# Patient Record
Sex: MALE | Race: WHITE | NOT HISPANIC OR LATINO
[De-identification: names, ages, dates, MRNs, and addresses within clinical notes are randomized per-mention and may not be internally consistent; named-entity substitution may affect disease eponyms.]

---

## 2017-02-01 ENCOUNTER — APPOINTMENT (OUTPATIENT)
Dept: ELECTROPHYSIOLOGY | Facility: CLINIC | Age: 82
End: 2017-02-01

## 2017-05-04 ENCOUNTER — APPOINTMENT (OUTPATIENT)
Dept: ELECTROPHYSIOLOGY | Facility: CLINIC | Age: 82
End: 2017-05-04

## 2017-05-04 DIAGNOSIS — I50.9 HEART FAILURE, UNSPECIFIED: ICD-10-CM

## 2017-11-21 ENCOUNTER — APPOINTMENT (OUTPATIENT)
Dept: ELECTROPHYSIOLOGY | Facility: CLINIC | Age: 82
End: 2017-11-21
Payer: MEDICARE

## 2017-11-21 PROCEDURE — 93295 DEV INTERROG REMOTE 1/2/MLT: CPT

## 2017-11-21 PROCEDURE — 93296 REM INTERROG EVL PM/IDS: CPT

## 2018-03-08 ENCOUNTER — APPOINTMENT (OUTPATIENT)
Dept: ELECTROPHYSIOLOGY | Facility: CLINIC | Age: 83
End: 2018-03-08

## 2018-06-24 ENCOUNTER — INPATIENT (INPATIENT)
Facility: HOSPITAL | Age: 83
LOS: 11 days | Discharge: NURSING, REHAB OR LONG-TERM CARE | End: 2018-07-06
Attending: INTERNAL MEDICINE | Admitting: INTERNAL MEDICINE
Payer: MEDICARE

## 2018-06-24 VITALS
DIASTOLIC BLOOD PRESSURE: 60 MMHG | SYSTOLIC BLOOD PRESSURE: 123 MMHG | HEART RATE: 73 BPM | RESPIRATION RATE: 18 BRPM | OXYGEN SATURATION: 97 % | TEMPERATURE: 98 F

## 2018-06-24 DIAGNOSIS — I95.9 HYPOTENSION, UNSPECIFIED: ICD-10-CM

## 2018-06-24 LAB
ALBUMIN SERPL ELPH-MCNC: 3.8 G/DL — SIGNIFICANT CHANGE UP (ref 3.3–5)
ALP SERPL-CCNC: 72 U/L — SIGNIFICANT CHANGE UP (ref 40–120)
ALT FLD-CCNC: 16 U/L — SIGNIFICANT CHANGE UP (ref 4–41)
APPEARANCE UR: CLEAR — SIGNIFICANT CHANGE UP
AST SERPL-CCNC: 22 U/L — SIGNIFICANT CHANGE UP (ref 4–40)
BASE EXCESS BLDV CALC-SCNC: 4 MMOL/L — SIGNIFICANT CHANGE UP
BASOPHILS # BLD AUTO: 0.3 K/UL — HIGH (ref 0–0.2)
BASOPHILS NFR BLD AUTO: 1.2 % — SIGNIFICANT CHANGE UP (ref 0–2)
BASOPHILS NFR SPEC: 0 % — SIGNIFICANT CHANGE UP (ref 0–2)
BILIRUB SERPL-MCNC: 0.6 MG/DL — SIGNIFICANT CHANGE UP (ref 0.2–1.2)
BILIRUB UR-MCNC: NEGATIVE — SIGNIFICANT CHANGE UP
BLOOD GAS VENOUS - CREATININE: 1.27 MG/DL — SIGNIFICANT CHANGE UP (ref 0.5–1.3)
BLOOD UR QL VISUAL: NEGATIVE — SIGNIFICANT CHANGE UP
BUN SERPL-MCNC: 25 MG/DL — HIGH (ref 7–23)
CALCIUM SERPL-MCNC: 8.7 MG/DL — SIGNIFICANT CHANGE UP (ref 8.4–10.5)
CHLORIDE BLDV-SCNC: 97 MMOL/L — SIGNIFICANT CHANGE UP (ref 96–108)
CHLORIDE SERPL-SCNC: 94 MMOL/L — LOW (ref 98–107)
CK MB BLD-MCNC: 2.48 NG/ML — SIGNIFICANT CHANGE UP (ref 1–6.6)
CK MB BLD-MCNC: SIGNIFICANT CHANGE UP (ref 0–2.5)
CK SERPL-CCNC: 27 U/L — LOW (ref 30–200)
CO2 SERPL-SCNC: 26 MMOL/L — SIGNIFICANT CHANGE UP (ref 22–31)
COLOR SPEC: YELLOW — SIGNIFICANT CHANGE UP
CREAT SERPL-MCNC: 1.28 MG/DL — SIGNIFICANT CHANGE UP (ref 0.5–1.3)
ELLIPTOCYTES BLD QL SMEAR: SIGNIFICANT CHANGE UP
EOSINOPHIL # BLD AUTO: 0.12 K/UL — SIGNIFICANT CHANGE UP (ref 0–0.5)
EOSINOPHIL NFR BLD AUTO: 0.5 % — SIGNIFICANT CHANGE UP (ref 0–6)
EOSINOPHIL NFR FLD: 0.9 % — SIGNIFICANT CHANGE UP (ref 0–6)
FERRITIN SERPL-MCNC: 327.1 NG/ML — SIGNIFICANT CHANGE UP (ref 30–400)
GAS PNL BLDV: 138 MMOL/L — SIGNIFICANT CHANGE UP (ref 136–146)
GIANT PLATELETS BLD QL SMEAR: PRESENT — SIGNIFICANT CHANGE UP
GLUCOSE BLDV-MCNC: 196 — HIGH (ref 70–99)
GLUCOSE SERPL-MCNC: 197 MG/DL — HIGH (ref 70–99)
GLUCOSE UR-MCNC: NEGATIVE — SIGNIFICANT CHANGE UP
HCO3 BLDV-SCNC: 26 MMOL/L — SIGNIFICANT CHANGE UP (ref 20–27)
HCT VFR BLD CALC: 33.7 % — LOW (ref 39–50)
HCT VFR BLDV CALC: 35.8 % — LOW (ref 39–51)
HGB BLD-MCNC: 10.9 G/DL — LOW (ref 13–17)
HGB BLDV-MCNC: 11.6 G/DL — LOW (ref 13–17)
HYALINE CASTS # UR AUTO: SIGNIFICANT CHANGE UP (ref 0–?)
IMM GRANULOCYTES # BLD AUTO: 0.05 # — SIGNIFICANT CHANGE UP
IMM GRANULOCYTES NFR BLD AUTO: 0.2 % — SIGNIFICANT CHANGE UP (ref 0–1.5)
IRON SATN MFR SERPL: 295 UG/DL — SIGNIFICANT CHANGE UP (ref 155–535)
IRON SATN MFR SERPL: 74 UG/DL — SIGNIFICANT CHANGE UP (ref 45–165)
KETONES UR-MCNC: NEGATIVE — SIGNIFICANT CHANGE UP
LACTATE BLDV-MCNC: 4.9 MMOL/L — CRITICAL HIGH (ref 0.5–2)
LEUKOCYTE ESTERASE UR-ACNC: NEGATIVE — SIGNIFICANT CHANGE UP
LYMPHOCYTES # BLD AUTO: 16.65 K/UL — HIGH (ref 1–3.3)
LYMPHOCYTES # BLD AUTO: 66.1 % — HIGH (ref 13–44)
LYMPHOCYTES NFR SPEC AUTO: 46.9 % — HIGH (ref 13–44)
MCHC RBC-ENTMCNC: 27.5 PG — SIGNIFICANT CHANGE UP (ref 27–34)
MCHC RBC-ENTMCNC: 32.3 % — SIGNIFICANT CHANGE UP (ref 32–36)
MCV RBC AUTO: 85.1 FL — SIGNIFICANT CHANGE UP (ref 80–100)
MONOCYTES # BLD AUTO: 4.36 K/UL — HIGH (ref 0–0.9)
MONOCYTES NFR BLD AUTO: 17.3 % — HIGH (ref 2–14)
MONOCYTES NFR BLD: 8 % — SIGNIFICANT CHANGE UP (ref 2–9)
MUCOUS THREADS # UR AUTO: SIGNIFICANT CHANGE UP
NEUTROPHIL AB SER-ACNC: 11.5 % — LOW (ref 43–77)
NEUTROPHILS # BLD AUTO: 3.71 K/UL — SIGNIFICANT CHANGE UP (ref 1.8–7.4)
NEUTROPHILS NFR BLD AUTO: 14.7 % — LOW (ref 43–77)
NITRITE UR-MCNC: NEGATIVE — SIGNIFICANT CHANGE UP
NRBC # FLD: 0 — SIGNIFICANT CHANGE UP
PCO2 BLDV: 54 MMHG — HIGH (ref 41–51)
PH BLDV: 7.35 PH — SIGNIFICANT CHANGE UP (ref 7.32–7.43)
PH UR: 6 — SIGNIFICANT CHANGE UP (ref 4.6–8)
PLATELET # BLD AUTO: 168 K/UL — SIGNIFICANT CHANGE UP (ref 150–400)
PLATELET COUNT - ESTIMATE: NORMAL — SIGNIFICANT CHANGE UP
PMV BLD: 10.1 FL — SIGNIFICANT CHANGE UP (ref 7–13)
PO2 BLDV: < 24 MMHG — LOW (ref 35–40)
POIKILOCYTOSIS BLD QL AUTO: SLIGHT — SIGNIFICANT CHANGE UP
POLYCHROMASIA BLD QL SMEAR: SIGNIFICANT CHANGE UP
POTASSIUM BLDV-SCNC: 3.7 MMOL/L — SIGNIFICANT CHANGE UP (ref 3.4–4.5)
POTASSIUM SERPL-MCNC: 4 MMOL/L — SIGNIFICANT CHANGE UP (ref 3.5–5.3)
POTASSIUM SERPL-SCNC: 4 MMOL/L — SIGNIFICANT CHANGE UP (ref 3.5–5.3)
PROT SERPL-MCNC: 7 G/DL — SIGNIFICANT CHANGE UP (ref 6–8.3)
PROT UR-MCNC: NEGATIVE MG/DL — SIGNIFICANT CHANGE UP
RBC # BLD: 3.96 M/UL — LOW (ref 4.2–5.8)
RBC # FLD: 15.3 % — HIGH (ref 10.3–14.5)
SAO2 % BLDV: 19.1 % — LOW (ref 60–85)
SCHISTOCYTES BLD QL AUTO: SLIGHT — SIGNIFICANT CHANGE UP
SMUDGE CELLS # BLD: PRESENT — SIGNIFICANT CHANGE UP
SODIUM SERPL-SCNC: 139 MMOL/L — SIGNIFICANT CHANGE UP (ref 135–145)
SP GR SPEC: 1.01 — SIGNIFICANT CHANGE UP (ref 1–1.04)
TROPONIN T, HIGH SENSITIVITY: 126 NG/L — CRITICAL HIGH (ref ?–14)
TROPONIN T, HIGH SENSITIVITY: 132 NG/L — CRITICAL HIGH (ref ?–14)
UIBC SERPL-MCNC: 221.1 UG/DL — SIGNIFICANT CHANGE UP (ref 110–370)
UROBILINOGEN FLD QL: NORMAL MG/DL — SIGNIFICANT CHANGE UP
VARIANT LYMPHS # BLD: 32.7 % — SIGNIFICANT CHANGE UP
WBC # BLD: 25.19 K/UL — HIGH (ref 3.8–10.5)
WBC # FLD AUTO: 25.19 K/UL — HIGH (ref 3.8–10.5)
WBC UR QL: SIGNIFICANT CHANGE UP (ref 0–?)

## 2018-06-24 PROCEDURE — 71046 X-RAY EXAM CHEST 2 VIEWS: CPT | Mod: 26

## 2018-06-24 PROCEDURE — 99223 1ST HOSP IP/OBS HIGH 75: CPT | Mod: GC

## 2018-06-24 RX ORDER — SODIUM CHLORIDE 9 MG/ML
500 INJECTION INTRAMUSCULAR; INTRAVENOUS; SUBCUTANEOUS ONCE
Qty: 0 | Refills: 0 | Status: COMPLETED | OUTPATIENT
Start: 2018-06-24 | End: 2018-06-24

## 2018-06-24 RX ORDER — FOLIC ACID 0.8 MG
1 TABLET ORAL DAILY
Qty: 0 | Refills: 0 | Status: DISCONTINUED | OUTPATIENT
Start: 2018-06-24 | End: 2018-07-06

## 2018-06-24 RX ORDER — CARVEDILOL PHOSPHATE 80 MG/1
6.25 CAPSULE, EXTENDED RELEASE ORAL EVERY 12 HOURS
Qty: 0 | Refills: 0 | Status: DISCONTINUED | OUTPATIENT
Start: 2018-06-24 | End: 2018-06-27

## 2018-06-24 RX ORDER — OMEGA-3 ACID ETHYL ESTERS 1 G
2 CAPSULE ORAL
Qty: 0 | Refills: 0 | Status: DISCONTINUED | OUTPATIENT
Start: 2018-06-24 | End: 2018-07-06

## 2018-06-24 RX ORDER — SODIUM CHLORIDE 9 MG/ML
1000 INJECTION INTRAMUSCULAR; INTRAVENOUS; SUBCUTANEOUS ONCE
Qty: 0 | Refills: 0 | Status: COMPLETED | OUTPATIENT
Start: 2018-06-24 | End: 2018-06-24

## 2018-06-24 RX ADMIN — SODIUM CHLORIDE 1000 MILLILITER(S): 9 INJECTION INTRAMUSCULAR; INTRAVENOUS; SUBCUTANEOUS at 18:18

## 2018-06-24 RX ADMIN — SODIUM CHLORIDE 500 MILLILITER(S): 9 INJECTION INTRAMUSCULAR; INTRAVENOUS; SUBCUTANEOUS at 16:55

## 2018-06-24 NOTE — H&P ADULT - PMH
Adult Onset Diabetes Mellitus,    ALMI (Anterolateral Wall Myoca    Asymptomatic Chronic Venous Hy    Heart failure    Personal History of Hypertensi    Personal History of Myocardial    S/P Implantation of Automatic

## 2018-06-24 NOTE — H&P ADULT - PROBLEM SELECTOR PLAN 8
Pt on Eliquis for UNKNOWN REASONS - family does not know.   -- on EKG, pt appears to be atrial sensed, ventricular paced with irregular rhythm - likely Afib. Will c/w eliquis. Check A1C   Insulin SS and premeal for now.  Lactic acidosis likely secondary to Metformin use, would recommend discontinuing prior to d/c home

## 2018-06-24 NOTE — H&P ADULT - HISTORY OF PRESENT ILLNESS
89 yo M with PMHx of MI (1980s, no stents), HFrEF (EF unknown) s/p AICD (BiV, [place] [facility] 2003), CVA with minimal residual LLE weakness, HTN, T2DM presenting with complaint of generalized weakness of 3-4 weeks and increasing fatigue. Today, per family, the patient appeared lethargic and not quite himself. Home bp measurement revealed his BP to be in 70s/40s. Per family, his bp has been in 70-80s systolic at home in the morning but improves during the day. Patient was seen by both PMD and cardiologist last week and advised to decrease the dose of some of his medications, however, patient has not done so. Additionally, patient was found to have leukocytosis without any fevers/signs of infection for the past 2-3 months. He was referred to hematologist last week and has appointment tomorrow. Patient denies any recent fevers, chills, nausea, vomiting, abdominal pain, headache, shortness of breath, chest pain. No recent travel. He has been ambulatory with a walker which he needs because of bad knees. Denies lower extremity edema.     In EMS, BP was 102/70. In ED, initial vitals were T97.9F, HR73, /60, RR 18 97% on supplement O2   Patient received 1.5L NS. 89 yo Male with Hx of MI (1980s, no stents), HFrEF (unknown current EF, EF from 2009 30% per the H&P from 6/17/2009) s/p AICD (BiV, [place] [facility] 2003), CVA with minimal residual LLE weakness, HTN, Type 2 DM presenting with complaint of generalized weakness of 3-4 weeks and increasing fatigue. Today, per family, the patient appeared lethargic and not quite himself. Home bp measurement revealed his BP to be in 70s/40s. Per family, his bp has been in 70-80s systolic at home in the morning but improves during the day. Patient was seen by both PMD and cardiologist last week and advised to decrease the dose of some of his medications, however, patient has not done so. Additionally, patient was found to have leukocytosis without any fevers/signs of infection for the past 2-3 months. He was referred to hematologist last week and has appointment tomorrow. Patient denies any recent fevers, chills, nausea, vomiting, abdominal pain, headache, shortness of breath, chest pain. No recent travel. He has been ambulatory with a walker which he needs because of bad knees. Denies lower extremity edema.     In EMS, BP was 102/70. In ED, initial vitals were T97.9F, HR73, /60, RR 18 97% on supplement O2   Patient received 1.5L NS.

## 2018-06-24 NOTE — CONSULT NOTE ADULT - ATTENDING COMMENTS
Resting comfortably. No signs of CHF or acute cardiac decompensation. Suspect type II MI in setting of hypotension. No plans for ischemic evaluation.

## 2018-06-24 NOTE — H&P ADULT - MUSCULOSKELETAL
detailed exam no joint warmth/no calf tenderness/diminished strength/no joint erythema/ROM intact/no joint swelling

## 2018-06-24 NOTE — ED PROVIDER NOTE - CHPI ED SYMPTOMS NEG
no chills/no decreased eating/drinking/no nausea/no dizziness/no numbness/no tingling/no vomiting/no pain/no fever

## 2018-06-24 NOTE — H&P ADULT - ASSESSMENT
88 yo M w pmhx of PMHx of MI (1980s, no stents), HFrEF (EF unknown) s/p AICD (BiV, [place] [facility] 2003), CVA, HTN, T2DM presenting w 4 weeks of weakness found to have persistent leukocytosis in addition to normocytic anemia admitted for hypotension responsive to fluids 89 yo Male with Hx of MI (1980s, no stents), HFrEF (unknown current EF, EF from 2009 30% per the H&P from 6/17/2009) s/p AICD (BiV, [place] [facility] 2003), CVA with minimal residual LLE weakness, HTN, Type 2 DM p/w 4 weeks of generalized weakness, found to have persistent leukocytosis in addition to normocytic anemia admitted for hypotension responsive to fluids in the context of heavy BP control regimen; 89 yo Male with Hx of MI (1980s, no stents), HFrEF (unknown current EF, EF from 2009 30% per the H&P from 6/17/2009) s/p AICD (BiV, [place] [facility] 2003), CVA with minimal residual LLE weakness, HTN, Type 2 DM a/w multifactorial generalized weakness in the context of persistent lymphocytosis, normocytic anemia, hypotension likely 2/2 heavy BP control regimen and possible side-effect of high dose Coreg; 88 yo Male with Hx of MI (1980s, no stents), HFrEF (unknown current EF, EF from 2009 30% per the H&P from 6/17/2009) s/p AICD (BiV, [place] [facility] 2003), CVA with minimal residual LLE weakness, HTN, Type 2 DM a/w multifactorial generalized weakness in the context of persistent lymphocytosis, normocytic anemia, hypotension likely 2/2 heavy BP control regimen and possible side-effect of high dose Coreg; Lymphocytic predominance concerning for CLL given smudge cells vs  lymphoma;

## 2018-06-24 NOTE — ED PROVIDER NOTE - PMH
Abnormal Metabolic State in Di    Abnormal Metabolic State in Di    Abnormal Metabolic State in Di    Acute on Chronic Systolic Hear    Adult Onset Diabetes Mellitus,    ALMI (Anterolateral Wall Myoca    Asymptomatic Chronic Venous Hy    Personal History of Hypertensi    Personal History of Myocardial    S/P Implantation of Automatic

## 2018-06-24 NOTE — H&P ADULT - PROBLEM SELECTOR PLAN 4
Pt not currently in HF exacerbation - lungs clear, no JVP elevation, pt tolerated 1.5L fluids without issues.   Cards consult appreciated, will start coreg at 6.25 bid. Add on ARB when tolerated. Pt not currently in HF exacerbation - lungs clear, no JVP elevation, pt tolerated 1.5L fluids without issues.   Cards consult appreciated, will start coreg at 6.25 bid. Add on ARB when tolerated.  TTE in am   ICD interrogation

## 2018-06-24 NOTE — H&P ADULT - PROBLEM SELECTOR PLAN 3
Unclear etiology, has a lymphocytic predominance concerning for lymphoma. LN not palpable on exam.   Fu blood and urine cultures  Heme consult in am for possible bm biopsy Unclear etiology, has a lymphocytic predominance concerning for lymphoma vs CLL . LN not palpable on exam.   Fu blood and urine cultures  Heme consult in am for possible bm biopsy Unclear etiology, has a lymphocytic predominance concerning for lymphoma vs CLL . LN not palpable on exam.   Fu blood and urine cultures  Heme consult in am for possible BM biopsy Unclear etiology, has a lymphocytic predominance concerning for CLL given smudge cells vs  lymphoma; LN not palpable on exam.   f/u blood and urine cultures  Heme consult in am for flow cytometry

## 2018-06-24 NOTE — ED ADULT NURSE NOTE - OBJECTIVE STATEMENT
pt rec;d in 22, A&Ox3, c/o generalized weakness to both legs at baseline, progressively getting worse. Pt's family states his blood pressure was low earlier today and he nearly passed out, denies LOC, denies fevers/chills, denies chest pain/SOB. Labs sent, NS infusing, EKG in progress

## 2018-06-24 NOTE — H&P ADULT - NSHPPHYSICALEXAM_GEN_ALL_CORE
Vital Signs Last 24 Hrs  T(C): 36.8 (06-24-18 @ 22:46), Max: 37 (06-24-18 @ 17:47)  T(F): 98.2 (06-24-18 @ 22:46), Max: 98.6 (06-24-18 @ 17:47)  HR: 80 (06-24-18 @ 22:46) (70 - 80)  BP: 159/72 (06-24-18 @ 22:46) (123/60 - 159/72)  BP(mean): --  RR: 16 (06-24-18 @ 22:46) (16 - 18)  SpO2: 100% (06-24-18 @ 22:46) (97% - 100%)    GENERAL: NAD  HEAD: Head atraumatic, normocephalic  ENT: EOMI, L eye hazy opacity, partial blindness, conjunctiva and sclera clear, neck supple, no JVD, no erythema or exudates in the oropharynx   CHEST/LUNG: Clear to auscultation bilaterally, no crackles, rhonchi or wheezing   HEART: Regular rate and rhythm, no murmurs, rubs, or gallops  ABDOMEN: Soft, nontender, nondistended, normal bowel sounds   EXTREMITIES:  2+ Peripheral pulses, no clubbing, cyanosis, or edema  PSYCH: AAOx3  NEUROLOGY: No focal deficits -- LLE does not feel weak compared to RLE   SKIN: 2 lesions, 1cm each on L side of the forehead that are hyperpigmented macules appearing stuck on Vital Signs Last 24 Hrs  T(C): 36.8 (06-24-18 @ 22:46), Max: 37 (06-24-18 @ 17:47)  T(F): 98.2 (06-24-18 @ 22:46), Max: 98.6 (06-24-18 @ 17:47)  HR: 80 (06-24-18 @ 22:46) (70 - 80)  BP: 159/72 (06-24-18 @ 22:46) (123/60 - 159/72)  BP(mean): --  RR: 16 (06-24-18 @ 22:46) (16 - 18)  SpO2: 100% (06-24-18 @ 22:46) (97% - 100%)    GENERAL: NAD  ENT: EOMI, L eye hazy opacity, partial blindness, conjunctiva and sclera clear, neck supple, no JVD, no erythema or exudates in the oropharynx   CHEST/LUNG: Clear to auscultation bilaterally, no crackles, rhonchi or wheezing   HEART: Regular rate and rhythm, no murmurs, rubs, or gallops  ABDOMEN: Soft, nontender, nondistended, normal bowel sounds   EXTREMITIES:  2+ Peripheral pulses, no clubbing, cyanosis, or edema  NEUROLOGY: No focal deficits -- LLE does not feel weak compared to RLE   SKIN: 2 lesions, 1cm each on L side of the forehead that are hyperpigmented macules appearing stuck on

## 2018-06-24 NOTE — H&P ADULT - PROBLEM SELECTOR PLAN 1
Patient on multiple heart failure medications that are blood pressure lowering including both Ace+ ARB, high dose coreg and hydralazine.   Hypotension likely due to intolerance of high dose meds that patient takes all at once in the am, it was responsive to fluids, now bp stable.   Monitor vitals q4h  Hold antihypertensives, restart slowly as tolerated. Will discuss w pt's cardiologist re: both Ace/ARB- only start one in hospital. Patient on multiple heart failure medications that are blood pressure lowering including both Ace+ ARB, high dose Coreg and Hydralazine.   Hypotension likely due to intolerance of high dose meds that patient takes all at once in the am, it was responsive to fluids, now bp stable.   Monitor vitals q4h  Hold antihypertensives, restart slowly as tolerated. Will discuss w pt's cardiologist re: both Ace/ARB- only start one in hospital.

## 2018-06-24 NOTE — CONSULT NOTE ADULT - ASSESSMENT
89M with PMH of acute on chronic systolic HF, AICD ([place] [facility]), AWSTEMI, DM,HTN presents with generalized weakness 89M with PMH of acute on chronic systolic HF, AICD ([place] [facility]), AWSTEMI, DM,HTN presents with generalized weakness. In ER, troponin T 134, indicative of Type II MI. Plan: Patient with evidence of Type II MI and would benefit from a low dose aspirin. Would not treat for ACS at this time, there is no active chest pain. EKG shows ventricular pacing.  Admit to tele and repeat troponin T and trend cardiac enzymes. It is unlikely that patient is in acute heart failure, he tolerated 1.5 liters of fluid in the ER and has no SOB or  MCC[place]OUGH.  CXR is clear. BP is currently 130/60, pacing, HR is in the  60s.  On physical exam, there is no edema, lungs are clear. Patient skin is cool and dry on exam, possibly low output state, given the lactic acid of 4.9. ECHO in the am and AICD interrogation ([place] [facility]). Patient is currently taking large doses of cardiac medications and diuretic therapy. At home, family reports BP was soft, systolic . Would initiate low dose coreg of 6.25. Hold ARB and hold lasix for today.  Hold hydralazine for now. Continue lipitor 40mg po qhs. This plan has been discussed with . Cardiology will continue to follow.

## 2018-06-24 NOTE — H&P ADULT - NSHPREVIEWOFSYSTEMS_GEN_ALL_CORE
General: No fevers, chills +weakness   HEENT: No headaches, dysphagia, changes in vision, hoarseness, nasal congestion  CVS: No chest pain, palpitations   Resp: No shortness of breath, wheezing   GI: No abdominal pain, nausea, vomiting, changes in bowel habits  Renal: No dysuria, hematuria   Ext: No edema, no claudication   Skin: No rashes or itching   Neuro: No confusion, +LLE weakness  Endocrine: No excessive heat or cold symptoms General: No fevers, chills, +weakness   HEENT: No headaches, dysphagia, changes in vision, hoarseness, nasal congestion  CVS: No chest pain, palpitations   Resp: No shortness of breath, wheezing   GI: No abdominal pain, nausea, vomiting, changes in bowel habits  Renal: No dysuria, hematuria   Ext: No edema, no claudication   Skin: No rashes or itching   Neuro: No confusion, +LLE weakness  Endocrine: No excessive heat or cold symptoms

## 2018-06-24 NOTE — H&P ADULT - PROBLEM SELECTOR PLAN 7
Pt on Eliquis for UNKNOWN REASONS - family does not know.   Hold Eliquis  Start lovenox in am for DVT ppx Check A1C   Insulin SS and premeal for now.  Lactic acidosis likely secondary to Metformin use, would recommend discontinuing at home Patient's medication list provided by the daughter, entered by Pharmacy   However, both Ace and ARB are listed as current meds, pt also on Eliquis for unclear reason, on multiple high dose anti hypertensives  Medication list needs to be clarified with PMD by the day team Patient's medication list provided by the daughter, entered by Pharmacy   However, both ACEI and ARB are listed as current meds, pt also on Eliquis for unclear reason, on multiple high dose anti hypertensives  -Medication list needs to be clarified with PMD by the day team

## 2018-06-24 NOTE — ED PROVIDER NOTE - MEDICAL DECISION MAKING DETAILS
89 y/o male w/ generalized weakness. will evaluate for electorlyte vs infectous etiology. plan for xray chest, labs, ua, IVF. BP wnl. will have patient dispo to home if labs wnl, patient has appt w/hematologist tomorrow

## 2018-06-24 NOTE — ED PROVIDER NOTE - OBJECTIVE STATEMENT
DM, CVA w/residual LLE weakness, CAD s/p AICD p/w generalized weakness. Pt alert and oriented at bedside, daughter and caretaker also at bedside to provide history. Over the past 1day patient has been noted to have lower than usual blood pressure and more tired appearing today. Patient usually have lower BP's in the AM (90's SBP). Patient denies chest pain, shortness of breath, no diarrhea fever or  symptoms. Follows up with PMD and was reported to have a WBC 26, patient to follow up with hematologist tomorrow. Family also reports recent changes in diabetes medication. ON ROS, patient has no other complaints.

## 2018-06-24 NOTE — H&P ADULT - PROBLEM SELECTOR PLAN 9
Pt on Eliquis for UNKNOWN REASONS - family does not know.   -- on EKG, pt appears to be atrial sensed, ventricular paced with irregular rhythm - likely Afib. Will c/w eliquis.

## 2018-06-24 NOTE — H&P ADULT - NSHPLABSRESULTS_GEN_ALL_CORE
LABS:                        10.9   . )-----------( 168      ( 2018 17:00 )             33.7     -    139  |  94<L>  |  25<H>  ----------------------------<  197<H>  4.0   |  26  |  1.28    Ca    8.7      2018 16:53    TPro  7.0  /  Alb  3.8  /  TBili  0.6  /  DBili  x   /  AST  22  /  ALT  16  /  AlkPhos  72          CARDIAC MARKERS ( 2018 18:17 )  x     / x     / 27 u/L / 2.48 ng/mL / x          Urinalysis Basic - ( 2018 19:33 )    Color: YELLOW / Appearance: CLEAR / S.009 / pH: 6.0  Gluc: NEGATIVE / Ketone: NEGATIVE  / Bili: NEGATIVE / Urobili: NORMAL mg/dL   Blood: NEGATIVE / Protein: NEGATIVE mg/dL / Nitrite: NEGATIVE   Leuk Esterase: NEGATIVE / RBC: x / WBC 0-2   Sq Epi: x / Non Sq Epi: x / Bacteria: x      RADIOLOGY & ADDITIONAL TESTS:    Imaging Personally Reviewed:  < from: Xray Chest 2 Views PA/Lat (18 @ 18:00) >        INTERPRETATION:  no emergent findings    Consultant(s) Notes Reviewed:  Cardiology    EKG: V paced LABS:                        10.9   . )-----------( 168      ( 2018 17:00 )             33.7     -    139  |  94<L>  |  25<H>  ----------------------------<  197<H>  4.0   |  26  |  1.28    Ca    8.7      2018 16:53    TPro  7.0  /  Alb  3.8  /  TBili  0.6  /  DBili  x   /  AST  22  /  ALT  16  /  AlkPhos  72          CARDIAC MARKERS ( 2018 18:17 )  x     / x     / 27 u/L / 2.48 ng/mL / x          Urinalysis Basic - ( 2018 19:33 )    Color: YELLOW / Appearance: CLEAR / S.009 / pH: 6.0  Gluc: NEGATIVE / Ketone: NEGATIVE  / Bili: NEGATIVE / Urobili: NORMAL mg/dL   Blood: NEGATIVE / Protein: NEGATIVE mg/dL / Nitrite: NEGATIVE   Leuk Esterase: NEGATIVE / RBC: x / WBC 0-2   Sq Epi: x / Non Sq Epi: x / Bacteria: x      RADIOLOGY & ADDITIONAL TESTS:    Imaging Personally Reviewed:  < from: Xray Chest 2 Views PA/Lat (18 @ 18:00) >        INTERPRETATION:  no emergent findings    Consultant(s) Notes Reviewed:  Cardiology    EKG: V paced -- appears to be atrial sensed, v paced as irregular. CXR - clear lungs, no pleural effusions, AICD, left sided - my reading     EKG: V paced -- appears to be atrial sensed, v paced as irregular.    TPro  7.0  /  Alb  3.8  /  TBili  0.6  /  DBili  x   /  AST  22  /  ALT  16  /  AlkPhos  72  06-24        CARDIAC MARKERS ( 2018 18:17 )  x     / x     / 27 u/L / 2.48 ng/mL / x          Urinalysis Basic - ( 2018 19:33 )    Color: YELLOW / Appearance: CLEAR / S.009 / pH: 6.0  Gluc: NEGATIVE / Ketone: NEGATIVE  / Bili: NEGATIVE / Urobili: NORMAL mg/dL   Blood: NEGATIVE / Protein: NEGATIVE mg/dL / Nitrite: NEGATIVE   Leuk Esterase: NEGATIVE / RBC: x / WBC 0-2   Sq Epi: x / Non Sq Epi: x / Bacteria: x CXR - clear lungs, no pleural effusions, AICD, left sided - my reading     EKG: V paced, appears to be atrial sensed, v paced as irregular - my reading    TPro  7.0  /  Alb  3.8  /  TBili  0.6  /  DBili  x   /  AST  22  /  ALT  16  /  AlkPhos  72  06-24        CARDIAC MARKERS ( 2018 18:17 )  x     / x     / 27 u/L / 2.48 ng/mL / x          Urinalysis Basic - ( 2018 19:33 )    Color: YELLOW / Appearance: CLEAR / S.009 / pH: 6.0  Gluc: NEGATIVE / Ketone: NEGATIVE  / Bili: NEGATIVE / Urobili: NORMAL mg/dL   Blood: NEGATIVE / Protein: NEGATIVE mg/dL / Nitrite: NEGATIVE   Leuk Esterase: NEGATIVE / RBC: x / WBC 0-2   Sq Epi: x / Non Sq Epi: x / Bacteria: x

## 2018-06-24 NOTE — H&P ADULT - PROBLEM SELECTOR PLAN 2
hsTrp elevated, trending down.   Cards consult appreciated, appears to be Type II MI in the setting of hypotension and poor perfusion   Monitor on tele hsTrp elevated, trending down. Doubt acute ACS given low CKMB and no clinical finding c/w ACS (no CP, palpitations, SOB);  Possible Type II MI in the setting of hypotension  Cards consult in ED, recs reviewed   Monitor on tele  Trend CE  Repeat screening EKG unchanged

## 2018-06-24 NOTE — H&P ADULT - PROBLEM SELECTOR PLAN 5
Normocytic anemia, potentially contributing to weakness  F/u iron studies, B12, folate, TSH Normocytic anemia, potentially contributing to weakness  F/u iron studies, B12, folate, TSH  Check LDH, Haptoglobin

## 2018-06-24 NOTE — H&P ADULT - PROBLEM SELECTOR PLAN 6
Check A1C   Insulin SS and premeal for now. Patient's medication list provided by the daughter, entered by Pharmacy   However, both Ace and ARB are listed as current meds, pt also on Eliquis for unclear reason, on multiple high dose anti hypertensives  Medication list needs to be clarified with PMD by the day team Lactic acidosis likely secondary to Metformin use Lactic acidosis likely multifactorial secondary to Metformin use and possible episodes of hypotension due to BP medication non-intentional overdose;   -Hold Metformin   -Recheck Lactate in am

## 2018-06-24 NOTE — ED PROVIDER NOTE - ATTENDING CONTRIBUTION TO CARE
I performed a face to face history and physical exam of the patient and discussed their management with the resident. I reviewed the resident's note and agree with the documented findings and plan of care. 88 y/o male BIB daughter and HHA for near syncopal episode today, as per patient he complains of b/l knee pain, however they noted he looked like he was going to pass out, no CP, no SOB, +weakness generalized, has had low BP for several weeks, today had BP of 69/40, pt had Lasix from 80 to 60 mg for one week, 1)EKG 2)tele 3)CBC, CMP, VBG, CE, 4)CXR 5)IVF 6)Cardiology

## 2018-06-25 DIAGNOSIS — R74.8 ABNORMAL LEVELS OF OTHER SERUM ENZYMES: ICD-10-CM

## 2018-06-25 DIAGNOSIS — D64.9 ANEMIA, UNSPECIFIED: ICD-10-CM

## 2018-06-25 DIAGNOSIS — I95.9 HYPOTENSION, UNSPECIFIED: ICD-10-CM

## 2018-06-25 DIAGNOSIS — Z29.9 ENCOUNTER FOR PROPHYLACTIC MEASURES, UNSPECIFIED: ICD-10-CM

## 2018-06-25 DIAGNOSIS — Z79.899 OTHER LONG TERM (CURRENT) DRUG THERAPY: ICD-10-CM

## 2018-06-25 DIAGNOSIS — I50.9 HEART FAILURE, UNSPECIFIED: ICD-10-CM

## 2018-06-25 DIAGNOSIS — E11.9 TYPE 2 DIABETES MELLITUS WITHOUT COMPLICATIONS: ICD-10-CM

## 2018-06-25 DIAGNOSIS — D72.829 ELEVATED WHITE BLOOD CELL COUNT, UNSPECIFIED: ICD-10-CM

## 2018-06-25 DIAGNOSIS — R79.89 OTHER SPECIFIED ABNORMAL FINDINGS OF BLOOD CHEMISTRY: ICD-10-CM

## 2018-06-25 LAB
BASOPHILS # BLD AUTO: 0.28 K/UL — HIGH (ref 0–0.2)
BASOPHILS NFR BLD AUTO: 1.1 % — SIGNIFICANT CHANGE UP (ref 0–2)
CORTIS SERPL-MCNC: 12.9 UG/DL — SIGNIFICANT CHANGE UP (ref 2.7–18.4)
EOSINOPHIL # BLD AUTO: 0.18 K/UL — SIGNIFICANT CHANGE UP (ref 0–0.5)
EOSINOPHIL NFR BLD AUTO: 0.7 % — SIGNIFICANT CHANGE UP (ref 0–6)
FOLATE SERPL-MCNC: 12.2 NG/ML — SIGNIFICANT CHANGE UP (ref 4.7–20)
GLUCOSE BLDC GLUCOMTR-MCNC: 101 MG/DL — HIGH (ref 70–99)
GLUCOSE BLDC GLUCOMTR-MCNC: 204 MG/DL — HIGH (ref 70–99)
GLUCOSE BLDC GLUCOMTR-MCNC: 205 MG/DL — HIGH (ref 70–99)
GLUCOSE BLDC GLUCOMTR-MCNC: 240 MG/DL — HIGH (ref 70–99)
HBA1C BLD-MCNC: 5.6 % — SIGNIFICANT CHANGE UP (ref 4–5.6)
HCT VFR BLD CALC: 29.6 % — LOW (ref 39–50)
HGB BLD-MCNC: 9.4 G/DL — LOW (ref 13–17)
IMM GRANULOCYTES # BLD AUTO: 0.03 # — SIGNIFICANT CHANGE UP
IMM GRANULOCYTES NFR BLD AUTO: 0.1 % — SIGNIFICANT CHANGE UP (ref 0–1.5)
LACTATE SERPL-SCNC: 0.9 MMOL/L — SIGNIFICANT CHANGE UP (ref 0.5–2)
LYMPHOCYTES # BLD AUTO: 18.69 K/UL — HIGH (ref 1–3.3)
LYMPHOCYTES # BLD AUTO: 71.8 % — HIGH (ref 13–44)
MCHC RBC-ENTMCNC: 28.1 PG — SIGNIFICANT CHANGE UP (ref 27–34)
MCHC RBC-ENTMCNC: 31.8 % — LOW (ref 32–36)
MCV RBC AUTO: 88.6 FL — SIGNIFICANT CHANGE UP (ref 80–100)
MONOCYTES # BLD AUTO: 3.98 K/UL — HIGH (ref 0–0.9)
MONOCYTES NFR BLD AUTO: 15.3 % — HIGH (ref 2–14)
NEUTROPHILS # BLD AUTO: 2.87 K/UL — SIGNIFICANT CHANGE UP (ref 1.8–7.4)
NEUTROPHILS NFR BLD AUTO: 11 % — LOW (ref 43–77)
NRBC # FLD: 0 — SIGNIFICANT CHANGE UP
NT-PROBNP SERPL-SCNC: 5187 PG/ML — SIGNIFICANT CHANGE UP
PLATELET # BLD AUTO: 151 K/UL — SIGNIFICANT CHANGE UP (ref 150–400)
PMV BLD: 10.3 FL — SIGNIFICANT CHANGE UP (ref 7–13)
RBC # BLD: 3.34 M/UL — LOW (ref 4.2–5.8)
RBC # FLD: 15.1 % — HIGH (ref 10.3–14.5)
TSH SERPL-MCNC: 0.86 UIU/ML — SIGNIFICANT CHANGE UP (ref 0.27–4.2)
VIT B12 SERPL-MCNC: 1514 PG/ML — HIGH (ref 200–900)
WBC # BLD: 26.03 K/UL — HIGH (ref 3.8–10.5)
WBC # FLD AUTO: 26.03 K/UL — HIGH (ref 3.8–10.5)

## 2018-06-25 PROCEDURE — 93284 PRGRMG EVAL IMPLANTABLE DFB: CPT | Mod: 26

## 2018-06-25 PROCEDURE — 99233 SBSQ HOSP IP/OBS HIGH 50: CPT

## 2018-06-25 PROCEDURE — 99222 1ST HOSP IP/OBS MODERATE 55: CPT

## 2018-06-25 PROCEDURE — 73030 X-RAY EXAM OF SHOULDER: CPT | Mod: 26,RT

## 2018-06-25 RX ORDER — INSULIN LISPRO 100/ML
VIAL (ML) SUBCUTANEOUS AT BEDTIME
Qty: 0 | Refills: 0 | Status: DISCONTINUED | OUTPATIENT
Start: 2018-06-25 | End: 2018-07-06

## 2018-06-25 RX ORDER — DEXTROSE 50 % IN WATER 50 %
12.5 SYRINGE (ML) INTRAVENOUS ONCE
Qty: 0 | Refills: 0 | Status: DISCONTINUED | OUTPATIENT
Start: 2018-06-25 | End: 2018-07-06

## 2018-06-25 RX ORDER — ACETAMINOPHEN 500 MG
650 TABLET ORAL EVERY 6 HOURS
Qty: 0 | Refills: 0 | Status: DISCONTINUED | OUTPATIENT
Start: 2018-06-25 | End: 2018-07-06

## 2018-06-25 RX ORDER — INSULIN LISPRO 100/ML
VIAL (ML) SUBCUTANEOUS
Qty: 0 | Refills: 0 | Status: DISCONTINUED | OUTPATIENT
Start: 2018-06-25 | End: 2018-07-06

## 2018-06-25 RX ORDER — APIXABAN 2.5 MG/1
2.5 TABLET, FILM COATED ORAL EVERY 12 HOURS
Qty: 0 | Refills: 0 | Status: DISCONTINUED | OUTPATIENT
Start: 2018-06-25 | End: 2018-07-06

## 2018-06-25 RX ADMIN — CARVEDILOL PHOSPHATE 6.25 MILLIGRAM(S): 80 CAPSULE, EXTENDED RELEASE ORAL at 13:02

## 2018-06-25 RX ADMIN — CARVEDILOL PHOSPHATE 6.25 MILLIGRAM(S): 80 CAPSULE, EXTENDED RELEASE ORAL at 01:49

## 2018-06-25 RX ADMIN — APIXABAN 2.5 MILLIGRAM(S): 2.5 TABLET, FILM COATED ORAL at 07:27

## 2018-06-25 RX ADMIN — Medication 2: at 17:51

## 2018-06-25 RX ADMIN — Medication 2 GRAM(S): at 07:27

## 2018-06-25 RX ADMIN — Medication 2 GRAM(S): at 21:09

## 2018-06-25 RX ADMIN — Medication 1 MILLIGRAM(S): at 13:01

## 2018-06-25 RX ADMIN — Medication 2: at 13:01

## 2018-06-25 RX ADMIN — APIXABAN 2.5 MILLIGRAM(S): 2.5 TABLET, FILM COATED ORAL at 21:09

## 2018-06-25 NOTE — PROGRESS NOTE ADULT - ASSESSMENT
88 yo M HFrEF s/p AICD, prior CVA with minimal residual LLE weakness, HTN, DM2 p/w generalized weakness, possibly secondary to hypotension from heavy BP control regimen, noted to have anemia and lymphocytosis, concerning for possible CLL.     # Hypotension likely 2/2 medications  - Patient on multiple BP lowering medications including possibly both ace-inhibitor and ARB, high dose coreg and hydralazine as well as diuretic  - BP improving  - c/w coreg, slowly reintroduce other BP meds as tolerated, will not prescribe both ace-i and arb at same time  - fall precautions, PT consult    # Elevated hs-troponin  - appreciate cardiology input  - doubt ACS, patient denies CP/SOB, likely type 2 MI in setting of hypotension  - no need for ischemic evaluation as per cardiology    # Leukocytosis, concern for CLL  - smudge cells noted, concern for CLL, d/w heme fellow  - patient was supposed to followup with Dr. Abad's office, contacted office at [phone], d/w staff there who reports Dr. Abad has not seen the patient yet (patient no-showed three appointments to establish care), left message for him to see if there is any workup he wishes patient to initiate here in the hospital   - if he is unable to come to hospital, or declines as he has not seen patient yet, will defer to house hematology team for workup    # Chronic HFrEF  - appears euvolemic  - c/w coreg, add on ARB when tolerated  - EP interrogation appreciated: no high ventricular rate events, few episodes of very high transient atrial rate events, normal sensing and pacing, excellent threshold capture, no reprogramming  - echo pending    # Anemia (normocytic)  - monitor hgb. transfuse prn goal hgb >7    # lactic acidosis  - possibly secondary to hypotension and metformin use  - hold metformin  - s/p IV fluids  - lactate improved    # DM2  - hold metformin, and other oral agents  - c/w HISS, monitor FS, goal <180    # Medication management  - medication list provided by daughter, entered by pharmacy  - f/u PMD re: medication list to clarify indications for meds, avoid concurrent ace-i/ARB administration    # DVT ppx: on eliquis ?for afib    Dispo: pending medical optimization, workup of possible CLL (vs otpt f/u), PT recs rehab 88 yo M HFrEF s/p AICD, prior CVA with minimal residual LLE weakness, HTN, DM2 p/w generalized weakness, possibly secondary to hypotension from heavy BP control regimen, noted to have anemia and lymphocytosis, concerning for possible CLL.     # Hypotension likely 2/2 medications  - Patient on multiple BP lowering medications including possibly both ace-inhibitor and ARB, high dose coreg and hydralazine as well as diuretic  - BP improving  - c/w coreg, slowly reintroduce other BP meds as tolerated, will not prescribe both ace-i and arb at same time  - fall precautions, PT consult    # Right shoulder disclocation  - superior right shoulder dislocation noted on Xray  - appreciate ortho assistance...     # Elevated hs-troponin  - appreciate cardiology input  - doubt ACS, patient denies CP/SOB, likely type 2 MI in setting of hypotension  - no need for ischemic evaluation as per cardiology    # Leukocytosis, concern for CLL  - smudge cells noted, concern for CLL, d/w heme fellow  - patient was supposed to followup with Dr. Abad's office, contacted office at [phone], d/w staff there who reports Dr. Abad has not seen the patient yet (patient no-showed three appointments to establish care), left message for him to see if there is any workup he wishes patient to initiate here in the hospital   - if he is unable to come to hospital, or declines as he has not seen patient yet, will defer to house hematology team for workup    # Chronic HFrEF  - appears euvolemic  - c/w coreg, add on ARB when tolerated  - EP interrogation appreciated: no high ventricular rate events, few episodes of very high transient atrial rate events, normal sensing and pacing, excellent threshold capture, no reprogramming  - echo pending    # Anemia (normocytic)  - monitor hgb. transfuse prn goal hgb >7    # lactic acidosis  - possibly secondary to hypotension and metformin use  - hold metformin  - s/p IV fluids  - lactate improved    # DM2  - hold metformin, and other oral agents  - c/w HISS, monitor FS, goal <180    # Medication management  - medication list provided by daughter, entered by pharmacy  - f/u PMD re: medication list to clarify indications for meds, avoid concurrent ace-i/ARB administration    # DVT ppx: on eliquis ?for afib    Dispo: pending medical optimization, workup of possible CLL (vs otpt f/u), PT recs rehab

## 2018-06-25 NOTE — CHART NOTE - NSCHARTNOTEFT_GEN_A_CORE
ELECTROPHYSIOLOGY  Device Interrogation Performed                                  Date/Time: 6/25/2018  16:00  :       [employer] CRT-D                    Model:          Cognis                          Mode:      DDD                       Rate:     60    Atrial Lead:  P wave amplitude:                          mv          Impedence:                   Ohms      Threshold:                V@             ms      Ventricular Lead(s):  RV Lead: R wave amplitude:                          mv          Impedence:                   Ohms      Threshold:                V@             ms   LV Lead:  R wave amplitude:                          mv          Impedence:                   Ohms      Threshold:                V@             ms     Battery Status:                     Good             Underlying Rhythm:    Sinus rhythm 60    Events/Observation: No high ventricular rate events;  few episodes of  very transient high atrial rate events  one 10 beats PAT with RVR     Impression/Plan:  p/w hypotension; Advised to follow up in device clinic  ([id number]) after discharge for routine device check up (last follow up in 2016)   Normal sensing and pacing via iterative testing.  Excellent threshold capture.  No reprogramming.

## 2018-06-25 NOTE — PROGRESS NOTE ADULT - SUBJECTIVE AND OBJECTIVE BOX
Patient is a 89y old  Male who presents with a chief complaint of Weakness (24 Jun 2018 23:07)    INTERVAL HPI/OVERNIGHT EVENTS:  Denies pain/chills. States feeling much better, denies lightheadedness or weakness. Reports he lives w/ his daughter and son in law. Would like to go home. Discussed with him importance of medication adjustment/avoiding hypotension, need to ensure safety of ambulation (PT eval), as well as followup/investigation of leukocytosis (concern for CLL). 	    MEDICATIONS  (STANDING):  apixaban 2.5 milliGRAM(s) Oral every 12 hours  carvedilol 6.25 milliGRAM(s) Oral every 12 hours  dextrose 50% Injectable 12.5 Gram(s) IV Push once  folic acid 1 milliGRAM(s) Oral daily  insulin lispro (HumaLOG) corrective regimen sliding scale   SubCutaneous three times a day before meals  insulin lispro (HumaLOG) corrective regimen sliding scale   SubCutaneous at bedtime  omega-3-Acid Ethyl Esters 2 Gram(s) Oral two times a day    MEDICATIONS  (PRN):    Allergies    Cipro (Other)  fluoroquinolone antibiotics (Unknown)  latex (Unknown)    Intolerances    REVIEW OF SYSTEMS:  Please see interval HPI:    Vital Signs Last 24 Hrs  T(C): 36.3 (25 Jun 2018 10:41), Max: 37 (24 Jun 2018 17:47)  T(F): 97.4 (25 Jun 2018 10:41), Max: 98.6 (24 Jun 2018 17:47)  HR: 82 (25 Jun 2018 14:10) (70 - 90)  BP: 104/62 (25 Jun 2018 14:10) (104/62 - 178/73)  BP(mean): --  RR: 18 (25 Jun 2018 14:10) (16 - 18)  SpO2: 99% (25 Jun 2018 14:10) (98% - 100%)  I&O's Detail    24 Jun 2018 07:01  -  25 Jun 2018 07:00  --------------------------------------------------------  IN:  Total IN: 0 mL    OUT:    Voided: 200 mL  Total OUT: 200 mL    Total NET: -200 mL    PHYSICAL EXAM:  GENERAL: NAD, lying in bed  HEAD:  NC/AT  EYES: EOMI, clear sclera/conjunctiva  ENMT: MMM  NECK: supple  NERVOUS SYSTEM: non-focal   CHEST/LUNG: CTAB  HEART: S1S2 RRR  ABDOMEN: soft, non-tender  EXTREMITIES:  no c/c/e      LABS:                        9.4    26.03 )-----------( 151      ( 25 Jun 2018 09:08 )             29.6     24 Jun 2018 16:53    139    |  94     |  25     ----------------------------<  197    4.0     |  26     |  1.28     Ca    8.7        24 Jun 2018 16:53    TPro  7.0    /  Alb  3.8    /  TBili  0.6    /  DBili  x      /  AST  22     /  ALT  16     /  AlkPhos  72     24 Jun 2018 16:53      CAPILLARY BLOOD GLUCOSE  POCT Blood Glucose.: 240 mg/dL (25 Jun 2018 12:28)  POCT Blood Glucose.: 101 mg/dL (25 Jun 2018 09:02)    BLOOD CULTURE    RADIOLOGY & ADDITIONAL TESTS:    Imaging Personally Reviewed:  [x ] YES   CXR: clear lungs  Consultant(s) Notes Reviewed:  Cards    Care Discussed with Consultants/Other Providers: Carlitso fellow, Office of Dr. Abad Patient is a 89y old  Male who presents with a chief complaint of Weakness (24 Jun 2018 23:07)    INTERVAL HPI/OVERNIGHT EVENTS:  Denies pain/chills. States feeling much better, denies lightheadedness or weakness. Reports he lives w/ his daughter and son in law. Would like to go home. Discussed with him importance of medication adjustment/avoiding hypotension, need to ensure safety of ambulation (PT eval), as well as followup/investigation of leukocytosis (concern for CLL). 	    MEDICATIONS  (STANDING):  apixaban 2.5 milliGRAM(s) Oral every 12 hours  carvedilol 6.25 milliGRAM(s) Oral every 12 hours  dextrose 50% Injectable 12.5 Gram(s) IV Push once  folic acid 1 milliGRAM(s) Oral daily  insulin lispro (HumaLOG) corrective regimen sliding scale   SubCutaneous three times a day before meals  insulin lispro (HumaLOG) corrective regimen sliding scale   SubCutaneous at bedtime  omega-3-Acid Ethyl Esters 2 Gram(s) Oral two times a day    MEDICATIONS  (PRN):    Allergies    Cipro (Other)  fluoroquinolone antibiotics (Unknown)  latex (Unknown)    Intolerances    REVIEW OF SYSTEMS:  Please see interval HPI:    Vital Signs Last 24 Hrs  T(C): 36.3 (25 Jun 2018 10:41), Max: 37 (24 Jun 2018 17:47)  T(F): 97.4 (25 Jun 2018 10:41), Max: 98.6 (24 Jun 2018 17:47)  HR: 82 (25 Jun 2018 14:10) (70 - 90)  BP: 104/62 (25 Jun 2018 14:10) (104/62 - 178/73)  BP(mean): --  RR: 18 (25 Jun 2018 14:10) (16 - 18)  SpO2: 99% (25 Jun 2018 14:10) (98% - 100%)  I&O's Detail    24 Jun 2018 07:01  -  25 Jun 2018 07:00  --------------------------------------------------------  IN:  Total IN: 0 mL    OUT:    Voided: 200 mL  Total OUT: 200 mL    Total NET: -200 mL    PHYSICAL EXAM:  GENERAL: NAD, lying in bed  HEAD:  NC/AT  EYES: EOMI, clear sclera/conjunctiva  ENMT: MMM  NECK: supple  NERVOUS SYSTEM: non-focal   CHEST/LUNG: CTAB  HEART: S1S2 RRR  ABDOMEN: soft, non-tender  EXTREMITIES:  no c/c/e      LABS:                        9.4    26.03 )-----------( 151      ( 25 Jun 2018 09:08 )             29.6     24 Jun 2018 16:53    139    |  94     |  25     ----------------------------<  197    4.0     |  26     |  1.28     Ca    8.7        24 Jun 2018 16:53    TPro  7.0    /  Alb  3.8    /  TBili  0.6    /  DBili  x      /  AST  22     /  ALT  16     /  AlkPhos  72     24 Jun 2018 16:53      CAPILLARY BLOOD GLUCOSE  POCT Blood Glucose.: 240 mg/dL (25 Jun 2018 12:28)  POCT Blood Glucose.: 101 mg/dL (25 Jun 2018 09:02)    BLOOD CULTURE    RADIOLOGY & ADDITIONAL TESTS:    Imaging Personally Reviewed:  [x ] YES   CXR: clear lungs, superior right shoulder dislocation     Consultant(s) Notes Reviewed:  Cards    Care Discussed with Consultants/Other Providers: Carlitos fellow, Office of Alberta Britton

## 2018-06-26 LAB
ALBUMIN SERPL ELPH-MCNC: 3.5 G/DL — SIGNIFICANT CHANGE UP (ref 3.3–5)
ALP SERPL-CCNC: 74 U/L — SIGNIFICANT CHANGE UP (ref 40–120)
ALT FLD-CCNC: 12 U/L — SIGNIFICANT CHANGE UP (ref 4–41)
AST SERPL-CCNC: 18 U/L — SIGNIFICANT CHANGE UP (ref 4–40)
BASOPHILS # BLD AUTO: 0.09 K/UL — SIGNIFICANT CHANGE UP (ref 0–0.2)
BASOPHILS NFR BLD AUTO: 0.2 % — SIGNIFICANT CHANGE UP (ref 0–2)
BILIRUB SERPL-MCNC: 0.7 MG/DL — SIGNIFICANT CHANGE UP (ref 0.2–1.2)
BUN SERPL-MCNC: 17 MG/DL — SIGNIFICANT CHANGE UP (ref 7–23)
BUN SERPL-MCNC: 19 MG/DL — SIGNIFICANT CHANGE UP (ref 7–23)
CALCIUM SERPL-MCNC: 8.7 MG/DL — SIGNIFICANT CHANGE UP (ref 8.4–10.5)
CALCIUM SERPL-MCNC: 8.9 MG/DL — SIGNIFICANT CHANGE UP (ref 8.4–10.5)
CHLORIDE SERPL-SCNC: 100 MMOL/L — SIGNIFICANT CHANGE UP (ref 98–107)
CHLORIDE SERPL-SCNC: 100 MMOL/L — SIGNIFICANT CHANGE UP (ref 98–107)
CO2 SERPL-SCNC: 27 MMOL/L — SIGNIFICANT CHANGE UP (ref 22–31)
CO2 SERPL-SCNC: 27 MMOL/L — SIGNIFICANT CHANGE UP (ref 22–31)
CREAT SERPL-MCNC: 0.86 MG/DL — SIGNIFICANT CHANGE UP (ref 0.5–1.3)
CREAT SERPL-MCNC: 0.93 MG/DL — SIGNIFICANT CHANGE UP (ref 0.5–1.3)
EOSINOPHIL # BLD AUTO: 0.13 K/UL — SIGNIFICANT CHANGE UP (ref 0–0.5)
EOSINOPHIL NFR BLD AUTO: 0.4 % — SIGNIFICANT CHANGE UP (ref 0–6)
GLUCOSE BLDC GLUCOMTR-MCNC: 189 MG/DL — HIGH (ref 70–99)
GLUCOSE BLDC GLUCOMTR-MCNC: 198 MG/DL — HIGH (ref 70–99)
GLUCOSE BLDC GLUCOMTR-MCNC: 270 MG/DL — HIGH (ref 70–99)
GLUCOSE BLDC GLUCOMTR-MCNC: 298 MG/DL — HIGH (ref 70–99)
GLUCOSE SERPL-MCNC: 184 MG/DL — HIGH (ref 70–99)
GLUCOSE SERPL-MCNC: 204 MG/DL — HIGH (ref 70–99)
HCT VFR BLD CALC: 33.3 % — LOW (ref 39–50)
HCT VFR BLD CALC: 34.2 % — LOW (ref 39–50)
HGB BLD-MCNC: 10.7 G/DL — LOW (ref 13–17)
HGB BLD-MCNC: 11.1 G/DL — LOW (ref 13–17)
IMM GRANULOCYTES # BLD AUTO: 0.07 # — SIGNIFICANT CHANGE UP
IMM GRANULOCYTES NFR BLD AUTO: 0.2 % — SIGNIFICANT CHANGE UP (ref 0–1.5)
LYMPHOCYTES # BLD AUTO: 27.04 K/UL — HIGH (ref 1–3.3)
LYMPHOCYTES # BLD AUTO: 73.6 % — HIGH (ref 13–44)
MAGNESIUM SERPL-MCNC: 1.4 MG/DL — LOW (ref 1.6–2.6)
MCHC RBC-ENTMCNC: 27.2 PG — SIGNIFICANT CHANGE UP (ref 27–34)
MCHC RBC-ENTMCNC: 27.4 PG — SIGNIFICANT CHANGE UP (ref 27–34)
MCHC RBC-ENTMCNC: 32.1 % — SIGNIFICANT CHANGE UP (ref 32–36)
MCHC RBC-ENTMCNC: 32.5 % — SIGNIFICANT CHANGE UP (ref 32–36)
MCV RBC AUTO: 84.4 FL — SIGNIFICANT CHANGE UP (ref 80–100)
MCV RBC AUTO: 84.7 FL — SIGNIFICANT CHANGE UP (ref 80–100)
METHOD TYPE: SIGNIFICANT CHANGE UP
MONOCYTES # BLD AUTO: 4.45 K/UL — HIGH (ref 0–0.9)
MONOCYTES NFR BLD AUTO: 12.1 % — SIGNIFICANT CHANGE UP (ref 2–14)
NEUTROPHILS # BLD AUTO: 4.97 K/UL — SIGNIFICANT CHANGE UP (ref 1.8–7.4)
NEUTROPHILS NFR BLD AUTO: 13.5 % — LOW (ref 43–77)
NRBC # FLD: 0 — SIGNIFICANT CHANGE UP
NRBC # FLD: 0 — SIGNIFICANT CHANGE UP
ORGANISM # SPEC MICROSCOPIC CNT: SIGNIFICANT CHANGE UP
ORGANISM # SPEC MICROSCOPIC CNT: SIGNIFICANT CHANGE UP
PHOSPHATE SERPL-MCNC: 2.2 MG/DL — LOW (ref 2.5–4.5)
PLATELET # BLD AUTO: 170 K/UL — SIGNIFICANT CHANGE UP (ref 150–400)
PLATELET # BLD AUTO: 171 K/UL — SIGNIFICANT CHANGE UP (ref 150–400)
PMV BLD: 10.4 FL — SIGNIFICANT CHANGE UP (ref 7–13)
PMV BLD: 9.9 FL — SIGNIFICANT CHANGE UP (ref 7–13)
POTASSIUM SERPL-MCNC: 3.6 MMOL/L — SIGNIFICANT CHANGE UP (ref 3.5–5.3)
POTASSIUM SERPL-MCNC: 3.7 MMOL/L — SIGNIFICANT CHANGE UP (ref 3.5–5.3)
POTASSIUM SERPL-SCNC: 3.6 MMOL/L — SIGNIFICANT CHANGE UP (ref 3.5–5.3)
POTASSIUM SERPL-SCNC: 3.7 MMOL/L — SIGNIFICANT CHANGE UP (ref 3.5–5.3)
PROT SERPL-MCNC: 6.6 G/DL — SIGNIFICANT CHANGE UP (ref 6–8.3)
RBC # BLD: 3.93 M/UL — LOW (ref 4.2–5.8)
RBC # BLD: 4.05 M/UL — LOW (ref 4.2–5.8)
RBC # FLD: 15.3 % — HIGH (ref 10.3–14.5)
RBC # FLD: 15.4 % — HIGH (ref 10.3–14.5)
SODIUM SERPL-SCNC: 140 MMOL/L — SIGNIFICANT CHANGE UP (ref 135–145)
SODIUM SERPL-SCNC: 141 MMOL/L — SIGNIFICANT CHANGE UP (ref 135–145)
SPECIMEN SOURCE: SIGNIFICANT CHANGE UP
SPECIMEN SOURCE: SIGNIFICANT CHANGE UP
WBC # BLD: 31.99 K/UL — HIGH (ref 3.8–10.5)
WBC # BLD: 36.75 K/UL — HIGH (ref 3.8–10.5)
WBC # FLD AUTO: 31.99 K/UL — HIGH (ref 3.8–10.5)
WBC # FLD AUTO: 36.75 K/UL — HIGH (ref 3.8–10.5)

## 2018-06-26 PROCEDURE — 73020 X-RAY EXAM OF SHOULDER: CPT | Mod: 26,RT

## 2018-06-26 PROCEDURE — 99232 SBSQ HOSP IP/OBS MODERATE 35: CPT

## 2018-06-26 PROCEDURE — 99233 SBSQ HOSP IP/OBS HIGH 50: CPT

## 2018-06-26 RX ORDER — LOSARTAN POTASSIUM 100 MG/1
25 TABLET, FILM COATED ORAL DAILY
Qty: 0 | Refills: 0 | Status: DISCONTINUED | OUTPATIENT
Start: 2018-06-26 | End: 2018-07-06

## 2018-06-26 RX ORDER — MAGNESIUM SULFATE 500 MG/ML
2 VIAL (ML) INJECTION ONCE
Qty: 0 | Refills: 0 | Status: COMPLETED | OUTPATIENT
Start: 2018-06-26 | End: 2018-06-26

## 2018-06-26 RX ORDER — SENNA PLUS 8.6 MG/1
2 TABLET ORAL AT BEDTIME
Qty: 0 | Refills: 0 | Status: DISCONTINUED | OUTPATIENT
Start: 2018-06-26 | End: 2018-07-06

## 2018-06-26 RX ORDER — DOCUSATE SODIUM 100 MG
100 CAPSULE ORAL THREE TIMES A DAY
Qty: 0 | Refills: 0 | Status: DISCONTINUED | OUTPATIENT
Start: 2018-06-26 | End: 2018-07-06

## 2018-06-26 RX ORDER — POLYETHYLENE GLYCOL 3350 17 G/17G
17 POWDER, FOR SOLUTION ORAL ONCE
Qty: 0 | Refills: 0 | Status: COMPLETED | OUTPATIENT
Start: 2018-06-26 | End: 2018-06-26

## 2018-06-26 RX ADMIN — CARVEDILOL PHOSPHATE 6.25 MILLIGRAM(S): 80 CAPSULE, EXTENDED RELEASE ORAL at 06:02

## 2018-06-26 RX ADMIN — Medication 100 MILLIGRAM(S): at 22:10

## 2018-06-26 RX ADMIN — CARVEDILOL PHOSPHATE 6.25 MILLIGRAM(S): 80 CAPSULE, EXTENDED RELEASE ORAL at 17:34

## 2018-06-26 RX ADMIN — SENNA PLUS 2 TABLET(S): 8.6 TABLET ORAL at 22:10

## 2018-06-26 RX ADMIN — POLYETHYLENE GLYCOL 3350 17 GRAM(S): 17 POWDER, FOR SOLUTION ORAL at 18:30

## 2018-06-26 RX ADMIN — APIXABAN 2.5 MILLIGRAM(S): 2.5 TABLET, FILM COATED ORAL at 17:35

## 2018-06-26 RX ADMIN — Medication 3: at 12:11

## 2018-06-26 RX ADMIN — APIXABAN 2.5 MILLIGRAM(S): 2.5 TABLET, FILM COATED ORAL at 06:02

## 2018-06-26 RX ADMIN — Medication 100 MILLIGRAM(S): at 14:38

## 2018-06-26 RX ADMIN — Medication 3: at 17:34

## 2018-06-26 RX ADMIN — Medication 2 GRAM(S): at 12:12

## 2018-06-26 RX ADMIN — Medication 1 MILLIGRAM(S): at 14:38

## 2018-06-26 RX ADMIN — Medication 100 MILLIGRAM(S): at 06:02

## 2018-06-26 RX ADMIN — Medication 1: at 08:43

## 2018-06-26 RX ADMIN — Medication 50 GRAM(S): at 02:36

## 2018-06-26 RX ADMIN — LOSARTAN POTASSIUM 25 MILLIGRAM(S): 100 TABLET, FILM COATED ORAL at 14:38

## 2018-06-26 NOTE — PROGRESS NOTE ADULT - ASSESSMENT
89M with PMH of acute on chronic systolic HF, AICD ([employer]), AWSTEMI, DM,HTN presents with generalized weakness, leukocytosis and hypotension. His troponin elevation was a type 2 event likely in the setting of hypotension. He has had no cardiac symptoms since, and his BP has stabilized, more so even into hypertensive range.   -plan will be for medical management, no plan for ischemic evaluation   -would continue carvedilol 6.25 mg BID  -would reintroduce losartan 25 mg daily (takes 50 mg at home)  -would hold diuretic (takes 60 mg furosemide at home) as he appears euvolemic to dry on my exam and potentially over diuresis contributed to his admission, this can be reaccessed with his cardiologist in outpatient setting  -primary team to investigate his indication for NOAC/reach out to patient's cardiologist--> no afib on device interrogation. Benefits and risks must be evaluated in a frail elderly man and if indication is not there anymore or low benefit, he would likely benefit from aspirin alone over NOAC from an ischemic heart standpoint.   -no further cardiac interventions  -please call service with any further questions     Please call on [place] cardiology [place] at [phone] with any further questions. 89M with PMH of acute on chronic systolic HF, AICD ([employer]), AWSTEMI, DM,HTN presents with generalized weakness, leukocytosis and hypotension. His troponin elevation was a type 2 event likely in the setting of hypotension. He has had no cardiac symptoms since, and his BP has stabilized, more so even into hypertensive range.   -plan will be for medical management, no plan for ischemic evaluation   -would continue carvedilol 6.25 mg BID  -would reintroduce losartan 25 mg daily (takes 50 mg at home)  -would hold diuretic (takes 60 mg furosemide at home) as he appears euvolemic to dry on my exam and potentially over diuresis contributed to his admission, this can be reaccessed with his cardiologist in outpatient setting  -primary team to investigate his indication for NOAC/reach out to patient's cardiologist--> no afib on device interrogation. Benefits and risks must be evaluated in a frail elderly man and if indication is not there anymore or low benefit, he would likely benefit from aspirin alone over NOAC from an ischemic heart standpoint.   -no further cardiac interventions, primary team to investigate causes of his lymphocytosis   -please call service with any further questions     Please call on call cardiology team at [phone] with any further questions.

## 2018-06-26 NOTE — PROGRESS NOTE ADULT - SUBJECTIVE AND OBJECTIVE BOX
24H hour events: No acute events overnight. Patient feels well.     MEDICATIONS:  apixaban 2.5 milliGRAM(s) Oral every 12 hours  carvedilol 6.25 milliGRAM(s) Oral every 12 hours  acetaminophen   Tablet. 650 milliGRAM(s) Oral every 6 hours PRN  docusate sodium 100 milliGRAM(s) Oral three times a day  senna 2 Tablet(s) Oral at bedtime  dextrose 50% Injectable 12.5 Gram(s) IV Push once  insulin lispro (HumaLOG) corrective regimen sliding scale   SubCutaneous three times a day before meals  insulin lispro (HumaLOG) corrective regimen sliding scale   SubCutaneous at bedtime  folic acid 1 milliGRAM(s) Oral daily    REVIEW OF SYSTEMS:  Complete 10point ROS negative except as documented above.    PHYSICAL EXAM:  T(C): 36.7 (06-26-18 @ 08:36), Max: 37.5 (06-26-18 @ 01:35)  HR: 81 (06-26-18 @ 08:36) (80 - 100)  BP: 145/77 (06-26-18 @ 08:36) (75/75 - 175/88)  RR: 16 (06-26-18 @ 08:36) (16 - 18)  SpO2: 100% (06-26-18 @ 08:36) (97% - 100%)  Wt(kg): --  I&O's Summary    Appearance: Normal	  HEENT:   Normal oral mucosa, PERRL, EOMI	  Lymphatic: No lymphadenopathy  Cardiovascular: Normal S1 S2, No JVD, No murmurs, No edema  Respiratory: Lungs clear to auscultation	  Psychiatry: A & O x 3, Mood & affect appropriate  Gastrointestinal:  Soft, Non-tender, + BS	  Skin: No rashes, No ecchymoses, No cyanosis	  Neurologic: Non-focal  Extremities: Normal range of motion, No clubbing, cyanosis or edema  Vascular: Peripheral pulses palpable 2+ bilaterally    LABS:	 	    CBC Full  -  ( 26 Jun 2018 05:10 )  WBC Count : 31.99 K/uL  Hemoglobin : 10.7 g/dL  Hematocrit : 33.3 %  Platelet Count - Automated : 171 K/uL  Mean Cell Volume : 84.7 fL  Mean Cell Hemoglobin : 27.2 pg  Mean Cell Hemoglobin Concentration : 32.1 %  Auto Neutrophil # : x  Auto Lymphocyte # : x  Auto Monocyte # : x  Auto Eosinophil # : x  Auto Basophil # : x  Auto Neutrophil % : x  Auto Lymphocyte % : x  Auto Monocyte % : x  Auto Eosinophil % : x  Auto Basophil % : x    06-26    141  |  100  |  17  ----------------------------<  184<H>  3.6   |  27  |  0.86  06-26    140  |  100  |  19  ----------------------------<  204<H>  3.7   |  27  |  0.93    Ca    8.9      26 Jun 2018 05:10  Ca    8.7      26 Jun 2018 00:30  Phos  2.2     06-26  Mg     1.4     06-26    TPro  6.6  /  Alb  3.5  /  TBili  0.7  /  DBili  x   /  AST  18  /  ALT  12  /  AlkPhos  74  06-26  TPro  7.0  /  Alb  3.8  /  TBili  0.6  /  DBili  x   /  AST  22  /  ALT  16  /  AlkPhos  72  06-24      proBNP: Serum Pro-Brain Natriuretic Peptide: 5187 pg/mL (06-25 @ 06:30)    TELEMETRY: V paced at 70 	    	  ASSESSMENT/PLAN:

## 2018-06-26 NOTE — CONSULT NOTE ADULT - SUBJECTIVE AND OBJECTIVE BOX
Orthopaedic Surgery Consult Note    Patient is a 89y old  Male who presents with a chief complaint of Weakness (2018 23:07)    HPI: 89 yo Male with Hx of MI (1980s, no stents), CHF, AICD ([facility], [place] [facility] ), CVA with minimal residual LLE weakness, HTN, Type 2 DM who was admitted on 18 due to a month of generalized weakness, lethargy, and fatigue. Also found to have leukocytosis and fevers. A chest XR was obtained during this hospitalization, which showed a glimpse of a R shoulder that was superiorly dislocated. We were consulted to evaluate given the abnormal finding and instructed the primary team to obtain dedicated R shoulder XRs. The patient denies experiencing any recent shoulder trauma. He denies shoulder pain but endorses inability to raise arm due to weakness that has been ongoing for many years. The family independently confirmed this. He denies numbness, tingling in RUE. He denies ever having had surgery on R shoulder or any diagnoses including rotator cuff disease or prior dislocations to shoulder.     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  S/P Inguinal Hernia Repair    [] No significant past history as reviewed with the patient and family    FAMILY HISTORY:  No pertinent family history in first degree relatives    [] Family history not pertinent as reviewed with the patient and family    SOCIAL HISTORY:    MEDICATIONS  (STANDING):  apixaban 2.5 milliGRAM(s) Oral every 12 hours  carvedilol 6.25 milliGRAM(s) Oral every 12 hours  dextrose 50% Injectable 12.5 Gram(s) IV Push once  folic acid 1 milliGRAM(s) Oral daily  insulin lispro (HumaLOG) corrective regimen sliding scale   SubCutaneous three times a day before meals  insulin lispro (HumaLOG) corrective regimen sliding scale   SubCutaneous at bedtime  omega-3-Acid Ethyl Esters 2 Gram(s) Oral two times a day    MEDICATIONS  (PRN):  acetaminophen   Tablet. 650 milliGRAM(s) Oral every 6 hours PRN mild and moderate pain    Allergies    Cipro (Other)  fluoroquinolone antibiotics (Unknown)  latex (Unknown)    Intolerances        REVIEW OF SYSTEMS  [x] All review of systems negative except for those marked.  Systemic:	[] Fever		[] Chills		[] Night sweats		[] Fatigue	[] Other  [] Cardiovascular:  [] Pulmonary:  [] Renal/Urologic:  [] Gastrointestinal:  [] Metabolic:  [] Neurologic:  [] Hematologic:  [] ENT:  [] Ophthalmologic:  [] Musculoskeletal: see HPI    Vital Signs Last 24 Hrs  T(C): 36.4 (2018 02:49), Max: 37.5 (2018 01:35)  T(F): 97.6 (2018 02:49), Max: 99.5 (2018 01:35)  HR: 100 (2018 02:49) (80 - 100)  BP: 175/88 (2018 02:49) (75/75 - 175/88)  BP(mean): --  RR: 18 (2018 02:49) (16 - 18)  SpO2: 97% (:49) (97% - 100%)     @ 07:01  -   @ 07:00  --------------------------------------------------------  IN: 0 mL / OUT: 200 mL / NET: -200 mL        PHYSICAL EXAM:  RUE:  skin intact  PROM in shoulder FF, IR, ER, Abduction and adduction all full and painless  Patient minimally cooperative with most of active shoulder examination but unable to forward flex shoulder past 40-50 deg  motor AIN/PIN/U intact  SILT M/U/R  wwp                              11.1   36.75 )-----------( 170      ( 2018 00:30 )             34.2     26    140  |  100  |  19  ----------------------------<  204<H>  3.7   |  27  |  0.93    Ca    8.7      2018 00:30  Phos  2.2       Mg     1.4         TPro  6.6  /  Alb  3.5  /  TBili  0.7  /  DBili  x   /  AST  18  /  ALT  12  /  AlkPhos  74  26      Urinalysis Basic - ( 2018 19:33 )    Color: YELLOW / Appearance: CLEAR / S.009 / pH: 6.0  Gluc: NEGATIVE / Ketone: NEGATIVE  / Bili: NEGATIVE / Urobili: NORMAL mg/dL   Blood: NEGATIVE / Protein: NEGATIVE mg/dL / Nitrite: NEGATIVE   Leuk Esterase: NEGATIVE / RBC: x / WBC 0-2   Sq Epi: x / Non Sq Epi: x / Bacteria: x        IMAGING STUDIES:  XR R shoulder  -Superior dislocated R shoulder. Changes in humeral head and remodeling of osseus structures such as glenoid and acromion indicate this dislocation is chronic. No evidence of fractures or any other acute processes.     89y Male with PMH as above admitted w/ fatigue, lethargy in setting of fever and leukocytosis incidentally found to have chronic R shoulder dislocation seen on CXR and better visualized on subsequent R shoulder films. Patient has no pain in his R shoulder and has had limited ROM for years according to family.     Pain  -pain control if needed  -WBAT RUE  -No acute orthopaedic intervention at this time  -May follow up with Dr. Bryce Barrera as an out pt if necesarry
Date of Admission:  6/24/18  CHIEF COMPLAINT:  Generalized weakness  HISTORY OF PRESENT ILLNESS:  Mr. Quintanilla is an 89M with PMH of CVA with residual LLE weakness, CAD, acute on chronic systolic HF, AICD ([facility]), presents with generalized weakness for several weeks and increased fatigue and decreased exercise tolerance. Cardiology called to evaluate for elevated troponin T of 134 in setting of elevated lactic acid level of 4.9 and elevated WBC count of 25    Allergies  Cipro (Other)  ciprofloxacin (Unknown)  fluoroquinolone antibiotics (Unknown)  latex (Unknown)    Intolerances  MEDICATIONS:    coreg 25mg po bid  eliquis 2.5mg po bid  enalapril 20mg po bid  lasix 80mg po qd  folic acid 1mg po qd  glipizide 10mg in am 5mg in pm  hydralazine 25mg po qd  losartan 50mg po qd  metformin 1000mg po bid  pravastatin 40mg po qd  zetia 10mg po qd  VIT D 1000units qd    PAST MEDICAL & SURGICAL HISTORY:  Acute on Chronic Systolic Hear  S/P Implantation of Automatic difibrillator [facility] [facility]  ALMI (Anterolateral Wall Myoca  Asymptomatic Chronic Venous Hy  Adult Onset Diabetes Mellitus,  Abnormal Metabolic State in Di  Abnormal Metabolic State in Di  Abnormal Metabolic State in Di  Personal History of Myocardial  Personal History of Hypertensi  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  S/P Inguinal Hernia Repair      FAMILY HISTORY:      SOCIAL HISTORY:    [ ] Non-smoker  [ ] Smoker  [ ] Alcohol      REVIEW OF SYSTEMS:  Constituitional: +weakness, +fatigue, negative for fever, chills  Lungs: negative for SOB  Cardiac: negative for CP or palpitations  GI: negative for N/V/D  Ext:negative for edema      T(C): 37 (06-24-18 @ 17:47), Max: 37 (06-24-18 @ 17:47)  HR: 70 (06-24-18 @ 17:47) (70 - 73)  BP: 138/54 (06-24-18 @ 17:47) (123/60 - 138/54)  RR: 18 (06-24-18 @ 17:47) (18 - 18)  SpO2: 100% (06-24-18 @ 17:47) (97% - 100%)  Wt(kg): --  I&O's Summary      Physical Exam:  General: NAD  Cardiovascular: click heard, Normal S1 S2, No JVD, No murmurs, No edema  Respiratory: Lungs clear to auscultation	  Gastrointestinal:  Soft, Non-tender, + BS  : large hernia in scrotum, +peristalsis heard, no tenderness, enlarged scrotum  Skin: warm and dry, No rashes, No ecchymoses, No cyanosis	  Extremities:  No clubbing, cyanosis or edema  Vascular: Peripheral pulses palpable 2+ bilaterally    LABS:	   	    CBC Full  -  ( 24 Jun 2018 17:00 )  WBC Count : 25.19 K/uL  Hemoglobin : 10.9 g/dL  Hematocrit : 33.7 %  Platelet Count - Automated : 168 K/uL  Mean Cell Volume : 85.1 fL  Mean Cell Hemoglobin : 27.5 pg  Mean Cell Hemoglobin Concentration : 32.3 %  Auto Neutrophil # : 3.71 K/uL  Auto Lymphocyte # : 16.65 K/uL  Auto Monocyte # : 4.36 K/uL  Auto Eosinophil # : 0.12 K/uL  Auto Basophil # : 0.30 K/uL  Auto Neutrophil % : 14.7 %  Auto Lymphocyte % : 66.1 %  Auto Monocyte % : 17.3 %  Auto Eosinophil % : 0.5 %  Auto Basophil % : 1.2 %    06-24    139  |  94<L>  |  25<H>  ----------------------------<  197<H>  4.0   |  26  |  1.28    Ca    8.7      24 Jun 2018 16:53    TPro  7.0  /  Alb  3.8  /  TBili  0.6  /  DBili  x   /  AST  22  /  ALT  16  /  AlkPhos  72  06-24    TELEMETRY: 	  ventricular paced  ECG:  	ventricular paced

## 2018-06-26 NOTE — PROGRESS NOTE ADULT - SUBJECTIVE AND OBJECTIVE BOX
Patient is a 89y old  Male who presents with a chief complaint of Weakness (24 Jun 2018 23:07)    INTERVAL HPI/OVERNIGHT EVENTS:  Feels OK, denies chest pain/SOB/lightheadedness. Discussed history of fall and chronic right shoulder dislocation. Also discussed PT eval and recommendation for rehab. Patient currently does not want to go to rehab, notes that he has a 6 hr x 7 day HHA to take care of him while his daughter and son in law are at work. To discuss further with family.     MEDICATIONS  (STANDING):  apixaban 2.5 milliGRAM(s) Oral every 12 hours  carvedilol 6.25 milliGRAM(s) Oral every 12 hours  dextrose 50% Injectable 12.5 Gram(s) IV Push once  docusate sodium 100 milliGRAM(s) Oral three times a day  folic acid 1 milliGRAM(s) Oral daily  insulin lispro (HumaLOG) corrective regimen sliding scale   SubCutaneous three times a day before meals  insulin lispro (HumaLOG) corrective regimen sliding scale   SubCutaneous at bedtime  losartan 25 milliGRAM(s) Oral daily  omega-3-Acid Ethyl Esters 2 Gram(s) Oral two times a day  senna 2 Tablet(s) Oral at bedtime    MEDICATIONS  (PRN):  acetaminophen   Tablet. 650 milliGRAM(s) Oral every 6 hours PRN mild and moderate pain    Allergies  Cipro (Other)  fluoroquinolone antibiotics (Unknown)  latex (Unknown)    Intolerances    REVIEW OF SYSTEMS:  Please see interval HPI:    Vital Signs Last 24 Hrs  T(C): 36.7 (26 Jun 2018 08:36), Max: 37.5 (26 Jun 2018 01:35)  T(F): 98 (26 Jun 2018 08:36), Max: 99.5 (26 Jun 2018 01:35)  HR: 81 (26 Jun 2018 08:36) (80 - 100)  BP: 145/77 (26 Jun 2018 08:36) (75/75 - 175/88)  BP(mean): --  RR: 16 (26 Jun 2018 08:36) (16 - 18)  SpO2: 100% (26 Jun 2018 08:36) (97% - 100%)  I&O's Detail    PHYSICAL EXAM:  GENERAL: NAD, lying in bed  HEAD:  NC/AT  EYES: EOMI, clear sclera/conjunctiva  ENMT: MMM  NECK: supple  NERVOUS SYSTEM: non-focal   CHEST/LUNG: CTAB  HEART: S1S2 RRR  ABDOMEN: soft, non-tender  EXTREMITIES:  no c/c/e      LABS:                        10.7   31.99 )-----------( 171      ( 26 Jun 2018 05:10 )             33.3     26 Jun 2018 05:10    141    |  100    |  17     ----------------------------<  184    3.6     |  27     |  0.86     Ca    8.9        26 Jun 2018 05:10  Phos  2.2       26 Jun 2018 00:30  Mg     1.4       26 Jun 2018 00:30    TPro  6.6    /  Alb  3.5    /  TBili  0.7    /  DBili  x      /  AST  18     /  ALT  12     /  AlkPhos  74     26 Jun 2018 00:30    CAPILLARY BLOOD GLUCOSE  POCT Blood Glucose.: 270 mg/dL (26 Jun 2018 12:03)  POCT Blood Glucose.: 189 mg/dL (26 Jun 2018 08:39)  POCT Blood Glucose.: 204 mg/dL (25 Jun 2018 21:47)  POCT Blood Glucose.: 205 mg/dL (25 Jun 2018 17:02)    BLOOD CULTURE  06-25 @ 07:19 --    NO ORGANISMS ISOLATED  NO ORGANISMS ISOLATED AT 24 HOURS  BLOOD CULTURE PCR    RADIOLOGY & ADDITIONAL TESTS:    Imaging Personally Reviewed:  [ ] YES     Consultant(s) Notes Reviewed:  Cards    Care Discussed with Consultants/Other Providers: Carlitos fellow, Dr. Abad's office

## 2018-06-26 NOTE — PROGRESS NOTE ADULT - ASSESSMENT
88 yo M HFrEF s/p AICD, prior CVA with minimal residual LLE weakness, HTN, DM2 p/w generalized weakness, possibly secondary to hypotension from heavy BP control regimen, noted to have anemia and lymphocytosis, concerning for possible CLL.     # Hypotension likely 2/2 medications  - Patient on multiple BP lowering medications including possibly both ace-inhibitor and ARB, high dose coreg and hydralazine as well as diuretic  - BP improving  - c/w coreg, appreciate cardiology input, will resume losartan 25 mg at this time, hold diuretics as does not appear overloaded  - otpt cardiology followup  - fall precautions, PT recs rehab, will d/w patient and family rehab vs home w/ services    # Right shoulder disclocation  - superior right shoulder dislocation noted on Xray  - appreciate ortho assistance, they note chronic in nature, no acute orthopedic intervention at this time    # Elevated hs-troponin  - appreciate cardiology input  - doubt ACS, patient denies CP/SOB, likely type 2 MI in setting of hypotension  - no need for ischemic evaluation as per cardiology    # Leukocytosis, concern for CLL  - smudge cells noted, concern for CLL  - d/w heme fellow, who states would be preferable for patient to followup with/be evaluated by the hematologist he would follow up with as outpatient...  - patient was supposed to followup with Dr. Abad, again contacted Dr. Abad's office, they report that patient has multiple no-shows and Dr. Abad has not seen this patient yet. Since he missed the appointment earlier this week, he had another appointment set for July 30th, but office reports that it was also recently cancelled and he has no upcoming appointments as of this time. They will reach out to Dr. Abad to see if he would wants to still see this patient or if he should be referred to another hematologist, number left for callback  - if Dr. Abad is unable to come to hospital, or declines to see the patient given multiple no-shows, will defer to house hematology team for workup    # Chronic HFrEF  - appears euvolemic  - c/w coreg, adding on losartan today  - EP interrogation appreciated: no high ventricular rate events, few episodes of very high transient atrial rate events, normal sensing and pacing, excellent threshold capture, no reprogramming  - echo pending    # Anemia (normocytic)  - monitor hgb. transfuse prn goal hgb >7    # lactic acidosis  - possibly secondary to hypotension and metformin use  - hold metformin  - s/p IV fluids  - lactate improved    # DM2  - hold metformin, and other oral agents  - c/w HISS, monitor FS, goal <180  - c/w diabetic diet    # Medication management  - medication list provided by daughter, entered by pharmacy  - f/u PMD re: medication list to clarify indications for meds, including indication for eliquis, no afib noted on device interrogation...    # DVT ppx: on eliquis ?for afib, to followup with PMD re: indication for eliquis    Dispo: pending medical optimization, workup of possible CLL (vs otpt f/u), PT recs rehab, to f/u family re: rehab vs home w/ services... 89 yo M HFrEF s/p AICD, prior CVA with minimal residual LLE weakness, HTN, DM2 p/w generalized weakness, possibly secondary to hypotension from heavy BP control regimen, noted to have anemia and lymphocytosis, concerning for possible CLL.     # Hypotension likely 2/2 medications  - Patient on multiple BP lowering medications including possibly both ace-inhibitor and ARB, high dose coreg and hydralazine as well as diuretic  - BP improving  - c/w coreg, appreciate cardiology input, will resume losartan 25 mg at this time, hold diuretics as does not appear overloaded  - otpt cardiology followup  - fall precautions, PT recs rehab, will d/w patient and family rehab vs home w/ services    # Right shoulder disclocation  - superior right shoulder dislocation noted on Xray  - appreciate ortho assistance, they note chronic in nature, no acute orthopedic intervention at this time    # Elevated hs-troponin  - appreciate cardiology input  - doubt ACS, patient denies CP/SOB, likely type 2 MI in setting of hypotension  - no need for ischemic evaluation as per cardiology    # Leukocytosis, concern for CLL  - smudge cells noted, concern for CLL  - d/w heme fellow, who states would be preferable for patient to followup with/be evaluated by the hematologist he would follow up with as outpatient...  - patient was supposed to followup with Dr. Abad, again contacted Dr. Abad's office, they report that patient has multiple no-shows and Dr. Abad has not seen this patient yet. Since he missed the appointment earlier this week, he had another appointment set for July 30th, but office reports that it was also recently cancelled and he has no upcoming appointments as of this time. They will reach out to Dr. Abad to see if he would wants to still see this patient or if he should be referred to another hematologist, number left for callback  - if Dr. Abad is unable to come to hospital, or declines to see the patient given multiple no-shows, will defer to house hematology team for workup    # Chronic HFrEF  - appears euvolemic  - c/w coreg, adding on losartan today  - EP interrogation appreciated: no high ventricular rate events, few episodes of very high transient atrial rate events, normal sensing and pacing, excellent threshold capture, no reprogramming  - echo pending    # Anemia (normocytic)  - monitor hgb. transfuse prn goal hgb >7    # lactic acidosis  - possibly secondary to hypotension and metformin use  - hold metformin  - s/p IV fluids  - lactate improved    # DM2  - hold metformin, and other oral agents  - c/w HISS, monitor FS, goal <180  - c/w diabetic diet    # Hypomagnesemia  - repleting Mg    # Medication management  - medication list provided by daughter, entered by pharmacy  - f/u PMD re: medication list to clarify indications for meds, including indication for eliquis, no afib noted on device interrogation...    # DVT ppx: on eliquis ?for afib, to followup with PMD re: indication for eliquis    Dispo: pending medical optimization, workup of possible CLL (vs otpt f/u), PT recs rehab, to f/u family re: rehab vs home w/ services...

## 2018-06-27 LAB
-  COAGULASE NEGATIVE STAPHYLOCOCCUS: SIGNIFICANT CHANGE UP
4/8 RATIO: 1.93 CELLS/UL — SIGNIFICANT CHANGE UP (ref 1.69–2.84)
ABS CD8: 300 CELLS/UL — SIGNIFICANT CHANGE UP (ref 291–576)
BACTERIA BLD CULT: SIGNIFICANT CHANGE UP
BASOPHILS # BLD AUTO: 0.04 K/UL — SIGNIFICANT CHANGE UP (ref 0–0.2)
BASOPHILS NFR BLD AUTO: 0.1 % — SIGNIFICANT CHANGE UP (ref 0–2)
BASOPHILS NFR SPEC: 0 % — SIGNIFICANT CHANGE UP (ref 0–2)
BUN SERPL-MCNC: 21 MG/DL — SIGNIFICANT CHANGE UP (ref 7–23)
BUN SERPL-MCNC: 23 MG/DL — SIGNIFICANT CHANGE UP (ref 7–23)
CALCIUM SERPL-MCNC: 8.7 MG/DL — SIGNIFICANT CHANGE UP (ref 8.4–10.5)
CALCIUM SERPL-MCNC: 8.8 MG/DL — SIGNIFICANT CHANGE UP (ref 8.4–10.5)
CD16+CD56+ CELLS NFR BLD: 3 % — LOW (ref 6–22)
CD16+CD56+ CELLS NFR SPEC: 314 CELL/UL — SIGNIFICANT CHANGE UP (ref 59–453)
CD19 BLASTS SPEC-ACNC: 88 % — HIGH (ref 8–22)
CD19 BLASTS SPEC-ACNC: 9708 CELL/UL — HIGH (ref 105–430)
CD3 BLASTS SPEC-ACNC: 8 % — LOW (ref 62–83)
CD3 BLASTS SPEC-ACNC: 919 CELLS/UL — SIGNIFICANT CHANGE UP (ref 678–2144)
CD4 %: 5 % — LOW (ref 43–52)
CD8 %: 3 % — LOW (ref 17–28)
CHLORIDE SERPL-SCNC: 101 MMOL/L — SIGNIFICANT CHANGE UP (ref 98–107)
CHLORIDE SERPL-SCNC: 101 MMOL/L — SIGNIFICANT CHANGE UP (ref 98–107)
CO2 SERPL-SCNC: 26 MMOL/L — SIGNIFICANT CHANGE UP (ref 22–31)
CO2 SERPL-SCNC: 28 MMOL/L — SIGNIFICANT CHANGE UP (ref 22–31)
CREAT SERPL-MCNC: 0.89 MG/DL — SIGNIFICANT CHANGE UP (ref 0.5–1.3)
CREAT SERPL-MCNC: 0.99 MG/DL — SIGNIFICANT CHANGE UP (ref 0.5–1.3)
EOSINOPHIL # BLD AUTO: 0.05 K/UL — SIGNIFICANT CHANGE UP (ref 0–0.5)
EOSINOPHIL NFR BLD AUTO: 0.1 % — SIGNIFICANT CHANGE UP (ref 0–6)
EOSINOPHIL NFR FLD: 1 % — SIGNIFICANT CHANGE UP (ref 0–6)
GLUCOSE BLDC GLUCOMTR-MCNC: 144 MG/DL — HIGH (ref 70–99)
GLUCOSE BLDC GLUCOMTR-MCNC: 181 MG/DL — HIGH (ref 70–99)
GLUCOSE BLDC GLUCOMTR-MCNC: 219 MG/DL — HIGH (ref 70–99)
GLUCOSE BLDC GLUCOMTR-MCNC: 249 MG/DL — HIGH (ref 70–99)
GLUCOSE BLDC GLUCOMTR-MCNC: 281 MG/DL — HIGH (ref 70–99)
GLUCOSE SERPL-MCNC: 178 MG/DL — HIGH (ref 70–99)
GLUCOSE SERPL-MCNC: 204 MG/DL — HIGH (ref 70–99)
HCT VFR BLD CALC: 35 % — LOW (ref 39–50)
HGB BLD-MCNC: 10.8 G/DL — LOW (ref 13–17)
IMM GRANULOCYTES # BLD AUTO: 0.1 # — SIGNIFICANT CHANGE UP
IMM GRANULOCYTES NFR BLD AUTO: 0.3 % — SIGNIFICANT CHANGE UP (ref 0–1.5)
LG PLATELETS BLD QL AUTO: SLIGHT — SIGNIFICANT CHANGE UP
LYMPHOCYTES # BLD AUTO: 23.97 K/UL — HIGH (ref 1–3.3)
LYMPHOCYTES # BLD AUTO: 70.3 % — HIGH (ref 13–44)
LYMPHOCYTES NFR SPEC AUTO: 59 % — HIGH (ref 13–44)
MAGNESIUM SERPL-MCNC: 1.8 MG/DL — SIGNIFICANT CHANGE UP (ref 1.6–2.6)
MAGNESIUM SERPL-MCNC: 1.9 MG/DL — SIGNIFICANT CHANGE UP (ref 1.6–2.6)
MANUAL SMEAR VERIFICATION: SIGNIFICANT CHANGE UP
MCHC RBC-ENTMCNC: 27.6 PG — SIGNIFICANT CHANGE UP (ref 27–34)
MCHC RBC-ENTMCNC: 30.9 % — LOW (ref 32–36)
MCV RBC AUTO: 89.3 FL — SIGNIFICANT CHANGE UP (ref 80–100)
MONOCYTES # BLD AUTO: 3.52 K/UL — HIGH (ref 0–0.9)
MONOCYTES NFR BLD AUTO: 10.3 % — SIGNIFICANT CHANGE UP (ref 2–14)
MONOCYTES NFR BLD: 6 % — SIGNIFICANT CHANGE UP (ref 2–9)
NEUTROPHIL AB SER-ACNC: 23 % — LOW (ref 43–77)
NEUTROPHILS # BLD AUTO: 6.42 K/UL — SIGNIFICANT CHANGE UP (ref 1.8–7.4)
NEUTROPHILS NFR BLD AUTO: 18.9 % — LOW (ref 43–77)
NRBC # BLD: 0 /100WBC — SIGNIFICANT CHANGE UP
NRBC # FLD: 0 — SIGNIFICANT CHANGE UP
ORGANISM # SPEC MICROSCOPIC CNT: SIGNIFICANT CHANGE UP
OVALOCYTES BLD QL SMEAR: SLIGHT — SIGNIFICANT CHANGE UP
PLATELET # BLD AUTO: 157 K/UL — SIGNIFICANT CHANGE UP (ref 150–400)
PLATELET COUNT - ESTIMATE: NORMAL — SIGNIFICANT CHANGE UP
PMV BLD: 10 FL — SIGNIFICANT CHANGE UP (ref 7–13)
POTASSIUM SERPL-MCNC: 3.4 MMOL/L — LOW (ref 3.5–5.3)
POTASSIUM SERPL-MCNC: 4 MMOL/L — SIGNIFICANT CHANGE UP (ref 3.5–5.3)
POTASSIUM SERPL-SCNC: 3.4 MMOL/L — LOW (ref 3.5–5.3)
POTASSIUM SERPL-SCNC: 4 MMOL/L — SIGNIFICANT CHANGE UP (ref 3.5–5.3)
RBC # BLD: 3.92 M/UL — LOW (ref 4.2–5.8)
RBC # FLD: 15.8 % — HIGH (ref 10.3–14.5)
SMUDGE CELLS # BLD: PRESENT — SIGNIFICANT CHANGE UP
SODIUM SERPL-SCNC: 140 MMOL/L — SIGNIFICANT CHANGE UP (ref 135–145)
SODIUM SERPL-SCNC: 142 MMOL/L — SIGNIFICANT CHANGE UP (ref 135–145)
T-CELL CD4 SUBSET PNL BLD: 581 CELL/UL — SIGNIFICANT CHANGE UP (ref 431–1434)
VARIANT LYMPHS # BLD: 11 % — SIGNIFICANT CHANGE UP
WBC # BLD: 34.1 K/UL — HIGH (ref 3.8–10.5)
WBC # FLD AUTO: 34.1 K/UL — HIGH (ref 3.8–10.5)

## 2018-06-27 PROCEDURE — 88189 FLOWCYTOMETRY/READ 16 & >: CPT

## 2018-06-27 PROCEDURE — 93010 ELECTROCARDIOGRAM REPORT: CPT

## 2018-06-27 PROCEDURE — 85060 BLOOD SMEAR INTERPRETATION: CPT

## 2018-06-27 PROCEDURE — 99233 SBSQ HOSP IP/OBS HIGH 50: CPT

## 2018-06-27 RX ORDER — MAGNESIUM SULFATE 500 MG/ML
1 VIAL (ML) INJECTION ONCE
Qty: 0 | Refills: 0 | Status: COMPLETED | OUTPATIENT
Start: 2018-06-27 | End: 2018-06-27

## 2018-06-27 RX ORDER — CARVEDILOL PHOSPHATE 80 MG/1
12.5 CAPSULE, EXTENDED RELEASE ORAL EVERY 12 HOURS
Qty: 0 | Refills: 0 | Status: DISCONTINUED | OUTPATIENT
Start: 2018-06-27 | End: 2018-07-06

## 2018-06-27 RX ORDER — POTASSIUM CHLORIDE 20 MEQ
40 PACKET (EA) ORAL EVERY 4 HOURS
Qty: 0 | Refills: 0 | Status: COMPLETED | OUTPATIENT
Start: 2018-06-27 | End: 2018-06-27

## 2018-06-27 RX ORDER — CARVEDILOL PHOSPHATE 80 MG/1
6.25 CAPSULE, EXTENDED RELEASE ORAL ONCE
Qty: 0 | Refills: 0 | Status: COMPLETED | OUTPATIENT
Start: 2018-06-27 | End: 2018-06-27

## 2018-06-27 RX ORDER — MAGNESIUM OXIDE 400 MG ORAL TABLET 241.3 MG
400 TABLET ORAL
Qty: 0 | Refills: 0 | Status: DISCONTINUED | OUTPATIENT
Start: 2018-06-27 | End: 2018-07-06

## 2018-06-27 RX ADMIN — LOSARTAN POTASSIUM 25 MILLIGRAM(S): 100 TABLET, FILM COATED ORAL at 06:51

## 2018-06-27 RX ADMIN — Medication 1: at 09:35

## 2018-06-27 RX ADMIN — MAGNESIUM OXIDE 400 MG ORAL TABLET 400 MILLIGRAM(S): 241.3 TABLET ORAL at 02:05

## 2018-06-27 RX ADMIN — Medication 2 GRAM(S): at 12:54

## 2018-06-27 RX ADMIN — Medication 100 MILLIGRAM(S): at 06:47

## 2018-06-27 RX ADMIN — Medication 100 GRAM(S): at 17:03

## 2018-06-27 RX ADMIN — Medication 2: at 17:35

## 2018-06-27 RX ADMIN — APIXABAN 2.5 MILLIGRAM(S): 2.5 TABLET, FILM COATED ORAL at 06:47

## 2018-06-27 RX ADMIN — CARVEDILOL PHOSPHATE 12.5 MILLIGRAM(S): 80 CAPSULE, EXTENDED RELEASE ORAL at 17:03

## 2018-06-27 RX ADMIN — Medication 40 MILLIEQUIVALENT(S): at 12:53

## 2018-06-27 RX ADMIN — Medication 100 MILLIGRAM(S): at 12:56

## 2018-06-27 RX ADMIN — Medication 2 GRAM(S): at 17:02

## 2018-06-27 RX ADMIN — Medication 100 MILLIGRAM(S): at 22:43

## 2018-06-27 RX ADMIN — Medication 1 MILLIGRAM(S): at 12:55

## 2018-06-27 RX ADMIN — MAGNESIUM OXIDE 400 MG ORAL TABLET 400 MILLIGRAM(S): 241.3 TABLET ORAL at 09:35

## 2018-06-27 RX ADMIN — MAGNESIUM OXIDE 400 MG ORAL TABLET 400 MILLIGRAM(S): 241.3 TABLET ORAL at 17:02

## 2018-06-27 RX ADMIN — CARVEDILOL PHOSPHATE 6.25 MILLIGRAM(S): 80 CAPSULE, EXTENDED RELEASE ORAL at 12:55

## 2018-06-27 RX ADMIN — APIXABAN 2.5 MILLIGRAM(S): 2.5 TABLET, FILM COATED ORAL at 17:02

## 2018-06-27 RX ADMIN — SENNA PLUS 2 TABLET(S): 8.6 TABLET ORAL at 22:43

## 2018-06-27 RX ADMIN — CARVEDILOL PHOSPHATE 6.25 MILLIGRAM(S): 80 CAPSULE, EXTENDED RELEASE ORAL at 06:47

## 2018-06-27 RX ADMIN — Medication 40 MILLIEQUIVALENT(S): at 09:35

## 2018-06-27 NOTE — PROGRESS NOTE ADULT - SUBJECTIVE AND OBJECTIVE BOX
Patient is a 89y old  Male who presents with a chief complaint of Weakness (24 Jun 2018 23:07)      INTERVAL HPI/OVERNIGHT EVENTS:  Feels well, without complaints. Deferring to family regarding rehab vs home w/ services. Discussed plan to obtain flow cytometry here to investigate possibility of CLL, and to followup with Dr. Abad as outpatient for further management.     Able to reach family (granddaughter at [phone]), updated them on hospital course, need to reschedule heme appt with Dr. Abad and PT recommendations of rehab. Family would like patient to have increased HHA hours, but are interested in learning about rehab, will f/u CM/SW re: choices.     MEDICATIONS  (STANDING):  apixaban 2.5 milliGRAM(s) Oral every 12 hours  carvedilol 12.5 milliGRAM(s) Oral every 12 hours  dextrose 50% Injectable 12.5 Gram(s) IV Push once  docusate sodium 100 milliGRAM(s) Oral three times a day  folic acid 1 milliGRAM(s) Oral daily  insulin lispro (HumaLOG) corrective regimen sliding scale   SubCutaneous three times a day before meals  insulin lispro (HumaLOG) corrective regimen sliding scale   SubCutaneous at bedtime  losartan 25 milliGRAM(s) Oral daily  magnesium oxide 400 milliGRAM(s) Oral two times a day with meals  magnesium sulfate  IVPB 1 Gram(s) IV Intermittent once  omega-3-Acid Ethyl Esters 2 Gram(s) Oral two times a day  senna 2 Tablet(s) Oral at bedtime    MEDICATIONS  (PRN):  acetaminophen   Tablet. 650 milliGRAM(s) Oral every 6 hours PRN mild and moderate pain    Allergies    Cipro (Other)  fluoroquinolone antibiotics (Unknown)  latex (Unknown)    Intolerances    REVIEW OF SYSTEMS:  Please see interval HPI:    Vital Signs Last 24 Hrs  T(C): 36.2 (27 Jun 2018 12:46), Max: 36.8 (26 Jun 2018 22:08)  T(F): 97.1 (27 Jun 2018 12:46), Max: 98.2 (26 Jun 2018 22:08)  HR: 70 (27 Jun 2018 12:46) (70 - 102)  BP: 118/60 (27 Jun 2018 12:46) (118/60 - 175/85)  BP(mean): --  RR: 18 (27 Jun 2018 12:46) (16 - 18)  SpO2: 95% (27 Jun 2018 12:46) (95% - 99%)  I&O's Detail    27 Jun 2018 07:01  -  27 Jun 2018 13:50  --------------------------------------------------------  IN:  Total IN: 0 mL    OUT:    Intermittent Catheterization - Urethral: 320 mL  Total OUT: 320 mL    Total NET: -320 mL    PHYSICAL EXAM:  GENERAL: NAD, lying in bed  HEAD:  NC/AT  EYES: EOMI, clear sclera/conjunctiva  ENMT: MMM  NECK: supple  NERVOUS SYSTEM:  awake, non-focal  CHEST/LUNG: Comfortable on RA  ABDOMEN: soft, non-tender  EXTREMITIES:  no c/c/e    LABS:             10.8   34.10 )-----------( 157      ( 27 Jun 2018 05:45 )             35.0     27 Jun 2018 05:45    142    |  101    |  21     ----------------------------<  178    3.4     |  28     |  0.89     Ca    8.7        27 Jun 2018 05:45  Mg     1.9       27 Jun 2018 05:45    CAPILLARY BLOOD GLUCOSE      POCT Blood Glucose.: 144 mg/dL (27 Jun 2018 12:07)  POCT Blood Glucose.: 181 mg/dL (27 Jun 2018 09:18)  POCT Blood Glucose.: 281 mg/dL (27 Jun 2018 07:34)  POCT Blood Glucose.: 198 mg/dL (26 Jun 2018 21:20)  POCT Blood Glucose.: 298 mg/dL (26 Jun 2018 17:07)    BLOOD CULTURE  06-25 @ 07:19 --    NO ORGANISMS ISOLATED  NO ORGANISMS ISOLATED AT 48 HRS.  BLOOD CULTURE PCR  ***Blood Panel PCR results on this specimen are available  approximately 3 hours after the Gram stain result***  Gram stain, PCR, and/or culture results may not always  correspond due to difference in methodologies  ------------------------------------------------------------  This PCR assay was performed using [employer].  The  following targets are tested for:  Enterococcus, vancomycin  resistant enterococci, Listeria monocytogenes,  coagulase  negative staphylococci, S. aureus, methicillin resistant S.  aureus, Streptococcus agalactiae (Group B), S. pneumoniae,  S. pyogenes (Group A), Acinetobacter baumannii, Enterobacter  cloacae, E. coli, Klebsiella oxytoca, K. pneumoniae, Proteus  sp., Serratia marcescens, Haemophilus influenzae, Neisseria  meningitidis, Pseudomonas aeruginosa, Candida albicans, C.  glabrata, C. krusei, C. parapsilosis, C. tropicalis and the  KPC resistance gene.  **NOTE: Due to technical problems, Proteus sp. will NOT be  reported as part of the BCID panel until further notice.    RADIOLOGY & ADDITIONAL TESTS:    Imaging Personally Reviewed:  [ ] YES     Telemetry: episodes of WCT    Consultant(s) Notes Reviewed:      Care Discussed with Consultants/Other Providers: Dr. Abad ([facility]), [place]

## 2018-06-27 NOTE — CHART NOTE - NSCHARTNOTEFT_GEN_A_CORE
6/26/18 - Tele - 14 beats of Wide complex Tachycardia  - Irregularly irregular w intermittent regular WCT -EKG paced rhythm pt asymp -   pt has h/o Atrial Fib & AICD - evaluated this admission - 6/25 ICD interrogation: No high ventricular rate events. Few episodes of very transient high atrial rate events. Normal sensing and pacing via iterative testing. Excellent threshold capture. No reprogramming.  Patient on Coreg  BMP sent stat - magnesium 1.8 - supplemented    Patient incontinent - urinary dribbling noted by RN at bedside, pt has no urinary complaints of dysuria, frequency, urgency  bladder scan performed showed > 800 ccs of urine - straight cath protocol initiated   pt stable will continue to monitor       Vital Signs Last 24 Hrs  T(C): 36.7 (27 Jun 2018 02:23), Max: 36.8 (26 Jun 2018 12:30)  T(F): 98.1 (27 Jun 2018 02:23), Max: 98.2 (26 Jun 2018 12:30)  HR: 102 (27 Jun 2018 02:23) (81 - 102)  BP: 135/86 (27 Jun 2018 02:23) (127/74 - 175/85)  RR: 17 (27 Jun 2018 02:23) (16 - 18)  SpO2: 98% (27 Jun 2018 02:23) (98% - 100%)    Allergies    Cipro (Other)  fluoroquinolone antibiotics (Unknown)  latex (Unknown)    Intolerances      MEDICATIONS  (STANDING):  apixaban 2.5 milliGRAM(s) Oral every 12 hours  carvedilol 6.25 milliGRAM(s) Oral every 12 hours  dextrose 50% Injectable 12.5 Gram(s) IV Push once  docusate sodium 100 milliGRAM(s) Oral three times a day  folic acid 1 milliGRAM(s) Oral daily  insulin lispro (HumaLOG) corrective regimen sliding scale   SubCutaneous three times a day before meals  insulin lispro (HumaLOG) corrective regimen sliding scale   SubCutaneous at bedtime  losartan 25 milliGRAM(s) Oral daily  magnesium oxide 400 milliGRAM(s) Oral two times a day with meals  magnesium sulfate  IVPB 1 Gram(s) IV Intermittent once  omega-3-Acid Ethyl Esters 2 Gram(s) Oral two times a day  senna 2 Tablet(s) Oral at bedtime    MEDICATIONS  (PRN):  acetaminophen   Tablet. 650 milliGRAM(s) Oral every 6 hours PRN mild and moderate pain    06-27    140  |  101  |  23  ----------------------------<  204<H>  4.0   |  26  |  0.99    Ca    8.8      27 Jun 2018 00:40  Phos  2.2     06-26  Mg     1.8     06-27    TPro  6.6  /  Alb  3.5  /  TBili  0.7  /  DBili  x   /  AST  18  /  ALT  12  /  AlkPhos  74  06-26

## 2018-06-27 NOTE — PROGRESS NOTE ADULT - ASSESSMENT
89 yo M HFrEF s/p AICD, prior CVA with minimal residual LLE weakness, HTN, DM2 p/w generalized weakness, possibly secondary to hypotension from heavy BP control regimen, noted to have anemia and lymphocytosis, concerning for possible CLL.     # Hypotension likely 2/2 medications  - Patient on multiple BP lowering medications including possibly both ace-inhibitor and ARB, high dose coreg and hydralazine as well as diuretic  - BP improving  - appreciate cardiology input, restarted losartan 25 mg,, holding diuretics as does not appear overloaded  - given events on tele, dose coreg increased to 12.5 mg bid, appreciate cardiology assistance  - otpt cardiology followup  - fall precautions, PT recs rehab, discussed with family, who may be leaning towards rehab    # WCT  - dose coreg increased to 12.5 mg bid as above  - hypokalemia: replete K and mg    # Right shoulder disclocation  - superior right shoulder dislocation noted on Xray  - appreciate ortho assistance, they note chronic in nature, no acute orthopedic intervention at this time    # Elevated hs-troponin  - appreciate cardiology input  - doubt ACS, patient denies CP/SOB, likely type 2 MI in setting of hypotension  - no need for ischemic evaluation as per cardiology    # Leukocytosis (lymphocytosis), concern for CLL  - smudge cells noted, concern for CLL  - d/w Dr. Abad (otpt heme) today, will obtain flow cytometry lymphocytosis panel here, patient to followup with him in office for results/further management, d/w family to ensure that they reschedule his appointment and that he attends (had no showed multiple times previously)  - flow cytometry requisition form given to nursing    # Chronic HFrEF  - appears euvolemic  - c/w coreg, losartan today  - EP interrogation appreciated: no high ventricular rate events, few episodes of very high transient atrial rate events, normal sensing and pacing, excellent threshold capture, no reprogramming  - echo pending    # Anemia (normocytic)  - monitor hgb. transfuse prn goal hgb >7    # lactic acidosis  - possibly secondary to hypotension and metformin use  - hold metformin  - s/p IV fluids  - lactate improved    # DM2  - hold metformin, and other oral agents  - c/w HISS, monitor FS, goal <180  - c/w diabetic diet    # Medication management  - medication list provided by daughter, entered by pharmacy  - f/u PMD re: medication list to clarify indications for meds, including indication for eliquis, no afib noted on device interrogation...    # DVT ppx: on eliquis ?for afib, to followup with PMD re: indication for eliquis    Dispo:  PT recs rehab, to f/u family re: rehab vs home w/ services... family may be leaning toward rehab, appreciate CM/SW assistance

## 2018-06-28 LAB
BUN SERPL-MCNC: 19 MG/DL — SIGNIFICANT CHANGE UP (ref 7–23)
CALCIUM SERPL-MCNC: 8.8 MG/DL — SIGNIFICANT CHANGE UP (ref 8.4–10.5)
CHLORIDE SERPL-SCNC: 104 MMOL/L — SIGNIFICANT CHANGE UP (ref 98–107)
CO2 SERPL-SCNC: 28 MMOL/L — SIGNIFICANT CHANGE UP (ref 22–31)
CREAT SERPL-MCNC: 0.84 MG/DL — SIGNIFICANT CHANGE UP (ref 0.5–1.3)
GLUCOSE BLDC GLUCOMTR-MCNC: 164 MG/DL — HIGH (ref 70–99)
GLUCOSE BLDC GLUCOMTR-MCNC: 228 MG/DL — HIGH (ref 70–99)
GLUCOSE BLDC GLUCOMTR-MCNC: 255 MG/DL — HIGH (ref 70–99)
GLUCOSE BLDC GLUCOMTR-MCNC: 283 MG/DL — HIGH (ref 70–99)
GLUCOSE BLDC GLUCOMTR-MCNC: 301 MG/DL — HIGH (ref 70–99)
GLUCOSE SERPL-MCNC: 153 MG/DL — HIGH (ref 70–99)
HCT VFR BLD CALC: 30.9 % — LOW (ref 39–50)
HGB BLD-MCNC: 9.6 G/DL — LOW (ref 13–17)
MAGNESIUM SERPL-MCNC: 2.2 MG/DL — SIGNIFICANT CHANGE UP (ref 1.6–2.6)
MCHC RBC-ENTMCNC: 28.2 PG — SIGNIFICANT CHANGE UP (ref 27–34)
MCHC RBC-ENTMCNC: 31.1 % — LOW (ref 32–36)
MCV RBC AUTO: 90.6 FL — SIGNIFICANT CHANGE UP (ref 80–100)
NRBC # FLD: 0 — SIGNIFICANT CHANGE UP
PLATELET # BLD AUTO: 143 K/UL — LOW (ref 150–400)
PMV BLD: 10.6 FL — SIGNIFICANT CHANGE UP (ref 7–13)
POTASSIUM SERPL-MCNC: 4.4 MMOL/L — SIGNIFICANT CHANGE UP (ref 3.5–5.3)
POTASSIUM SERPL-SCNC: 4.4 MMOL/L — SIGNIFICANT CHANGE UP (ref 3.5–5.3)
RBC # BLD: 3.41 M/UL — LOW (ref 4.2–5.8)
RBC # FLD: 15.7 % — HIGH (ref 10.3–14.5)
SODIUM SERPL-SCNC: 142 MMOL/L — SIGNIFICANT CHANGE UP (ref 135–145)
WBC # BLD: 28.61 K/UL — HIGH (ref 3.8–10.5)
WBC # FLD AUTO: 28.61 K/UL — HIGH (ref 3.8–10.5)

## 2018-06-28 PROCEDURE — 99233 SBSQ HOSP IP/OBS HIGH 50: CPT

## 2018-06-28 RX ADMIN — MAGNESIUM OXIDE 400 MG ORAL TABLET 400 MILLIGRAM(S): 241.3 TABLET ORAL at 17:32

## 2018-06-28 RX ADMIN — Medication 3: at 12:55

## 2018-06-28 RX ADMIN — LOSARTAN POTASSIUM 25 MILLIGRAM(S): 100 TABLET, FILM COATED ORAL at 05:52

## 2018-06-28 RX ADMIN — Medication 1: at 08:54

## 2018-06-28 RX ADMIN — Medication 2 GRAM(S): at 05:52

## 2018-06-28 RX ADMIN — Medication 100 MILLIGRAM(S): at 05:52

## 2018-06-28 RX ADMIN — Medication 1: at 22:58

## 2018-06-28 RX ADMIN — CARVEDILOL PHOSPHATE 12.5 MILLIGRAM(S): 80 CAPSULE, EXTENDED RELEASE ORAL at 05:53

## 2018-06-28 RX ADMIN — APIXABAN 2.5 MILLIGRAM(S): 2.5 TABLET, FILM COATED ORAL at 05:52

## 2018-06-28 RX ADMIN — Medication 100 MILLIGRAM(S): at 12:56

## 2018-06-28 RX ADMIN — Medication 100 MILLIGRAM(S): at 22:58

## 2018-06-28 RX ADMIN — APIXABAN 2.5 MILLIGRAM(S): 2.5 TABLET, FILM COATED ORAL at 17:33

## 2018-06-28 RX ADMIN — SENNA PLUS 2 TABLET(S): 8.6 TABLET ORAL at 22:58

## 2018-06-28 RX ADMIN — Medication 2: at 17:37

## 2018-06-28 RX ADMIN — CARVEDILOL PHOSPHATE 12.5 MILLIGRAM(S): 80 CAPSULE, EXTENDED RELEASE ORAL at 17:33

## 2018-06-28 RX ADMIN — Medication 1 MILLIGRAM(S): at 12:56

## 2018-06-28 RX ADMIN — MAGNESIUM OXIDE 400 MG ORAL TABLET 400 MILLIGRAM(S): 241.3 TABLET ORAL at 08:55

## 2018-06-28 RX ADMIN — Medication 2 GRAM(S): at 17:32

## 2018-06-28 NOTE — DIETITIAN INITIAL EVALUATION ADULT. - PROBLEM SELECTOR PLAN 5
Normocytic anemia, potentially contributing to weakness  F/u iron studies, B12, folate, TSH  Check LDH, Haptoglobin

## 2018-06-28 NOTE — DIETITIAN INITIAL EVALUATION ADULT. - PROBLEM SELECTOR PLAN 4
Pt not currently in HF exacerbation - lungs clear, no JVP elevation, pt tolerated 1.5L fluids without issues.   Cards consult appreciated, will start coreg at 6.25 bid. Add on ARB when tolerated.  TTE in am   ICD interrogation

## 2018-06-28 NOTE — DIETITIAN INITIAL EVALUATION ADULT. - NS AS NUTRI INTERV MEALS SNACK
Diets modified for specific foods and ingredients/1. Suggest: PO diet rx: Mechanical Soft; Consistent Carbohydrate (no snacks), DASH/TLC (cholesterol and sodium restricted); PO supplement: Glucerna Shake 8oz. 2x daily (will provide additional ~440kcals, 20g protein);          2. Encourage & assist Pt with meals; Monitor PO diet tolerance; Honor food preferences;            3. Monitor labs, weights, hydration status;/Other (specify)

## 2018-06-28 NOTE — DIETITIAN INITIAL EVALUATION ADULT. - PHYSICAL APPEARANCE
underweight Nutrition focused physical exam conducted - found signs of malnutrition [ ] absent [X] present Subcutaneous fat loss: [moderate] Orbital fat pads region, [moderate] Buccal fat region. Muscle wasting: [moderate] Temples region/underweight/other (specify)

## 2018-06-28 NOTE — DIETITIAN INITIAL EVALUATION ADULT. - PROBLEM SELECTOR PLAN 2
hsTrp elevated, trending down. Doubt acute ACS given low CKMB and no clinical finding c/w ACS (no CP, palpitations, SOB);  Possible Type II MI in the setting of hypotension  Cards consult in ED, recs reviewed   Monitor on tele  Trend CE  Repeat screening EKG unchanged

## 2018-06-28 NOTE — DIETITIAN INITIAL EVALUATION ADULT. - PROBLEM SELECTOR PLAN 1
Patient on multiple heart failure medications that are blood pressure lowering including both Ace+ ARB, high dose Coreg and Hydralazine.   Hypotension likely due to intolerance of high dose meds that patient takes all at once in the am, it was responsive to fluids, now bp stable.   Monitor vitals q4h  Hold antihypertensives, restart slowly as tolerated. Will discuss w pt's cardiologist re: both Ace/ARB- only start one in hospital.

## 2018-06-28 NOTE — PROGRESS NOTE ADULT - SUBJECTIVE AND OBJECTIVE BOX
24H hour events: No acute events overnight. Patient feeling better.     MEDICATIONS:  apixaban 2.5 milliGRAM(s) Oral every 12 hours  carvedilol 12.5 milliGRAM(s) Oral every 12 hours  losartan 25 milliGRAM(s) Oral daily  acetaminophen   Tablet. 650 milliGRAM(s) Oral every 6 hours PRN  docusate sodium 100 milliGRAM(s) Oral three times a day  senna 2 Tablet(s) Oral at bedtime  dextrose 50% Injectable 12.5 Gram(s) IV Push once  insulin lispro (HumaLOG) corrective regimen sliding scale   SubCutaneous three times a day before meals  insulin lispro (HumaLOG) corrective regimen sliding scale   SubCutaneous at bedtime  folic acid 1 milliGRAM(s) Oral daily  magnesium oxide 400 milliGRAM(s) Oral two times a day with meals    REVIEW OF SYSTEMS:  Complete 10point ROS negative except as documented above.    PHYSICAL EXAM:  T(C): 36.2 (06-28-18 @ 13:52), Max: 36.7 (06-27-18 @ 21:15)  HR: 67 (06-28-18 @ 13:52) (67 - 75)  BP: 114/49 (06-28-18 @ 13:52) (114/49 - 156/74)  RR: 18 (06-28-18 @ 13:52) (18 - 18)  SpO2: 99% (06-28-18 @ 13:52) (98% - 99%)  Wt(kg): --  I&O's Summary    27 Jun 2018 07:01  -  28 Jun 2018 07:00  --------------------------------------------------------  IN: 0 mL / OUT: 670 mL / NET: -670 mL    Appearance: Normal	  HEENT:   Normal oral mucosa, PERRL, EOMI	  Lymphatic: No lymphadenopathy  Cardiovascular: Normal S1 S2, No JVD, No murmurs, No edema  Respiratory: Lungs clear to auscultation	  Psychiatry: A & O x 3, Mood & affect appropriate  Gastrointestinal:  Soft, Non-tender, + BS	  Skin: No rashes, No ecchymoses, No cyanosis	  Neurologic: Non-focal  Extremities: Normal range of motion, No clubbing, cyanosis or edema  Vascular: Peripheral pulses palpable 2+ bilaterally    LABS:	 	    CBC Full  -  ( 28 Jun 2018 05:58 )  WBC Count : 28.61 K/uL  Hemoglobin : 9.6 g/dL  Hematocrit : 30.9 %  Platelet Count - Automated : 143 K/uL  Mean Cell Volume : 90.6 fL  Mean Cell Hemoglobin : 28.2 pg  Mean Cell Hemoglobin Concentration : 31.1 %  Auto Neutrophil # : x  Auto Lymphocyte # : x  Auto Monocyte # : x  Auto Eosinophil # : x  Auto Basophil # : x  Auto Neutrophil % : x  Auto Lymphocyte % : x  Auto Monocyte % : x  Auto Eosinophil % : x  Auto Basophil % : x    06-28    142  |  104  |  19  ----------------------------<  153<H>  4.4   |  28  |  0.84  06-27    142  |  101  |  21  ----------------------------<  178<H>  3.4<L>   |  28  |  0.89    Ca    8.8      28 Jun 2018 05:58  Ca    8.7      27 Jun 2018 05:45  Mg     2.2     06-28  Mg     1.9     06-27      proBNP: Serum Pro-Brain Natriuretic Peptide: 5187 pg/mL (06-25 @ 06:30)    TELEMETRY: sinus without any more VT events  	    ECG:  	  RADIOLOGY:  OTHER: 	    PREVIOUS DIAGNOSTIC TESTING:    [ ] Echocardiogram:  [ ]  Catheterization:  [ ] Stress Test:  	  	  ASSESSMENT/PLAN:

## 2018-06-28 NOTE — DIETITIAN INITIAL EVALUATION ADULT. - OTHER INFO
Nutrition Consult X Cachexia, generalized weakness. Pt 89 yo male appears alert, oriented. Per Pt his appetite okay, but Pt needs help with meals reported. Pt C/O chewing difficulty; Pt's denture does not fit well reported. Pt agreed to try Mechanical Soft consistency meal option. No swallowing difficulty voiced. No report of nausea, vomiting or diarrhea @ this time. Pt's current body weight: ~61 Kg (bed scale – at time of visit). Pt with gradual weight loss "over the course of years" reported. Food preferences discussed; Pt agreed to try PO supplement: Glucerna Shake. RDN remains available, Pt made aware. Nutrition Consult X Cachexia, generalized weakness. Pt 88 yo male appears alert, oriented. Per Pt his appetite okay, but Pt needs help with meals reported. Pt C/O chewing difficulty; Pt's denture does not fit well reported. Pt agreed to try Mechanical Soft consistency meal option. No swallowing difficulty voiced. No report of nausea, vomiting or diarrhea @ this time. Pt's current body weight: ~61 Kg (bed scale – at time of visit). Pt with gradual weight loss "over the course of years" reported. Food preferences discussed; Pt agreed to try PO supplement: Glucerna Shake. Case discussed with Tele PA. RDN remains available, Pt made aware.

## 2018-06-28 NOTE — CHART NOTE - NSCHARTNOTEFT_GEN_A_CORE
Patient incontinent and was unable to void was undergoing straight cath protocol - 6/27/18 - >> 6/28/18 Pt urinary retention - bladder scan > 800 cc urine - straight cath protocol initiated, re-scanned > 500cc again straight cathed, repeat bladder scan - 135, 312 > pt unable to void - 6/28/18 - Davis inserted

## 2018-06-28 NOTE — DIETITIAN INITIAL EVALUATION ADULT. - PROBLEM SELECTOR PLAN 7
Patient's medication list provided by the daughter, entered by Pharmacy   However, both ACEI and ARB are listed as current meds, pt also on Eliquis for unclear reason, on multiple high dose anti hypertensives  -Medication list needs to be clarified with PMD by the day team

## 2018-06-28 NOTE — PROGRESS NOTE ADULT - ASSESSMENT
89M with PMH of acute on chronic systolic HF, AICD ([employer]), AWSTEMI, DM,HTN presents with generalized weakness, leukocytosis and hypotension. His troponin elevation was a type 2 event likely in the setting of hypotension. He has had no cardiac symptoms since, and his BP has stabilized, more so even into hypertensive range.   -plan will be for medical management, no plan for ischemic evaluation   -doing well with carvedilol 12.50 mg BID, no further ectopy  -c/w losartan 25 mg daily (takes 50 mg at home)  -would hold diuretic (takes 60 mg furosemide at home) as he appears euvolemic to dry on my exam and potentially over diuresis contributed to his admission, this can be reaccessed with his cardiologist in outpatient setting  -primary team to investigate his indication for NOAC/reach out to patient's cardiologist--> no afib on device interrogation. Benefits and risks must be evaluated in a frail elderly man and if indication is not there anymore or low benefit, he would likely benefit from aspirin alone over NOAC from an ischemic heart standpoint.   -no further cardiac interventions, primary team to investigate causes of his lymphocytosis   -please call service with any further questions     Please call on call cardiology team at [phone] with any further questions. 89M with PMH of acute on chronic systolic HF, AICD ([employer]), AWSTEMI, DM,HTN presents with generalized weakness, leukocytosis and hypotension. His troponin elevation was a type 2 event likely in the setting of hypotension. He has had no cardiac symptoms since, and his BP has stabilized, more so even into hypertensive range.   -plan will be for medical management, no plan for ischemic evaluation   -doing well with carvedilol 12.50 mg BID, no further ectopy  -c/w losartan 25 mg daily (takes 50 mg at home)  -restart home diuresis PO regimen, this can be reaccessed with his cardiologist in outpatient setting  -primary team to investigate his indication for NOAC/reach out to patient's cardiologist--> no afib on device interrogation. Benefits and risks must be evaluated in a frail elderly man and if indication is not there anymore or low benefit, he would likely benefit from aspirin alone over NOAC from an ischemic heart standpoint.   -no further cardiac interventions, primary team to investigate causes of his lymphocytosis   -please call service with any further questions     Please call on call cardiology team at [phone] with any further questions.

## 2018-06-28 NOTE — DIETITIAN INITIAL EVALUATION ADULT. - PROBLEM SELECTOR PLAN 8
Check A1C   Insulin SS and premeal for now.  Lactic acidosis likely secondary to Metformin use, would recommend discontinuing prior to d/c home

## 2018-06-28 NOTE — PROGRESS NOTE ADULT - SUBJECTIVE AND OBJECTIVE BOX
Patient is a 89y old  Male who presents with a chief complaint of Weakness (24 Jun 2018 23:07)      INTERVAL HPI/OVERNIGHT EVENTS:  Encouraged patient to get out of bed, he wants nurse to assist. Defers decision on rehab to daughter. Otherwise without acute complaints. Notes that his last BM was day prior but he is passing gas.     MEDICATIONS  (STANDING):  apixaban 2.5 milliGRAM(s) Oral every 12 hours  carvedilol 12.5 milliGRAM(s) Oral every 12 hours  dextrose 50% Injectable 12.5 Gram(s) IV Push once  docusate sodium 100 milliGRAM(s) Oral three times a day  folic acid 1 milliGRAM(s) Oral daily  insulin lispro (HumaLOG) corrective regimen sliding scale   SubCutaneous three times a day before meals  insulin lispro (HumaLOG) corrective regimen sliding scale   SubCutaneous at bedtime  losartan 25 milliGRAM(s) Oral daily  magnesium oxide 400 milliGRAM(s) Oral two times a day with meals  omega-3-Acid Ethyl Esters 2 Gram(s) Oral two times a day  senna 2 Tablet(s) Oral at bedtime    MEDICATIONS  (PRN):  acetaminophen   Tablet. 650 milliGRAM(s) Oral every 6 hours PRN mild and moderate pain    Allergies    Cipro (Other)  fluoroquinolone antibiotics (Unknown)  latex (Unknown)    Intolerances    REVIEW OF SYSTEMS:  Please see interval HPI:    Vital Signs Last 24 Hrs  T(C): 36.6 (28 Jun 2018 05:16), Max: 36.7 (27 Jun 2018 21:15)  T(F): 97.8 (28 Jun 2018 05:16), Max: 98 (27 Jun 2018 21:15)  HR: 75 (28 Jun 2018 05:16) (70 - 75)  BP: 156/74 (28 Jun 2018 05:16) (117/59 - 156/74)  BP(mean): --  RR: 18 (28 Jun 2018 05:16) (18 - 18)  SpO2: 98% (28 Jun 2018 05:16) (95% - 99%)  I&O's Detail    27 Jun 2018 07:01  -  28 Jun 2018 07:00  --------------------------------------------------------  IN:  Total IN: 0 mL    OUT:    Intermittent Catheterization - Urethral: 670 mL  Total OUT: 670 mL    Total NET: -670 mL    PHYSICAL EXAM:  GENERAL: NAD, lying in bed  HEAD:  NC/AT  EYES: EOMI, clear sclera/conjunctiva  ENMT: MMM  NECK: supple  NERVOUS SYSTEM: awake, non-focal   CHEST/LUNG: Comfortable on RA  ABDOMEN: soft, non-tender, no suprapubic tenderness  EXTREMITIES:  no c/c/e    LABS:                        9.6    28.61 )-----------( 143      ( 28 Jun 2018 05:58 )             30.9     28 Jun 2018 05:58    142    |  104    |  19     ----------------------------<  153    4.4     |  28     |  0.84     Ca    8.8        28 Jun 2018 05:58  Mg     2.2       28 Jun 2018 05:58    CAPILLARY BLOOD GLUCOSE  POCT Blood Glucose.: 255 mg/dL (28 Jun 2018 12:14)  POCT Blood Glucose.: 164 mg/dL (28 Jun 2018 08:01)  POCT Blood Glucose.: 249 mg/dL (27 Jun 2018 22:08)  POCT Blood Glucose.: 219 mg/dL (27 Jun 2018 17:10)    BLOOD CULTURE  06-25 @ 07:19 --    NO ORGANISMS ISOLATED  NO ORGANISMS ISOLATED AT 72 HRS.  BLOOD CULTURE PCR  ***Blood Panel PCR results on this specimen are available  approximately 3 hours after the Gram stain result***  Gram stain, PCR, and/or culture results may not always  correspond due to difference in methodologies  ------------------------------------------------------------  This PCR assay was performed using [employer].  The  following targets are tested for:  Enterococcus, vancomycin  resistant enterococci, Listeria monocytogenes,  coagulase  negative staphylococci, S. aureus, methicillin resistant S.  aureus, Streptococcus agalactiae (Group B), S. pneumoniae,  S. pyogenes (Group A), Acinetobacter baumannii, Enterobacter  cloacae, E. coli, Klebsiella oxytoca, K. pneumoniae, Proteus  sp., Serratia marcescens, Haemophilus influenzae, Neisseria  meningitidis, Pseudomonas aeruginosa, Candida albicans, C.  glabrata, C. krusei, C. parapsilosis, C. tropicalis and the  KPC resistance gene.  **NOTE: Due to technical problems, Proteus sp. will NOT be  reported as part of the BCID panel until further notice.    RADIOLOGY & ADDITIONAL TESTS:    Imaging Personally Reviewed:  [ ] YES     Consultant(s) Notes Reviewed:      Care Discussed with Consultants/Other Providers: Dr. Pollo Leggett ([facility]), flow cytometry lab

## 2018-06-28 NOTE — DIETITIAN INITIAL EVALUATION ADULT. - PROBLEM SELECTOR PLAN 3
Unclear etiology, has a lymphocytic predominance concerning for CLL given smudge cells vs  lymphoma; LN not palpable on exam.   f/u blood and urine cultures  Heme consult in am for flow cytometry

## 2018-06-28 NOTE — PROGRESS NOTE ADULT - ASSESSMENT
88 yo M HFrEF s/p AICD, prior CVA with minimal residual LLE weakness, HTN, DM2 p/w generalized weakness, possibly secondary to hypotension from heavy BP control regimen, noted to have anemia and lymphocytosis, concerning for possible CLL.     # Hypotension likely 2/2 medications  - Patient on multiple BP lowering medications including possibly both ace-inhibitor and ARB, high dose coreg and hydralazine as well as diuretic  - BP improving, currently in acceptable range  - appreciate cardiology input, restarted losartan 25 mg,, holding diuretics as does not appear overloaded  - given recent events on tele, dose coreg increased yesterday to 12.5 mg bid, appreciate cardiology assistance  - otpt cardiology followup  - fall precautions, PT recs rehab, discussed with family, who may be leaning towards rehab    # WCT  - dose coreg increased to 12.5 mg bid yesterday as above  - continue to monitor, currently sinus on tele    # Right shoulder disclocation  - superior right shoulder dislocation noted on Xray  - appreciate ortho assistance, they note chronic in nature, no acute orthopedic intervention at this time    # Elevated hs-troponin  - appreciate cardiology input  - doubt ACS, patient denies CP/SOB, likely type 2 MI in setting of hypotension  - no need for ischemic evaluation as per cardiology    # Leukocytosis (lymphocytosis), concern for CLL  - smudge cells noted, concern for CLL  - d/w Dr. Abad (otpt heme), obtaining flow cytometry, reviewed current results with him (cell subsets), also called lab to see if they will run leukemia/lymphoma panel,   - had d/w family to ensure that they reschedule his appointment and that he attends (had no showed multiple times previously)    # Chronic HFrEF  - appears euvolemic  - c/w coreg, losartan today  - EP interrogation appreciated: no high ventricular rate events, few episodes of very high transient atrial rate events, normal sensing and pacing, excellent threshold capture, no reprogramming  - echo pending    # Anemia (normocytic)  - monitor hgb. transfuse prn goal hgb >7    # lactic acidosis  - possibly secondary to hypotension and metformin use  - hold metformin  - s/p IV fluids  - lactate improved    # DM2  - hold metformin, and other oral agents  - c/w HISS, monitor FS, goal <180  - c/w diabetic diet    # Medication management  - medication list provided by daughter, entered by pharmacy  - f/u PMD re: medication list to clarify indications for meds, including indication for eliquis, no afib noted on device interrogation...    # DVT ppx: on eliquis ?for afib, to followup with PMD re: indication for eliquis    Dispo:  PT recs rehab, to f/u family re: rehab vs home w/ services... family may be leaning toward rehab, appreciate CM/SW assistance

## 2018-06-28 NOTE — DIETITIAN INITIAL EVALUATION ADULT. - PROBLEM SELECTOR PLAN 6
Lactic acidosis likely multifactorial secondary to Metformin use and possible episodes of hypotension due to BP medication non-intentional overdose;   -Hold Metformin   -Recheck Lactate in am

## 2018-06-29 LAB
BASOPHILS # BLD AUTO: 0.29 K/UL — HIGH (ref 0–0.2)
BASOPHILS NFR BLD AUTO: 1 % — SIGNIFICANT CHANGE UP (ref 0–2)
BUN SERPL-MCNC: 19 MG/DL — SIGNIFICANT CHANGE UP (ref 7–23)
CALCIUM SERPL-MCNC: 8.6 MG/DL — SIGNIFICANT CHANGE UP (ref 8.4–10.5)
CHLORIDE SERPL-SCNC: 100 MMOL/L — SIGNIFICANT CHANGE UP (ref 98–107)
CO2 SERPL-SCNC: 26 MMOL/L — SIGNIFICANT CHANGE UP (ref 22–31)
CREAT SERPL-MCNC: 0.69 MG/DL — SIGNIFICANT CHANGE UP (ref 0.5–1.3)
EOSINOPHIL # BLD AUTO: 0.41 K/UL — SIGNIFICANT CHANGE UP (ref 0–0.5)
EOSINOPHIL NFR BLD AUTO: 1.4 % — SIGNIFICANT CHANGE UP (ref 0–6)
GLUCOSE BLDC GLUCOMTR-MCNC: 185 MG/DL — HIGH (ref 70–99)
GLUCOSE BLDC GLUCOMTR-MCNC: 193 MG/DL — HIGH (ref 70–99)
GLUCOSE BLDC GLUCOMTR-MCNC: 294 MG/DL — HIGH (ref 70–99)
GLUCOSE BLDC GLUCOMTR-MCNC: 299 MG/DL — HIGH (ref 70–99)
GLUCOSE BLDC GLUCOMTR-MCNC: 304 MG/DL — HIGH (ref 70–99)
GLUCOSE BLDC GLUCOMTR-MCNC: 339 MG/DL — HIGH (ref 70–99)
GLUCOSE SERPL-MCNC: 149 MG/DL — HIGH (ref 70–99)
HCT VFR BLD CALC: 29.7 % — LOW (ref 39–50)
HGB BLD-MCNC: 9.3 G/DL — LOW (ref 13–17)
IMM GRANULOCYTES # BLD AUTO: 0.08 # — SIGNIFICANT CHANGE UP
IMM GRANULOCYTES NFR BLD AUTO: 0.3 % — SIGNIFICANT CHANGE UP (ref 0–1.5)
LYMPHOCYTES # BLD AUTO: 21.45 K/UL — HIGH (ref 1–3.3)
LYMPHOCYTES # BLD AUTO: 72 % — HIGH (ref 13–44)
MAGNESIUM SERPL-MCNC: 2.1 MG/DL — SIGNIFICANT CHANGE UP (ref 1.6–2.6)
MANUAL SMEAR VERIFICATION: SIGNIFICANT CHANGE UP
MCHC RBC-ENTMCNC: 27.8 PG — SIGNIFICANT CHANGE UP (ref 27–34)
MCHC RBC-ENTMCNC: 31.3 % — LOW (ref 32–36)
MCV RBC AUTO: 88.9 FL — SIGNIFICANT CHANGE UP (ref 80–100)
MONOCYTES # BLD AUTO: 3.28 K/UL — HIGH (ref 0–0.9)
MONOCYTES NFR BLD AUTO: 11 % — SIGNIFICANT CHANGE UP (ref 2–14)
NEUTROPHILS # BLD AUTO: 4.28 K/UL — SIGNIFICANT CHANGE UP (ref 1.8–7.4)
NEUTROPHILS NFR BLD AUTO: 14.3 % — LOW (ref 43–77)
NRBC # FLD: 0 — SIGNIFICANT CHANGE UP
PLATELET # BLD AUTO: 144 K/UL — LOW (ref 150–400)
PMV BLD: 10.1 FL — SIGNIFICANT CHANGE UP (ref 7–13)
POTASSIUM SERPL-MCNC: 4.4 MMOL/L — SIGNIFICANT CHANGE UP (ref 3.5–5.3)
POTASSIUM SERPL-SCNC: 4.4 MMOL/L — SIGNIFICANT CHANGE UP (ref 3.5–5.3)
RBC # BLD: 3.34 M/UL — LOW (ref 4.2–5.8)
RBC # FLD: 15.1 % — HIGH (ref 10.3–14.5)
SODIUM SERPL-SCNC: 136 MMOL/L — SIGNIFICANT CHANGE UP (ref 135–145)
TM INTERPRETATION: SIGNIFICANT CHANGE UP
WBC # BLD: 29.79 K/UL — HIGH (ref 3.8–10.5)
WBC # FLD AUTO: 29.79 K/UL — HIGH (ref 3.8–10.5)

## 2018-06-29 PROCEDURE — 99233 SBSQ HOSP IP/OBS HIGH 50: CPT

## 2018-06-29 RX ORDER — TAMSULOSIN HYDROCHLORIDE 0.4 MG/1
0.4 CAPSULE ORAL AT BEDTIME
Qty: 0 | Refills: 0 | Status: DISCONTINUED | OUTPATIENT
Start: 2018-06-29 | End: 2018-07-06

## 2018-06-29 RX ORDER — FUROSEMIDE 40 MG
60 TABLET ORAL DAILY
Qty: 0 | Refills: 0 | Status: DISCONTINUED | OUTPATIENT
Start: 2018-06-29 | End: 2018-07-05

## 2018-06-29 RX ADMIN — Medication 100 MILLIGRAM(S): at 05:57

## 2018-06-29 RX ADMIN — Medication 1 MILLIGRAM(S): at 13:52

## 2018-06-29 RX ADMIN — Medication 2 GRAM(S): at 05:56

## 2018-06-29 RX ADMIN — APIXABAN 2.5 MILLIGRAM(S): 2.5 TABLET, FILM COATED ORAL at 17:24

## 2018-06-29 RX ADMIN — MAGNESIUM OXIDE 400 MG ORAL TABLET 400 MILLIGRAM(S): 241.3 TABLET ORAL at 17:24

## 2018-06-29 RX ADMIN — SENNA PLUS 2 TABLET(S): 8.6 TABLET ORAL at 21:58

## 2018-06-29 RX ADMIN — TAMSULOSIN HYDROCHLORIDE 0.4 MILLIGRAM(S): 0.4 CAPSULE ORAL at 21:58

## 2018-06-29 RX ADMIN — CARVEDILOL PHOSPHATE 12.5 MILLIGRAM(S): 80 CAPSULE, EXTENDED RELEASE ORAL at 17:25

## 2018-06-29 RX ADMIN — Medication 60 MILLIGRAM(S): at 13:52

## 2018-06-29 RX ADMIN — APIXABAN 2.5 MILLIGRAM(S): 2.5 TABLET, FILM COATED ORAL at 05:56

## 2018-06-29 RX ADMIN — MAGNESIUM OXIDE 400 MG ORAL TABLET 400 MILLIGRAM(S): 241.3 TABLET ORAL at 08:52

## 2018-06-29 RX ADMIN — Medication 100 MILLIGRAM(S): at 21:59

## 2018-06-29 RX ADMIN — Medication 2 GRAM(S): at 17:25

## 2018-06-29 RX ADMIN — CARVEDILOL PHOSPHATE 12.5 MILLIGRAM(S): 80 CAPSULE, EXTENDED RELEASE ORAL at 05:57

## 2018-06-29 RX ADMIN — LOSARTAN POTASSIUM 25 MILLIGRAM(S): 100 TABLET, FILM COATED ORAL at 05:57

## 2018-06-29 RX ADMIN — Medication 4: at 12:55

## 2018-06-29 RX ADMIN — Medication 3: at 17:45

## 2018-06-29 RX ADMIN — Medication 2: at 22:10

## 2018-06-29 RX ADMIN — Medication 1: at 08:58

## 2018-06-29 NOTE — CHART NOTE - NSCHARTNOTEFT_GEN_A_CORE
Flow cytometry results noted.    " IMMUNOPHENOTYPE:  Monotypic B-cells (60% of total), positive for bright kappa, bright CD20, CD19, dim CD5, dim CD23,  FMC-7; negative CD10, CD38.  There is no increase in CD34,  or CD14 positive cells.    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  The main differential diagnosis includes CLL/SLL (which may occasionally be FMC-7 positive with  brighter CD20 and surface light chains), and mantle cell lymphoma.  Also, marginal zone lineage (which may occasionally be CD5 positive) cannot be entirely excluded. "    D/w Dr. Abad ([facility]) who recommends obtaining CLL FISH cytogenetic panel Monday to help discern CLL vs Mantle cell lymphoma.

## 2018-06-29 NOTE — PROGRESS NOTE ADULT - SUBJECTIVE AND OBJECTIVE BOX
Patient is a 89y old  Male who presents with a chief complaint of Weakness (24 Jun 2018 23:07)    INTERVAL HPI/OVERNIGHT EVENTS:  Had urinary retention necessitating placement of joshi (s/p straight cath protocol). Patient feels he can still urinate, discussed urinary retention, why joshi placed and potential complications of unmanaged retention including kidney injury and initiation of flomax to help. Encouraged patient to get out of bed and work with PT. Patient reports he was oob to chair earlier this morning.     MEDICATIONS  (STANDING):  apixaban 2.5 milliGRAM(s) Oral every 12 hours  carvedilol 12.5 milliGRAM(s) Oral every 12 hours  dextrose 50% Injectable 12.5 Gram(s) IV Push once  docusate sodium 100 milliGRAM(s) Oral three times a day  folic acid 1 milliGRAM(s) Oral daily  furosemide    Tablet 60 milliGRAM(s) Oral daily  insulin lispro (HumaLOG) corrective regimen sliding scale   SubCutaneous three times a day before meals  insulin lispro (HumaLOG) corrective regimen sliding scale   SubCutaneous at bedtime  losartan 25 milliGRAM(s) Oral daily  magnesium oxide 400 milliGRAM(s) Oral two times a day with meals  omega-3-Acid Ethyl Esters 2 Gram(s) Oral two times a day  senna 2 Tablet(s) Oral at bedtime  tamsulosin 0.4 milliGRAM(s) Oral at bedtime    MEDICATIONS  (PRN):  acetaminophen   Tablet. 650 milliGRAM(s) Oral every 6 hours PRN mild and moderate pain    Allergies  Cipro (Other)  fluoroquinolone antibiotics (Unknown)  latex (Unknown)    Intolerances    REVIEW OF SYSTEMS:  Please see interval HPI:    Vital Signs Last 24 Hrs  T(C): 36.6 (29 Jun 2018 12:35), Max: 37 (28 Jun 2018 17:35)  T(F): 97.9 (29 Jun 2018 12:35), Max: 98.6 (28 Jun 2018 17:35)  HR: 83 (29 Jun 2018 13:51) (69 - 83)  BP: 122/60 (29 Jun 2018 13:51) (122/60 - 162/88)  BP(mean): --  RR: 17 (29 Jun 2018 12:35) (17 - 18)  SpO2: 100% (29 Jun 2018 12:35) (96% - 100%)  I&O's Detail    28 Jun 2018 07:01  -  29 Jun 2018 07:00  --------------------------------------------------------  IN:  Total IN: 0 mL    OUT:    Indwelling Catheter - Urethral: 600 mL    Intermittent Catheterization - Urethral: 300 mL  Total OUT: 900 mL    Total NET: -900 mL    PHYSICAL EXAM:  GENERAL: NAD, lying in bed  HEAD:  NC/AT  EYES: EOMI  ENMT: MMM  NECK: supple  NERVOUS SYSTEM: non-focal, moving all extremities   CHEST/LUNG: Comfortable on RA  ABDOMEN: soft, non-tender, no suprapubic tenderness  EXTREMITIES:  no c/c/e  : joshi in place, draining urine    LABS:                        9.3    29.79 )-----------( 144      ( 29 Jun 2018 05:41 )             29.7     29 Jun 2018 05:41    136    |  100    |  19     ----------------------------<  149    4.4     |  26     |  0.69     Ca    8.6        29 Jun 2018 05:41  Mg     2.1       29 Jun 2018 05:41    CAPILLARY BLOOD GLUCOSE  POCT Blood Glucose.: 339 mg/dL (29 Jun 2018 12:14)  POCT Blood Glucose.: 193 mg/dL (29 Jun 2018 08:55)  POCT Blood Glucose.: 185 mg/dL (29 Jun 2018 07:47)  POCT Blood Glucose.: 283 mg/dL (28 Jun 2018 22:57)  POCT Blood Glucose.: 301 mg/dL (28 Jun 2018 21:37)  POCT Blood Glucose.: 228 mg/dL (28 Jun 2018 17:29)    BLOOD CULTURE    RADIOLOGY & ADDITIONAL TESTS:    Imaging Personally Reviewed:  [ ] YES     Consultant(s) Notes Reviewed:      Care Discussed with Consultants/Other Providers: telePA

## 2018-06-29 NOTE — PROGRESS NOTE ADULT - ASSESSMENT
89 yo M HFrEF s/p AICD, prior CVA with minimal residual LLE weakness, HTN, DM2 p/w generalized weakness, possibly secondary to hypotension from heavy BP control regimen, noted to have anemia and lymphocytosis, concerning for possible CLL. Course c/b urinary retention.     # Urinary retention  - failed straight cath protocol, joshi in place  - will start flomax, continue to monitor  - will consider d/c to rehab with joshi in place and otpt urology f/u, vs attempting  TOV later s/p flomax    # Hypotension likely 2/2 medications  - Patient on multiple BP lowering medications including possibly both ace-inhibitor and ARB, high dose coreg and hydralazine as well as diuretic  - BP overall improving with reduction in medication regimen  - appreciate cardiology input, restarted losartan 25 mg, will resume home diuretic today   - given recent events on tele, dose coreg increased recently to 12.5 mg bid, appreciate cardiology assistance, continue to monitor  - otpt cardiology followup  - fall precautions, PT recs rehab    # WCT  - dose coreg increased to 12.5 mg bid recently  - continue to monitor on telemetry  - appreciate cardiology input    # Right shoulder dislocation  - superior right shoulder dislocation noted on Xray  - appreciate ortho assistance, they note chronic in nature, no acute orthopedic intervention at this time    # Elevated hs-troponin  - appreciate cardiology input  - doubt ACS, patient denies CP/SOB, likely type 2 MI in setting of hypotension  - no need for ischemic evaluation as per cardiology    # Leukocytosis (lymphocytosis), 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 Abad (otpt heme), obtaining flow cytometry, reviewed  results with him (cell subsets), also had called lab to see if they will run leukemia/lymphoma panel, f/u results  - had d/w family to ensure that they reschedule his appointment and that he attends (had no showed multiple times previously)    # Chronic HFrEF  - appears euvolemic  - c/w coreg, losartan, lasix as per cards  - EP interrogation appreciated: no high ventricular rate events, few episodes of very high transient atrial rate events, normal sensing and pacing, excellent threshold capture, no reprogramming  - echo pending    # Anemia (normocytic)  - monitor hgb. transfuse prn goal hgb >7    # lactic acidosis  - possibly secondary to hypotension and metformin use  - hold metformin  - s/p IV fluids  - lactate improved    # DM2  - hold metformin, and other oral agents  - c/w HISS, monitor FS, goal <180  - c/w diabetic diet    # Medication management  - medication list provided by daughter, entered by pharmacy  - f/u PMD re: medication list to clarify indications for meds, including indication for eliquis, no afib noted on device interrogation...    # DVT ppx: on eliquis ?for afib, to followup with PMD re: indication for eliquis    Dispo:  PT recs rehab, appreciate CM/SW assistance re: placement

## 2018-06-30 LAB
BACTERIA BLD CULT: SIGNIFICANT CHANGE UP
BUN SERPL-MCNC: 22 MG/DL — SIGNIFICANT CHANGE UP (ref 7–23)
CALCIUM SERPL-MCNC: 8.7 MG/DL — SIGNIFICANT CHANGE UP (ref 8.4–10.5)
CHLORIDE SERPL-SCNC: 98 MMOL/L — SIGNIFICANT CHANGE UP (ref 98–107)
CO2 SERPL-SCNC: 27 MMOL/L — SIGNIFICANT CHANGE UP (ref 22–31)
CREAT SERPL-MCNC: 0.82 MG/DL — SIGNIFICANT CHANGE UP (ref 0.5–1.3)
GLUCOSE BLDC GLUCOMTR-MCNC: 199 MG/DL — HIGH (ref 70–99)
GLUCOSE BLDC GLUCOMTR-MCNC: 220 MG/DL — HIGH (ref 70–99)
GLUCOSE BLDC GLUCOMTR-MCNC: 278 MG/DL — HIGH (ref 70–99)
GLUCOSE BLDC GLUCOMTR-MCNC: 346 MG/DL — HIGH (ref 70–99)
GLUCOSE BLDC GLUCOMTR-MCNC: 351 MG/DL — HIGH (ref 70–99)
GLUCOSE SERPL-MCNC: 199 MG/DL — HIGH (ref 70–99)
HCT VFR BLD CALC: 31.9 % — LOW (ref 39–50)
HGB BLD-MCNC: 10.2 G/DL — LOW (ref 13–17)
MAGNESIUM SERPL-MCNC: 1.9 MG/DL — SIGNIFICANT CHANGE UP (ref 1.6–2.6)
MCHC RBC-ENTMCNC: 27.9 PG — SIGNIFICANT CHANGE UP (ref 27–34)
MCHC RBC-ENTMCNC: 32 % — SIGNIFICANT CHANGE UP (ref 32–36)
MCV RBC AUTO: 87.2 FL — SIGNIFICANT CHANGE UP (ref 80–100)
NRBC # FLD: 0 — SIGNIFICANT CHANGE UP
PLATELET # BLD AUTO: 167 K/UL — SIGNIFICANT CHANGE UP (ref 150–400)
PMV BLD: 10.6 FL — SIGNIFICANT CHANGE UP (ref 7–13)
POTASSIUM SERPL-MCNC: 3.9 MMOL/L — SIGNIFICANT CHANGE UP (ref 3.5–5.3)
POTASSIUM SERPL-SCNC: 3.9 MMOL/L — SIGNIFICANT CHANGE UP (ref 3.5–5.3)
RBC # BLD: 3.66 M/UL — LOW (ref 4.2–5.8)
RBC # FLD: 15 % — HIGH (ref 10.3–14.5)
SODIUM SERPL-SCNC: 135 MMOL/L — SIGNIFICANT CHANGE UP (ref 135–145)
WBC # BLD: 29.54 K/UL — HIGH (ref 3.8–10.5)
WBC # FLD AUTO: 29.54 K/UL — HIGH (ref 3.8–10.5)

## 2018-06-30 PROCEDURE — 93306 TTE W/DOPPLER COMPLETE: CPT | Mod: 26

## 2018-06-30 PROCEDURE — 99233 SBSQ HOSP IP/OBS HIGH 50: CPT

## 2018-06-30 RX ORDER — FINASTERIDE 5 MG/1
5 TABLET, FILM COATED ORAL DAILY
Qty: 0 | Refills: 0 | Status: DISCONTINUED | OUTPATIENT
Start: 2018-06-30 | End: 2018-07-06

## 2018-06-30 RX ORDER — POLYETHYLENE GLYCOL 3350 17 G/17G
17 POWDER, FOR SOLUTION ORAL DAILY
Qty: 0 | Refills: 0 | Status: DISCONTINUED | OUTPATIENT
Start: 2018-06-30 | End: 2018-07-06

## 2018-06-30 RX ADMIN — Medication 3: at 17:35

## 2018-06-30 RX ADMIN — Medication 100 MILLIGRAM(S): at 05:51

## 2018-06-30 RX ADMIN — LOSARTAN POTASSIUM 25 MILLIGRAM(S): 100 TABLET, FILM COATED ORAL at 05:52

## 2018-06-30 RX ADMIN — Medication 1: at 10:09

## 2018-06-30 RX ADMIN — Medication 1 MILLIGRAM(S): at 17:37

## 2018-06-30 RX ADMIN — Medication 60 MILLIGRAM(S): at 05:53

## 2018-06-30 RX ADMIN — TAMSULOSIN HYDROCHLORIDE 0.4 MILLIGRAM(S): 0.4 CAPSULE ORAL at 21:56

## 2018-06-30 RX ADMIN — Medication 2 GRAM(S): at 05:50

## 2018-06-30 RX ADMIN — Medication 2 GRAM(S): at 17:36

## 2018-06-30 RX ADMIN — MAGNESIUM OXIDE 400 MG ORAL TABLET 400 MILLIGRAM(S): 241.3 TABLET ORAL at 10:08

## 2018-06-30 RX ADMIN — Medication 100 MILLIGRAM(S): at 21:56

## 2018-06-30 RX ADMIN — CARVEDILOL PHOSPHATE 12.5 MILLIGRAM(S): 80 CAPSULE, EXTENDED RELEASE ORAL at 05:51

## 2018-06-30 RX ADMIN — APIXABAN 2.5 MILLIGRAM(S): 2.5 TABLET, FILM COATED ORAL at 05:50

## 2018-06-30 RX ADMIN — MAGNESIUM OXIDE 400 MG ORAL TABLET 400 MILLIGRAM(S): 241.3 TABLET ORAL at 17:35

## 2018-06-30 RX ADMIN — APIXABAN 2.5 MILLIGRAM(S): 2.5 TABLET, FILM COATED ORAL at 17:35

## 2018-06-30 RX ADMIN — FINASTERIDE 5 MILLIGRAM(S): 5 TABLET, FILM COATED ORAL at 17:34

## 2018-06-30 RX ADMIN — SENNA PLUS 2 TABLET(S): 8.6 TABLET ORAL at 21:56

## 2018-06-30 RX ADMIN — Medication 2: at 21:56

## 2018-06-30 RX ADMIN — Medication 5: at 12:37

## 2018-06-30 RX ADMIN — CARVEDILOL PHOSPHATE 12.5 MILLIGRAM(S): 80 CAPSULE, EXTENDED RELEASE ORAL at 17:36

## 2018-06-30 NOTE — PROGRESS NOTE ADULT - ASSESSMENT
88 yo M HFrEF s/p AICD, prior CVA with minimal residual LLE weakness, HTN, DM2 p/w generalized weakness, possibly secondary to hypotension from heavy BP control regimen, noted to have anemia and lymphocytosis, concerning for possible CLL. Course c/b urinary retention.     # Urinary retention  - joshi placed 6/28, consider TOV tomorrow, if fails again will maintain joshi with outpt  f/u  -c/w flomax, add proscar  -bowel regimen      # Hypotension likely 2/2 medications  - Patient on multiple BP lowering medications including possibly both ace-inhibitor and ARB, high dose coreg and hydralazine as well as diuretic  - now hemodynamically stable  -c/w losartan 25 mg and lasix 60 mg qd  -TTE today, will follow result  -no further w/u per cardiology  -d/c plan JUS per PT recs    # WCT  - dose coreg increased to 12.5 mg bid recently  - continue to monitor on telemetry  - f/u cardio    # Right shoulder dislocation  - superior right shoulder dislocation noted on Xray  - appreciate ortho assistance, they note chronic in nature, no acute orthopedic intervention at this time    # Elevated hs-troponin  - doubt ACS, patient denies CP/SOB, likely type 2 MI in setting of hypotension  - f/u TTE, no need for ischemic evaluation as per cardiology    # Leukocytosis (lymphocytosis), 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  ddx include CLL/SLL  Per Dr. Abad (heme), plan to obtain CLL FISH cytogenetic panel Monday to help discern CLL vs Mantle cell lymphoma.      # Chronic HFrEF  - appears euvolemic  - c/w coreg, losartan, lasix as per cards  - EP interrogation appreciated: no high ventricular rate events, few episodes of very high transient atrial rate events, normal sensing and pacing, excellent threshold capture, no reprogramming  - f/u TTE    # Anemia (normocytic)  - monitor hgb. transfuse prn goal hgb >7    # lactic acidosis  - possibly secondary to hypotension and metformin use  - hold metformin  - s/p IV fluids  - lactate resolved    # DM2  - hold metformin, and other oral agents  - c/w HISS, monitor FS, goal <180  - c/w diabetic diet    # DVT ppx: on eliquis ?for afib, to followup with PMD re: indication for eliquis    Dispo: d/c plan JUS when medically optimized, f/u CM/SW

## 2018-06-30 NOTE — PROGRESS NOTE ADULT - SUBJECTIVE AND OBJECTIVE BOX
CHIEF COMPLAINT: Patient is a 89y old  male who presents with a chief complaint of Weakness (24 Jun 2018 23:07)      SUBJECTIVE / OVERNIGHT EVENTS:  pt feels ok, reports that he lost about 30 lbs for the past few years  denies chest pain/sob    MEDICATIONS  (STANDING):  apixaban 2.5 milliGRAM(s) Oral every 12 hours  carvedilol 12.5 milliGRAM(s) Oral every 12 hours  dextrose 50% Injectable 12.5 Gram(s) IV Push once  docusate sodium 100 milliGRAM(s) Oral three times a day  folic acid 1 milliGRAM(s) Oral daily  furosemide    Tablet 60 milliGRAM(s) Oral daily  insulin lispro (HumaLOG) corrective regimen sliding scale   SubCutaneous three times a day before meals  insulin lispro (HumaLOG) corrective regimen sliding scale   SubCutaneous at bedtime  losartan 25 milliGRAM(s) Oral daily  magnesium oxide 400 milliGRAM(s) Oral two times a day with meals  omega-3-Acid Ethyl Esters 2 Gram(s) Oral two times a day  senna 2 Tablet(s) Oral at bedtime  tamsulosin 0.4 milliGRAM(s) Oral at bedtime    MEDICATIONS  (PRN):  acetaminophen   Tablet. 650 milliGRAM(s) Oral every 6 hours PRN mild and moderate pain      VITALS:  T(F): 99 (06-30-18 @ 05:42), Max: 99 (06-30-18 @ 05:42)  HR: 80 (06-30-18 @ 05:42) (75 - 89)  BP: 147/74 (06-30-18 @ 05:42) (122/60 - 155/71)  RR: 18 (06-30-18 @ 05:42) (18 - 18)  SpO2: 99% (06-30-18 @ 05:42)      CAPILLARY BLOOD GLUCOSE    Output     I&O's Summary  T(F): 99 (06-30-18 @ 05:42), Max: 99 (06-30-18 @ 05:42)  HR: 80 (06-30-18 @ 05:42) (75 - 89)  BP: 147/74 (06-30-18 @ 05:42) (122/60 - 155/71)  RR: 18 (06-30-18 @ 05:42) (18 - 18)  SpO2: 99% (06-30-18 @ 05:42)    PHYSICAL EXAM:  GENERAL: NAD, well-developed  HEAD:  Atraumatic, Normocephalic  EYES: EOMI,  conjunctiva and sclera clear  NECK: Supple, No JVD  CHEST/LUNG: Clear to auscultation bilaterally; No wheeze  HEART: Regular rate and rhythm; No murmurs, rubs, or gallops  ABDOMEN: Soft, Nontender, Nondistended; Bowel sounds present  : joshi with clear yellow urine   EXTREMITIES:  2+ Peripheral Pulses, No clubbing, cyanosis, or edema  PSYCH: AAOx3  NEUROLOGY: non-focal  SKIN: No rashes or lesions    LABS:              10.2                 135  | 27   | 22           29.54 >-----------< 167     ------------------------< 199                   31.9                 3.9  | 98   | 0.82                                         Ca 8.7   Mg 1.9   Ph x          MICROBIOLOGY:    RADIOLOGY & ADDITIONAL TESTS:    Imaging Personally Reviewed:    [ ] Consultant(s) Notes Reviewed:  [ ] Care Discussed with Consultants/Other Providers:

## 2018-07-01 LAB
BUN SERPL-MCNC: 23 MG/DL — SIGNIFICANT CHANGE UP (ref 7–23)
CALCIUM SERPL-MCNC: 8.5 MG/DL — SIGNIFICANT CHANGE UP (ref 8.4–10.5)
CHLORIDE SERPL-SCNC: 95 MMOL/L — LOW (ref 98–107)
CO2 SERPL-SCNC: 29 MMOL/L — SIGNIFICANT CHANGE UP (ref 22–31)
CREAT SERPL-MCNC: 0.73 MG/DL — SIGNIFICANT CHANGE UP (ref 0.5–1.3)
GLUCOSE BLDC GLUCOMTR-MCNC: 147 MG/DL — HIGH (ref 70–99)
GLUCOSE BLDC GLUCOMTR-MCNC: 223 MG/DL — HIGH (ref 70–99)
GLUCOSE BLDC GLUCOMTR-MCNC: 223 MG/DL — HIGH (ref 70–99)
GLUCOSE BLDC GLUCOMTR-MCNC: 225 MG/DL — HIGH (ref 70–99)
GLUCOSE SERPL-MCNC: 198 MG/DL — HIGH (ref 70–99)
HCT VFR BLD CALC: 29 % — LOW (ref 39–50)
HGB BLD-MCNC: 9.2 G/DL — LOW (ref 13–17)
MAGNESIUM SERPL-MCNC: 1.8 MG/DL — SIGNIFICANT CHANGE UP (ref 1.6–2.6)
MCHC RBC-ENTMCNC: 27.6 PG — SIGNIFICANT CHANGE UP (ref 27–34)
MCHC RBC-ENTMCNC: 31.7 % — LOW (ref 32–36)
MCV RBC AUTO: 87.1 FL — SIGNIFICANT CHANGE UP (ref 80–100)
NRBC # FLD: 0 — SIGNIFICANT CHANGE UP
PLATELET # BLD AUTO: 149 K/UL — LOW (ref 150–400)
PMV BLD: 10 FL — SIGNIFICANT CHANGE UP (ref 7–13)
POTASSIUM SERPL-MCNC: 3.7 MMOL/L — SIGNIFICANT CHANGE UP (ref 3.5–5.3)
POTASSIUM SERPL-SCNC: 3.7 MMOL/L — SIGNIFICANT CHANGE UP (ref 3.5–5.3)
RBC # BLD: 3.33 M/UL — LOW (ref 4.2–5.8)
RBC # FLD: 14.8 % — HIGH (ref 10.3–14.5)
SODIUM SERPL-SCNC: 135 MMOL/L — SIGNIFICANT CHANGE UP (ref 135–145)
WBC # BLD: 30.29 K/UL — HIGH (ref 3.8–10.5)
WBC # FLD AUTO: 30.29 K/UL — HIGH (ref 3.8–10.5)

## 2018-07-01 PROCEDURE — 99233 SBSQ HOSP IP/OBS HIGH 50: CPT

## 2018-07-01 RX ORDER — INSULIN LISPRO 100/ML
5 VIAL (ML) SUBCUTANEOUS
Qty: 0 | Refills: 0 | Status: DISCONTINUED | OUTPATIENT
Start: 2018-07-01 | End: 2018-07-05

## 2018-07-01 RX ORDER — SPIRONOLACTONE 25 MG/1
25 TABLET, FILM COATED ORAL DAILY
Qty: 0 | Refills: 0 | Status: DISCONTINUED | OUTPATIENT
Start: 2018-07-01 | End: 2018-07-02

## 2018-07-01 RX ORDER — INSULIN GLARGINE 100 [IU]/ML
15 INJECTION, SOLUTION SUBCUTANEOUS AT BEDTIME
Qty: 0 | Refills: 0 | Status: DISCONTINUED | OUTPATIENT
Start: 2018-07-01 | End: 2018-07-06

## 2018-07-01 RX ADMIN — CARVEDILOL PHOSPHATE 12.5 MILLIGRAM(S): 80 CAPSULE, EXTENDED RELEASE ORAL at 06:46

## 2018-07-01 RX ADMIN — Medication 60 MILLIGRAM(S): at 06:46

## 2018-07-01 RX ADMIN — APIXABAN 2.5 MILLIGRAM(S): 2.5 TABLET, FILM COATED ORAL at 06:46

## 2018-07-01 RX ADMIN — FINASTERIDE 5 MILLIGRAM(S): 5 TABLET, FILM COATED ORAL at 12:26

## 2018-07-01 RX ADMIN — APIXABAN 2.5 MILLIGRAM(S): 2.5 TABLET, FILM COATED ORAL at 17:46

## 2018-07-01 RX ADMIN — SENNA PLUS 2 TABLET(S): 8.6 TABLET ORAL at 21:36

## 2018-07-01 RX ADMIN — Medication 2: at 12:30

## 2018-07-01 RX ADMIN — LOSARTAN POTASSIUM 25 MILLIGRAM(S): 100 TABLET, FILM COATED ORAL at 06:46

## 2018-07-01 RX ADMIN — MAGNESIUM OXIDE 400 MG ORAL TABLET 400 MILLIGRAM(S): 241.3 TABLET ORAL at 17:46

## 2018-07-01 RX ADMIN — CARVEDILOL PHOSPHATE 12.5 MILLIGRAM(S): 80 CAPSULE, EXTENDED RELEASE ORAL at 17:46

## 2018-07-01 RX ADMIN — POLYETHYLENE GLYCOL 3350 17 GRAM(S): 17 POWDER, FOR SOLUTION ORAL at 12:27

## 2018-07-01 RX ADMIN — Medication 5 UNIT(S): at 08:36

## 2018-07-01 RX ADMIN — Medication 2: at 08:30

## 2018-07-01 RX ADMIN — Medication 1 MILLIGRAM(S): at 12:27

## 2018-07-01 RX ADMIN — Medication 100 MILLIGRAM(S): at 06:46

## 2018-07-01 RX ADMIN — Medication 100 MILLIGRAM(S): at 21:36

## 2018-07-01 RX ADMIN — Medication 5 UNIT(S): at 12:30

## 2018-07-01 RX ADMIN — Medication 2 GRAM(S): at 06:46

## 2018-07-01 RX ADMIN — Medication 100 MILLIGRAM(S): at 12:31

## 2018-07-01 RX ADMIN — MAGNESIUM OXIDE 400 MG ORAL TABLET 400 MILLIGRAM(S): 241.3 TABLET ORAL at 08:35

## 2018-07-01 RX ADMIN — Medication 2 GRAM(S): at 17:46

## 2018-07-01 RX ADMIN — TAMSULOSIN HYDROCHLORIDE 0.4 MILLIGRAM(S): 0.4 CAPSULE ORAL at 21:36

## 2018-07-01 RX ADMIN — SPIRONOLACTONE 25 MILLIGRAM(S): 25 TABLET, FILM COATED ORAL at 12:29

## 2018-07-01 RX ADMIN — Medication 5 UNIT(S): at 17:47

## 2018-07-01 RX ADMIN — INSULIN GLARGINE 15 UNIT(S): 100 INJECTION, SOLUTION SUBCUTANEOUS at 21:36

## 2018-07-01 NOTE — PROGRESS NOTE ADULT - ASSESSMENT
89 yo M HFrEF s/p AICD, prior CVA with minimal residual LLE weakness, HTN, DM2 p/w generalized weakness, possibly secondary to hypotension from heavy BP control regimen, noted to have anemia and lymphocytosis, concerning for possible CLL. Course c/b urinary retention.     # Urinary retention  - joshi placed 6/28, consider TOV tonight, if fails again will maintain joshi with outpt  f/u  -c/w flomax/proscar  -bowel regimen      # Hypotension likely 2/2 medications  - Patient on multiple BP lowering medications including possibly both ace-inhibitor and ARB, high dose coreg and hydralazine as well as diuretic  - now hemodynamically stable  -c/w losartan 25 mg and lasix 60 mg qd  -TTE (6/30) showed severe global systolic dysfunction with LVEF 20%, severe concentric LVH;   -will start spironolactone 25 mg qd   -no further w/u per cardiology  -d/c plan JUS per PT recs    # WCT  - dose coreg increased to 12.5 mg bid recently  - continue to monitor on telemetry  - f/u cardio    # Right shoulder dislocation  - superior right shoulder dislocation noted on Xray  - appreciate ortho assistance, they note chronic in nature, no acute orthopedic intervention at this time    # Elevated hs-troponin  - doubt ACS, patient denies CP/SOB, likely type 2 MI in setting of hypotension  - f/u TTE, no need for ischemic evaluation as per cardiology    # Leukocytosis (lymphocytosis), 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  ddx include CLL/SLL  Per Dr. Abad ([facility]), plan to obtain CLL FISH cytogenetic panel Monday to help discern CLL vs Mantle cell lymphoma.      # Chronic HFrEF  - appears euvolemic  - c/w coreg, losartan, lasix as per cards  - EP interrogation appreciated: no high ventricular rate events, few episodes of very high transient atrial rate events, normal sensing and pacing, excellent threshold capture, no reprogramming  - f/u TTE    # Anemia (normocytic)  - monitor hgb. transfuse prn goal hgb >7    # lactic acidosis  - possibly secondary to hypotension and metformin use  - hold metformin  - s/p IV fluids  - lactate resolved    # DM2  - hold metformin, and other oral agents  -uncontrolled, start lantus 15 u hs and humalog 5 u ac  - c/w HISS, monitor FS, goal <180  - c/w diabetic diet    # DVT ppx: on eliquis ?for afib, to followup with PMD re: indication for eliquis    Dispo: d/c plan JUS when medically optimized, f/u CM/SW

## 2018-07-01 NOTE — PROGRESS NOTE ADULT - SUBJECTIVE AND OBJECTIVE BOX
CHIEF COMPLAINT: Patient is a 89y old  male who presents with a chief complaint of Weakness (24 Jun 2018 23:07)      SUBJECTIVE / OVERNIGHT EVENTS:  pt feels ok, reports he needs help with feeding, denies chest pain/sob    MEDICATIONS  (STANDING):  apixaban 2.5 milliGRAM(s) Oral every 12 hours  carvedilol 12.5 milliGRAM(s) Oral every 12 hours  dextrose 50% Injectable 12.5 Gram(s) IV Push once  docusate sodium 100 milliGRAM(s) Oral three times a day  finasteride 5 milliGRAM(s) Oral daily  folic acid 1 milliGRAM(s) Oral daily  furosemide    Tablet 60 milliGRAM(s) Oral daily  insulin glargine Injectable (LANTUS) 15 Unit(s) SubCutaneous at bedtime  insulin lispro (HumaLOG) corrective regimen sliding scale   SubCutaneous three times a day before meals  insulin lispro (HumaLOG) corrective regimen sliding scale   SubCutaneous at bedtime  insulin lispro Injectable (HumaLOG) 5 Unit(s) SubCutaneous three times a day before meals  losartan 25 milliGRAM(s) Oral daily  magnesium oxide 400 milliGRAM(s) Oral two times a day with meals  omega-3-Acid Ethyl Esters 2 Gram(s) Oral two times a day  polyethylene glycol 3350 17 Gram(s) Oral daily  senna 2 Tablet(s) Oral at bedtime  spironolactone 25 milliGRAM(s) Oral daily  tamsulosin 0.4 milliGRAM(s) Oral at bedtime    MEDICATIONS  (PRN):  acetaminophen   Tablet. 650 milliGRAM(s) Oral every 6 hours PRN mild and moderate pain      VITALS:  T(F): 98.7 (07-01-18 @ 05:09), Max: 98.7 (07-01-18 @ 05:09)  HR: 74 (07-01-18 @ 05:09) (74 - 100)  BP: 139/63 (07-01-18 @ 05:09) (113/59 - 162/79)  RR: 18 (07-01-18 @ 05:09) (18 - 18)  SpO2: 98% (07-01-18 @ 05:09)      CAPILLARY BLOOD GLUCOSE    Output     I&O's Summary  T(F): 98.7 (07-01-18 @ 05:09), Max: 98.7 (07-01-18 @ 05:09)  HR: 74 (07-01-18 @ 05:09) (74 - 100)  BP: 139/63 (07-01-18 @ 05:09) (113/59 - 162/79)  RR: 18 (07-01-18 @ 05:09) (18 - 18)  SpO2: 98% (07-01-18 @ 05:09)    PHYSICAL EXAM:  GENERAL: NAD, cachectic   HEAD:  Atraumatic, Normocephalic  EYES: EOMI,  conjunctiva and sclera clear  NECK: Supple, No JVD  CHEST/LUNG: Clear to auscultation bilaterally; No wheeze  HEART: Regular rate and rhythm; No murmurs, rubs, or gallops  ABDOMEN: Soft, Nontender, Nondistended; Bowel sounds present  : joshi with clear yellow urine   EXTREMITIES:  2+ Peripheral Pulses, No clubbing, cyanosis, or edema  PSYCH: AAOx3  NEUROLOGY: non-focal  SKIN: No rashes or lesions    LABS:              9.2                  135  | 29   | 23           30.29 >-----------< 149     ------------------------< 198                   29.0                 3.7  | 95   | 0.73                                         Ca 8.5   Mg 1.8   Ph x                          MICROBIOLOGY:        RADIOLOGY & ADDITIONAL TESTS:    Imaging Personally Reviewed:    [ ] Consultant(s) Notes Reviewed:  [ ] Care Discussed with Consultants/Other Providers:

## 2018-07-02 LAB
BUN SERPL-MCNC: 22 MG/DL — SIGNIFICANT CHANGE UP (ref 7–23)
CALCIUM SERPL-MCNC: 8.9 MG/DL — SIGNIFICANT CHANGE UP (ref 8.4–10.5)
CHLORIDE SERPL-SCNC: 98 MMOL/L — SIGNIFICANT CHANGE UP (ref 98–107)
CO2 SERPL-SCNC: 29 MMOL/L — SIGNIFICANT CHANGE UP (ref 22–31)
CREAT SERPL-MCNC: 0.79 MG/DL — SIGNIFICANT CHANGE UP (ref 0.5–1.3)
GLUCOSE BLDC GLUCOMTR-MCNC: 177 MG/DL — HIGH (ref 70–99)
GLUCOSE BLDC GLUCOMTR-MCNC: 185 MG/DL — HIGH (ref 70–99)
GLUCOSE BLDC GLUCOMTR-MCNC: 231 MG/DL — HIGH (ref 70–99)
GLUCOSE BLDC GLUCOMTR-MCNC: 240 MG/DL — HIGH (ref 70–99)
GLUCOSE BLDC GLUCOMTR-MCNC: 245 MG/DL — HIGH (ref 70–99)
GLUCOSE SERPL-MCNC: 138 MG/DL — HIGH (ref 70–99)
HCT VFR BLD CALC: 31.9 % — LOW (ref 39–50)
HGB BLD-MCNC: 10 G/DL — LOW (ref 13–17)
MAGNESIUM SERPL-MCNC: 2 MG/DL — SIGNIFICANT CHANGE UP (ref 1.6–2.6)
MCHC RBC-ENTMCNC: 27.9 PG — SIGNIFICANT CHANGE UP (ref 27–34)
MCHC RBC-ENTMCNC: 31.3 % — LOW (ref 32–36)
MCV RBC AUTO: 88.9 FL — SIGNIFICANT CHANGE UP (ref 80–100)
NRBC # FLD: 0 — SIGNIFICANT CHANGE UP
PLATELET # BLD AUTO: 164 K/UL — SIGNIFICANT CHANGE UP (ref 150–400)
PMV BLD: 10.1 FL — SIGNIFICANT CHANGE UP (ref 7–13)
POTASSIUM SERPL-MCNC: 3.9 MMOL/L — SIGNIFICANT CHANGE UP (ref 3.5–5.3)
POTASSIUM SERPL-SCNC: 3.9 MMOL/L — SIGNIFICANT CHANGE UP (ref 3.5–5.3)
RBC # BLD: 3.59 M/UL — LOW (ref 4.2–5.8)
RBC # FLD: 14.7 % — HIGH (ref 10.3–14.5)
SODIUM SERPL-SCNC: 137 MMOL/L — SIGNIFICANT CHANGE UP (ref 135–145)
WBC # BLD: 27.87 K/UL — HIGH (ref 3.8–10.5)
WBC # FLD AUTO: 27.87 K/UL — HIGH (ref 3.8–10.5)

## 2018-07-02 PROCEDURE — 99233 SBSQ HOSP IP/OBS HIGH 50: CPT

## 2018-07-02 RX ADMIN — SENNA PLUS 2 TABLET(S): 8.6 TABLET ORAL at 21:53

## 2018-07-02 RX ADMIN — Medication 2 GRAM(S): at 17:24

## 2018-07-02 RX ADMIN — LOSARTAN POTASSIUM 25 MILLIGRAM(S): 100 TABLET, FILM COATED ORAL at 05:24

## 2018-07-02 RX ADMIN — Medication 5 UNIT(S): at 17:26

## 2018-07-02 RX ADMIN — FINASTERIDE 5 MILLIGRAM(S): 5 TABLET, FILM COATED ORAL at 12:38

## 2018-07-02 RX ADMIN — Medication 1: at 08:32

## 2018-07-02 RX ADMIN — INSULIN GLARGINE 15 UNIT(S): 100 INJECTION, SOLUTION SUBCUTANEOUS at 21:52

## 2018-07-02 RX ADMIN — CARVEDILOL PHOSPHATE 12.5 MILLIGRAM(S): 80 CAPSULE, EXTENDED RELEASE ORAL at 05:24

## 2018-07-02 RX ADMIN — Medication 1: at 17:26

## 2018-07-02 RX ADMIN — MAGNESIUM OXIDE 400 MG ORAL TABLET 400 MILLIGRAM(S): 241.3 TABLET ORAL at 08:33

## 2018-07-02 RX ADMIN — MAGNESIUM OXIDE 400 MG ORAL TABLET 400 MILLIGRAM(S): 241.3 TABLET ORAL at 17:24

## 2018-07-02 RX ADMIN — Medication 2: at 12:37

## 2018-07-02 RX ADMIN — SPIRONOLACTONE 25 MILLIGRAM(S): 25 TABLET, FILM COATED ORAL at 05:24

## 2018-07-02 RX ADMIN — APIXABAN 2.5 MILLIGRAM(S): 2.5 TABLET, FILM COATED ORAL at 17:25

## 2018-07-02 RX ADMIN — Medication 5 UNIT(S): at 12:37

## 2018-07-02 RX ADMIN — APIXABAN 2.5 MILLIGRAM(S): 2.5 TABLET, FILM COATED ORAL at 05:24

## 2018-07-02 RX ADMIN — Medication 100 MILLIGRAM(S): at 12:38

## 2018-07-02 RX ADMIN — CARVEDILOL PHOSPHATE 12.5 MILLIGRAM(S): 80 CAPSULE, EXTENDED RELEASE ORAL at 17:25

## 2018-07-02 RX ADMIN — POLYETHYLENE GLYCOL 3350 17 GRAM(S): 17 POWDER, FOR SOLUTION ORAL at 12:39

## 2018-07-02 RX ADMIN — Medication 100 MILLIGRAM(S): at 21:53

## 2018-07-02 RX ADMIN — Medication 60 MILLIGRAM(S): at 05:24

## 2018-07-02 RX ADMIN — Medication 5 UNIT(S): at 08:33

## 2018-07-02 RX ADMIN — Medication 2 GRAM(S): at 05:24

## 2018-07-02 RX ADMIN — Medication 1 MILLIGRAM(S): at 12:39

## 2018-07-02 RX ADMIN — Medication 100 MILLIGRAM(S): at 05:24

## 2018-07-02 RX ADMIN — TAMSULOSIN HYDROCHLORIDE 0.4 MILLIGRAM(S): 0.4 CAPSULE ORAL at 21:53

## 2018-07-02 NOTE — CHART NOTE - NSCHARTNOTEFT_GEN_A_CORE
Discussed case with hematology, Dr. Abad - CLL cytogenic panel can be obtained as outpatient, pt should follow up with Dr. Harvey for CLL workup following discharge.

## 2018-07-02 NOTE — PROGRESS NOTE ADULT - SUBJECTIVE AND OBJECTIVE BOX
Patient is a 89y old  Male who presents with a chief complaint of Weakness (24 Jun 2018 23:07)      SUBJECTIVE / OVERNIGHT EVENTS: Pt was seen and examined at 1:25pm, no overnight events, per nsg has low bp but without any symptoms, has not voided after joshi removal, per bladder scan per nsg 700ml, no other new complaints.    MEDICATIONS  (STANDING):  apixaban 2.5 milliGRAM(s) Oral every 12 hours  carvedilol 12.5 milliGRAM(s) Oral every 12 hours  dextrose 50% Injectable 12.5 Gram(s) IV Push once  docusate sodium 100 milliGRAM(s) Oral three times a day  finasteride 5 milliGRAM(s) Oral daily  folic acid 1 milliGRAM(s) Oral daily  furosemide    Tablet 60 milliGRAM(s) Oral daily  insulin glargine Injectable (LANTUS) 15 Unit(s) SubCutaneous at bedtime  insulin lispro (HumaLOG) corrective regimen sliding scale   SubCutaneous three times a day before meals  insulin lispro (HumaLOG) corrective regimen sliding scale   SubCutaneous at bedtime  insulin lispro Injectable (HumaLOG) 5 Unit(s) SubCutaneous three times a day before meals  losartan 25 milliGRAM(s) Oral daily  magnesium oxide 400 milliGRAM(s) Oral two times a day with meals  omega-3-Acid Ethyl Esters 2 Gram(s) Oral two times a day  polyethylene glycol 3350 17 Gram(s) Oral daily  senna 2 Tablet(s) Oral at bedtime  tamsulosin 0.4 milliGRAM(s) Oral at bedtime    MEDICATIONS  (PRN):  acetaminophen   Tablet. 650 milliGRAM(s) Oral every 6 hours PRN mild and moderate pain      Vital Signs Last 24 Hrs  T(C): 37.2 (02 Jul 2018 13:28), Max: 37.2 (02 Jul 2018 13:28)  T(F): 98.9 (02 Jul 2018 13:28), Max: 98.9 (02 Jul 2018 13:28)  HR: 78 (02 Jul 2018 13:59) (76 - 81)  BP: 128/70 (02 Jul 2018 13:59) (66/42 - 158/70)  BP(mean): --  RR: 18 (02 Jul 2018 13:28) (16 - 18)  SpO2: 100% (02 Jul 2018 13:28) (97% - 100%)  CAPILLARY BLOOD GLUCOSE      POCT Blood Glucose.: 231 mg/dL (02 Jul 2018 11:54)  POCT Blood Glucose.: 185 mg/dL (02 Jul 2018 07:44)  POCT Blood Glucose.: 223 mg/dL (01 Jul 2018 21:31)  POCT Blood Glucose.: 147 mg/dL (01 Jul 2018 17:20)    I&O's Summary    01 Jul 2018 07:01  -  02 Jul 2018 07:00  --------------------------------------------------------  IN: 940 mL / OUT: 1710 mL / NET: -770 mL    02 Jul 2018 07:01  -  02 Jul 2018 14:57  --------------------------------------------------------  IN: 0 mL / OUT: 700 mL / NET: -700 mL        PHYSICAL EXAM:  GENERAL: NAD, cachectic   CHEST/LUNG: Clear to auscultation bilaterally; No wheeze  HEART: Regular rate and rhythm; No murmurs, rubs, or gallops  GI: abdomen soft, nondistended  EXTREMITIES:  2+ Peripheral Pulses, No clubbing, cyanosis, or edema  PSYCH: calm  NEUROLOGY: Alert, awake answers questions appropriately  SKIN: No rashes or lesions      LABS:                        10.0   27.87 )-----------( 164      ( 02 Jul 2018 06:03 )             31.9     07-02    137  |  98  |  22  ----------------------------<  138<H>  3.9   |  29  |  0.79    Ca    8.9      02 Jul 2018 06:03  Mg     2.0     07-02                RADIOLOGY & ADDITIONAL TESTS:    Imaging Personally Reviewed:    Consultant(s) Notes Reviewed:      Care Discussed with Consultants/Other Providers:

## 2018-07-02 NOTE — PROGRESS NOTE ADULT - ASSESSMENT
88 yo M HFrEF s/p AICD, prior CVA with minimal residual LLE weakness, HTN, DM2 p/w generalized weakness, possibly secondary to hypotension from heavy BP control regimen, noted to have anemia and lymphocytosis, concerning for possible CLL. Course c/b urinary retention.     # Urinary retention  - joshi placed 6/28, f/u TOV tonight, if fails again will maintain joshi with outpt  f/u  -c/w flomax/proscar  -bowel regimen      # Hypotension likely 2/2 medications  - Patient on multiple BP lowering medications including possibly both ace-inhibitor and ARB, high dose coreg and hydralazine as well as diuretic  - now hemodynamically stable  -c/w losartan 25 mg and lasix 60 mg qd  -TTE (6/30) showed severe global systolic dysfunction with LVEF 20%, severe concentric LVH;   -d/c spironolactone given hypotension will monitor bp closely  -no further w/u per cardiology  -d/c plan JUS per PT recs    # WCT  - dose coreg increased to 12.5 mg bid recently  - continue to monitor on telemetry  - f/u cardio    # Right shoulder dislocation  - superior right shoulder dislocation noted on Xray  - appreciate ortho assistance, they note chronic in nature, no acute orthopedic intervention at this time    # Elevated hs-troponin  - doubt ACS, patient denies CP/SOB, likely type 2 MI in setting of hypotension  -  no need for ischemic evaluation as per cardiology    # Leukocytosis (lymphocytosis), 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  ddx include CLL/SLL  Per Dr. Abad ([facility]), plan to obtain CLL FISH cytogenetic panel Monday to help discern CLL vs Mantle cell lymphoma. Will reach out to [facility] if wkup has to be done inpt.      # Chronic HFrEF  - appears euvolemic  - c/w coreg, losartan, lasix as per cards  - EP interrogation appreciated: no high ventricular rate events, few episodes of very high transient atrial rate events, normal sensing and pacing, excellent threshold capture, no reprogramming      # Anemia (normocytic)  - monitor hgb. transfuse prn goal hgb >7    # lactic acidosis  - possibly secondary to hypotension and metformin use  - hold metformin  - s/p IV fluids  - lactate resolved    # DM2  - hold metformin, and other oral agents  -uncontrolled, start lantus 15 u hs and humalog 5 u ac  - c/w HISS, monitor FS, goal <180  - c/w diabetic diet    # DVT ppx: on eliquis ?for afib, to followup with PMD re: indication for eliquis    Dispo: d/c plan JUS when medically optimized, f/u CM/SW

## 2018-07-02 NOTE — PROVIDER CONTACT NOTE (OTHER) - ACTION/TREATMENT ORDERED:
WIll continue to monitor.
Patient will cont to be monitored and safety maintained
Patient will cont to be monitored

## 2018-07-03 ENCOUNTER — TRANSCRIPTION ENCOUNTER (OUTPATIENT)
Age: 83
End: 2018-07-03

## 2018-07-03 LAB
BUN SERPL-MCNC: 28 MG/DL — HIGH (ref 7–23)
CALCIUM SERPL-MCNC: 8.8 MG/DL — SIGNIFICANT CHANGE UP (ref 8.4–10.5)
CHLORIDE SERPL-SCNC: 98 MMOL/L — SIGNIFICANT CHANGE UP (ref 98–107)
CO2 SERPL-SCNC: 28 MMOL/L — SIGNIFICANT CHANGE UP (ref 22–31)
CREAT SERPL-MCNC: 0.86 MG/DL — SIGNIFICANT CHANGE UP (ref 0.5–1.3)
GLUCOSE BLDC GLUCOMTR-MCNC: 130 MG/DL — HIGH (ref 70–99)
GLUCOSE BLDC GLUCOMTR-MCNC: 149 MG/DL — HIGH (ref 70–99)
GLUCOSE BLDC GLUCOMTR-MCNC: 201 MG/DL — HIGH (ref 70–99)
GLUCOSE BLDC GLUCOMTR-MCNC: 262 MG/DL — HIGH (ref 70–99)
GLUCOSE SERPL-MCNC: 140 MG/DL — HIGH (ref 70–99)
HCT VFR BLD CALC: 29.5 % — LOW (ref 39–50)
HGB BLD-MCNC: 9.4 G/DL — LOW (ref 13–17)
MAGNESIUM SERPL-MCNC: 2.1 MG/DL — SIGNIFICANT CHANGE UP (ref 1.6–2.6)
MCHC RBC-ENTMCNC: 27.7 PG — SIGNIFICANT CHANGE UP (ref 27–34)
MCHC RBC-ENTMCNC: 31.9 % — LOW (ref 32–36)
MCV RBC AUTO: 87 FL — SIGNIFICANT CHANGE UP (ref 80–100)
NRBC # FLD: 0 — SIGNIFICANT CHANGE UP
PLATELET # BLD AUTO: 173 K/UL — SIGNIFICANT CHANGE UP (ref 150–400)
PMV BLD: 10.2 FL — SIGNIFICANT CHANGE UP (ref 7–13)
POTASSIUM SERPL-MCNC: 3.9 MMOL/L — SIGNIFICANT CHANGE UP (ref 3.5–5.3)
POTASSIUM SERPL-SCNC: 3.9 MMOL/L — SIGNIFICANT CHANGE UP (ref 3.5–5.3)
RBC # BLD: 3.39 M/UL — LOW (ref 4.2–5.8)
RBC # FLD: 14.5 % — SIGNIFICANT CHANGE UP (ref 10.3–14.5)
SODIUM SERPL-SCNC: 138 MMOL/L — SIGNIFICANT CHANGE UP (ref 135–145)
WBC # BLD: 26.44 K/UL — HIGH (ref 3.8–10.5)
WBC # FLD AUTO: 26.44 K/UL — HIGH (ref 3.8–10.5)

## 2018-07-03 PROCEDURE — 99232 SBSQ HOSP IP/OBS MODERATE 35: CPT

## 2018-07-03 RX ORDER — SENNA PLUS 8.6 MG/1
2 TABLET ORAL
Qty: 0 | Refills: 0 | COMMUNITY
Start: 2018-07-03

## 2018-07-03 RX ORDER — CARVEDILOL PHOSPHATE 80 MG/1
1 CAPSULE, EXTENDED RELEASE ORAL
Qty: 0 | Refills: 0 | COMMUNITY
Start: 2018-07-03

## 2018-07-03 RX ORDER — INSULIN GLARGINE 100 [IU]/ML
8 INJECTION, SOLUTION SUBCUTANEOUS
Qty: 0 | Refills: 0 | COMMUNITY
Start: 2018-07-03

## 2018-07-03 RX ORDER — TAMSULOSIN HYDROCHLORIDE 0.4 MG/1
1 CAPSULE ORAL
Qty: 0 | Refills: 0 | COMMUNITY
Start: 2018-07-03

## 2018-07-03 RX ORDER — DOCUSATE SODIUM 100 MG
1 CAPSULE ORAL
Qty: 0 | Refills: 0 | COMMUNITY
Start: 2018-07-03

## 2018-07-03 RX ORDER — LOSARTAN POTASSIUM 100 MG/1
1 TABLET, FILM COATED ORAL
Qty: 0 | Refills: 0 | COMMUNITY
Start: 2018-07-03

## 2018-07-03 RX ORDER — CHOLECALCIFEROL (VITAMIN D3) 125 MCG
1 CAPSULE ORAL
Qty: 0 | Refills: 0 | COMMUNITY

## 2018-07-03 RX ORDER — FINASTERIDE 5 MG/1
1 TABLET, FILM COATED ORAL
Qty: 0 | Refills: 0 | COMMUNITY
Start: 2018-07-03

## 2018-07-03 RX ORDER — POLYETHYLENE GLYCOL 3350 17 G/17G
17 POWDER, FOR SOLUTION ORAL
Qty: 0 | Refills: 0 | COMMUNITY
Start: 2018-07-03

## 2018-07-03 RX ORDER — EZETIMIBE 10 MG/1
1 TABLET ORAL
Qty: 0 | Refills: 0 | COMMUNITY

## 2018-07-03 RX ORDER — TAMSULOSIN HYDROCHLORIDE 0.4 MG/1
2 CAPSULE ORAL
Qty: 0 | Refills: 0 | COMMUNITY
Start: 2018-07-03

## 2018-07-03 RX ORDER — METFORMIN HYDROCHLORIDE 850 MG/1
1 TABLET ORAL
Qty: 0 | Refills: 0 | COMMUNITY

## 2018-07-03 RX ORDER — ACETAMINOPHEN 500 MG
2 TABLET ORAL
Qty: 0 | Refills: 0 | COMMUNITY
Start: 2018-07-03

## 2018-07-03 RX ORDER — LOSARTAN POTASSIUM 100 MG/1
1 TABLET, FILM COATED ORAL
Qty: 0 | Refills: 0 | COMMUNITY

## 2018-07-03 RX ORDER — CARVEDILOL PHOSPHATE 80 MG/1
1 CAPSULE, EXTENDED RELEASE ORAL
Qty: 0 | Refills: 0 | COMMUNITY

## 2018-07-03 RX ADMIN — Medication 100 MILLIGRAM(S): at 12:38

## 2018-07-03 RX ADMIN — LOSARTAN POTASSIUM 25 MILLIGRAM(S): 100 TABLET, FILM COATED ORAL at 05:59

## 2018-07-03 RX ADMIN — Medication 100 MILLIGRAM(S): at 22:05

## 2018-07-03 RX ADMIN — Medication 5 UNIT(S): at 08:13

## 2018-07-03 RX ADMIN — SENNA PLUS 2 TABLET(S): 8.6 TABLET ORAL at 22:05

## 2018-07-03 RX ADMIN — MAGNESIUM OXIDE 400 MG ORAL TABLET 400 MILLIGRAM(S): 241.3 TABLET ORAL at 17:42

## 2018-07-03 RX ADMIN — Medication 100 MILLIGRAM(S): at 05:59

## 2018-07-03 RX ADMIN — Medication 60 MILLIGRAM(S): at 05:58

## 2018-07-03 RX ADMIN — APIXABAN 2.5 MILLIGRAM(S): 2.5 TABLET, FILM COATED ORAL at 05:58

## 2018-07-03 RX ADMIN — POLYETHYLENE GLYCOL 3350 17 GRAM(S): 17 POWDER, FOR SOLUTION ORAL at 12:39

## 2018-07-03 RX ADMIN — Medication 5 UNIT(S): at 12:39

## 2018-07-03 RX ADMIN — Medication 1 MILLIGRAM(S): at 12:38

## 2018-07-03 RX ADMIN — FINASTERIDE 5 MILLIGRAM(S): 5 TABLET, FILM COATED ORAL at 12:38

## 2018-07-03 RX ADMIN — MAGNESIUM OXIDE 400 MG ORAL TABLET 400 MILLIGRAM(S): 241.3 TABLET ORAL at 08:13

## 2018-07-03 RX ADMIN — TAMSULOSIN HYDROCHLORIDE 0.4 MILLIGRAM(S): 0.4 CAPSULE ORAL at 22:05

## 2018-07-03 RX ADMIN — Medication 3: at 12:38

## 2018-07-03 RX ADMIN — Medication 5 UNIT(S): at 17:43

## 2018-07-03 RX ADMIN — INSULIN GLARGINE 15 UNIT(S): 100 INJECTION, SOLUTION SUBCUTANEOUS at 22:09

## 2018-07-03 RX ADMIN — CARVEDILOL PHOSPHATE 12.5 MILLIGRAM(S): 80 CAPSULE, EXTENDED RELEASE ORAL at 05:59

## 2018-07-03 RX ADMIN — APIXABAN 2.5 MILLIGRAM(S): 2.5 TABLET, FILM COATED ORAL at 17:42

## 2018-07-03 RX ADMIN — Medication 2 GRAM(S): at 05:59

## 2018-07-03 RX ADMIN — Medication 2 GRAM(S): at 17:42

## 2018-07-03 RX ADMIN — Medication 2: at 17:43

## 2018-07-03 NOTE — DISCHARGE NOTE ADULT - MEDICATION SUMMARY - MEDICATIONS TO CHANGE
I will SWITCH the dose or number of times a day I take the medications listed below when I get home from the hospital:    losartan 50 mg oral tablet  -- 1 tab(s) by mouth once a day (in the morning)    carvedilol 6.25 mg oral tablet  -- 1 tab(s) by mouth 2 times a day (taken with carvedilol 25mg tablet for 31.25mg twice a day)    carvedilol 25 mg oral tablet  -- 1 tab(s) by mouth 2 times a day (taken with carvedilol 6.25mg tablet for 31.25mg twice a day) I will SWITCH the dose or number of times a day I take the medications listed below when I get home from the hospital:    losartan 50 mg oral tablet  -- 1 tab(s) by mouth once a day (in the morning)    carvedilol 6.25 mg oral tablet  -- 1 tab(s) by mouth 2 times a day (taken with carvedilol 25mg tablet for 31.25mg twice a day)    carvedilol 25 mg oral tablet  -- 1 tab(s) by mouth 2 times a day (taken with carvedilol 6.25mg tablet for 31.25mg twice a day)    furosemide 20 mg oral tablet  -- 3 tab(s) (60mg) by mouth once a day

## 2018-07-03 NOTE — DISCHARGE NOTE ADULT - CARE PLAN
Principal Discharge DX:	Hypotension, unspecified hypotension type  Goal:	Prevent recurrence  Assessment and plan of treatment:	Likely medication related. Your blood pressure medications have been adjusted, please follow regimen as prescribed. Follow up with your primary care physician for further monitoring in 1-2 weeks. Please call to arrange appointment.  Secondary Diagnosis:	Urinary retention  Assessment and plan of treatment:	Continue with Davis cathter. Follow up with urology as outpatient for further management and possible trial of void.  Secondary Diagnosis:	Shoulder dislocation, right, subsequent encounter  Assessment and plan of treatment:	Jo bearing as tolerated. Follow up with orthopedic surgery, Dr. Barrera as needed.  Secondary Diagnosis:	Leukocytosis  Assessment and plan of treatment:	It is very important that you follow up with Dr. Abad after you are discharged from the hospital for further workup. Please call his office to arrange an appointment.  Secondary Diagnosis:	Chronic systolic heart failure  Goal:	To relieve and prevent worsening symptoms associated with congestive heart failure, to improve quality of life, and to treat underlying conditions such as coronary heart disease, high blood pressure, or diabetes, and to maintain euvolemia.  Assessment and plan of treatment:	Low salt diet, fluid restriction to 1500 ml daily, monitor your fluid intake and weight daily, exercise as tolerated 30 minutes daily, and follow up with your physician/cardiologist within 1 to 2 weeks.  Secondary Diagnosis:	Diabetes mellitus  Goal:	To maintain a normal blood glucose level and HgA1C level < 7 and to prevent diabetic complications such as uncontrolled diabetes, diabetic coma, blindness, renal failure, and amputations.  Assessment and plan of treatment:	Monitor finger sticks pre-meal and bedtime, low salt, fat and carbohydrate diet, minimize glucose intake.  Exercise daily for at least 30 minutes and weight loss.  Follow up with primary care physician and endocrinologist for routine Hemoglobin A1C checks and management.  Follow up with your ophthalmologist for routine yearly vision exams. Principal Discharge DX:	Hypotension, unspecified hypotension type  Goal:	Prevent recurrence  Assessment and plan of treatment:	Likely medication related. Your blood pressure medications have been adjusted, please follow regimen as prescribed. Follow up with your primary care physician for further monitoring in 1-2 weeks. Please call to arrange appointment.  Secondary Diagnosis:	Urinary retention  Assessment and plan of treatment:	Continue with Davis cathter. Follow up with urology as outpatient for further management and possible trial of void.  Secondary Diagnosis:	Shoulder dislocation, right, subsequent encounter  Assessment and plan of treatment:	Jo bearing as tolerated. Follow up with orthopedic surgery, Dr. Barrera as needed.  Secondary Diagnosis:	Leukocytosis  Assessment and plan of treatment:	It is very important that you follow up with Dr. Abad after you are discharged from the hospital for further workup. Please call his office to arrange an appointment.  Secondary Diagnosis:	Chronic systolic heart failure  Goal:	To relieve and prevent worsening symptoms associated with congestive heart failure, to improve quality of life, and to treat underlying conditions such as coronary heart disease, high blood pressure, or diabetes, and to maintain euvolemia.  Assessment and plan of treatment:	Low salt diet, fluid restriction to 1500 ml daily, monitor your fluid intake and weight daily, exercise as tolerated 30 minutes daily, and follow up with your physician/cardiologist within 1 to 2 weeks.  Secondary Diagnosis:	Diabetes mellitus  Goal:	To maintain a normal blood glucose level and HgA1C level < 7 and to prevent diabetic complications such as uncontrolled diabetes, diabetic coma, blindness, renal failure, and amputations.  Assessment and plan of treatment:	Monitor finger sticks pre-meal and bedtime, low salt, fat and carbohydrate diet, minimize glucose intake.  Exercise daily for at least 30 minutes and weight loss.  Follow up with primary care physician and endocrinologist for routine Hemoglobin A1C checks and management.  Follow up with your ophthalmologist for routine yearly vision exams.  Secondary Diagnosis:	Afib  Assessment and plan of treatment:	Please take your medications as prescribed. Please continue Eliquis for now but you need to follow up with your cardiologist since it is unclear whether this medication needs to be continues long term. Principal Discharge DX:	Hypotension, unspecified hypotension type  Goal:	Prevent recurrence  Assessment and plan of treatment:	Likely medication related. Your blood pressure medications have been adjusted, please follow regimen as prescribed. Follow up with your primary care physician for further monitoring in 1-2 weeks. Please call to arrange appointment.  Secondary Diagnosis:	Urinary retention  Assessment and plan of treatment:	Continue with Davis cathter. Follow up with urology as outpatient for further management and possible trial of void.  Secondary Diagnosis:	Shoulder dislocation, right, subsequent encounter  Assessment and plan of treatment:	Jo bearing as tolerated. Follow up with orthopedic surgery, Dr. Barrera as needed.  Secondary Diagnosis:	Leukocytosis  Assessment and plan of treatment:	It is very important that you follow up with Dr. Abad after you are discharged from the hospital for further workup. Please call his office to arrange an appointment.  Secondary Diagnosis:	Chronic systolic heart failure  Goal:	To relieve and prevent worsening symptoms associated with congestive heart failure, to improve quality of life, and to treat underlying conditions such as coronary heart disease, high blood pressure, or diabetes, and to maintain euvolemia.  Assessment and plan of treatment:	Low salt diet, fluid restriction to 1500 ml daily, monitor your fluid intake and weight daily, exercise as tolerated 30 minutes daily, and follow up with your physician/cardiologist within 1 to 2 weeks.  Secondary Diagnosis:	Diabetes mellitus  Goal:	To maintain a normal blood glucose level and HgA1C level < 7 and to prevent diabetic complications such as uncontrolled diabetes, diabetic coma, blindness, renal failure, and amputations.  Assessment and plan of treatment:	Monitor finger sticks pre-meal and bedtime, low salt, fat and carbohydrate diet, minimize glucose intake.  Exercise daily for at least 30 minutes and weight loss.  Follow up with primary care physician and endocrinologist for routine Hemoglobin A1C checks and management.  Follow up with your ophthalmologist for routine yearly vision exams.  Secondary Diagnosis:	Afib  Assessment and plan of treatment:	Please take your medications as prescribed. Please continue Eliquis for now but you need to follow up with your cardiologist since it is unclear whether this medication needs to be continued long term.

## 2018-07-03 NOTE — DISCHARGE NOTE ADULT - CARE PROVIDER_API CALL
Kehinde Abad), Hematology; Medical Oncology  [address] [facility]  [address]  [place] [address]  Phone: [phone]  Fax: [phone]    Bryce Barrera), Orthopaedic Surgery; Sports Medicine  [address] [facility] [address]  [place] [address]  Phone: [phone]  Fax: [phone]    Yemi Barr), Cardiovascular Disease; Internal Medicine  [address] [place] [address]  Phone: [phone]  Fax: [phone] Kehinde Abad), Hematology; Medical Oncology  [address] [facility]  [address]  [place] [address]  Phone: [phone]  Fax: [phone]    Bryce Barrera), Orthopaedic Surgery; Sports Medicine  [address] [facility] [address]  [place] [address]  Phone: [phone]  Fax: [phone]    Yemi Barr), Cardiovascular Disease; Internal Medicine  [address] [place] [address]  Phone: [phone]  Fax: [phone]    [facility],   [address] [address]  Phone: [phone]  Fax: (       -

## 2018-07-03 NOTE — DISCHARGE NOTE ADULT - COMMUNITY RESOURCES
[facility] address: [address] [place] [address] room # 446B, 4pm  via w/c provided by [facility] via the tunnel.

## 2018-07-03 NOTE — DISCHARGE NOTE ADULT - HOSPITAL COURSE
In EMS, BP was 102/70. In ED, initial vitals were T97.9F, HR73, /60, RR 18 97% on supplement O2   Patient received 1.5L NS.     Test Results:  EKG: V paced at 69 bpm  CE x2: Trop 132-->126  WBC: 25.19  H/H: 10.9/33.7  BUN/Cr: 25/1.28  Glucose: 197  ProBNP: 5187  Lactate: 4.9  UA: Negative    6/24 CXR: Clear lungs. Superior right shoulder dislocation.    6/25 ICD interrogation: No high ventricular rate events. Few episodes of very transient high atrial rate events. Normal sensing and pacing via iterative testing. Excellent threshold capture. No reprogramming.    6/26 R shoulder xray: Superior subluxation of the right humeral head secondary to chronic rotator cuff tearing with moderate glenohumeral arthrosis and severe AC joint arthrosis. No acute fracture.    6/27 NSVT 23 beats uptitrated coreg 12.5mg BID     6/27/18 - >> 6/28/18 Pt urinary retention - bladder scan > 800 cc urine - straight cath protocol initiated, re-scanned > 500cc again straight cathed, repeat bladder scan - 135, 312 > pt unable to void - 6/28/18 - Joshi inserted     6/30/18 - Echo LVEF 20%  Calcified trileaflet aortic valve with normal opening. Mild aortic regurgitation.  2. Severe concentric left ventricular hypertrophy.  3. Endocardial visualization enhanced with intravenous injection of echo contrast (Definity). Severe global left ventricular systolic dysfunction. The apex appears aneurysmal. No LV thrombus seen.  4. Normal right ventricular size and function.        89 yo M HFrEF s/p AICD, prior CVA with minimal residual LLE weakness, HTN, DM2 p/w generalized weakness, possibly secondary to hypotension from heavy BP control regimen, noted to have anemia and lymphocytosis, concerning for possible CLL. Course c/b urinary retention.     # Hypotension likely 2/2 medications  - Patient on multiple BP lowering medications including possibly both ace-inhibitor and ARB, high dose coreg and hydralazine as well as diuretic  - now hemodynamically stable  -c/w losartan 25 mg and lasix 60 mg qd  -TTE (6/30) showed severe global systolic dysfunction with LVEF 20%, severe concentric LVH;   -d/c spironolactone given hypotension will monitor bp closely  -no further w/u per cardiology  -d/c plan [facility] per PT recs    # Urinary retention  - joshi placed 6/28, f/u TOV tonight, if fails again will maintain joshi with outpt  f/u  -c/w flomax/proscar  -bowel regimen    # WCT - dose coreg increased to 12.5 mg bid recently    # Right shoulder dislocation  - superior right shoulder dislocation noted on Xray  - appreciate ortho assistance, they note chronic in nature, no acute orthopedic intervention at this time    # Elevated hs-troponin - doubt ACS, patient denies CP/SOB, likely type 2 MI in setting of hypotension -  no need for ischemic evaluation as per cardiology    # Leukocytosis (lymphocytosis), 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  ddx include CLL/SLL Per Dr. Abad (heme), OK to obtain CLL FISH cytogenetic panel as outpatient to help discern CLL vs Mantle cell lymphoma.    # Chronic HFrEF - appears euvolemic now  - c/w coreg, losartan, lasix as per cards  - EP interrogation appreciated: no high ventricular rate events, few episodes of very high transient atrial rate events, normal sensing and pacing, excellent threshold capture, no reprogramming    # Anemia (normocytic)  - monitor hgb. transfuse prn goal hgb >7    # lactic acidosis  - possibly secondary to hypotension and metformin use  - hold metformin  - s/p IV fluids  - lactate now resolved    # DM2  - hold metformin, and other oral agents  -uncontrolled, start lantus 15 u hs and humalog 5 u ac  - c/w HISS, monitor FS, goal <180  - c/w diabetic diet    # DVT ppx: on eliquis ?for afib, to followup with PMD re: indication for eliquis    Dispo: d/c plan [facility] when medically optimized, f/u CM/SW In EMS, BP was 102/70. In ED, initial vitals were T97.9F, HR73, /60, RR 18 97% on supplement O2   Patient received 1.5L NS.     Test Results:  EKG: V paced at 69 bpm  CE x2: Trop 132-->126  WBC: 25.19  H/H: 10.9/33.7  BUN/Cr: 25/1.28  Glucose: 197  ProBNP: 5187  Lactate: 4.9  UA: Negative    6/24 CXR: Clear lungs. Superior right shoulder dislocation.    6/25 ICD interrogation: No high ventricular rate events. Few episodes of very transient high atrial rate events. Normal sensing and pacing via iterative testing. Excellent threshold capture. No reprogramming.    6/26 R shoulder xray: Superior subluxation of the right humeral head secondary to chronic rotator cuff tearing with moderate glenohumeral arthrosis and severe AC joint arthrosis. No acute fracture.    6/27 NSVT 23 beats uptitrated coreg 12.5mg BID     6/27/18 - >> 6/28/18 Pt urinary retention - bladder scan > 800 cc urine - straight cath protocol initiated, re-scanned > 500cc again straight cathed, repeat bladder scan - 135, 312 > pt unable to void - 6/28/18 - Joshi inserted     6/30/18 - Echo LVEF 20%  Calcified trileaflet aortic valve with normal opening. Mild aortic regurgitation.  2. Severe concentric left ventricular hypertrophy.  3. Endocardial visualization enhanced with intravenous injection of echo contrast (Definity). Severe global left ventricular systolic dysfunction. The apex appears aneurysmal. No LV thrombus seen.  4. Normal right ventricular size and function.        88 yo M HFrEF s/p AICD, prior CVA with minimal residual LLE weakness, HTN, DM2 p/w generalized weakness, possibly secondary to hypotension from heavy BP control regimen, noted to have anemia and lymphocytosis, concerning for possible CLL. Course c/b urinary retention.     # Hypotension likely 2/2 medications  - Patient on multiple BP lowering medications including possibly both ace-inhibitor and ARB, high dose coreg and hydralazine as well as diuretic  - now hemodynamically stable  -c/w losartan 25 mg and lasix 60 mg qd  -TTE (6/30) showed severe global systolic dysfunction with LVEF 20%, severe concentric LVH;   -d/c spironolactone given hypotension will monitor bp closely  -no further w/u per cardiology  -d/c plan [facility] per PT recs    # Urinary retention  - joshi placed 6/28, f/u TOV tonight, if fails again will maintain joshi with outpt  f/u  -c/w flomax/proscar  -bowel regimen    # WCT - dose coreg increased to 12.5 mg bid recently    # Right shoulder dislocation  - superior right shoulder dislocation noted on Xray  - appreciate ortho assistance, they note chronic in nature, no acute orthopedic intervention at this time    # Elevated hs-troponin - doubt ACS, patient denies CP/SOB, likely type 2 MI in setting of hypotension -  no need for ischemic evaluation as per cardiology    # Leukocytosis (lymphocytosis), 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  ddx include CLL/SLL Per Dr. Abad (heme), OK to obtain CLL FISH cytogenetic panel as outpatient to help discern CLL vs Mantle cell lymphoma.    # Chronic HFrEF - appears euvolemic now  - c/w coreg, losartan, lasix as per cards  - EP interrogation appreciated: no high ventricular rate events, few episodes of very high transient atrial rate events, normal sensing and pacing, excellent threshold capture, no reprogramming    # Anemia (normocytic)  - monitor hgb. transfuse prn goal hgb >7    # lactic acidosis  - possibly secondary to hypotension and metformin use  - hold metformin  - s/p IV fluids  - lactate now resolved    # DM2  - hold metformin, and other oral agents  -uncontrolled, start lantus 15 u hs and humalog 5 u ac  - c/w HISS, monitor FS, goal <180  - c/w diabetic diet    # DVT ppx: on eliquis ?for afib, to followup with PMD, discussed with pmd who is not clear on the indication for eliquis he said that his cardiologist placed him on it, call ed Dr. Barr (pt's cardiologist) office report that he is on vacation till july 19th, pt to f/u as outpt with him about it. In EMS, BP was 102/70. In ED, initial vitals were T97.9F, HR73, /60, RR 18 97% on supplement O2   Patient received 1.5L NS.     Test Results:  EKG: V paced at 69 bpm  CE x2: Trop 132-->126  WBC: 25.19  H/H: 10.9/33.7  BUN/Cr: 25/1.28  Glucose: 197  ProBNP: 5187  Lactate: 4.9  UA: Negative    6/24 CXR: Clear lungs. Superior right shoulder dislocation.    6/25 ICD interrogation: No high ventricular rate events. Few episodes of very transient high atrial rate events. Normal sensing and pacing via iterative testing. Excellent threshold capture. No reprogramming.    6/26 R shoulder xray: Superior subluxation of the right humeral head secondary to chronic rotator cuff tearing with moderate glenohumeral arthrosis and severe AC joint arthrosis. No acute fracture.    6/27 NSVT 23 beats uptitrated coreg 12.5mg BID     6/27/18 - >> 6/28/18 Pt urinary retention - bladder scan > 800 cc urine - straight cath protocol initiated, re-scanned > 500cc again straight cathed, repeat bladder scan - 135, 312 > pt unable to void - 6/28/18 - Joshi inserted     6/30/18 - Echo LVEF 20%  Calcified trileaflet aortic valve with normal opening. Mild aortic regurgitation.  2. Severe concentric left ventricular hypertrophy.  3. Endocardial visualization enhanced with intravenous injection of echo contrast (Definity). Severe global left ventricular systolic dysfunction. The apex appears aneurysmal. No LV thrombus seen.  4. Normal right ventricular size and function.        89 yo M HFrEF s/p AICD, prior CVA with minimal residual LLE weakness, HTN, DM2 p/w generalized weakness, possibly secondary to hypotension from heavy BP control regimen, noted to have anemia and lymphocytosis, concerning for possible CLL. Course c/b urinary retention.     # Hypotension likely 2/2 medications  - Patient on multiple BP lowering medications including possibly both ace-inhibitor and ARB, high dose coreg and hydralazine as well as diuretic  - now hemodynamically stable  -c/w losartan 25 mg, lasix dose decreased to 40mg qd   -TTE (6/30) showed severe global systolic dysfunction with LVEF 20%, severe concentric LVH;   -d/c spironolactone given hypotension will monitor bp closely  -no further w/u per cardiology  -d/c plan [facility] per PT recs    # Urinary retention  - joshi placed 6/28, f/u TOV tonight, if fails again will maintain joshi with outpt  f/u  -c/w flomax/proscar  -bowel regimen    # WCT - dose coreg increased to 12.5 mg bid recently    # Right shoulder dislocation  - superior right shoulder dislocation noted on Xray  - appreciate ortho assistance, they note chronic in nature, no acute orthopedic intervention at this time    # Elevated hs-troponin - doubt ACS, patient denies CP/SOB, likely type 2 MI in setting of hypotension -  no need for ischemic evaluation as per cardiology    # Leukocytosis (lymphocytosis), 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  ddx include CLL/SLL Per Dr. Abad (heme), OK to obtain CLL FISH cytogenetic panel as outpatient to help discern CLL vs Mantle cell lymphoma.    # Chronic HFrEF - appears euvolemic now  - c/w coreg, losartan, lasix as per cards  - EP interrogation appreciated: no high ventricular rate events, few episodes of very high transient atrial rate events, normal sensing and pacing, excellent threshold capture, no reprogramming    # Anemia (normocytic)  - monitor hgb. transfuse prn goal hgb >7    # lactic acidosis  - possibly secondary to hypotension and metformin use  - hold metformin  - s/p IV fluids  - lactate now resolved    # DM2  - hold metformin, and other oral agents  -uncontrolled, start lantus 15 u hs and humalog 5 u ac  - c/w HISS, monitor FS, goal <180  - c/w diabetic diet    # DVT ppx: on eliquis ?for afib, to followup with PMD, discussed with pmd who is not clear on the indication for eliquis he said that his cardiologist placed him on it, call ed Dr. Barr (pt's cardiologist) office report that he is on vacation till july 19th, pt to f/u as outpt with him about it. In EMS, BP was 102/70. In ED, initial vitals were T97.9F, HR73, /60, RR 18 97% on supplement O2   Patient received 1.5L NS.     Test Results:  EKG: V paced at 69 bpm  CE x2: Trop 132-->126  WBC: 25.19  H/H: 10.9/33.7  BUN/Cr: 25/1.28  Glucose: 197  ProBNP: 5187  Lactate: 4.9  UA: Negative    6/24 CXR: Clear lungs. Superior right shoulder dislocation.    6/25 ICD interrogation: No high ventricular rate events. Few episodes of very transient high atrial rate events. Normal sensing and pacing via iterative testing. Excellent threshold capture. No reprogramming.    6/26 R shoulder xray: Superior subluxation of the right humeral head secondary to chronic rotator cuff tearing with moderate glenohumeral arthrosis and severe AC joint arthrosis. No acute fracture.    6/27 NSVT 23 beats uptitrated coreg 12.5mg BID     6/27/18 - >> 6/28/18 Pt urinary retention - bladder scan > 800 cc urine - straight cath protocol initiated, re-scanned > 500cc again straight cathed, repeat bladder scan - 135, 312 > pt unable to void - 6/28/18 - Joshi inserted     6/30/18 - Echo LVEF 20%  Calcified trileaflet aortic valve with normal opening. Mild aortic regurgitation.  2. Severe concentric left ventricular hypertrophy.  3. Endocardial visualization enhanced with intravenous injection of echo contrast (Definity). Severe global left ventricular systolic dysfunction. The apex appears aneurysmal. No LV thrombus seen.  4. Normal right ventricular size and function.    89 yo M HFrEF s/p AICD, prior CVA with minimal residual LLE weakness, HTN, DM2 p/w generalized weakness, possibly secondary to hypotension from heavy BP control regimen, noted to have anemia and lymphocytosis, concerning for possible CLL. Course c/b urinary retention.   # Hypotension likely 2/2 medications  - Patient on multiple BP lowering medications including possibly both ace-inhibitor and ARB, high dose coreg and hydralazine as well as diuretic  - now hemodynamically stable  -c/w losartan 25 mg, lasix dose decreased to 40mg qd   -TTE (6/30) showed severe global systolic dysfunction with LVEF 20%, severe concentric LVH;   -d/c spironolactone given hypotension will monitor bp closely  -no further w/u per cardiology  -d/c plan [facility] per PT recs    # Urinary retention  - joshi placed 6/28, f/u TOV tonight, if fails again will maintain joshi with outpt  f/u  -c/w flomax/proscar  -bowel regimen    # WCT - dose coreg increased to 12.5 mg bid recently    # Right shoulder dislocation  - superior right shoulder dislocation noted on Xray  - appreciate ortho assistance, they note chronic in nature, no acute orthopedic intervention at this time    # Elevated hs-troponin - doubt ACS, patient denies CP/SOB, likely type 2 MI in setting of hypotension -  no need for ischemic evaluation as per cardiology    # Leukocytosis (lymphocytosis), 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  ddx include CLL/SLL Per Dr. Abad (heme), OK to obtain CLL FISH cytogenetic panel as outpatient to help discern CLL vs Mantle cell lymphoma.    # Chronic HFrEF - appears euvolemic now  - c/w coreg, losartan, lasix as per cards  - EP interrogation appreciated: no high ventricular rate events, few episodes of very high transient atrial rate events, normal sensing and pacing, excellent threshold capture, no reprogramming    # Anemia (normocytic)  - monitor hgb. transfuse prn goal hgb >7    # lactic acidosis  - possibly secondary to hypotension and metformin use  - hold metformin  - s/p IV fluids  - lactate now resolved    # DM2  - hold metformin, and other oral agents  -uncontrolled, start lantus 15 u hs and humalog 5 u ac  - c/w HISS, monitor FS, goal <180  - c/w diabetic diet  - FS on lower side yesterday, will hold pre-meal humalog and lower lantus dose on discharge     # DVT ppx: on eliquis ?for afib, to followup with PMD, discussed with pmd who is not clear on the indication for eliquis he said that his cardiologist placed him on it, call ed Dr. Barr (pt's cardiologist) office report that he is on vacation till july 19th, pt to f/u as outpt with him about it.    Discussed with Dr. Melo, pt cleared for discharge to [facility]. In EMS, BP was 102/70. In ED, initial vitals were T97.9F, HR73, /60, RR 18 97% on supplement O2   Patient received 1.5L NS.     Test Results:  EKG: V paced at 69 bpm  CE x2: Trop 132-->126  WBC: 25.19  H/H: 10.9/33.7  BUN/Cr: 25/1.28  Glucose: 197  ProBNP: 5187  Lactate: 4.9  UA: Negative    6/24 CXR: Clear lungs. Superior right shoulder dislocation.    6/25 ICD interrogation: No high ventricular rate events. Few episodes of very transient high atrial rate events. Normal sensing and pacing via iterative testing. Excellent threshold capture. No reprogramming.    6/26 R shoulder xray: Superior subluxation of the right humeral head secondary to chronic rotator cuff tearing with moderate glenohumeral arthrosis and severe AC joint arthrosis. No acute fracture.    6/27 NSVT 23 beats uptitrated coreg 12.5mg BID     6/27/18 - >> 6/28/18 Pt urinary retention - bladder scan > 800 cc urine - straight cath protocol initiated, re-scanned > 500cc again straight cathed, repeat bladder scan - 135, 312 > pt unable to void - 6/28/18 - Joshi inserted     6/30/18 - Echo LVEF 20%  Calcified trileaflet aortic valve with normal opening. Mild aortic regurgitation.  2. Severe concentric left ventricular hypertrophy.  3. Endocardial visualization enhanced with intravenous injection of echo contrast (Definity). Severe global left ventricular systolic dysfunction. The apex appears aneurysmal. No LV thrombus seen.  4. Normal right ventricular size and function.    89 yo M HFrEF s/p AICD, prior CVA with minimal residual LLE weakness, HTN, DM2 p/w generalized weakness, possibly secondary to hypotension from heavy BP control regimen, noted to have anemia and lymphocytosis, concerning for possible CLL. Course c/b urinary retention.   # Hypotension likely 2/2 medications  - Patient on multiple BP lowering medications including possibly both ace-inhibitor and ARB, high dose coreg and hydralazine as well as diuretic  - now hemodynamically stable  -c/w losartan 25 mg, lasix dose decreased to 40mg qd   -TTE (6/30) showed severe global systolic dysfunction with LVEF 20%, severe concentric LVH;   -d/c spironolactone given hypotension will monitor bp closely  -no further w/u per cardiology  -d/c plan [facility] per PT recs    # Urinary retention  - joshi placed 6/28, f/u TOV tonight, if fails again will maintain joshi with outpt  f/u  -c/w flomax/proscar  -bowel regimen    # WCT - dose coreg increased to 12.5 mg bid recently    # Right shoulder dislocation  - superior right shoulder dislocation noted on Xray  - appreciate ortho assistance, they note chronic in nature, no acute orthopedic intervention at this time    # Elevated hs-troponin - doubt ACS, patient denies CP/SOB, likely type 2 MI in setting of hypotension -  no need for ischemic evaluation as per cardiology    # Leukocytosis (lymphocytosis), 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  ddx include CLL/SLL Per Dr. Abad (heme), OK to obtain CLL FISH cytogenetic panel as outpatient to help discern CLL vs Mantle cell lymphoma.    # Chronic HFrEF - appears euvolemic now  - c/w coreg, losartan, lasix as per cards  - EP interrogation appreciated: no high ventricular rate events, few episodes of very high transient atrial rate events, normal sensing and pacing, excellent threshold capture, no reprogramming    # Anemia (normocytic)  - monitor hgb. transfuse prn goal hgb >7    # lactic acidosis  - possibly secondary to hypotension and metformin use  - hold metformin  - s/p IV fluids  - lactate now resolved    # DM2  - hold metformin, and other oral agents  -uncontrolled, start lantus 15 u hs and humalog 5 u ac  - c/w HISS, monitor FS, goal <180  - c/w diabetic diet  - FS on lower side yesterday, will hold pre-meal humalog and lower lantus dose on discharge     # DVT ppx: on eliquis ?for afib, to followup with PMD, discussed with pmd who is not clear on the indication for eliquis he said that his cardiologist placed him on it, call ed Dr. Barr (pt's cardiologist) office report that he is on vacation till july 19th, pt to f/u as outpt with him about it.    Discussed with Dr. Dhyayalan, pt cleared for discharge to [facility]. Pt to f/u as outpt.

## 2018-07-03 NOTE — DISCHARGE NOTE ADULT - PATIENT PORTAL LINK FT
You can access the [facility] Patient Portal, offered by [facility], by registering with the following website: [email or web address]

## 2018-07-03 NOTE — DISCHARGE NOTE ADULT - PROVIDER TOKENS
TOKEN:'[id number]' TOKEN:'[id number]',FREE:[LAST:[[facility]],PHONE:[phone]:[phone]    -],ADDRESS:[[address] [place] [address]]]

## 2018-07-03 NOTE — PROGRESS NOTE ADULT - ASSESSMENT
89 yo M HFrEF s/p AICD, prior CVA with minimal residual LLE weakness, HTN, DM2 p/w generalized weakness, possibly secondary to hypotension from heavy BP control regimen, noted to have anemia and lymphocytosis, concerning for possible CLL. Course c/b urinary retention.     # Urinary retention  - joshi placed 6/28, failed TOV again and joshi was reinserted, pt to go to rehab with joshi and f/u with urology as outpt.  -c/w flomax/proscar  -bowel regimen      # Hypotension likely 2/2 medications  - Patient on multiple BP lowering medications including possibly both ace-inhibitor and ARB, high dose coreg and hydralazine as well as diuretic  - now hemodynamically stable  -c/w losartan 25 mg and lasix 60 mg qd  -TTE (6/30) showed severe global systolic dysfunction with LVEF 20%, severe concentric LVH;   -d/c spironolactone given hypotension will monitor bp closely  -no further w/u per cardiology  -d/c plan JUS per PT recs    # WCT  - dose coreg increased to 12.5 mg bid recently  - continue to monitor on telemetry  - f/u cardio    # Right shoulder dislocation  - superior right shoulder dislocation noted on Xray  - appreciate ortho assistance, they note chronic in nature, no acute orthopedic intervention at this time    # Elevated hs-troponin  - doubt ACS, patient denies CP/SOB, likely type 2 MI in setting of hypotension  -  no need for ischemic evaluation as per cardiology    # Leukocytosis (lymphocytosis), 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  ddx include CLL/SLL  Per Dr. Abad (heme), plan to obtain CLL FISH cytogenetic panel , further wkup can be done as outpt      # Chronic HFrEF  - appears euvolemic  - c/w coreg, losartan, lasix as per cards  - EP interrogation appreciated: no high ventricular rate events, few episodes of very high transient atrial rate events, normal sensing and pacing, excellent threshold capture, no reprogramming      # Anemia (normocytic)  - monitor hgb. transfuse prn goal hgb >7    # lactic acidosis  - possibly secondary to hypotension and metformin use  - hold metformin  - s/p IV fluids  - lactate resolved    # DM2  - hold metformin, and other oral agents  -will d/c on lantus and ss  - c/w HISS, monitor FS, goal <180  - c/w diabetic diet    # DVT ppx: on eliquis ?for afib, to followup with PMD, discussed with pmd who is not clear on the indication for eliquis he said that his cardiologist placed him on it, call ed Dr. Barr (pt's cardiologist) office report that he is on vacation till july 19th, pt to f/u as outpt with him about it.    awaiting auth for d/c to rehab.    d/c planning time spent in coordination of care 38 mts ( preparing d/c plan, d/c summary, discussion with family, cm/sw, PA)

## 2018-07-03 NOTE — DISCHARGE NOTE ADULT - SECONDARY DIAGNOSIS.
Urinary retention Shoulder dislocation, right, subsequent encounter Leukocytosis Chronic systolic heart failure Diabetes mellitus Afib

## 2018-07-03 NOTE — DISCHARGE NOTE ADULT - MEDICATION SUMMARY - MEDICATIONS TO STOP TAKING
I will STOP taking the medications listed below when I get home from the hospital:    metFORMIN 1000 mg oral tablet  -- 1 tab(s) by mouth 2 times a day    glipiZIDE 5 mg oral tablet  -- 2 tab(s) (10mg) by mouth in the morning and then 1 tab(s) (5mg) by mouth in the evening    enalapril 20 mg oral tablet  -- 1 tab(s) by mouth 2 times a day    pravastatin 40 mg oral tablet  -- 1 tab(s) by mouth once a day (at bedtime)    Zetia 10 mg oral tablet  -- 1 tab(s) by mouth once a day (at bedtime)

## 2018-07-03 NOTE — PROGRESS NOTE ADULT - SUBJECTIVE AND OBJECTIVE BOX
Patient is a 89y old  Male who presents with a chief complaint of Weakness (03 Jul 2018 07:59)      SUBJECTIVE / OVERNIGHT EVENTS: Pt was seen and examined at 11:45am, no overnight events, per nsg failed tov and joshi had to be reinserted, no other complaints.      MEDICATIONS  (STANDING):  apixaban 2.5 milliGRAM(s) Oral every 12 hours  carvedilol 12.5 milliGRAM(s) Oral every 12 hours  dextrose 50% Injectable 12.5 Gram(s) IV Push once  docusate sodium 100 milliGRAM(s) Oral three times a day  finasteride 5 milliGRAM(s) Oral daily  folic acid 1 milliGRAM(s) Oral daily  furosemide    Tablet 60 milliGRAM(s) Oral daily  insulin glargine Injectable (LANTUS) 15 Unit(s) SubCutaneous at bedtime  insulin lispro (HumaLOG) corrective regimen sliding scale   SubCutaneous three times a day before meals  insulin lispro (HumaLOG) corrective regimen sliding scale   SubCutaneous at bedtime  insulin lispro Injectable (HumaLOG) 5 Unit(s) SubCutaneous three times a day before meals  losartan 25 milliGRAM(s) Oral daily  magnesium oxide 400 milliGRAM(s) Oral two times a day with meals  omega-3-Acid Ethyl Esters 2 Gram(s) Oral two times a day  polyethylene glycol 3350 17 Gram(s) Oral daily  senna 2 Tablet(s) Oral at bedtime  tamsulosin 0.4 milliGRAM(s) Oral at bedtime    MEDICATIONS  (PRN):  acetaminophen   Tablet. 650 milliGRAM(s) Oral every 6 hours PRN mild and moderate pain      Vital Signs Last 24 Hrs  T(C): 36.9 (03 Jul 2018 12:44), Max: 37 (02 Jul 2018 20:34)  T(F): 98.4 (03 Jul 2018 12:44), Max: 98.6 (02 Jul 2018 20:34)  HR: 81 (03 Jul 2018 12:44) (72 - 81)  BP: 135/68 (03 Jul 2018 12:44) (119/59 - 158/78)  BP(mean): --  RR: 17 (03 Jul 2018 12:44) (16 - 18)  SpO2: 97% (03 Jul 2018 12:44) (97% - 100%)  CAPILLARY BLOOD GLUCOSE      POCT Blood Glucose.: 262 mg/dL (03 Jul 2018 11:55)  POCT Blood Glucose.: 149 mg/dL (03 Jul 2018 07:32)  POCT Blood Glucose.: 240 mg/dL (02 Jul 2018 21:49)  POCT Blood Glucose.: 245 mg/dL (02 Jul 2018 21:26)  POCT Blood Glucose.: 177 mg/dL (02 Jul 2018 16:45)    I&O's Summary    02 Jul 2018 07:01  -  03 Jul 2018 07:00  --------------------------------------------------------  IN: 0 mL / OUT: 1200 mL / NET: -1200 mL    03 Jul 2018 07:01  -  03 Jul 2018 14:42  --------------------------------------------------------  IN: 0 mL / OUT: 1150 mL / NET: -1150 mL        PHYSICAL EXAM:  GENERAL: NAD, cachectic   CHEST/LUNG: Clear to auscultation bilaterally; No wheeze  HEART: Regular rate and rhythm; No murmurs, rubs, or gallops  GI: abdomen soft, nondistended  EXTREMITIES:  2+ Peripheral Pulses, No clubbing, cyanosis, or edema  : + joshi with clear urine in the bag  PSYCH: calm  NEUROLOGY: Alert, awake answers questions appropriately  SKIN: No rashes or lesions        LABS:                        9.4    26.44 )-----------( 173      ( 03 Jul 2018 04:30 )             29.5     07-03    138  |  98  |  28<H>  ----------------------------<  140<H>  3.9   |  28  |  0.86    Ca    8.8      03 Jul 2018 04:30  Mg     2.1     07-03                RADIOLOGY & ADDITIONAL TESTS:    Imaging Personally Reviewed:    Consultant(s) Notes Reviewed:      Care Discussed with Consultants/Other Providers:

## 2018-07-03 NOTE — DISCHARGE NOTE ADULT - PLAN OF CARE
Prevent recurrence Likely medication related. Your blood pressure medications have been adjusted, please follow regimen as prescribed. Follow up with your primary care physician for further monitoring in 1-2 weeks. Please call to arrange appointment. Continue with Davis cathter. Follow up with urology as outpatient for further management and possible trial of void. Jo bearing as tolerated. Follow up with orthopedic surgery, Dr. Barrera as needed. It is very important that you follow up with Dr. Abad after you are discharged from the hospital for further workup. Please call his office to arrange an appointment. To relieve and prevent worsening symptoms associated with congestive heart failure, to improve quality of life, and to treat underlying conditions such as coronary heart disease, high blood pressure, or diabetes, and to maintain euvolemia. Low salt diet, fluid restriction to 1500 ml daily, monitor your fluid intake and weight daily, exercise as tolerated 30 minutes daily, and follow up with your physician/cardiologist within 1 to 2 weeks. To maintain a normal blood glucose level and HgA1C level < 7 and to prevent diabetic complications such as uncontrolled diabetes, diabetic coma, blindness, renal failure, and amputations. Monitor finger sticks pre-meal and bedtime, low salt, fat and carbohydrate diet, minimize glucose intake.  Exercise daily for at least 30 minutes and weight loss.  Follow up with primary care physician and endocrinologist for routine Hemoglobin A1C checks and management.  Follow up with your ophthalmologist for routine yearly vision exams. Please take your medications as prescribed. Please continue Eliquis for now but you need to follow up with your cardiologist since it is unclear whether this medication needs to be continues long term. Please take your medications as prescribed. Please continue Eliquis for now but you need to follow up with your cardiologist since it is unclear whether this medication needs to be continued long term.

## 2018-07-03 NOTE — DISCHARGE NOTE ADULT - MEDICATION SUMMARY - MEDICATIONS TO TAKE
I will START or STAY ON the medications listed below when I get home from the hospital:    finasteride 5 mg oral tablet  -- 1 tab(s) by mouth once a day  -- Indication: For BPH    acetaminophen 325 mg oral tablet  -- 2 tab(s) by mouth every 6 hours, As needed, mild and moderate pain  -- Indication: For pain control    losartan 25 mg oral tablet  -- 1 tab(s) by mouth once a day  -- Indication: For Hypertension    tamsulosin 0.4 mg oral capsule  -- 1 cap(s) by mouth once a day (at bedtime)  -- Indication: For BPH    Eliquis 2.5 mg oral tablet  -- 1 tab(s) by mouth 2 times a day  -- Indication: For PAF    insulin glargine  -- 8 unit(s) subcutaneous once a day (at bedtime)  -- Indication: For Diabetes mellitus    carvedilol 12.5 mg oral tablet  -- 1 tab(s) by mouth every 12 hours  -- Indication: For Hypertension    furosemide 20 mg oral tablet  -- 3 tab(s) (60mg) by mouth once a day  -- Indication: For Chronic systolic heart failure    senna oral tablet  -- 2 tab(s) by mouth once a day (at bedtime)  -- Indication: For Constipation    docusate sodium 100 mg oral capsule  -- 1 cap(s) by mouth 3 times a day  -- Indication: For Constipation    polyethylene glycol 3350 oral powder for reconstitution  -- 17 gram(s) by mouth once a day  -- Indication: For Constipation    Fish Oil 1000 mg oral capsule  -- 1 cap(s) by mouth once a day (in the morning)  -- Indication: For Suplement     folic acid 1 mg oral tablet  -- 1 tab(s) by mouth once a day  -- Indication: For Supplement I will START or STAY ON the medications listed below when I get home from the hospital:    finasteride 5 mg oral tablet  -- 1 tab(s) by mouth once a day  -- Indication: For BPH    acetaminophen 325 mg oral tablet  -- 2 tab(s) by mouth every 6 hours, As needed, mild and moderate pain  -- Indication: For pain control    losartan 25 mg oral tablet  -- 1 tab(s) by mouth once a day  -- Indication: For Hypertension    tamsulosin 0.4 mg oral capsule  -- 1 cap(s) by mouth once a day (at bedtime)  -- Indication: For BPH    Eliquis 2.5 mg oral tablet  -- 1 tab(s) by mouth 2 times a day  -- Indication: For PAF    insulin glargine  -- 8 unit(s) subcutaneous once a day (at bedtime)  -- Indication: For Diabetes mellitus    carvedilol 12.5 mg oral tablet  -- 1 tab(s) by mouth every 12 hours  -- Indication: For Hypertension    furosemide 20 mg oral tablet  -- 2 tab(s) by mouth once a day  -- Indication: For Chronic systolic heart failure    senna oral tablet  -- 2 tab(s) by mouth once a day (at bedtime)  -- Indication: For Constipation    docusate sodium 100 mg oral capsule  -- 1 cap(s) by mouth 3 times a day  -- Indication: For Constipation    polyethylene glycol 3350 oral powder for reconstitution  -- 17 gram(s) by mouth once a day  -- Indication: For Constipation    Fish Oil 1000 mg oral capsule  -- 1 cap(s) by mouth once a day (in the morning)  -- Indication: For Suplement     folic acid 1 mg oral tablet  -- 1 tab(s) by mouth once a day  -- Indication: For Supplement

## 2018-07-03 NOTE — DISCHARGE NOTE ADULT - ADDITIONAL INSTRUCTIONS
Please follow up at [facility] for Urology for trial of void  Please follow up with your cardiologist regarding need for Eliquis. Please follow up at [facility] for Urology for trial of void  Please follow up with your cardiologist regarding need for Eliquis.  Follow up with hematology for further testing.

## 2018-07-04 LAB
BUN SERPL-MCNC: 25 MG/DL — HIGH (ref 7–23)
CALCIUM SERPL-MCNC: 8.7 MG/DL — SIGNIFICANT CHANGE UP (ref 8.4–10.5)
CHLORIDE SERPL-SCNC: 100 MMOL/L — SIGNIFICANT CHANGE UP (ref 98–107)
CO2 SERPL-SCNC: 27 MMOL/L — SIGNIFICANT CHANGE UP (ref 22–31)
CREAT SERPL-MCNC: 0.69 MG/DL — SIGNIFICANT CHANGE UP (ref 0.5–1.3)
GLUCOSE BLDC GLUCOMTR-MCNC: 110 MG/DL — HIGH (ref 70–99)
GLUCOSE BLDC GLUCOMTR-MCNC: 222 MG/DL — HIGH (ref 70–99)
GLUCOSE BLDC GLUCOMTR-MCNC: 234 MG/DL — HIGH (ref 70–99)
GLUCOSE BLDC GLUCOMTR-MCNC: 96 MG/DL — SIGNIFICANT CHANGE UP (ref 70–99)
GLUCOSE SERPL-MCNC: 82 MG/DL — SIGNIFICANT CHANGE UP (ref 70–99)
HCT VFR BLD CALC: 31.2 % — LOW (ref 39–50)
HGB BLD-MCNC: 9.6 G/DL — LOW (ref 13–17)
MAGNESIUM SERPL-MCNC: 2.1 MG/DL — SIGNIFICANT CHANGE UP (ref 1.6–2.6)
MCHC RBC-ENTMCNC: 27.4 PG — SIGNIFICANT CHANGE UP (ref 27–34)
MCHC RBC-ENTMCNC: 30.8 % — LOW (ref 32–36)
MCV RBC AUTO: 88.9 FL — SIGNIFICANT CHANGE UP (ref 80–100)
NRBC # FLD: 0 — SIGNIFICANT CHANGE UP
PLATELET # BLD AUTO: 118 K/UL — LOW (ref 150–400)
PMV BLD: 10.4 FL — SIGNIFICANT CHANGE UP (ref 7–13)
POTASSIUM SERPL-MCNC: 4 MMOL/L — SIGNIFICANT CHANGE UP (ref 3.5–5.3)
POTASSIUM SERPL-SCNC: 4 MMOL/L — SIGNIFICANT CHANGE UP (ref 3.5–5.3)
RBC # BLD: 3.51 M/UL — LOW (ref 4.2–5.8)
RBC # FLD: 14.6 % — HIGH (ref 10.3–14.5)
SODIUM SERPL-SCNC: 137 MMOL/L — SIGNIFICANT CHANGE UP (ref 135–145)
WBC # BLD: 27.08 K/UL — HIGH (ref 3.8–10.5)
WBC # FLD AUTO: 27.08 K/UL — HIGH (ref 3.8–10.5)

## 2018-07-04 PROCEDURE — 99232 SBSQ HOSP IP/OBS MODERATE 35: CPT

## 2018-07-04 RX ADMIN — Medication 100 MILLIGRAM(S): at 21:49

## 2018-07-04 RX ADMIN — CARVEDILOL PHOSPHATE 12.5 MILLIGRAM(S): 80 CAPSULE, EXTENDED RELEASE ORAL at 17:59

## 2018-07-04 RX ADMIN — FINASTERIDE 5 MILLIGRAM(S): 5 TABLET, FILM COATED ORAL at 12:46

## 2018-07-04 RX ADMIN — Medication 2 GRAM(S): at 17:59

## 2018-07-04 RX ADMIN — Medication 1 MILLIGRAM(S): at 12:46

## 2018-07-04 RX ADMIN — Medication 5 UNIT(S): at 08:41

## 2018-07-04 RX ADMIN — CARVEDILOL PHOSPHATE 12.5 MILLIGRAM(S): 80 CAPSULE, EXTENDED RELEASE ORAL at 06:08

## 2018-07-04 RX ADMIN — SENNA PLUS 2 TABLET(S): 8.6 TABLET ORAL at 21:49

## 2018-07-04 RX ADMIN — Medication 5 UNIT(S): at 12:47

## 2018-07-04 RX ADMIN — MAGNESIUM OXIDE 400 MG ORAL TABLET 400 MILLIGRAM(S): 241.3 TABLET ORAL at 08:41

## 2018-07-04 RX ADMIN — MAGNESIUM OXIDE 400 MG ORAL TABLET 400 MILLIGRAM(S): 241.3 TABLET ORAL at 17:59

## 2018-07-04 RX ADMIN — Medication 60 MILLIGRAM(S): at 06:08

## 2018-07-04 RX ADMIN — TAMSULOSIN HYDROCHLORIDE 0.4 MILLIGRAM(S): 0.4 CAPSULE ORAL at 21:49

## 2018-07-04 RX ADMIN — APIXABAN 2.5 MILLIGRAM(S): 2.5 TABLET, FILM COATED ORAL at 06:08

## 2018-07-04 RX ADMIN — Medication 100 MILLIGRAM(S): at 12:46

## 2018-07-04 RX ADMIN — POLYETHYLENE GLYCOL 3350 17 GRAM(S): 17 POWDER, FOR SOLUTION ORAL at 12:46

## 2018-07-04 RX ADMIN — Medication 2 GRAM(S): at 06:09

## 2018-07-04 RX ADMIN — Medication 5 UNIT(S): at 18:00

## 2018-07-04 RX ADMIN — LOSARTAN POTASSIUM 25 MILLIGRAM(S): 100 TABLET, FILM COATED ORAL at 06:09

## 2018-07-04 RX ADMIN — INSULIN GLARGINE 15 UNIT(S): 100 INJECTION, SOLUTION SUBCUTANEOUS at 21:48

## 2018-07-04 RX ADMIN — Medication 100 MILLIGRAM(S): at 06:08

## 2018-07-04 RX ADMIN — Medication 2: at 12:47

## 2018-07-04 RX ADMIN — APIXABAN 2.5 MILLIGRAM(S): 2.5 TABLET, FILM COATED ORAL at 17:59

## 2018-07-04 NOTE — PROGRESS NOTE ADULT - SUBJECTIVE AND OBJECTIVE BOX
Patient is a 89y old  Male who presents with a chief complaint of Weakness (03 Jul 2018 07:59)      SUBJECTIVE / OVERNIGHT EVENTS: No overnight events. No reported f/c/n/v/d/cp/sob.    MEDICATIONS  (STANDING):  apixaban 2.5 milliGRAM(s) Oral every 12 hours  carvedilol 12.5 milliGRAM(s) Oral every 12 hours  dextrose 50% Injectable 12.5 Gram(s) IV Push once  docusate sodium 100 milliGRAM(s) Oral three times a day  finasteride 5 milliGRAM(s) Oral daily  folic acid 1 milliGRAM(s) Oral daily  furosemide    Tablet 60 milliGRAM(s) Oral daily  insulin glargine Injectable (LANTUS) 15 Unit(s) SubCutaneous at bedtime  insulin lispro (HumaLOG) corrective regimen sliding scale   SubCutaneous three times a day before meals  insulin lispro (HumaLOG) corrective regimen sliding scale   SubCutaneous at bedtime  insulin lispro Injectable (HumaLOG) 5 Unit(s) SubCutaneous three times a day before meals  losartan 25 milliGRAM(s) Oral daily  magnesium oxide 400 milliGRAM(s) Oral two times a day with meals  omega-3-Acid Ethyl Esters 2 Gram(s) Oral two times a day  polyethylene glycol 3350 17 Gram(s) Oral daily  senna 2 Tablet(s) Oral at bedtime  tamsulosin 0.4 milliGRAM(s) Oral at bedtime    MEDICATIONS  (PRN):  acetaminophen   Tablet. 650 milliGRAM(s) Oral every 6 hours PRN mild and moderate pain    Vital Signs Last 24 Hrs  T(C): 36.5 (04 Jul 2018 12:17), Max: 37.3 (03 Jul 2018 21:14)  T(F): 97.7 (04 Jul 2018 12:17), Max: 99.2 (03 Jul 2018 21:14)  HR: 70 (04 Jul 2018 17:57) (70 - 81)  BP: 125/84 (04 Jul 2018 17:57) (90/42 - 129/65)  BP(mean): --  RR: 17 (04 Jul 2018 12:17) (17 - 18)  SpO2: 100% (04 Jul 2018 12:17) (96% - 100%)    PHYSICAL EXAM:  GENERAL: NAD, cachectic   CHEST/LUNG: Clear to auscultation bilaterally; No wheeze  HEART: Regular rate and rhythm; No murmurs, rubs, or gallops  GI: abdomen soft, nondistended  EXTREMITIES:  2+ Peripheral Pulses, No clubbing, cyanosis, or edema  : + joshi with clear urine in the bag  PSYCH: calm  NEUROLOGY: Alert, awake answers questions appropriately  SKIN: No rashes or lesions        LABS:                                   9.6    27.08 )-----------( 118      ( 04 Jul 2018 05:27 )             31.2     07-04    137  |  100  |  25<H>  ----------------------------<  82  4.0   |  27  |  0.69    Ca    8.7      04 Jul 2018 05:27  Mg     2.1     07-04      RADIOLOGY & ADDITIONAL TESTS:    Imaging Personally Reviewed:    Consultant(s) Notes Reviewed:      Care Discussed with Consultants/Other Providers:

## 2018-07-04 NOTE — PROGRESS NOTE ADULT - ASSESSMENT
89 yo M HFrEF s/p AICD, prior CVA with minimal residual LLE weakness, HTN, DM2 p/w generalized weakness, possibly secondary to hypotension from heavy BP control regimen, noted to have anemia and lymphocytosis, concerning for possible CLL. Course c/b urinary retention.     # Urinary retention  - joshi placed 6/28, failed TOV again and joshi was reinserted, pt to go to rehab with joshi and f/u with urology as outpt.  -c/w flomax/proscar  -bowel regimen    # Hypotension likely 2/2 medications  - Patient on multiple BP lowering medications including possibly both ace-inhibitor and ARB, high dose coreg and hydralazine as well as diuretic  - now hemodynamically stable  -c/w losartan 25 mg and lasix 60 mg qd  -TTE (6/30) showed severe global systolic dysfunction with LVEF 20%, severe concentric LVH;   -d/c spironolactone given hypotension will monitor bp closely  -no further w/u per cardiology  -d/c plan JUS per PT recs    # WCT  - dose coreg increased to 12.5 mg bid recently  - continue to monitor on telemetry  - f/u cardio recs    # Right shoulder dislocation  - superior right shoulder dislocation noted on Xray  - appreciate ortho assistance, they note chronic in nature, no acute orthopedic intervention at this time    # Elevated hs-troponin  - doubt ACS, patient denies CP/SOB, likely type 2 MI in setting of hypotension  -  no need for ischemic evaluation as per cardiology    # Leukocytosis (lymphocytosis), 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  ddx include CLL/SLL  Per Dr. Abad (heme), plan to obtain CLL FISH cytogenetic panel , further wkup can be done as outpt      # Chronic HFrEF  - appears euvolemic  - c/w coreg, losartan, lasix as per cards  - EP interrogation appreciated: no high ventricular rate events, few episodes of very high transient atrial rate events, normal sensing and pacing, excellent threshold capture, no reprogramming      # Anemia (normocytic)  - monitor hgb. transfuse prn goal hgb >7    # lactic acidosis  - possibly secondary to hypotension and metformin use  - hold metformin  - s/p IV fluids  - lactate resolved    # DM2  - hold metformin, and other oral agents  -will d/c on lantus and ss  - c/w HISS, monitor FS, goal <180  - c/w diabetic diet    # DVT ppx: on eliquis ?for afib, to followup with PMD, Dr. Hallman discussed with pmd who is not clear on the indication for eliquis he said that his cardiologist placed him on it, Dr. Hallman called Dr. Barr (pt's cardiologist) office report that he is on vacation till july 19th, pt to f/u as outpt with him about it.    awaiting auth for d/c to rehab.

## 2018-07-05 LAB
GLUCOSE BLDC GLUCOMTR-MCNC: 104 MG/DL — HIGH (ref 70–99)
GLUCOSE BLDC GLUCOMTR-MCNC: 170 MG/DL — HIGH (ref 70–99)
GLUCOSE BLDC GLUCOMTR-MCNC: 396 MG/DL — HIGH (ref 70–99)
GLUCOSE BLDC GLUCOMTR-MCNC: 50 MG/DL — LOW (ref 70–99)
GLUCOSE BLDC GLUCOMTR-MCNC: 76 MG/DL — SIGNIFICANT CHANGE UP (ref 70–99)
HCT VFR BLD CALC: 31.7 % — LOW (ref 39–50)
HGB BLD-MCNC: 9.8 G/DL — LOW (ref 13–17)
MCHC RBC-ENTMCNC: 27.4 PG — SIGNIFICANT CHANGE UP (ref 27–34)
MCHC RBC-ENTMCNC: 30.9 % — LOW (ref 32–36)
MCV RBC AUTO: 88.5 FL — SIGNIFICANT CHANGE UP (ref 80–100)
NRBC # FLD: 0 — SIGNIFICANT CHANGE UP
PLATELET # BLD AUTO: 191 K/UL — SIGNIFICANT CHANGE UP (ref 150–400)
PMV BLD: 9.9 FL — SIGNIFICANT CHANGE UP (ref 7–13)
RBC # BLD: 3.58 M/UL — LOW (ref 4.2–5.8)
RBC # FLD: 14.3 % — SIGNIFICANT CHANGE UP (ref 10.3–14.5)
WBC # BLD: 23.81 K/UL — HIGH (ref 3.8–10.5)
WBC # FLD AUTO: 23.81 K/UL — HIGH (ref 3.8–10.5)

## 2018-07-05 PROCEDURE — 99232 SBSQ HOSP IP/OBS MODERATE 35: CPT

## 2018-07-05 RX ORDER — DEXTROSE 50 % IN WATER 50 %
12.5 SYRINGE (ML) INTRAVENOUS ONCE
Qty: 0 | Refills: 0 | Status: COMPLETED | OUTPATIENT
Start: 2018-07-05 | End: 2018-07-05

## 2018-07-05 RX ORDER — FUROSEMIDE 40 MG
3 TABLET ORAL
Qty: 0 | Refills: 0 | COMMUNITY

## 2018-07-05 RX ORDER — FUROSEMIDE 40 MG
40 TABLET ORAL DAILY
Qty: 0 | Refills: 0 | Status: DISCONTINUED | OUTPATIENT
Start: 2018-07-05 | End: 2018-07-06

## 2018-07-05 RX ADMIN — APIXABAN 2.5 MILLIGRAM(S): 2.5 TABLET, FILM COATED ORAL at 05:41

## 2018-07-05 RX ADMIN — FINASTERIDE 5 MILLIGRAM(S): 5 TABLET, FILM COATED ORAL at 12:35

## 2018-07-05 RX ADMIN — Medication 60 MILLIGRAM(S): at 05:41

## 2018-07-05 RX ADMIN — INSULIN GLARGINE 15 UNIT(S): 100 INJECTION, SOLUTION SUBCUTANEOUS at 21:46

## 2018-07-05 RX ADMIN — Medication 100 MILLIGRAM(S): at 13:10

## 2018-07-05 RX ADMIN — Medication 5 UNIT(S): at 08:39

## 2018-07-05 RX ADMIN — Medication 5 UNIT(S): at 12:34

## 2018-07-05 RX ADMIN — SENNA PLUS 2 TABLET(S): 8.6 TABLET ORAL at 21:46

## 2018-07-05 RX ADMIN — Medication 2 GRAM(S): at 17:54

## 2018-07-05 RX ADMIN — POLYETHYLENE GLYCOL 3350 17 GRAM(S): 17 POWDER, FOR SOLUTION ORAL at 12:35

## 2018-07-05 RX ADMIN — Medication 3: at 21:46

## 2018-07-05 RX ADMIN — Medication 40 MILLIGRAM(S): at 17:54

## 2018-07-05 RX ADMIN — TAMSULOSIN HYDROCHLORIDE 0.4 MILLIGRAM(S): 0.4 CAPSULE ORAL at 21:46

## 2018-07-05 RX ADMIN — CARVEDILOL PHOSPHATE 12.5 MILLIGRAM(S): 80 CAPSULE, EXTENDED RELEASE ORAL at 17:53

## 2018-07-05 RX ADMIN — MAGNESIUM OXIDE 400 MG ORAL TABLET 400 MILLIGRAM(S): 241.3 TABLET ORAL at 17:54

## 2018-07-05 RX ADMIN — APIXABAN 2.5 MILLIGRAM(S): 2.5 TABLET, FILM COATED ORAL at 17:54

## 2018-07-05 RX ADMIN — MAGNESIUM OXIDE 400 MG ORAL TABLET 400 MILLIGRAM(S): 241.3 TABLET ORAL at 08:41

## 2018-07-05 RX ADMIN — LOSARTAN POTASSIUM 25 MILLIGRAM(S): 100 TABLET, FILM COATED ORAL at 05:41

## 2018-07-05 RX ADMIN — Medication 1 MILLIGRAM(S): at 12:35

## 2018-07-05 RX ADMIN — Medication 12.5 GRAM(S): at 17:30

## 2018-07-05 RX ADMIN — CARVEDILOL PHOSPHATE 12.5 MILLIGRAM(S): 80 CAPSULE, EXTENDED RELEASE ORAL at 05:41

## 2018-07-05 RX ADMIN — Medication 100 MILLIGRAM(S): at 21:46

## 2018-07-05 RX ADMIN — Medication 100 MILLIGRAM(S): at 05:41

## 2018-07-05 RX ADMIN — Medication 2 GRAM(S): at 05:41

## 2018-07-05 NOTE — CHART NOTE - NSCHARTNOTEFT_GEN_A_CORE
Called by RN for FS 76, pt is symptomatic, feels weak and is diaphoretic. Juice given along with half amp of dextrose. Pt with minimal PO intake per RN, will d/c premeal Humalog for now and continue to monitor. Due for lantus tonight.

## 2018-07-05 NOTE — PROGRESS NOTE ADULT - SUBJECTIVE AND OBJECTIVE BOX
Patient is a 89y old  Male who presents with a chief complaint of Weakness (03 Jul 2018 07:59)      SUBJECTIVE / OVERNIGHT EVENTS: Pt was seen and examined at11:30am, no overnight events, pt sitting in a chair, no complaints.    MEDICATIONS  (STANDING):  apixaban 2.5 milliGRAM(s) Oral every 12 hours  carvedilol 12.5 milliGRAM(s) Oral every 12 hours  dextrose 50% Injectable 12.5 Gram(s) IV Push once  docusate sodium 100 milliGRAM(s) Oral three times a day  finasteride 5 milliGRAM(s) Oral daily  folic acid 1 milliGRAM(s) Oral daily  furosemide    Tablet 40 milliGRAM(s) Oral daily  insulin glargine Injectable (LANTUS) 15 Unit(s) SubCutaneous at bedtime  insulin lispro (HumaLOG) corrective regimen sliding scale   SubCutaneous three times a day before meals  insulin lispro (HumaLOG) corrective regimen sliding scale   SubCutaneous at bedtime  insulin lispro Injectable (HumaLOG) 5 Unit(s) SubCutaneous three times a day before meals  losartan 25 milliGRAM(s) Oral daily  magnesium oxide 400 milliGRAM(s) Oral two times a day with meals  omega-3-Acid Ethyl Esters 2 Gram(s) Oral two times a day  polyethylene glycol 3350 17 Gram(s) Oral daily  senna 2 Tablet(s) Oral at bedtime  tamsulosin 0.4 milliGRAM(s) Oral at bedtime    MEDICATIONS  (PRN):  acetaminophen   Tablet. 650 milliGRAM(s) Oral every 6 hours PRN mild and moderate pain      Vital Signs Last 24 Hrs  T(C): 36.4 (05 Jul 2018 13:07), Max: 36.7 (04 Jul 2018 20:43)  T(F): 97.5 (05 Jul 2018 13:07), Max: 98.1 (04 Jul 2018 20:43)  HR: 71 (05 Jul 2018 13:07) (70 - 81)  BP: 90/40 (05 Jul 2018 13:07) (90/40 - 132/62)  BP(mean): --  RR: 18 (05 Jul 2018 13:07) (18 - 18)  SpO2: 96% (05 Jul 2018 13:07) (96% - 99%)  CAPILLARY BLOOD GLUCOSE      POCT Blood Glucose.: 76 mg/dL (05 Jul 2018 11:58)  POCT Blood Glucose.: 104 mg/dL (05 Jul 2018 07:40)  POCT Blood Glucose.: 222 mg/dL (04 Jul 2018 21:23)  POCT Blood Glucose.: 96 mg/dL (04 Jul 2018 17:12)    I&O's Summary    04 Jul 2018 07:01  -  05 Jul 2018 07:00  --------------------------------------------------------  IN: 0 mL / OUT: 1550 mL / NET: -1550 mL    05 Jul 2018 07:01  -  05 Jul 2018 15:15  --------------------------------------------------------  IN: 0 mL / OUT: 1150 mL / NET: -1150 mL        PHYSICAL EXAM:  GENERAL: NAD, cachectic   CHEST/LUNG: Clear to auscultation bilaterally; No wheeze  HEART: Regular rate and rhythm; No murmurs, rubs, or gallops  GI: abdomen soft, nondistended  EXTREMITIES:  2+ Peripheral Pulses, No clubbing, cyanosis, or edema  : + joshi with clear urine in the bag  PSYCH: calm  NEUROLOGY: Alert, awake answers questions appropriately  SKIN: No rashes or lesions        LABS:                        9.8    23.81 )-----------( 191      ( 05 Jul 2018 05:45 )             31.7     07-04    137  |  100  |  25<H>  ----------------------------<  82  4.0   |  27  |  0.69    Ca    8.7      04 Jul 2018 05:27  Mg     2.1     07-04                RADIOLOGY & ADDITIONAL TESTS:    Imaging Personally Reviewed:    Consultant(s) Notes Reviewed:      Care Discussed with Consultants/Other Providers:

## 2018-07-05 NOTE — PROVIDER CONTACT NOTE (OTHER) - SITUATION
patient had 9 beats VTach
patient with low finger stick diaphoretic and lethargic
Pt had 4 beats of NSVT

## 2018-07-05 NOTE — PROGRESS NOTE ADULT - ASSESSMENT
88 yo M HFrEF s/p AICD, prior CVA with minimal residual LLE weakness, HTN, DM2 p/w generalized weakness, possibly secondary to hypotension from heavy BP control regimen, noted to have anemia and lymphocytosis, concerning for possible CLL. Course c/b urinary retention.     # Urinary retention  - joshi placed 6/28, failed TOV again and joshi was reinserted, pt to go to rehab with joshi and f/u with urology as outpt.  -c/w flomax/proscar  -bowel regimen    # Hypotension likely 2/2 medications  - Patient on multiple BP lowering medications including possibly both ace-inhibitor and ARB, high dose coreg and hydralazine as well as diuretic  - now hemodynamically stable, cont to monitor closely  -c/w losartan 25 mg and lasix 60 mg qd  -TTE (6/30) showed severe global systolic dysfunction with LVEF 20%, severe concentric LVH;   -d/c spironolactone given hypotension will monitor bp closely  -no further w/u per cardiology  -d/c plan JUS per PT recs    # WCT  - dose coreg increased to 12.5 mg bid recently  - continue to monitor on telemetry  - f/u cardio recs    # Right shoulder dislocation  - superior right shoulder dislocation noted on Xray  - appreciate ortho assistance, they note chronic in nature, no acute orthopedic intervention at this time    # Elevated hs-troponin  - doubt ACS, patient denies CP/SOB, likely type 2 MI in setting of hypotension  -  no need for ischemic evaluation as per cardiology    # Leukocytosis (lymphocytosis), 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  ddx include CLL/SLL  Per Dr. Abad (heme), plan to obtain CLL FISH cytogenetic panel , further wkup can be done as outpt      # Chronic HFrEF  - appears euvolemic  - c/w coreg, losartan, lasix as per cards  - EP interrogation appreciated: no high ventricular rate events, few episodes of very high transient atrial rate events, normal sensing and pacing, excellent threshold capture, no reprogramming      # Anemia (normocytic)  - monitor hgb. transfuse prn goal hgb >7    # lactic acidosis  - possibly secondary to hypotension and metformin use  - hold metformin  - s/p IV fluids  - lactate resolved    # DM2  - hold metformin, and other oral agents  -will d/c on lantus and ss  - c/w HISS, monitor FS, goal <180  - c/w diabetic diet    # DVT ppx: on eliquis ?for afib, to followup with PMD, Dr. Hallman discussed with pmd who is not clear on the indication for eliquis he said that his cardiologist placed him on it, Dr. Hallman called Dr. Barr (pt's cardiologist) office report that he is on vacation till july 19th, pt to f/u as outpt with him about it.    awaiting auth for d/c to rehab.

## 2018-07-05 NOTE — PROVIDER CONTACT NOTE (OTHER) - REASON
23 BVT
4 BVT
50 finger stick patient symptomatic
patient had 9 beats VTach
Pt had four beats of NSVT

## 2018-07-06 VITALS
OXYGEN SATURATION: 100 % | DIASTOLIC BLOOD PRESSURE: 40 MMHG | RESPIRATION RATE: 18 BRPM | TEMPERATURE: 99 F | SYSTOLIC BLOOD PRESSURE: 103 MMHG | HEART RATE: 82 BPM

## 2018-07-06 LAB
GLUCOSE BLDC GLUCOMTR-MCNC: 164 MG/DL — HIGH (ref 70–99)
GLUCOSE BLDC GLUCOMTR-MCNC: 317 MG/DL — HIGH (ref 70–99)

## 2018-07-06 PROCEDURE — 99239 HOSP IP/OBS DSCHRG MGMT >30: CPT

## 2018-07-06 RX ADMIN — MAGNESIUM OXIDE 400 MG ORAL TABLET 400 MILLIGRAM(S): 241.3 TABLET ORAL at 08:19

## 2018-07-06 RX ADMIN — Medication 100 MILLIGRAM(S): at 13:15

## 2018-07-06 RX ADMIN — Medication 100 MILLIGRAM(S): at 06:43

## 2018-07-06 RX ADMIN — POLYETHYLENE GLYCOL 3350 17 GRAM(S): 17 POWDER, FOR SOLUTION ORAL at 13:15

## 2018-07-06 RX ADMIN — Medication 1 MILLIGRAM(S): at 13:15

## 2018-07-06 RX ADMIN — CARVEDILOL PHOSPHATE 12.5 MILLIGRAM(S): 80 CAPSULE, EXTENDED RELEASE ORAL at 06:43

## 2018-07-06 RX ADMIN — Medication 2 GRAM(S): at 06:43

## 2018-07-06 RX ADMIN — FINASTERIDE 5 MILLIGRAM(S): 5 TABLET, FILM COATED ORAL at 13:15

## 2018-07-06 RX ADMIN — Medication 4: at 12:25

## 2018-07-06 RX ADMIN — APIXABAN 2.5 MILLIGRAM(S): 2.5 TABLET, FILM COATED ORAL at 06:43

## 2018-07-06 RX ADMIN — Medication 40 MILLIGRAM(S): at 06:43

## 2018-07-06 RX ADMIN — Medication 1: at 08:19

## 2018-07-06 NOTE — PROGRESS NOTE ADULT - PROVIDER SPECIALTY LIST ADULT
Cardiology
Cardiology
Hospitalist

## 2018-07-06 NOTE — PROGRESS NOTE ADULT - SUBJECTIVE AND OBJECTIVE BOX
Patient is a 89y old  Male who presents with a chief complaint of Weakness (03 Jul 2018 07:59)      SUBJECTIVE / OVERNIGHT EVENTS: Pt was seen and examined at 10:45am, no overnight events, had some low sugars yesterday evening. pt feels fine, no complaints.    MEDICATIONS  (STANDING):  apixaban 2.5 milliGRAM(s) Oral every 12 hours  carvedilol 12.5 milliGRAM(s) Oral every 12 hours  dextrose 50% Injectable 12.5 Gram(s) IV Push once  docusate sodium 100 milliGRAM(s) Oral three times a day  finasteride 5 milliGRAM(s) Oral daily  folic acid 1 milliGRAM(s) Oral daily  furosemide    Tablet 40 milliGRAM(s) Oral daily  insulin glargine Injectable (LANTUS) 15 Unit(s) SubCutaneous at bedtime  insulin lispro (HumaLOG) corrective regimen sliding scale   SubCutaneous three times a day before meals  insulin lispro (HumaLOG) corrective regimen sliding scale   SubCutaneous at bedtime  losartan 25 milliGRAM(s) Oral daily  magnesium oxide 400 milliGRAM(s) Oral two times a day with meals  omega-3-Acid Ethyl Esters 2 Gram(s) Oral two times a day  polyethylene glycol 3350 17 Gram(s) Oral daily  senna 2 Tablet(s) Oral at bedtime  tamsulosin 0.4 milliGRAM(s) Oral at bedtime    MEDICATIONS  (PRN):  acetaminophen   Tablet. 650 milliGRAM(s) Oral every 6 hours PRN mild and moderate pain      Vital Signs Last 24 Hrs  T(C): 37.3 (06 Jul 2018 13:14), Max: 37.3 (06 Jul 2018 13:14)  T(F): 99.2 (06 Jul 2018 13:14), Max: 99.2 (06 Jul 2018 13:14)  HR: 82 (06 Jul 2018 13:14) (60 - 82)  BP: 103/40 (06 Jul 2018 13:14) (98/54 - 153/73)  BP(mean): --  RR: 18 (06 Jul 2018 13:14) (18 - 18)  SpO2: 100% (06 Jul 2018 13:14) (97% - 100%)  CAPILLARY BLOOD GLUCOSE      POCT Blood Glucose.: 317 mg/dL (06 Jul 2018 11:57)  POCT Blood Glucose.: 164 mg/dL (06 Jul 2018 07:50)  POCT Blood Glucose.: 396 mg/dL (05 Jul 2018 21:31)  POCT Blood Glucose.: 170 mg/dL (05 Jul 2018 17:48)  POCT Blood Glucose.: 50 mg/dL (05 Jul 2018 17:17)    I&O's Summary    05 Jul 2018 07:01  -  06 Jul 2018 07:00  --------------------------------------------------------  IN: 0 mL / OUT: 2150 mL / NET: -2150 mL        PHYSICAL EXAM:  GENERAL: NAD, well-developed  CHEST/LUNG: Clear to auscultation bilaterally; No wheeze  HEART: Regular rate and rhythm  ABDOMEN: Soft, Nontender, Nondistended  EXTREMITIES:  no LE edema  : + joshi with clear urine in the bag  PSYCH: calm  NEUROLOGY: Alert, awake, answers questions appropriately  SKIN: No rashes or lesions    LABS:                        9.8    23.81 )-----------( 191      ( 05 Jul 2018 05:45 )             31.7                     RADIOLOGY & ADDITIONAL TESTS:    Imaging Personally Reviewed:    Consultant(s) Notes Reviewed:      Care Discussed with Consultants/Other Providers:

## 2018-07-06 NOTE — PROGRESS NOTE ADULT - ASSESSMENT
89 yo M HFrEF s/p AICD, prior CVA with minimal residual LLE weakness, HTN, DM2 p/w generalized weakness, possibly secondary to hypotension from heavy BP control regimen, noted to have anemia and lymphocytosis, concerning for possible CLL. Course c/b urinary retention.     # Urinary retention  - joshi placed 6/28, failed TOV again and joshi was reinserted, pt to go to rehab with joshi and f/u with urology as outpt.  -c/w flomax/proscar  -bowel regimen    # Hypotension likely 2/2 medications  - Patient on multiple BP lowering medications including possibly both ace-inhibitor and ARB, high dose coreg and hydralazine as well as diuretic  - now hemodynamically stable, cont to monitor closely  -c/w losartan 25 mg and lasix 60 mg qd  -TTE (6/30) showed severe global systolic dysfunction with LVEF 20%, severe concentric LVH;   -no further w/u per cardiology  -d/c plan JUS per PT recs    # WCT  - dose coreg increased to 12.5 mg bid recently  - continue to monitor on telemetry      # Right shoulder dislocation  - superior right shoulder dislocation noted on Xray  - appreciate ortho assistance, they note chronic in nature, no acute orthopedic intervention at this time    # Elevated hs-troponin  - doubt ACS, patient denies CP/SOB, likely type 2 MI in setting of hypotension  -  no need for ischemic evaluation as per cardiology    # Leukocytosis (lymphocytosis), 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  ddx include CLL/SLL  Per Dr. Abad (heme), plan to obtain CLL FISH cytogenetic panel , further wkup can be done as outpt      # Chronic HFrEF  - appears euvolemic  - c/w coreg, losartan, lasix as per cards  - EP interrogation appreciated: no high ventricular rate events, few episodes of very high transient atrial rate events, normal sensing and pacing, excellent threshold capture, no reprogramming      # Anemia (normocytic)  - monitor hgb. transfuse prn goal hgb >7    # lactic acidosis  - possibly secondary to hypotension and metformin use  - hold metformin  - s/p IV fluids  - lactate resolved    # DM2  - hold metformin, and other oral agents  -will d/c on lantus and ss, decrease dose upon d/c  - c/w HISS, monitor FS, goal <180  - c/w diabetic diet    # DVT ppx: on eliquis ?for afib, to followup with PMD, Dr. Hallman discussed with pmd who is not clear on the indication for eliquis he said that his cardiologist placed him on it, Dr. Hallman called Dr. Barr (pt's cardiologist) office report that he is on vacation till july 19th, pt to f/u as outpt with him about it.    d/c to rehab today, d/c planning time spent in coordination 38 mts, ( preparing d/c summary, plan, discussion with CM, SW and PA)

## 2018-08-07 ENCOUNTER — INPATIENT (INPATIENT)
Facility: HOSPITAL | Age: 83
LOS: 13 days | Discharge: NURSING, REHAB OR LONG-TERM CARE | End: 2018-08-21
Attending: INTERNAL MEDICINE | Admitting: INTERNAL MEDICINE
Payer: MEDICARE

## 2018-08-07 VITALS
HEART RATE: 101 BPM | RESPIRATION RATE: 16 BRPM | TEMPERATURE: 98 F | DIASTOLIC BLOOD PRESSURE: 71 MMHG | OXYGEN SATURATION: 100 % | SYSTOLIC BLOOD PRESSURE: 124 MMHG

## 2018-08-07 PROBLEM — I50.9 HEART FAILURE, UNSPECIFIED: Chronic | Status: ACTIVE | Noted: 2018-06-24

## 2018-08-07 LAB
APTT BLD: 31.9 SEC — SIGNIFICANT CHANGE UP (ref 27.5–37.4)
AST SERPL-CCNC: 15 U/L — SIGNIFICANT CHANGE UP (ref 4–40)
BASE EXCESS BLDV CALC-SCNC: 0.1 MMOL/L — SIGNIFICANT CHANGE UP
BASOPHILS # BLD AUTO: 0.03 K/UL — SIGNIFICANT CHANGE UP (ref 0–0.2)
BASOPHILS NFR BLD AUTO: 0.1 % — SIGNIFICANT CHANGE UP (ref 0–2)
BILIRUB SERPL-MCNC: 0.4 MG/DL — SIGNIFICANT CHANGE UP (ref 0.2–1.2)
BLOOD GAS VENOUS - CREATININE: 2.01 MG/DL — HIGH (ref 0.5–1.3)
BUN SERPL-MCNC: 48 MG/DL — HIGH (ref 7–23)
CALCIUM SERPL-MCNC: 8.7 MG/DL — SIGNIFICANT CHANGE UP (ref 8.4–10.5)
CHLORIDE BLDV-SCNC: 108 MMOL/L — SIGNIFICANT CHANGE UP (ref 96–108)
CHLORIDE SERPL-SCNC: 101 MMOL/L — SIGNIFICANT CHANGE UP (ref 98–107)
CO2 SERPL-SCNC: 21 MMOL/L — LOW (ref 22–31)
EOSINOPHIL # BLD AUTO: 0.02 K/UL — SIGNIFICANT CHANGE UP (ref 0–0.5)
EOSINOPHIL NFR BLD AUTO: 0.1 % — SIGNIFICANT CHANGE UP (ref 0–6)
GAS PNL BLDV: 135 MMOL/L — LOW (ref 136–146)
GLUCOSE BLDV-MCNC: 246 — HIGH (ref 70–99)
GLUCOSE SERPL-MCNC: 238 MG/DL — HIGH (ref 70–99)
HCO3 BLDV-SCNC: 23 MMOL/L — SIGNIFICANT CHANGE UP (ref 20–27)
HCT VFR BLD CALC: 32.5 % — LOW (ref 39–50)
HCT VFR BLDV CALC: 31.7 % — LOW (ref 39–51)
HGB BLD-MCNC: 10.2 G/DL — LOW (ref 13–17)
HGB BLDV-MCNC: 10.3 G/DL — LOW (ref 13–17)
IMM GRANULOCYTES # BLD AUTO: 0.16 # — SIGNIFICANT CHANGE UP
IMM GRANULOCYTES NFR BLD AUTO: 0.4 % — SIGNIFICANT CHANGE UP (ref 0–1.5)
INR BLD: 1.64 — HIGH (ref 0.88–1.17)
LACTATE BLDV-MCNC: 2.2 MMOL/L — HIGH (ref 0.5–2)
LYMPHOCYTES # BLD AUTO: 18.93 K/UL — HIGH (ref 1–3.3)
LYMPHOCYTES # BLD AUTO: 52.6 % — HIGH (ref 13–44)
MAGNESIUM SERPL-MCNC: 2.1 MG/DL — SIGNIFICANT CHANGE UP (ref 1.6–2.6)
MCHC RBC-ENTMCNC: 26.9 PG — LOW (ref 27–34)
MCHC RBC-ENTMCNC: 31.4 % — LOW (ref 32–36)
MCV RBC AUTO: 85.8 FL — SIGNIFICANT CHANGE UP (ref 80–100)
MONOCYTES # BLD AUTO: 4.45 K/UL — HIGH (ref 0–0.9)
MONOCYTES NFR BLD AUTO: 12.4 % — SIGNIFICANT CHANGE UP (ref 2–14)
NEUTROPHILS # BLD AUTO: 12.37 K/UL — HIGH (ref 1.8–7.4)
NEUTROPHILS NFR BLD AUTO: 34.4 % — LOW (ref 43–77)
NRBC # FLD: 0 — SIGNIFICANT CHANGE UP
PCO2 BLDV: 43 MMHG — SIGNIFICANT CHANGE UP (ref 41–51)
PH BLDV: 7.37 PH — SIGNIFICANT CHANGE UP (ref 7.32–7.43)
PHOSPHATE SERPL-MCNC: 2.9 MG/DL — SIGNIFICANT CHANGE UP (ref 2.5–4.5)
PLATELET # BLD AUTO: 291 K/UL — SIGNIFICANT CHANGE UP (ref 150–400)
PMV BLD: 10 FL — SIGNIFICANT CHANGE UP (ref 7–13)
PO2 BLDV: < 24 MMHG — LOW (ref 35–40)
POTASSIUM BLDV-SCNC: 4.8 MMOL/L — HIGH (ref 3.4–4.5)
POTASSIUM SERPL-MCNC: 5.1 MMOL/L — SIGNIFICANT CHANGE UP (ref 3.5–5.3)
POTASSIUM SERPL-SCNC: 5.1 MMOL/L — SIGNIFICANT CHANGE UP (ref 3.5–5.3)
PROT SERPL-MCNC: 6.7 G/DL — SIGNIFICANT CHANGE UP (ref 6–8.3)
PROTHROM AB SERPL-ACNC: 18.4 SEC — HIGH (ref 9.8–13.1)
RBC # BLD: 3.79 M/UL — LOW (ref 4.2–5.8)
RBC # FLD: 14.6 % — HIGH (ref 10.3–14.5)
REVIEW TO FOLLOW: YES — SIGNIFICANT CHANGE UP
SAO2 % BLDV: 16.1 % — LOW (ref 60–85)
SODIUM SERPL-SCNC: 137 MMOL/L — SIGNIFICANT CHANGE UP (ref 135–145)
WBC # BLD: 35.96 K/UL — HIGH (ref 3.8–10.5)
WBC # FLD AUTO: 35.96 K/UL — HIGH (ref 3.8–10.5)

## 2018-08-07 RX ORDER — LIDOCAINE 4 G/100G
1 CREAM TOPICAL ONCE
Qty: 0 | Refills: 0 | Status: COMPLETED | OUTPATIENT
Start: 2018-08-07 | End: 2018-08-07

## 2018-08-07 RX ORDER — SODIUM CHLORIDE 9 MG/ML
500 INJECTION INTRAMUSCULAR; INTRAVENOUS; SUBCUTANEOUS ONCE
Qty: 0 | Refills: 0 | Status: COMPLETED | OUTPATIENT
Start: 2018-08-07 | End: 2018-08-07

## 2018-08-07 RX ORDER — ACETAMINOPHEN 500 MG
650 TABLET ORAL ONCE
Qty: 0 | Refills: 0 | Status: COMPLETED | OUTPATIENT
Start: 2018-08-07 | End: 2018-08-07

## 2018-08-07 RX ADMIN — LIDOCAINE 1 APPLICATION(S): 4 CREAM TOPICAL at 23:44

## 2018-08-07 RX ADMIN — Medication 650 MILLIGRAM(S): at 23:01

## 2018-08-07 RX ADMIN — SODIUM CHLORIDE 500 MILLILITER(S): 9 INJECTION INTRAMUSCULAR; INTRAVENOUS; SUBCUTANEOUS at 23:01

## 2018-08-07 NOTE — ED ADULT NURSE NOTE - OBJECTIVE STATEMENT
PT received into room 29 A&Ox3, brought in by family for increased weakness/fatigue, ABD pain, decreased urinary output, tachycardia. As per family Pt was admitted to [facility] for 2 weeks for PT; D/C 2 days ago, has been ambulating less due to weakness: called visiting nurse service today and was advised to come to ED due to tachycardia and R/O infection. Pt received with 14F indwelling joshi catheter draining cloudy yellow urine that was placed in [facility] for urinary retention; Family and patient unsure what date indwelling catheter placed. Pt has stage 1 ulcer (non-[place] able redness) to sacrum measuring approximately 2cmx 2cm, clean and dry. Pt denies N/V/D, CP, SOB. Awaiting MD evaluated, VS as noted. PMH:  HF, AICD, DM, Left eye blindness. Comfort measures provided, call bell witomyin reach, will continue to montior for safety and comfort.

## 2018-08-07 NOTE — ED PROVIDER NOTE - NS ED MD DISPO ADMIT LIJ UNIT
MED

## 2018-08-07 NOTE — ED ADULT NURSE NOTE - PAIN RATING/NUMBER SCALE (0-10): REST
[address] 
[facility] EMERGENCY DEPT 
[address] [facility] [id number] [phone] Work/School Note Date: 5/26/2018 To Whom It May concern: 
 
Vanda Grande was seen and treated today in the emergency room by the following provider(s): 
Attending Provider: Shanta Johnson. Zander Marie MD 
Physician Assistant: CHIDI Chau. Vanda Grande may return to work on 5/29/18.  
 
Sincerely, 
 
 
 
 
CHIDI Chau 
 
 
 
 5

## 2018-08-07 NOTE — ED ADULT NURSE NOTE - INTERVENTIONS DEFINITIONS
Stretcher in lowest position, wheels locked, appropriate side rails in place/Instruct patient to call for assistance/Non-slip footwear when patient is off stretcher/Physically safe environment: no spills, clutter or unnecessary equipment/Call bell, personal items and telephone within reach/[place] to call system

## 2018-08-07 NOTE — ED PROVIDER NOTE - OBJECTIVE STATEMENT
89M hx MI (1980 no stent), HFrEF (EF 20%), AF, AICD, CVA (LLE weakness), HTN, T2DM, and recent ?CLL vs SLL discharged to [facility] from prior admission in Jul w/ joshi now presenting w/ lower abdominal pain and generalized weakness in setting of unknown joshi change. Pt was discharged from [facility] on 7/28 to home and pt has HHA 7h x 7d/wk and pt was supposed to f/u with outpt hematologist (Dr. Ty) yesterday but physician not in the office. Pt scheduled to have appt w/ urologist on Friday but came to the ED for the pain and generalized weakness. Outpt cardiologist is Dr. Pushpa Barr.    Meds: metformin, glipizide, eliquis, flomax, furosemide, losartan, carvedilol, finasteride.  Granddaughter - Milana TAYLOR [phone]. Will require ambulette for discharge. 89M hx MI (1980 no stent), HFrEF (EF 20%), ?AF, AICD, CVA (LLE weakness), HTN, T2DM, and recent ?CLL vs SLL discharged to [facility] from prior admission in Jul w/ madelyn now presenting w/ lower abdominal pain and generalized weakness in setting of unknown joshi change. Pt was discharged from [facility] on 7/28 to home and pt has HHA 7h x 7d/wk and pt was supposed to f/u with outpt hematologist (Dr. Ty) yesterday but physician not in the office. Pt scheduled to have appt w/ urologist on Friday but came to the ED for the pain and generalized weakness. Outpt cardiologist is Dr. Pushpa Barr.    Meds: metformin, glipizide, eliquis, flomax, furosemide, losartan, carvedilol, finasteride.  Granddaughter - Milana TAYLOR [phone]. Will require ambulette for discharge. 89M hx MI (1980 no stent), HFrEF (EF 20%), ?AF, AICD, CVA (LLE weakness), HTN, T2DM, and recent ?CLL vs SLL discharged to [facility] from prior admission in Jul w/ madelyn now presenting w/ lower abdominal pain and generalized weakness in setting of unknown joshi change. Pt was discharged from [facility] on 7/28 to home and pt has HHA 7h x 7d/wk and pt was supposed to f/u with outpt hematologist (Dr. Ty) yesterday but physician not in the office. Pt scheduled to have appt w/ urologist on Friday but came to the ED for the pain and generalized weakness. Outpt cardiologist is Dr. Pushpa Barr.    Meds: metformin, glipizide, eliquis, flomax, furosemide, losartan, carvedilol, finasteride.  Granddaughter - Milana CLAUDIA [phone]. Will require ambulette for discharge.    May need AICD interrogation as family was called by [employer] 2-3 ago for abnormality that it was "about to fire".

## 2018-08-07 NOTE — ED ADULT NURSE REASSESSMENT NOTE - NS ED NURSE REASSESS COMMENT FT1
Pre existing indwelling joshi catheter removed. New 14 Camb[place]ian indwelling joshi catheter placed as per orders. PT tolerated procedure well; initial 50 ml cloudy yellow urine output on insertion. ED Tech Cheli at bedside for procedure. Comfort measures provided, will continue to monitor for safety and comfort.

## 2018-08-07 NOTE — ED PROVIDER NOTE - MEDICAL DECISION MAKING DETAILS
Maxi: Elderly man, CHF (EF 20%) ICD/pacemaker, recently discharged from rehab facility, p/w lower abd pain, no F/V. Has Davis. HR ~106, Tongue dry. Plan: IVF, ABx, likely admit.

## 2018-08-07 NOTE — ED ADULT NURSE NOTE - CHIEF COMPLAINT QUOTE
brought in by EMS for lower abdominal discomfort and urinary retention since this A.M., states 200 ml urine output all day.  Patient was in [facility] and sent to [facility] where he was discharged on July 28th. Family unsure when cathter was changed.

## 2018-08-07 NOTE — ED ADULT TRIAGE NOTE - CHIEF COMPLAINT QUOTE
brought in by EMS for lower abdominal discomfort and urinary retention since this A.M., states 200 ml urine output all day.  Patient was in [facility] and sent to [facility] where he was discharged on July 28th. Family unsure when cathter was changed.

## 2018-08-07 NOTE — ED PROVIDER NOTE - ATTENDING CONTRIBUTION TO CARE
I performed a face-to-face evaluation of the patient and performed a history and physical examination. I agree with the history and physical examination.    Maxi: Elderly man, CHF (EF 20%) ICD/pacemaker, recently discharged from rehab facility, p/w lower abd pain, no F/V. Has Davis. HR ~106, Tongue dry. Plan: IVF, ABx, likely admit.

## 2018-08-08 DIAGNOSIS — I50.1 LEFT VENTRICULAR FAILURE, UNSPECIFIED: ICD-10-CM

## 2018-08-08 DIAGNOSIS — Z29.9 ENCOUNTER FOR PROPHYLACTIC MEASURES, UNSPECIFIED: ICD-10-CM

## 2018-08-08 DIAGNOSIS — N30.90 CYSTITIS, UNSPECIFIED WITHOUT HEMATURIA: ICD-10-CM

## 2018-08-08 DIAGNOSIS — C91.90 LYMPHOID LEUKEMIA, UNSPECIFIED NOT HAVING ACHIEVED REMISSION: ICD-10-CM

## 2018-08-08 DIAGNOSIS — I48.0 PAROXYSMAL ATRIAL FIBRILLATION: ICD-10-CM

## 2018-08-08 DIAGNOSIS — E11.9 TYPE 2 DIABETES MELLITUS WITHOUT COMPLICATIONS: ICD-10-CM

## 2018-08-08 DIAGNOSIS — N39.0 URINARY TRACT INFECTION, SITE NOT SPECIFIED: ICD-10-CM

## 2018-08-08 LAB
ALBUMIN SERPL ELPH-MCNC: 2.6 G/DL — LOW (ref 3.3–5)
ALBUMIN SERPL ELPH-MCNC: 2.7 G/DL — LOW (ref 3.3–5)
ALBUMIN SERPL ELPH-MCNC: 3.1 G/DL — LOW (ref 3.3–5)
ALP SERPL-CCNC: 114 U/L — SIGNIFICANT CHANGE UP (ref 40–120)
ALP SERPL-CCNC: 115 U/L — SIGNIFICANT CHANGE UP (ref 40–120)
ALP SERPL-CCNC: 122 U/L — HIGH (ref 40–120)
ALT FLD-CCNC: 10 U/L — SIGNIFICANT CHANGE UP (ref 4–41)
ALT FLD-CCNC: 13 U/L — SIGNIFICANT CHANGE UP (ref 4–41)
ALT FLD-CCNC: 14 U/L — SIGNIFICANT CHANGE UP (ref 4–41)
ANISOCYTOSIS BLD QL: SIGNIFICANT CHANGE UP
APPEARANCE UR: SIGNIFICANT CHANGE UP
APTT BLD: 33.4 SEC — SIGNIFICANT CHANGE UP (ref 27.5–37.4)
AST SERPL-CCNC: 11 U/L — SIGNIFICANT CHANGE UP (ref 4–40)
AST SERPL-CCNC: 13 U/L — SIGNIFICANT CHANGE UP (ref 4–40)
BASOPHILS # BLD AUTO: 0.01 K/UL — SIGNIFICANT CHANGE UP (ref 0–0.2)
BASOPHILS # BLD AUTO: 0.03 K/UL — SIGNIFICANT CHANGE UP (ref 0–0.2)
BASOPHILS NFR BLD AUTO: 0 % — SIGNIFICANT CHANGE UP (ref 0–2)
BASOPHILS NFR BLD AUTO: 0.1 % — SIGNIFICANT CHANGE UP (ref 0–2)
BASOPHILS NFR SPEC: 0 % — SIGNIFICANT CHANGE UP (ref 0–2)
BILIRUB SERPL-MCNC: 0.4 MG/DL — SIGNIFICANT CHANGE UP (ref 0.2–1.2)
BILIRUB SERPL-MCNC: 0.4 MG/DL — SIGNIFICANT CHANGE UP (ref 0.2–1.2)
BILIRUB UR-MCNC: NEGATIVE — SIGNIFICANT CHANGE UP
BLASTS # FLD: 0 % — SIGNIFICANT CHANGE UP (ref 0–0)
BLOOD UR QL VISUAL: HIGH
BUN SERPL-MCNC: 44 MG/DL — HIGH (ref 7–23)
BUN SERPL-MCNC: 46 MG/DL — HIGH (ref 7–23)
CALCIUM SERPL-MCNC: 8.6 MG/DL — SIGNIFICANT CHANGE UP (ref 8.4–10.5)
CALCIUM SERPL-MCNC: 8.6 MG/DL — SIGNIFICANT CHANGE UP (ref 8.4–10.5)
CHLORIDE SERPL-SCNC: 102 MMOL/L — SIGNIFICANT CHANGE UP (ref 98–107)
CHLORIDE SERPL-SCNC: 103 MMOL/L — SIGNIFICANT CHANGE UP (ref 98–107)
CO2 SERPL-SCNC: 20 MMOL/L — LOW (ref 22–31)
CO2 SERPL-SCNC: 23 MMOL/L — SIGNIFICANT CHANGE UP (ref 22–31)
COLOR SPEC: YELLOW — SIGNIFICANT CHANGE UP
CREAT SERPL-MCNC: 1.75 MG/DL — HIGH (ref 0.5–1.3)
CREAT SERPL-MCNC: 1.78 MG/DL — HIGH (ref 0.5–1.3)
CREAT SERPL-MCNC: 1.8 MG/DL — HIGH (ref 0.5–1.3)
EOSINOPHIL # BLD AUTO: 0.05 K/UL — SIGNIFICANT CHANGE UP (ref 0–0.5)
EOSINOPHIL # BLD AUTO: 0.05 K/UL — SIGNIFICANT CHANGE UP (ref 0–0.5)
EOSINOPHIL NFR BLD AUTO: 0.1 % — SIGNIFICANT CHANGE UP (ref 0–6)
EOSINOPHIL NFR BLD AUTO: 0.2 % — SIGNIFICANT CHANGE UP (ref 0–6)
EOSINOPHIL NFR FLD: 0 % — SIGNIFICANT CHANGE UP (ref 0–6)
GIANT PLATELETS BLD QL SMEAR: PRESENT — SIGNIFICANT CHANGE UP
GLUCOSE SERPL-MCNC: 202 MG/DL — HIGH (ref 70–99)
GLUCOSE SERPL-MCNC: 305 MG/DL — HIGH (ref 70–99)
GLUCOSE UR-MCNC: NEGATIVE — SIGNIFICANT CHANGE UP
HCT VFR BLD CALC: 30.5 % — LOW (ref 39–50)
HCT VFR BLD CALC: 35.1 % — LOW (ref 39–50)
HGB BLD-MCNC: 10.4 G/DL — LOW (ref 13–17)
HGB BLD-MCNC: 9.4 G/DL — LOW (ref 13–17)
HYPOCHROMIA BLD QL: SLIGHT — SIGNIFICANT CHANGE UP
IMM GRANULOCYTES # BLD AUTO: 0.13 # — SIGNIFICANT CHANGE UP
IMM GRANULOCYTES # BLD AUTO: 0.18 # — SIGNIFICANT CHANGE UP
IMM GRANULOCYTES NFR BLD AUTO: 0.4 % — SIGNIFICANT CHANGE UP (ref 0–1.5)
IMM GRANULOCYTES NFR BLD AUTO: 0.5 % — SIGNIFICANT CHANGE UP (ref 0–1.5)
INR BLD: 1.41 — HIGH (ref 0.88–1.17)
KETONES UR-MCNC: NEGATIVE — SIGNIFICANT CHANGE UP
LACTATE BLDV-MCNC: 1.4 MMOL/L — SIGNIFICANT CHANGE UP (ref 0.5–2)
LEUKOCYTE ESTERASE UR-ACNC: HIGH
LYMPHOCYTES # BLD AUTO: 15.71 K/UL — HIGH (ref 1–3.3)
LYMPHOCYTES # BLD AUTO: 17.82 K/UL — HIGH (ref 1–3.3)
LYMPHOCYTES # BLD AUTO: 51.5 % — HIGH (ref 13–44)
LYMPHOCYTES # BLD AUTO: 54 % — HIGH (ref 13–44)
LYMPHOCYTES NFR SPEC AUTO: 44.2 % — HIGH (ref 13–44)
MAGNESIUM SERPL-MCNC: 2.1 MG/DL — SIGNIFICANT CHANGE UP (ref 1.6–2.6)
MAGNESIUM SERPL-MCNC: 2.1 MG/DL — SIGNIFICANT CHANGE UP (ref 1.6–2.6)
MANUAL SMEAR VERIFICATION: SIGNIFICANT CHANGE UP
MCHC RBC-ENTMCNC: 26.1 PG — LOW (ref 27–34)
MCHC RBC-ENTMCNC: 26.7 PG — LOW (ref 27–34)
MCHC RBC-ENTMCNC: 29.6 % — LOW (ref 32–36)
MCHC RBC-ENTMCNC: 30.8 % — LOW (ref 32–36)
MCV RBC AUTO: 84.7 FL — SIGNIFICANT CHANGE UP (ref 80–100)
MCV RBC AUTO: 90.2 FL — SIGNIFICANT CHANGE UP (ref 80–100)
METAMYELOCYTES # FLD: 0.9 % — SIGNIFICANT CHANGE UP (ref 0–1)
MONOCYTES # BLD AUTO: 2.5 K/UL — HIGH (ref 0–0.9)
MONOCYTES # BLD AUTO: 4.52 K/UL — HIGH (ref 0–0.9)
MONOCYTES NFR BLD AUTO: 13.1 % — SIGNIFICANT CHANGE UP (ref 2–14)
MONOCYTES NFR BLD AUTO: 8.6 % — SIGNIFICANT CHANGE UP (ref 2–14)
MONOCYTES NFR BLD: 3.5 % — SIGNIFICANT CHANGE UP (ref 2–9)
MYELOCYTES NFR BLD: 0 % — SIGNIFICANT CHANGE UP (ref 0–0)
NEUTROPHIL AB SER-ACNC: 43.4 % — SIGNIFICANT CHANGE UP (ref 43–77)
NEUTROPHILS # BLD AUTO: 10.71 K/UL — HIGH (ref 1.8–7.4)
NEUTROPHILS # BLD AUTO: 11.98 K/UL — HIGH (ref 1.8–7.4)
NEUTROPHILS NFR BLD AUTO: 34.7 % — LOW (ref 43–77)
NEUTROPHILS NFR BLD AUTO: 36.8 % — LOW (ref 43–77)
NEUTS BAND # BLD: 0 % — SIGNIFICANT CHANGE UP (ref 0–6)
NITRITE UR-MCNC: NEGATIVE — SIGNIFICANT CHANGE UP
NRBC # FLD: 0 — SIGNIFICANT CHANGE UP
NRBC # FLD: 0 — SIGNIFICANT CHANGE UP
OTHER - HEMATOLOGY %: 2.7 — SIGNIFICANT CHANGE UP
PH UR: 6.5 — SIGNIFICANT CHANGE UP (ref 4.6–8)
PHOSPHATE SERPL-MCNC: 3 MG/DL — SIGNIFICANT CHANGE UP (ref 2.5–4.5)
PHOSPHATE SERPL-MCNC: 3.2 MG/DL — SIGNIFICANT CHANGE UP (ref 2.5–4.5)
PLATELET # BLD AUTO: 247 K/UL — SIGNIFICANT CHANGE UP (ref 150–400)
PLATELET # BLD AUTO: 264 K/UL — SIGNIFICANT CHANGE UP (ref 150–400)
PLATELET COUNT - ESTIMATE: NORMAL — SIGNIFICANT CHANGE UP
PMV BLD: 9.4 FL — SIGNIFICANT CHANGE UP (ref 7–13)
PMV BLD: 9.9 FL — SIGNIFICANT CHANGE UP (ref 7–13)
POIKILOCYTOSIS BLD QL AUTO: SIGNIFICANT CHANGE UP
POLYCHROMASIA BLD QL SMEAR: SLIGHT — SIGNIFICANT CHANGE UP
POTASSIUM SERPL-MCNC: 4.7 MMOL/L — SIGNIFICANT CHANGE UP (ref 3.5–5.3)
POTASSIUM SERPL-MCNC: 5.3 MMOL/L — SIGNIFICANT CHANGE UP (ref 3.5–5.3)
POTASSIUM SERPL-SCNC: 4.7 MMOL/L — SIGNIFICANT CHANGE UP (ref 3.5–5.3)
POTASSIUM SERPL-SCNC: 5.3 MMOL/L — SIGNIFICANT CHANGE UP (ref 3.5–5.3)
PROMYELOCYTES # FLD: 0 % — SIGNIFICANT CHANGE UP (ref 0–0)
PROT SERPL-MCNC: 6.5 G/DL — SIGNIFICANT CHANGE UP (ref 6–8.3)
PROT SERPL-MCNC: 6.7 G/DL — SIGNIFICANT CHANGE UP (ref 6–8.3)
PROT UR-MCNC: 100 MG/DL — SIGNIFICANT CHANGE UP
PROTHROM AB SERPL-ACNC: 16.3 SEC — HIGH (ref 9.8–13.1)
RBC # BLD: 3.6 M/UL — LOW (ref 4.2–5.8)
RBC # BLD: 3.89 M/UL — LOW (ref 4.2–5.8)
RBC # FLD: 14.6 % — HIGH (ref 10.3–14.5)
RBC # FLD: 14.6 % — HIGH (ref 10.3–14.5)
RBC CASTS # UR COMP ASSIST: SIGNIFICANT CHANGE UP (ref 0–?)
REVIEW TO FOLLOW: YES — SIGNIFICANT CHANGE UP
SMUDGE CELLS # BLD: PRESENT — SIGNIFICANT CHANGE UP
SODIUM SERPL-SCNC: 136 MMOL/L — SIGNIFICANT CHANGE UP (ref 135–145)
SODIUM SERPL-SCNC: 137 MMOL/L — SIGNIFICANT CHANGE UP (ref 135–145)
SP GR SPEC: 1.02 — SIGNIFICANT CHANGE UP (ref 1–1.04)
SQUAMOUS # UR AUTO: SIGNIFICANT CHANGE UP
UROBILINOGEN FLD QL: NORMAL MG/DL — SIGNIFICANT CHANGE UP
VARIANT LYMPHS # BLD: 5.3 % — SIGNIFICANT CHANGE UP
WBC # BLD: 29.11 K/UL — HIGH (ref 3.8–10.5)
WBC # BLD: 34.58 K/UL — HIGH (ref 3.8–10.5)
WBC # FLD AUTO: 29.11 K/UL — HIGH (ref 3.8–10.5)
WBC # FLD AUTO: 34.58 K/UL — HIGH (ref 3.8–10.5)
WBC CLUMPS #/AREA URNS HPF: PRESENT — HIGH (ref 0–?)
WBC UR QL: >50 — HIGH (ref 0–?)
YEAST BUDDING # UR COMP ASSIST: SIGNIFICANT CHANGE UP

## 2018-08-08 PROCEDURE — 76770 US EXAM ABDO BACK WALL COMP: CPT | Mod: 26

## 2018-08-08 PROCEDURE — 93284 PRGRMG EVAL IMPLANTABLE DFB: CPT | Mod: 26

## 2018-08-08 PROCEDURE — 99223 1ST HOSP IP/OBS HIGH 75: CPT | Mod: GC

## 2018-08-08 RX ORDER — CEFTRIAXONE 500 MG/1
1 INJECTION, POWDER, FOR SOLUTION INTRAMUSCULAR; INTRAVENOUS EVERY 24 HOURS
Qty: 0 | Refills: 0 | Status: DISCONTINUED | OUTPATIENT
Start: 2018-08-08 | End: 2018-08-17

## 2018-08-08 RX ORDER — DEXTROSE 50 % IN WATER 50 %
25 SYRINGE (ML) INTRAVENOUS ONCE
Qty: 0 | Refills: 0 | Status: DISCONTINUED | OUTPATIENT
Start: 2018-08-08 | End: 2018-08-21

## 2018-08-08 RX ORDER — SODIUM CHLORIDE 9 MG/ML
1000 INJECTION, SOLUTION INTRAVENOUS
Qty: 0 | Refills: 0 | Status: DISCONTINUED | OUTPATIENT
Start: 2018-08-08 | End: 2018-08-21

## 2018-08-08 RX ORDER — TAMSULOSIN HYDROCHLORIDE 0.4 MG/1
0.8 CAPSULE ORAL AT BEDTIME
Qty: 0 | Refills: 0 | Status: DISCONTINUED | OUTPATIENT
Start: 2018-08-08 | End: 2018-08-21

## 2018-08-08 RX ORDER — SITAGLIPTIN 50 MG/1
1 TABLET, FILM COATED ORAL
Qty: 0 | Refills: 0 | COMMUNITY

## 2018-08-08 RX ORDER — FUROSEMIDE 40 MG
2 TABLET ORAL
Qty: 0 | Refills: 0 | COMMUNITY

## 2018-08-08 RX ORDER — CHLORHEXIDINE GLUCONATE 213 G/1000ML
1 SOLUTION TOPICAL
Qty: 0 | Refills: 0 | Status: DISCONTINUED | OUTPATIENT
Start: 2018-08-08 | End: 2018-08-18

## 2018-08-08 RX ORDER — POLYETHYLENE GLYCOL 3350 17 G/17G
17 POWDER, FOR SOLUTION ORAL DAILY
Qty: 0 | Refills: 0 | Status: DISCONTINUED | OUTPATIENT
Start: 2018-08-08 | End: 2018-08-21

## 2018-08-08 RX ORDER — CARVEDILOL PHOSPHATE 80 MG/1
12.5 CAPSULE, EXTENDED RELEASE ORAL EVERY 12 HOURS
Qty: 0 | Refills: 0 | Status: DISCONTINUED | OUTPATIENT
Start: 2018-08-08 | End: 2018-08-08

## 2018-08-08 RX ORDER — DOCUSATE SODIUM 100 MG
100 CAPSULE ORAL THREE TIMES A DAY
Qty: 0 | Refills: 0 | Status: DISCONTINUED | OUTPATIENT
Start: 2018-08-08 | End: 2018-08-21

## 2018-08-08 RX ORDER — SENNA PLUS 8.6 MG/1
2 TABLET ORAL AT BEDTIME
Qty: 0 | Refills: 0 | Status: DISCONTINUED | OUTPATIENT
Start: 2018-08-08 | End: 2018-08-21

## 2018-08-08 RX ORDER — GLUCAGON INJECTION, SOLUTION 0.5 MG/.1ML
1 INJECTION, SOLUTION SUBCUTANEOUS ONCE
Qty: 0 | Refills: 0 | Status: DISCONTINUED | OUTPATIENT
Start: 2018-08-08 | End: 2018-08-21

## 2018-08-08 RX ORDER — FUROSEMIDE 40 MG
1 TABLET ORAL
Qty: 0 | Refills: 0 | COMMUNITY

## 2018-08-08 RX ORDER — FINASTERIDE 5 MG/1
5 TABLET, FILM COATED ORAL DAILY
Qty: 0 | Refills: 0 | Status: DISCONTINUED | OUTPATIENT
Start: 2018-08-08 | End: 2018-08-21

## 2018-08-08 RX ORDER — INSULIN LISPRO 100/ML
VIAL (ML) SUBCUTANEOUS
Qty: 0 | Refills: 0 | Status: DISCONTINUED | OUTPATIENT
Start: 2018-08-08 | End: 2018-08-21

## 2018-08-08 RX ORDER — INSULIN LISPRO 100/ML
2 VIAL (ML) SUBCUTANEOUS
Qty: 0 | Refills: 0 | Status: DISCONTINUED | OUTPATIENT
Start: 2018-08-08 | End: 2018-08-14

## 2018-08-08 RX ORDER — METFORMIN HYDROCHLORIDE 850 MG/1
1 TABLET ORAL
Qty: 0 | Refills: 0 | COMMUNITY

## 2018-08-08 RX ORDER — DEXTROSE 50 % IN WATER 50 %
12.5 SYRINGE (ML) INTRAVENOUS ONCE
Qty: 0 | Refills: 0 | Status: DISCONTINUED | OUTPATIENT
Start: 2018-08-08 | End: 2018-08-21

## 2018-08-08 RX ORDER — INSULIN GLARGINE 100 [IU]/ML
8 INJECTION, SOLUTION SUBCUTANEOUS AT BEDTIME
Qty: 0 | Refills: 0 | Status: DISCONTINUED | OUTPATIENT
Start: 2018-08-08 | End: 2018-08-21

## 2018-08-08 RX ORDER — FUROSEMIDE 40 MG
20 TABLET ORAL DAILY
Qty: 0 | Refills: 0 | Status: DISCONTINUED | OUTPATIENT
Start: 2018-08-08 | End: 2018-08-09

## 2018-08-08 RX ORDER — CARVEDILOL PHOSPHATE 80 MG/1
12.5 CAPSULE, EXTENDED RELEASE ORAL EVERY 12 HOURS
Qty: 0 | Refills: 0 | Status: DISCONTINUED | OUTPATIENT
Start: 2018-08-08 | End: 2018-08-09

## 2018-08-08 RX ORDER — FOLIC ACID 0.8 MG
1 TABLET ORAL
Qty: 0 | Refills: 0 | COMMUNITY

## 2018-08-08 RX ORDER — DEXTROSE 50 % IN WATER 50 %
15 SYRINGE (ML) INTRAVENOUS ONCE
Qty: 0 | Refills: 0 | Status: DISCONTINUED | OUTPATIENT
Start: 2018-08-08 | End: 2018-08-21

## 2018-08-08 RX ORDER — TAMSULOSIN HYDROCHLORIDE 0.4 MG/1
0.4 CAPSULE ORAL AT BEDTIME
Qty: 0 | Refills: 0 | Status: DISCONTINUED | OUTPATIENT
Start: 2018-08-08 | End: 2018-08-08

## 2018-08-08 RX ORDER — INSULIN LISPRO 100/ML
VIAL (ML) SUBCUTANEOUS AT BEDTIME
Qty: 0 | Refills: 0 | Status: DISCONTINUED | OUTPATIENT
Start: 2018-08-08 | End: 2018-08-21

## 2018-08-08 RX ORDER — CEFTRIAXONE 500 MG/1
INJECTION, POWDER, FOR SOLUTION INTRAMUSCULAR; INTRAVENOUS
Qty: 0 | Refills: 0 | Status: DISCONTINUED | OUTPATIENT
Start: 2018-08-08 | End: 2018-08-08

## 2018-08-08 RX ORDER — CEFTRIAXONE 500 MG/1
1 INJECTION, POWDER, FOR SOLUTION INTRAMUSCULAR; INTRAVENOUS ONCE
Qty: 0 | Refills: 0 | Status: COMPLETED | OUTPATIENT
Start: 2018-08-08 | End: 2018-08-08

## 2018-08-08 RX ORDER — APIXABAN 2.5 MG/1
2.5 TABLET, FILM COATED ORAL EVERY 12 HOURS
Qty: 0 | Refills: 0 | Status: DISCONTINUED | OUTPATIENT
Start: 2018-08-08 | End: 2018-08-18

## 2018-08-08 RX ORDER — HEPARIN SODIUM 5000 [USP'U]/ML
5000 INJECTION INTRAVENOUS; SUBCUTANEOUS EVERY 8 HOURS
Qty: 0 | Refills: 0 | Status: DISCONTINUED | OUTPATIENT
Start: 2018-08-08 | End: 2018-08-08

## 2018-08-08 RX ADMIN — Medication 3: at 12:35

## 2018-08-08 RX ADMIN — Medication 2: at 18:08

## 2018-08-08 RX ADMIN — Medication 650 MILLIGRAM(S): at 00:01

## 2018-08-08 RX ADMIN — APIXABAN 2.5 MILLIGRAM(S): 2.5 TABLET, FILM COATED ORAL at 18:09

## 2018-08-08 RX ADMIN — CARVEDILOL PHOSPHATE 12.5 MILLIGRAM(S): 80 CAPSULE, EXTENDED RELEASE ORAL at 18:42

## 2018-08-08 RX ADMIN — SODIUM CHLORIDE 500 MILLILITER(S): 9 INJECTION INTRAMUSCULAR; INTRAVENOUS; SUBCUTANEOUS at 00:10

## 2018-08-08 RX ADMIN — FINASTERIDE 5 MILLIGRAM(S): 5 TABLET, FILM COATED ORAL at 18:42

## 2018-08-08 RX ADMIN — CEFTRIAXONE 1 GRAM(S): 500 INJECTION, POWDER, FOR SOLUTION INTRAMUSCULAR; INTRAVENOUS at 01:16

## 2018-08-08 RX ADMIN — CEFTRIAXONE 100 GRAM(S): 500 INJECTION, POWDER, FOR SOLUTION INTRAMUSCULAR; INTRAVENOUS at 00:46

## 2018-08-08 RX ADMIN — TAMSULOSIN HYDROCHLORIDE 0.8 MILLIGRAM(S): 0.4 CAPSULE ORAL at 22:34

## 2018-08-08 RX ADMIN — CEFTRIAXONE 100 GRAM(S): 500 INJECTION, POWDER, FOR SOLUTION INTRAMUSCULAR; INTRAVENOUS at 22:33

## 2018-08-08 RX ADMIN — Medication 20 MILLIGRAM(S): at 18:09

## 2018-08-08 RX ADMIN — POLYETHYLENE GLYCOL 3350 17 GRAM(S): 17 POWDER, FOR SOLUTION ORAL at 18:42

## 2018-08-08 RX ADMIN — INSULIN GLARGINE 8 UNIT(S): 100 INJECTION, SOLUTION SUBCUTANEOUS at 22:33

## 2018-08-08 RX ADMIN — Medication 2 UNIT(S): at 18:09

## 2018-08-08 RX ADMIN — CHLORHEXIDINE GLUCONATE 1 APPLICATION(S): 213 SOLUTION TOPICAL at 18:10

## 2018-08-08 NOTE — PROCEDURE NOTE - INTERROGATION NOTE: COMMENTS
multiple NSVT PAF/PAT with RVR up to 200-220 bpm since August 7,2018; recent device check on June 25 with excellent threshold

## 2018-08-08 NOTE — H&P ADULT - PROBLEM SELECTOR PLAN 5
- DVT prophylaxis: patient on eliquis 2.5 BID. - currently on sliding scale. f/u with insulin dose after obtaining home meds. - currently on sliding scale, did not receive basal dosing with elevated glucose this AM. REsume home basal insulin and conitnue with sliding scale.

## 2018-08-08 NOTE — H&P ADULT - NSHPREVIEWOFSYSTEMS_GEN_ALL_CORE
REVIEW OF SYSTEMS:    CONSTITUTIONAL: generalized weakness  EYES/ENT: baseline poor vision;  No vertigo or throat pain   NECK: No pain or stiffness  RESPIRATORY: No cough, wheezing, hemoptysis; No shortness of breath  CARDIOVASCULAR: No chest pain or palpitations  GASTROINTESTINAL: mild abdominal pain or epigastric pain. No nausea, vomiting, or hematemesis; No diarrhea or constipation. No melena or hematochezia.  GENITOURINARY: Davis catheter with cloudy drainage.   NEUROLOGICAL: LLE weakness  SKIN: No itching, rashes

## 2018-08-08 NOTE — H&P ADULT - NSHPLABSRESULTS_GEN_ALL_CORE
10.4   29.11 )-----------( 247      ( 08 Aug 2018 10:26 )             35.1           136  |  102  |  44<H>  ----------------------------<  305<H>  5.3   |  23  |  1.78<H>    Ca    8.6      08 Aug 2018 10:26  Phos  3.2       Mg     2.1         TPro  6.5  /  Alb  2.7<L>  /  TBili  0.4  /  DBili  x   /  AST  11  /  ALT  10  /  AlkPhos  115    Urinalysis Basic - ( 07 Aug 2018 23:40 )    Color: YELLOW / Appearance: TURBID / S.020 / pH: 6.5  Gluc: NEGATIVE / Ketone: NEGATIVE  / Bili: NEGATIVE / Urobili: NORMAL mg/dL   Blood: MODERATE / Protein: 100 mg/dL / Nitrite: NEGATIVE   Leuk Esterase: LARGE / RBC: 25-50 / WBC >50   Sq Epi: FEW / Non Sq Epi: x / Bacteria: x    PT/INR - ( 08 Aug 2018 06:50 )   PT: 16.3 SEC;   INR: 1.41          PTT - ( 08 Aug 2018 06:50 )  PTT:33.4 SEC      CAPILLARY BLOOD GLUCOSE    POCT Blood Glucose.: 254 mg/dL (08 Aug 2018 12:32)

## 2018-08-08 NOTE — H&P ADULT - PROBLEM SELECTOR PLAN 4
- currently on sliding scale. f/u with insulin dose after obtaining home meds. - continue eliquis 2.5 BID - continue eliquis 2.5 BID, beta blocker

## 2018-08-08 NOTE — H&P ADULT - PROBLEM SELECTOR PLAN 3
- continue eliquis 2.5 BID - has HFrEF with EF of 20% with AICD placement  - f/u AICD interrogation. - has HFrEF with EF of 20% with AICD placement  - f/u AICD interrogation.  -patient appears euvolemic at this time.  -continue with coreg, hold ACE-i in setting of increased creatiine.

## 2018-08-08 NOTE — H&P ADULT - HISTORY OF PRESENT ILLNESS
89M hx MI (1980 no stent), HFrEF (EF 20%), ?AF, AICD, CVA (LLE weakness), HTN, T2DM, and recent ?CLL vs SLL discharged to [facility] from prior admission in Jul w/ madelyn now presenting w/ lower abdominal pain and generalized weakness in setting of unknown joshi change. Pt was discharged from [facility] on 7/28 to home and pt has HHA 7h x 7d/wk and pt was supposed to f/u with outpt hematologist (Dr. Ty) yesterday but physician not in the office. Pt scheduled to have appt w/ urologist on Friday but came to the ED for the pain and generalized weakness.     Meds: metformin, glipizide, eliquis, flomax, furosemide, losartan, carvedilol, finasteride. The patient is an 89 year old man with a history of MI (1980 no stent), HFrEF (EF 20%), paroxysmal A-Fib, AICD, CVA (LLE weakness), HTN, T2DM, and recent diagnosis of CLL, discharged to [facility] from prior admission in July w/ joshi now presenting w/ lower abdominal pain and generalized weakness in setting of unknown joshi change. Pt was discharged from [facility] on 7/28 to home and pt has HHA 7h x 7d/wk and pt was supposed to f/u with outpt hematologist (Dr. Ty) yesterday but physician not in the office. Pt scheduled to have appointment with his urologist on Friday but came to the ED for the pain and generalized weakness. The home health aid noted that he had cloudy urine    Meds: metformin, glipizide, eliquis, flomax, furosemide, losartan, carvedilol, finasteride. The patient is an 89 year old man with a history of MI (1980 no stent), HFrEF (EF 20%), paroxysmal A-Fib, AICD, CVA (LLE weakness), HTN, T2DM, and recent diagnosis of CLL, discharged to [facility] from prior admission in July with joshi now presenting with lower abdominal pain and generalized weakness in setting of unknown joshi change. Pt was discharged from [facility] on 7/28 to home and pt has HHA 7h x 7d/wk and pt was supposed to f/u with outpt hematologist (Dr. Ty) yesterday but physician not in the office. Pt scheduled to have appointment with his urologist on Friday but came to the ED for the pain and generalized weakness. The home health aid noted that he had cloudy urine and he was tachycardic. The patient is an 89 year old man with a history of MI (1980 no stent), HFrEF (EF 20%), paroxysmal A-Fib, AICD, CVA (LLE weakness), HTN, T2DM, and recent diagnosis of CLL, discharged to [facility] from prior admission in July with joshi now presenting with lower abdominal pain and generalized weakness in setting of unknown joshi change. Pt was discharged from [facility] on 7/28 to home and pt has HHA 7h x 7d/wk and pt was supposed to f/u with outpatient hematologist (Dr. Ty) yesterday but physician not in the office. Pt scheduled to have appointment with his urologist on Friday but came to the ED for the pain and generalized weakness. The home health aid noted that he had cloudy urine and he was tachycardic. The patient is an 89 year old man with a history of MI (1980 no stent), HFrEF (EF 20%), paroxysmal A-Fib, AICD, CVA (LLE weakness), HTN, T2DM, and recent diagnosis of CLL, discharged to [facility] from prior admission in July with joshi now presenting with lower abdominal pain and generalized weakness in setting of unknown joshi change. Pt was discharged from [facility] on 7/28 to home and pt has HHA 7h x 7d/wk and pt was supposed to f/u with outpatient hematologist (Dr. Ty) yesterday but physician not in the office. Pt scheduled to have appointment with his urologist on Friday but came to the ED for the pain and generalized weakness. The home health aid noted that he had cloudy urine and he was tachycardic.     Patient currentl still feels weak, but improving from lastn ight. Feels "cold". No fevers, chest pain, SOB, +upper abdominal pain. n/v/d/c, dysuria.

## 2018-08-08 NOTE — H&P ADULT - NSHPPHYSICALEXAM_GEN_ALL_CORE
PHYSICAL EXAM:  T(C): 36.4 (08-08-18 @ 05:11), Max: 36.7 (08-08-18 @ 00:34)  HR: 94 (08-08-18 @ 05:11) (94 - 113)  BP: 140/76 (08-08-18 @ 05:11) (124/71 - 140/76)  RR: 20 (08-08-18 @ 05:11) (16 - 20)  SpO2: 97% (08-08-18 @ 05:11) (97% - 100%)  GENERAL: NAD, well-developed  HEAD:  Atraumatic, Normocephalic  EYES: EOMI, PERRLA, conjunctiva and sclera clear  NECK: Supple, No JVD  CHEST/LUNG: Clear to auscultation bilaterally; No wheeze  HEART: Regular rate and rhythm; No murmurs, rubs, or gallops  ABDOMEN: Soft, Nontender, Nondistended; Bowel sounds present  EXTREMITIES:  2+ Peripheral Pulses, No clubbing, cyanosis, or edema  PSYCH: AAOx3  NEUROLOGY: non-focal  SKIN: No rashes or lesions PHYSICAL EXAM:  T(C): 36.4 (08-08-18 @ 05:11), Max: 36.7 (08-08-18 @ 00:34)  HR: 94 (08-08-18 @ 05:11) (94 - 113)  BP: 140/76 (08-08-18 @ 05:11) (124/71 - 140/76)  RR: 20 (08-08-18 @ 05:11) (16 - 20)  SpO2: 97% (08-08-18 @ 05:11) (97% - 100%)  GENERAL: NAD, well-developed  HEAD: loose dentures, Atraumatic, Normocephalic  EYES: EOMI, conjunctiva and sclera clear  NECK: Supple, No JVD  CHEST/LUNG: Clear to auscultation bilaterally; No wheeze  HEART: Regular rate and rhythm; No murmurs, rubs, or gallops  ABDOMEN: mild tenderness in the lowerquadrants Soft, Nondistended; Bowel sounds present  EXTREMITIES:  2+ Peripheral Pulses, No clubbing, cyanosis, or edema  PSYCH: AAOx2-3    SKIN: several healing lesions on the patients feet. PHYSICAL EXAM:  T(C): 36.4 (08-08-18 @ 05:11), Max: 36.7 (08-08-18 @ 00:34)  HR: 94 (08-08-18 @ 05:11) (94 - 113)  BP: 140/76 (08-08-18 @ 05:11) (124/71 - 140/76)  RR: 20 (08-08-18 @ 05:11) (16 - 20)  SpO2: 97% (08-08-18 @ 05:11) (97% - 100%)  GENERAL: NAD, well-developed  HEAD: loose dentures, Atraumatic, Normocephalic  EYES: EOMI, conjunctiva and sclera clear  NECK: Supple, No JVD  CHEST/LUNG: Clear to auscultation bilaterally; No wheeze  HEART: Regular rate and rhythm; No murmurs, rubs, or gallops  ABDOMEN: mild tenderness in the lowerquadrants Soft, Nondistended; Bowel sounds present  : Davis with cloudy sediment.  No CVA tenderness.   EXTREMITIES:  2+ Peripheral Pulses, No clubbing, cyanosis, or edema  PSYCH: AAOx2-3    SKIN: several healing lesions on the patients feet.

## 2018-08-08 NOTE — H&P ADULT - PROBLEM SELECTOR PLAN 1
- Patient presented with cloudy urine in the Davis catheter and mild abdominal pain.   - UA was positive for Leukocyte esterase  - patient received ceftriaxone 1 g in the ED. continue Ceftriaxone 1 g QD  - f/u urine culture and sensitivities.   - f/u renal ultrasound - Patient presented with cloudy urine in the Joshi catheter and mild abdominal pain.   - UA was positive for Leukocyte esterase  - patient received ceftriaxone 1 g in the ED. continue Ceftriaxone 1 g QD  -joshi exchanged in ED  - f/u urine culture and sensitivities.   - f/u renal ultrasound

## 2018-08-08 NOTE — H&P ADULT - PMH
Adult Onset Diabetes Mellitus,    ALMI (Anterolateral Wall Myoca    Asymptomatic Chronic Venous Hy    CLL (chronic lymphocytic leukemia)    Heart failure    Paroxysmal A-fib    Personal History of Hypertensi    Personal History of Myocardial    S/P Implantation of Automatic

## 2018-08-08 NOTE — H&P ADULT - ATTENDING COMMENTS
Agree with abvoe with following addendum:    88 yo male extensive PMHx admitted with complicated UTI with GAIL    #Complicated UTI, posisble pyelo  -patient with Gail, cloudy urine, weakness, tachycardia.  -continue ceftriaxone, joshi exchanged      #GAIL  -patient with GAIL  -ensure joshi flushes appropriately  -renal US to r/o hydro as well as any abscess  -avoid nephrotoxins.    Rest as abvoe.

## 2018-08-08 NOTE — H&P ADULT - ASSESSMENT
89 year old man with a history of MI (1980 no stent), HFrEF (EF 20%), paroxysmal A-Fib, AICD, CVA (LLE weakness), HTN, T2DM, and recent diagnosis of CLL, was admitted to the hospital for a complicated UTI.

## 2018-08-08 NOTE — PROCEDURE NOTE - ADDITIONAL PROCEDURE DETAILS
Pt presented with complicated UTI with +leukocytosis; frequent PAT/PAF with RVR in the setting of sepsis; recommend increase BB for rate control to prevent inappropriate shock for rapid PAFib  Device interrogation requested by pt and his family.

## 2018-08-09 DIAGNOSIS — N17.9 ACUTE KIDNEY FAILURE, UNSPECIFIED: ICD-10-CM

## 2018-08-09 LAB
ALBUMIN SERPL ELPH-MCNC: 2.6 G/DL — LOW (ref 3.3–5)
ALP SERPL-CCNC: 120 U/L — SIGNIFICANT CHANGE UP (ref 40–120)
ALT FLD-CCNC: 10 U/L — SIGNIFICANT CHANGE UP (ref 4–41)
AST SERPL-CCNC: 14 U/L — SIGNIFICANT CHANGE UP (ref 4–40)
BASOPHILS # BLD AUTO: 0.01 K/UL — SIGNIFICANT CHANGE UP (ref 0–0.2)
BASOPHILS NFR BLD AUTO: 0 % — SIGNIFICANT CHANGE UP (ref 0–2)
BILIRUB SERPL-MCNC: 0.4 MG/DL — SIGNIFICANT CHANGE UP (ref 0.2–1.2)
BUN SERPL-MCNC: 44 MG/DL — HIGH (ref 7–23)
CALCIUM SERPL-MCNC: 8.8 MG/DL — SIGNIFICANT CHANGE UP (ref 8.4–10.5)
CHLORIDE SERPL-SCNC: 105 MMOL/L — SIGNIFICANT CHANGE UP (ref 98–107)
CO2 SERPL-SCNC: 22 MMOL/L — SIGNIFICANT CHANGE UP (ref 22–31)
CREAT ?TM UR-MCNC: 34.5 MG/DL — SIGNIFICANT CHANGE UP
CREAT SERPL-MCNC: 1.82 MG/DL — HIGH (ref 0.5–1.3)
EOSINOPHIL # BLD AUTO: 0.06 K/UL — SIGNIFICANT CHANGE UP (ref 0–0.5)
EOSINOPHIL NFR BLD AUTO: 0.2 % — SIGNIFICANT CHANGE UP (ref 0–6)
GLUCOSE SERPL-MCNC: 116 MG/DL — HIGH (ref 70–99)
HCT VFR BLD CALC: 34.4 % — LOW (ref 39–50)
HGB BLD-MCNC: 10.6 G/DL — LOW (ref 13–17)
IGA FLD-MCNC: 325 MG/DL — SIGNIFICANT CHANGE UP (ref 70–400)
IGG FLD-MCNC: 1020 MG/DL — SIGNIFICANT CHANGE UP (ref 700–1600)
IGM SERPL-MCNC: 43 MG/DL — SIGNIFICANT CHANGE UP (ref 40–230)
IMM GRANULOCYTES # BLD AUTO: 0.17 # — SIGNIFICANT CHANGE UP
IMM GRANULOCYTES NFR BLD AUTO: 0.5 % — SIGNIFICANT CHANGE UP (ref 0–1.5)
LYMPHOCYTES # BLD AUTO: 17.52 K/UL — HIGH (ref 1–3.3)
LYMPHOCYTES # BLD AUTO: 50.3 % — HIGH (ref 13–44)
MAGNESIUM SERPL-MCNC: 2.2 MG/DL — SIGNIFICANT CHANGE UP (ref 1.6–2.6)
MANUAL SMEAR VERIFICATION: SIGNIFICANT CHANGE UP
MCHC RBC-ENTMCNC: 26.8 PG — LOW (ref 27–34)
MCHC RBC-ENTMCNC: 30.8 % — LOW (ref 32–36)
MCV RBC AUTO: 86.9 FL — SIGNIFICANT CHANGE UP (ref 80–100)
MONOCYTES # BLD AUTO: 5.79 K/UL — HIGH (ref 0–0.9)
MONOCYTES NFR BLD AUTO: 16.6 % — HIGH (ref 2–14)
NEUTROPHILS # BLD AUTO: 11.28 K/UL — HIGH (ref 1.8–7.4)
NEUTROPHILS NFR BLD AUTO: 32.4 % — LOW (ref 43–77)
NRBC # FLD: 0 — SIGNIFICANT CHANGE UP
OSMOLALITY UR: 359 MOSMO/KG — SIGNIFICANT CHANGE UP (ref 50–1200)
PHOSPHATE SERPL-MCNC: 3.1 MG/DL — SIGNIFICANT CHANGE UP (ref 2.5–4.5)
PLATELET # BLD AUTO: 257 K/UL — SIGNIFICANT CHANGE UP (ref 150–400)
PMV BLD: 10.2 FL — SIGNIFICANT CHANGE UP (ref 7–13)
POTASSIUM SERPL-MCNC: 4.9 MMOL/L — SIGNIFICANT CHANGE UP (ref 3.5–5.3)
POTASSIUM SERPL-SCNC: 4.9 MMOL/L — SIGNIFICANT CHANGE UP (ref 3.5–5.3)
PROT SERPL-MCNC: 6.7 G/DL — SIGNIFICANT CHANGE UP (ref 6–8.3)
RBC # BLD: 3.96 M/UL — LOW (ref 4.2–5.8)
RBC # FLD: 14.6 % — HIGH (ref 10.3–14.5)
SODIUM SERPL-SCNC: 140 MMOL/L — SIGNIFICANT CHANGE UP (ref 135–145)
SPECIMEN SOURCE: SIGNIFICANT CHANGE UP
UUN UR-MCNC: 364.9 MG/DL — SIGNIFICANT CHANGE UP
WBC # BLD: 34.83 K/UL — HIGH (ref 3.8–10.5)
WBC # FLD AUTO: 34.83 K/UL — HIGH (ref 3.8–10.5)

## 2018-08-09 PROCEDURE — 99233 SBSQ HOSP IP/OBS HIGH 50: CPT | Mod: GC

## 2018-08-09 RX ORDER — CARVEDILOL PHOSPHATE 80 MG/1
25 CAPSULE, EXTENDED RELEASE ORAL EVERY 12 HOURS
Qty: 0 | Refills: 0 | Status: DISCONTINUED | OUTPATIENT
Start: 2018-08-09 | End: 2018-08-21

## 2018-08-09 RX ADMIN — APIXABAN 2.5 MILLIGRAM(S): 2.5 TABLET, FILM COATED ORAL at 17:59

## 2018-08-09 RX ADMIN — Medication 2: at 12:33

## 2018-08-09 RX ADMIN — Medication 20 MILLIGRAM(S): at 06:43

## 2018-08-09 RX ADMIN — CARVEDILOL PHOSPHATE 12.5 MILLIGRAM(S): 80 CAPSULE, EXTENDED RELEASE ORAL at 06:43

## 2018-08-09 RX ADMIN — INSULIN GLARGINE 8 UNIT(S): 100 INJECTION, SOLUTION SUBCUTANEOUS at 22:06

## 2018-08-09 RX ADMIN — CHLORHEXIDINE GLUCONATE 1 APPLICATION(S): 213 SOLUTION TOPICAL at 18:06

## 2018-08-09 RX ADMIN — CARVEDILOL PHOSPHATE 25 MILLIGRAM(S): 80 CAPSULE, EXTENDED RELEASE ORAL at 18:06

## 2018-08-09 RX ADMIN — CEFTRIAXONE 100 GRAM(S): 500 INJECTION, POWDER, FOR SOLUTION INTRAMUSCULAR; INTRAVENOUS at 22:06

## 2018-08-09 RX ADMIN — Medication 1: at 17:59

## 2018-08-09 RX ADMIN — Medication 2 UNIT(S): at 08:44

## 2018-08-09 RX ADMIN — Medication 100 MILLIGRAM(S): at 22:06

## 2018-08-09 RX ADMIN — POLYETHYLENE GLYCOL 3350 17 GRAM(S): 17 POWDER, FOR SOLUTION ORAL at 12:32

## 2018-08-09 RX ADMIN — Medication 2 UNIT(S): at 12:33

## 2018-08-09 RX ADMIN — APIXABAN 2.5 MILLIGRAM(S): 2.5 TABLET, FILM COATED ORAL at 06:43

## 2018-08-09 RX ADMIN — CHLORHEXIDINE GLUCONATE 1 APPLICATION(S): 213 SOLUTION TOPICAL at 06:46

## 2018-08-09 RX ADMIN — SENNA PLUS 2 TABLET(S): 8.6 TABLET ORAL at 22:06

## 2018-08-09 RX ADMIN — Medication 100 MILLIGRAM(S): at 12:32

## 2018-08-09 RX ADMIN — Medication 2 UNIT(S): at 17:59

## 2018-08-09 RX ADMIN — Medication 100 MILLIGRAM(S): at 06:43

## 2018-08-09 RX ADMIN — FINASTERIDE 5 MILLIGRAM(S): 5 TABLET, FILM COATED ORAL at 12:32

## 2018-08-09 RX ADMIN — TAMSULOSIN HYDROCHLORIDE 0.8 MILLIGRAM(S): 0.4 CAPSULE ORAL at 22:12

## 2018-08-09 NOTE — PROGRESS NOTE ADULT - PROBLEM SELECTOR PLAN 7
- DVT prophylaxis: patient on eliquis 2.5 BID.  - PT recommends discharge to a rehab facility but patient and family would rather go home with home-care.

## 2018-08-09 NOTE — PROGRESS NOTE ADULT - PROBLEM SELECTOR PLAN 1
- Patient presented with cloudy urine in the Davis catheter and mild abdominal pain.   - UA was positive for Leukocyte esterase  - patient received ceftriaxone 1 g in the ED. continue Ceftriaxone 1 g QD  -Davis exchanged in ED  - urine cultures growing gram negative rods.   - renal ultrasound showed no hydronephrosis

## 2018-08-09 NOTE — PHYSICAL THERAPY INITIAL EVALUATION ADULT - PERTINENT HX OF CURRENT PROBLEM, REHAB EVAL
89 year old man with a history of MI (1980 no stent), HFrEF (EF 20%), paroxysmal A-Fib, AICD, CVA (LLE weakness), HTN, T2DM, and recent diagnosis of CLL presenting with lower abdominal pain and generalized weakness

## 2018-08-09 NOTE — PROGRESS NOTE ADULT - PROBLEM SELECTOR PLAN 2
-Dr. Abad is the patients private oncologist.   - ordered FISH studies as well as immunoglobulin levels as per onc recs. patient with elevated Creatinine still despite treatment.  -holdign lasix.  -likely still ATN in setting of complicated UTI  -if does not resolve tomorrow, will consult renal.

## 2018-08-09 NOTE — PROGRESS NOTE ADULT - PROBLEM SELECTOR PLAN 3
- has HFrEF with EF of 20% with AICD placement  - AICD interrogation showed sinus tachycardia and EP recommended increasing beta blocker dose.   -patient appears euvolemic at this time.  -continue with coreg, hold ACE-i in setting of increased creatiine. -Dr. Abad is the patients private oncologist.   - ordered FISH studies as well as immunoglobulin levels as per onc recs.

## 2018-08-09 NOTE — PROGRESS NOTE ADULT - PROBLEM SELECTOR PLAN 6
- DVT prophylaxis: patient on eliquis 2.5 BID.  - PT recommends discharge to a rehab facility but patient and family would rather go home with home-care. - currently on sliding scale, did not receive basal dosing with elevated glucose this AM. Resume home basal insulin and continue with sliding scale.

## 2018-08-09 NOTE — PROGRESS NOTE ADULT - PROBLEM SELECTOR PLAN 4
- continue eliquis 2.5 BID, beta blocker - has HFrEF with EF of 20% with AICD placement  - AICD interrogation showed sinus tachycardia and EP recommended increasing beta blocker dose.   -patient appears euvolemic at this time.  -continue with coreg, hold ACE-i in setting of increased creatiine.

## 2018-08-09 NOTE — PROGRESS NOTE ADULT - SUBJECTIVE AND OBJECTIVE BOX
Patient is a 89y old  Male who presents with a chief complaint of complicated UTI (08 Aug 2018 09:06)      SUBJECTIVE / OVERNIGHT EVENTS:  Patient seen and examined at bedside.    Other Review of Systems Negative.    MEDICATIONS  (STANDING):  apixaban 2.5 milliGRAM(s) Oral every 12 hours  carvedilol 12.5 milliGRAM(s) Oral every 12 hours  cefTRIAXone   IVPB 1 Gram(s) IV Intermittent every 24 hours  chlorhexidine 4% Liquid 1 Application(s) Topical two times a day  dextrose 5%. 1000 milliLiter(s) (50 mL/Hr) IV Continuous <Continuous>  dextrose 50% Injectable 12.5 Gram(s) IV Push once  dextrose 50% Injectable 25 Gram(s) IV Push once  dextrose 50% Injectable 25 Gram(s) IV Push once  docusate sodium 100 milliGRAM(s) Oral three times a day  finasteride 5 milliGRAM(s) Oral daily  insulin glargine Injectable (LANTUS) 8 Unit(s) SubCutaneous at bedtime  insulin lispro (HumaLOG) corrective regimen sliding scale   SubCutaneous three times a day before meals  insulin lispro (HumaLOG) corrective regimen sliding scale   SubCutaneous at bedtime  insulin lispro Injectable (HumaLOG) 2 Unit(s) SubCutaneous three times a day before meals  polyethylene glycol 3350 17 Gram(s) Oral daily  senna 2 Tablet(s) Oral at bedtime  tamsulosin 0.8 milliGRAM(s) Oral at bedtime    MEDICATIONS  (PRN):  dextrose 40% Gel 15 Gram(s) Oral once PRN Blood Glucose LESS THAN 70 milliGRAM(s)/deciliter  glucagon  Injectable 1 milliGRAM(s) IntraMuscular once PRN Glucose LESS THAN 70 milligrams/deciliter      OBJECTIVE:    Vital Signs Last 24 Hrs  T(C): 36.7 (09 Aug 2018 05:44), Max: 37.2 (08 Aug 2018 21:15)  T(F): 98 (09 Aug 2018 05:44), Max: 98.9 (08 Aug 2018 21:15)  HR: 105 (09 Aug 2018 05:44) (105 - 113)  BP: 134/74 (09 Aug 2018 05:44) (123/68 - 146/87)  BP(mean): --  RR: 18 (09 Aug 2018 05:44) (18 - 20)  SpO2: 97% (09 Aug 2018 05:44) (96% - 98%)    CAPILLARY BLOOD GLUCOSE      POCT Blood Glucose.: 218 mg/dL (09 Aug 2018 12:21)  POCT Blood Glucose.: 104 mg/dL (09 Aug 2018 08:23)  POCT Blood Glucose.: 156 mg/dL (08 Aug 2018 22:31)  POCT Blood Glucose.: 221 mg/dL (08 Aug 2018 17:03)    I&O's Summary    08 Aug 2018 07:01  -  09 Aug 2018 07:00  --------------------------------------------------------  IN: 550 mL / OUT: 675 mL / NET: -125 mL    09 Aug 2018 07:01  -  09 Aug 2018 14:20  --------------------------------------------------------  IN: 0 mL / OUT: 550 mL / NET: -550 mL        PHYSICAL EXAM:  GENERAL: NAD, well-developed  CHEST/LUNG: Clear to auscultation bilaterally; No wheeze  HEART: Regular rate and rhythm; No murmurs, rubs, or gallops  ABDOMEN: Soft, Nontender, Nondistended; Bowel sounds present  : Davis draining less cloudy urine  EXTREMITIES:  2+ Peripheral Pulses, No clubbing, cyanosis, or edema  PSYCH: AAOx3  LABS:                        10.6   34.83 )-----------( 257      ( 09 Aug 2018 07:30 )             34.4     Auto Eosinophil # 0.06  / Auto Eosinophil % 0.2   / Auto Neutrophil # 11.28 / Auto Neutrophil % 32.4  / BANDS % x                            10.4   29.11 )-----------( 247      ( 08 Aug 2018 10:26 )             35.1     Auto Eosinophil # 0.05  / Auto Eosinophil % 0.2   / Auto Neutrophil # 10.71 / Auto Neutrophil % 36.8  / BANDS % x                            9.4    34.58 )-----------( 264      ( 08 Aug 2018 06:50 )             30.5     Auto Eosinophil # 0.05  / Auto Eosinophil % 0.1   / Auto Neutrophil # 11.98 / Auto Neutrophil % 34.7  / BANDS % x            140  |  105  |  44<H>  ----------------------------<  116<H>  4.9   |  22  |  1.82<H>  08    136  |  102  |  44<H>  ----------------------------<  305<H>  5.3   |  23  |  1.78<H>  0808    137  |  103  |  46<H>  ----------------------------<  202<H>  4.7   |  20<L>  |  1.75<H>    Ca    8.8      09 Aug 2018 07:30  Mg     2.2       Phos  3.1       TPro  6.7  /  Alb  2.6<L>  /  TBili  0.4  /  DBili  x   /  AST  14  /  ALT  10  /  AlkPhos  120  08-09  TPro  6.5  /  Alb  2.7<L>  /  TBili  0.4  /  DBili  x   /  AST  11  /  ALT  10  /  AlkPhos  115  08-08  TPro  6.7  /  Alb  2.6<L>  /  TBili  0.4  /  DBili  x   /  AST  13  /  ALT  13  /  AlkPhos  114  08-08    PT/INR - ( 08 Aug 2018 06:50 )   PT: 16.3 SEC;   INR: 1.41      PTT - ( 08 Aug 2018 06:50 )  PTT:33.4 SEC    Urinalysis Basic - ( 07 Aug 2018 23:40 )    Color: YELLOW / Appearance: TURBID / S.020 / pH: 6.5  Gluc: NEGATIVE / Ketone: NEGATIVE  / Bili: NEGATIVE / Urobili: NORMAL mg/dL   Blood: MODERATE / Protein: 100 mg/dL / Nitrite: NEGATIVE   Leuk Esterase: LARGE / RBC: 25-50 / WBC >50   Sq Epi: FEW / Non Sq Epi: x / Bacteria: x    Care Discussed with Consultants/Other Providers:  yes

## 2018-08-09 NOTE — PROGRESS NOTE ADULT - PROBLEM SELECTOR PLAN 5
- currently on sliding scale, did not receive basal dosing with elevated glucose this AM. Resume home basal insulin and continue with sliding scale. - continue eliquis 2.5 BID, beta blocker

## 2018-08-09 NOTE — PROGRESS NOTE ADULT - ASSESSMENT
89 year old man with a history of MI (1980 no stent), HFrEF (EF 20%), paroxysmal A-Fib, AICD, CVA (LLE weakness), HTN, T2DM, and recent diagnosis of CLL, was admitted to the hospital for a complicated UTI. 89 year old man with a history of MI (1980 no stent), HFrEF (EF 20%), paroxysmal A-Fib, AICD, CVA (LLE weakness), HTN, T2DM, and recent diagnosis of CLL, was admitted to the hospital for a complicated UTI and NADIA

## 2018-08-09 NOTE — PHYSICAL THERAPY INITIAL EVALUATION ADULT - ADDITIONAL COMMENTS
Patient lives with daughter in a home with steps to enter; patient uses a rolling walker at baseline and has a home health aide for 7 hours per day/7 days a week

## 2018-08-10 LAB
-  AMIKACIN: SIGNIFICANT CHANGE UP
-  AMPICILLIN/SULBACTAM: SIGNIFICANT CHANGE UP
-  AMPICILLIN: SIGNIFICANT CHANGE UP
-  AZTREONAM: SIGNIFICANT CHANGE UP
-  CEFAZOLIN: SIGNIFICANT CHANGE UP
-  CEFEPIME: SIGNIFICANT CHANGE UP
-  CEFOXITIN: SIGNIFICANT CHANGE UP
-  CEFTAZIDIME: SIGNIFICANT CHANGE UP
-  CEFTRIAXONE: SIGNIFICANT CHANGE UP
-  CIPROFLOXACIN: SIGNIFICANT CHANGE UP
-  ERTAPENEM: SIGNIFICANT CHANGE UP
-  GENTAMICIN: SIGNIFICANT CHANGE UP
-  LEVOFLOXACIN: SIGNIFICANT CHANGE UP
-  MEROPENEM: SIGNIFICANT CHANGE UP
-  NITROFURANTOIN: SIGNIFICANT CHANGE UP
-  PIPERACILLIN/TAZOBACTAM: SIGNIFICANT CHANGE UP
-  TOBRAMYCIN: SIGNIFICANT CHANGE UP
-  TRIMETHOPRIM/SULFAMETHOXAZOLE: SIGNIFICANT CHANGE UP
ALBUMIN SERPL ELPH-MCNC: 2.3 G/DL — LOW (ref 3.3–5)
ALP SERPL-CCNC: 131 U/L — HIGH (ref 40–120)
ALT FLD-CCNC: 15 U/L — SIGNIFICANT CHANGE UP (ref 4–41)
ANISOCYTOSIS BLD QL: SLIGHT — SIGNIFICANT CHANGE UP
AST SERPL-CCNC: 23 U/L — SIGNIFICANT CHANGE UP (ref 4–40)
BACTERIA UR CULT: SIGNIFICANT CHANGE UP
BASOPHILS # BLD AUTO: 0.02 K/UL — SIGNIFICANT CHANGE UP (ref 0–0.2)
BASOPHILS NFR BLD AUTO: 0.1 % — SIGNIFICANT CHANGE UP (ref 0–2)
BASOPHILS NFR SPEC: 0 % — SIGNIFICANT CHANGE UP (ref 0–2)
BILIRUB SERPL-MCNC: 0.3 MG/DL — SIGNIFICANT CHANGE UP (ref 0.2–1.2)
BLASTS # FLD: 0 % — SIGNIFICANT CHANGE UP (ref 0–0)
BUN SERPL-MCNC: 46 MG/DL — HIGH (ref 7–23)
CALCIUM SERPL-MCNC: 8.5 MG/DL — SIGNIFICANT CHANGE UP (ref 8.4–10.5)
CHLORIDE SERPL-SCNC: 104 MMOL/L — SIGNIFICANT CHANGE UP (ref 98–107)
CO2 SERPL-SCNC: 23 MMOL/L — SIGNIFICANT CHANGE UP (ref 22–31)
CREAT ?TM UR-MCNC: 49.5 MG/DL — SIGNIFICANT CHANGE UP
CREAT SERPL-MCNC: 1.91 MG/DL — HIGH (ref 0.5–1.3)
DACRYOCYTES BLD QL SMEAR: SIGNIFICANT CHANGE UP
EOSINOPHIL # BLD AUTO: 0.23 K/UL — SIGNIFICANT CHANGE UP (ref 0–0.5)
EOSINOPHIL NFR BLD AUTO: 0.7 % — SIGNIFICANT CHANGE UP (ref 0–6)
EOSINOPHIL NFR FLD: 1.8 % — SIGNIFICANT CHANGE UP (ref 0–6)
GIANT PLATELETS BLD QL SMEAR: PRESENT — SIGNIFICANT CHANGE UP
GLUCOSE SERPL-MCNC: 111 MG/DL — HIGH (ref 70–99)
HCT VFR BLD CALC: 31 % — LOW (ref 39–50)
HGB BLD-MCNC: 9.6 G/DL — LOW (ref 13–17)
IMM GRANULOCYTES # BLD AUTO: 0.18 # — SIGNIFICANT CHANGE UP
IMM GRANULOCYTES NFR BLD AUTO: 0.6 % — SIGNIFICANT CHANGE UP (ref 0–1.5)
LYMPHOCYTES # BLD AUTO: 16.94 K/UL — HIGH (ref 1–3.3)
LYMPHOCYTES # BLD AUTO: 53.5 % — HIGH (ref 13–44)
LYMPHOCYTES NFR SPEC AUTO: 33.3 % — SIGNIFICANT CHANGE UP (ref 13–44)
MAGNESIUM SERPL-MCNC: 2.1 MG/DL — SIGNIFICANT CHANGE UP (ref 1.6–2.6)
MCHC RBC-ENTMCNC: 26.4 PG — LOW (ref 27–34)
MCHC RBC-ENTMCNC: 31 % — LOW (ref 32–36)
MCV RBC AUTO: 85.4 FL — SIGNIFICANT CHANGE UP (ref 80–100)
METAMYELOCYTES # FLD: 0 % — SIGNIFICANT CHANGE UP (ref 0–1)
METHOD TYPE: SIGNIFICANT CHANGE UP
MICROCYTES BLD QL: SLIGHT — SIGNIFICANT CHANGE UP
MONOCYTES # BLD AUTO: 3.67 K/UL — HIGH (ref 0–0.9)
MONOCYTES NFR BLD AUTO: 11.6 % — SIGNIFICANT CHANGE UP (ref 2–14)
MONOCYTES NFR BLD: 7.2 % — SIGNIFICANT CHANGE UP (ref 2–9)
MYELOCYTES NFR BLD: 0 % — SIGNIFICANT CHANGE UP (ref 0–0)
NEUTROPHIL AB SER-ACNC: 53.2 % — SIGNIFICANT CHANGE UP (ref 43–77)
NEUTROPHILS # BLD AUTO: 10.61 K/UL — HIGH (ref 1.8–7.4)
NEUTROPHILS NFR BLD AUTO: 33.5 % — LOW (ref 43–77)
NEUTS BAND # BLD: 0.9 % — SIGNIFICANT CHANGE UP (ref 0–6)
NRBC # FLD: 0 — SIGNIFICANT CHANGE UP
ORGANISM # SPEC MICROSCOPIC CNT: SIGNIFICANT CHANGE UP
ORGANISM # SPEC MICROSCOPIC CNT: SIGNIFICANT CHANGE UP
OSMOLALITY UR: 357 MOSMO/KG — SIGNIFICANT CHANGE UP (ref 50–1200)
OTHER - HEMATOLOGY %: 0 — SIGNIFICANT CHANGE UP
OVALOCYTES BLD QL SMEAR: SLIGHT — SIGNIFICANT CHANGE UP
PHOSPHATE SERPL-MCNC: 3 MG/DL — SIGNIFICANT CHANGE UP (ref 2.5–4.5)
PLATELET # BLD AUTO: 245 K/UL — SIGNIFICANT CHANGE UP (ref 150–400)
PLATELET COUNT - ESTIMATE: NORMAL — SIGNIFICANT CHANGE UP
PMV BLD: 10 FL — SIGNIFICANT CHANGE UP (ref 7–13)
POIKILOCYTOSIS BLD QL AUTO: SLIGHT — SIGNIFICANT CHANGE UP
POTASSIUM SERPL-MCNC: 4.6 MMOL/L — SIGNIFICANT CHANGE UP (ref 3.5–5.3)
POTASSIUM SERPL-SCNC: 4.6 MMOL/L — SIGNIFICANT CHANGE UP (ref 3.5–5.3)
PROMYELOCYTES # FLD: 0 % — SIGNIFICANT CHANGE UP (ref 0–0)
PROT SERPL-MCNC: 6.2 G/DL — SIGNIFICANT CHANGE UP (ref 6–8.3)
RBC # BLD: 3.63 M/UL — LOW (ref 4.2–5.8)
RBC # FLD: 14.7 % — HIGH (ref 10.3–14.5)
SMUDGE CELLS # BLD: PRESENT — SIGNIFICANT CHANGE UP
SODIUM SERPL-SCNC: 138 MMOL/L — SIGNIFICANT CHANGE UP (ref 135–145)
SODIUM UR-SCNC: 39 MMOL/L — SIGNIFICANT CHANGE UP
VARIANT LYMPHS # BLD: 3.6 % — SIGNIFICANT CHANGE UP
WBC # BLD: 31.65 K/UL — HIGH (ref 3.8–10.5)
WBC # FLD AUTO: 31.65 K/UL — HIGH (ref 3.8–10.5)

## 2018-08-10 PROCEDURE — 99233 SBSQ HOSP IP/OBS HIGH 50: CPT | Mod: GC

## 2018-08-10 PROCEDURE — 99223 1ST HOSP IP/OBS HIGH 75: CPT | Mod: GC

## 2018-08-10 RX ORDER — SODIUM CHLORIDE 9 MG/ML
1000 INJECTION INTRAMUSCULAR; INTRAVENOUS; SUBCUTANEOUS
Qty: 0 | Refills: 0 | Status: DISCONTINUED | OUTPATIENT
Start: 2018-08-10 | End: 2018-08-12

## 2018-08-10 RX ADMIN — SENNA PLUS 2 TABLET(S): 8.6 TABLET ORAL at 21:55

## 2018-08-10 RX ADMIN — CEFTRIAXONE 100 GRAM(S): 500 INJECTION, POWDER, FOR SOLUTION INTRAMUSCULAR; INTRAVENOUS at 21:55

## 2018-08-10 RX ADMIN — Medication 100 MILLIGRAM(S): at 06:05

## 2018-08-10 RX ADMIN — Medication 2 UNIT(S): at 17:29

## 2018-08-10 RX ADMIN — FINASTERIDE 5 MILLIGRAM(S): 5 TABLET, FILM COATED ORAL at 12:48

## 2018-08-10 RX ADMIN — Medication 1: at 12:47

## 2018-08-10 RX ADMIN — Medication 2 UNIT(S): at 12:48

## 2018-08-10 RX ADMIN — CHLORHEXIDINE GLUCONATE 1 APPLICATION(S): 213 SOLUTION TOPICAL at 06:06

## 2018-08-10 RX ADMIN — INSULIN GLARGINE 8 UNIT(S): 100 INJECTION, SOLUTION SUBCUTANEOUS at 21:55

## 2018-08-10 RX ADMIN — CHLORHEXIDINE GLUCONATE 1 APPLICATION(S): 213 SOLUTION TOPICAL at 17:30

## 2018-08-10 RX ADMIN — Medication 2: at 17:29

## 2018-08-10 RX ADMIN — Medication 100 MILLIGRAM(S): at 21:55

## 2018-08-10 RX ADMIN — CARVEDILOL PHOSPHATE 25 MILLIGRAM(S): 80 CAPSULE, EXTENDED RELEASE ORAL at 17:29

## 2018-08-10 RX ADMIN — APIXABAN 2.5 MILLIGRAM(S): 2.5 TABLET, FILM COATED ORAL at 17:30

## 2018-08-10 RX ADMIN — CARVEDILOL PHOSPHATE 25 MILLIGRAM(S): 80 CAPSULE, EXTENDED RELEASE ORAL at 06:05

## 2018-08-10 RX ADMIN — TAMSULOSIN HYDROCHLORIDE 0.8 MILLIGRAM(S): 0.4 CAPSULE ORAL at 21:55

## 2018-08-10 RX ADMIN — APIXABAN 2.5 MILLIGRAM(S): 2.5 TABLET, FILM COATED ORAL at 06:05

## 2018-08-10 RX ADMIN — SODIUM CHLORIDE 75 MILLILITER(S): 9 INJECTION INTRAMUSCULAR; INTRAVENOUS; SUBCUTANEOUS at 15:20

## 2018-08-10 NOTE — CONSULT NOTE ADULT - PROBLEM SELECTOR RECOMMENDATION 9
Pt with hemodynamically mediated NADIA in the setting of dehydration, poor oral intake. Upon review of [facility] HIE SCr WNL 0.69 on 7/4/18. At admission SCr elevated to 1.8 and 1.91 today. Recommend gentle IV fluid NS at 75 cc/h for 12 hrs. Check urine electrolytes. Monitor UOP and daily weights. Monitor BMP daily. Avoid nephrotoxic drugs. Dose all medications renally.
will order FISH studies to help clarify CLL vs MCL. In likely event of CLL, I do not think he will require treatment for quite some time. Will also check immunoglobulin levels  Will discuss with daughter

## 2018-08-10 NOTE — PROGRESS NOTE ADULT - PROBLEM SELECTOR PLAN 6
- currently on sliding scale, did not receive basal dosing with elevated glucose this AM. Resume home basal insulin and continue with sliding scale.

## 2018-08-10 NOTE — PROGRESS NOTE ADULT - SUBJECTIVE AND OBJECTIVE BOX
Patient is a 89y old  Male who presents with a chief complaint of complicated UTI (08 Aug 2018 09:06)      SUBJECTIVE / OVERNIGHT EVENTS:  Patient seen and examined at bedside.    Other Review of Systems Negative.    MEDICATIONS  (STANDING):  apixaban 2.5 milliGRAM(s) Oral every 12 hours  carvedilol 25 milliGRAM(s) Oral every 12 hours  cefTRIAXone   IVPB 1 Gram(s) IV Intermittent every 24 hours  chlorhexidine 4% Liquid 1 Application(s) Topical two times a day  dextrose 5%. 1000 milliLiter(s) (50 mL/Hr) IV Continuous <Continuous>  dextrose 50% Injectable 12.5 Gram(s) IV Push once  dextrose 50% Injectable 25 Gram(s) IV Push once  dextrose 50% Injectable 25 Gram(s) IV Push once  docusate sodium 100 milliGRAM(s) Oral three times a day  finasteride 5 milliGRAM(s) Oral daily  insulin glargine Injectable (LANTUS) 8 Unit(s) SubCutaneous at bedtime  insulin lispro (HumaLOG) corrective regimen sliding scale   SubCutaneous three times a day before meals  insulin lispro (HumaLOG) corrective regimen sliding scale   SubCutaneous at bedtime  insulin lispro Injectable (HumaLOG) 2 Unit(s) SubCutaneous three times a day before meals  polyethylene glycol 3350 17 Gram(s) Oral daily  senna 2 Tablet(s) Oral at bedtime  tamsulosin 0.8 milliGRAM(s) Oral at bedtime    MEDICATIONS  (PRN):  dextrose 40% Gel 15 Gram(s) Oral once PRN Blood Glucose LESS THAN 70 milliGRAM(s)/deciliter  glucagon  Injectable 1 milliGRAM(s) IntraMuscular once PRN Glucose LESS THAN 70 milligrams/deciliter      OBJECTIVE:    Vital Signs Last 24 Hrs  T(C): 36.7 (10 Aug 2018 05:08), Max: 36.9 (09 Aug 2018 21:05)  T(F): 98 (10 Aug 2018 05:08), Max: 98.4 (09 Aug 2018 21:05)  HR: 72 (10 Aug 2018 05:08) (72 - 103)  BP: 120/77 (10 Aug 2018 05:08) (104/60 - 120/77)  BP(mean): --  RR: 18 (10 Aug 2018 05:08) (18 - 18)  SpO2: 98% (10 Aug 2018 05:08) (98% - 100%)    CAPILLARY BLOOD GLUCOSE      POCT Blood Glucose.: 104 mg/dL (10 Aug 2018 08:14)  POCT Blood Glucose.: 154 mg/dL (09 Aug 2018 21:38)  POCT Blood Glucose.: 175 mg/dL (09 Aug 2018 17:22)  POCT Blood Glucose.: 218 mg/dL (09 Aug 2018 12:21)    I&O's Summary    09 Aug 2018 07:01  -  10 Aug 2018 07:00  --------------------------------------------------------  IN: 0 mL / OUT: 1500 mL / NET: -1500 mL    10 Aug 2018 07:01  -  10 Aug 2018 11:31  --------------------------------------------------------  IN: 0 mL / OUT: 300 mL / NET: -300 mL        PHYSICAL EXAM:  GENERAL: NAD, well-developed  HEAD:  Atraumatic, Normocephalic  EYES: EOMI, PERRLA, conjunctiva and sclera clear  NECK: Supple, No JVD  CHEST/LUNG: Clear to auscultation bilaterally; No wheeze  HEART: Regular rate and rhythm; No murmurs, rubs, or gallops  ABDOMEN: Soft, Nontender, Nondistended; Bowel sounds present  EXTREMITIES:  2+ Peripheral Pulses, No clubbing, cyanosis, or edema  PSYCH: AAOx3  NEUROLOGY: non-focal  SKIN: No rashes or lesions    LABS:                        9.6    31.65 )-----------( 245      ( 10 Aug 2018 06:16 )             31.0     Auto Eosinophil # 0.23  / Auto Eosinophil % 0.7   / Auto Neutrophil # 10.61 / Auto Neutrophil % 33.5  / BANDS % 0.9                          10.6   34.83 )-----------( 257      ( 09 Aug 2018 07:30 )             34.4     Auto Eosinophil # 0.06  / Auto Eosinophil % 0.2   / Auto Neutrophil # 11.28 / Auto Neutrophil % 32.4  / BANDS % x        08-10    138  |  104  |  46<H>  ----------------------------<  111<H>  4.6   |  23  |  1.91<H>  08-09    140  |  105  |  44<H>  ----------------------------<  116<H>  4.9   |  22  |  1.82<H>    Ca    8.5      10 Aug 2018 06:16  Mg     2.1     08-10  Phos  3.0     08-10  TPro  6.2  /  Alb  2.3<L>  /  TBili  0.3  /  DBili  x   /  AST  23  /  ALT  15  /  AlkPhos  131<H>  08-10  TPro  6.7  /  Alb  2.6<L>  /  TBili  0.4  /  DBili  x   /  AST  14  /  ALT  10  /  AlkPhos  120  08-09    Care Discussed with Consultants/Other Providers: yes Patient is a 89y old  Male who presents with a chief complaint of complicated UTI (08 Aug 2018 09:06)      SUBJECTIVE / OVERNIGHT EVENTS:  Patient seen and examined at bedside. No acute events overnight.     Other Review of Systems Negative.    MEDICATIONS  (STANDING):  apixaban 2.5 milliGRAM(s) Oral every 12 hours  carvedilol 25 milliGRAM(s) Oral every 12 hours  cefTRIAXone   IVPB 1 Gram(s) IV Intermittent every 24 hours  chlorhexidine 4% Liquid 1 Application(s) Topical two times a day  dextrose 5%. 1000 milliLiter(s) (50 mL/Hr) IV Continuous <Continuous>  dextrose 50% Injectable 12.5 Gram(s) IV Push once  dextrose 50% Injectable 25 Gram(s) IV Push once  dextrose 50% Injectable 25 Gram(s) IV Push once  docusate sodium 100 milliGRAM(s) Oral three times a day  finasteride 5 milliGRAM(s) Oral daily  insulin glargine Injectable (LANTUS) 8 Unit(s) SubCutaneous at bedtime  insulin lispro (HumaLOG) corrective regimen sliding scale   SubCutaneous three times a day before meals  insulin lispro (HumaLOG) corrective regimen sliding scale   SubCutaneous at bedtime  insulin lispro Injectable (HumaLOG) 2 Unit(s) SubCutaneous three times a day before meals  polyethylene glycol 3350 17 Gram(s) Oral daily  senna 2 Tablet(s) Oral at bedtime  tamsulosin 0.8 milliGRAM(s) Oral at bedtime    MEDICATIONS  (PRN):  dextrose 40% Gel 15 Gram(s) Oral once PRN Blood Glucose LESS THAN 70 milliGRAM(s)/deciliter  glucagon  Injectable 1 milliGRAM(s) IntraMuscular once PRN Glucose LESS THAN 70 milligrams/deciliter    OBJECTIVE:    Vital Signs Last 24 Hrs  T(C): 36.7 (10 Aug 2018 05:08), Max: 36.9 (09 Aug 2018 21:05)  T(F): 98 (10 Aug 2018 05:08), Max: 98.4 (09 Aug 2018 21:05)  HR: 72 (10 Aug 2018 05:08) (72 - 103)  BP: 120/77 (10 Aug 2018 05:08) (104/60 - 120/77)  BP(mean): --  RR: 18 (10 Aug 2018 05:08) (18 - 18)  SpO2: 98% (10 Aug 2018 05:08) (98% - 100%)    CAPILLARY BLOOD GLUCOSE  POCT Blood Glucose.: 104 mg/dL (10 Aug 2018 08:14)  POCT Blood Glucose.: 154 mg/dL (09 Aug 2018 21:38)  POCT Blood Glucose.: 175 mg/dL (09 Aug 2018 17:22)  POCT Blood Glucose.: 218 mg/dL (09 Aug 2018 12:21)    I&O's Summary    09 Aug 2018 07:01  -  10 Aug 2018 07:00  --------------------------------------------------------  IN: 0 mL / OUT: 1500 mL / NET: -1500 mL    10 Aug 2018 07:01  -  10 Aug 2018 11:31  --------------------------------------------------------  IN: 0 mL / OUT: 300 mL / NET: -300 mL    PHYSICAL EXAM:  GENERAL: comfortably in bed  HEAD:  Atraumatic, Normocephalic  EYES: EOMI, conjunctiva and sclera clear  NECK: Supple, No JVD  CHEST/LUNG: Clear to auscultation bilaterally; No wheeze  HEART: Regular rate and rhythm; No murmurs, rubs, or gallops  ABDOMEN: Soft, Nontender, Nondistended; Bowel sounds present  EXTREMITIES:  2+ Peripheral Pulses, No clubbing, cyanosis, or edema  PSYCH: AAOx3    LABS:                        9.6    31.65 )-----------( 245      ( 10 Aug 2018 06:16 )             31.0     Auto Eosinophil # 0.23  / Auto Eosinophil % 0.7   / Auto Neutrophil # 10.61 / Auto Neutrophil % 33.5  / BANDS % 0.9                          10.6   34.83 )-----------( 257      ( 09 Aug 2018 07:30 )             34.4     Auto Eosinophil # 0.06  / Auto Eosinophil % 0.2   / Auto Neutrophil # 11.28 / Auto Neutrophil % 32.4  / BANDS % x        08-10    138  |  104  |  46<H>  ----------------------------<  111<H>  4.6   |  23  |  1.91<H>  08-09    140  |  105  |  44<H>  ----------------------------<  116<H>  4.9   |  22  |  1.82<H>    Ca    8.5      10 Aug 2018 06:16  Mg     2.1     08-10  Phos  3.0     08-10  TPro  6.2  /  Alb  2.3<L>  /  TBili  0.3  /  DBili  x   /  AST  23  /  ALT  15  /  AlkPhos  131<H>  08-10  TPro  6.7  /  Alb  2.6<L>  /  TBili  0.4  /  DBili  x   /  AST  14  /  ALT  10  /  AlkPhos  120  08-09    Care Discussed with Consultants/Other Providers: yes

## 2018-08-10 NOTE — PROGRESS NOTE ADULT - PROBLEM SELECTOR PLAN 4
- has HFrEF with EF of 20% with AICD placement  - AICD interrogation showed sinus tachycardia and EP recommended increasing beta blocker dose.   -patient appears euvolemic at this time.  -continue with coreg, hold ACE-i in setting of increased creatiine.

## 2018-08-10 NOTE — PROGRESS NOTE ADULT - PROBLEM SELECTOR PLAN 1
- Patient presented with cloudy urine in the Davis catheter and mild abdominal pain.   - UA was positive for Leukocyte esterase  - patient received ceftriaxone 1 g in the ED. continue Ceftriaxone 1 g QD  -Davis exchanged in ED  - urine cultures growing proteus  - renal ultrasound showed no hydronephrosis

## 2018-08-10 NOTE — PROGRESS NOTE ADULT - ASSESSMENT
89 year old man with a history of MI (1980 no stent), HFrEF (EF 20%), paroxysmal A-Fib, AICD, CVA (LLE weakness), HTN, T2DM, and recent diagnosis of CLL, was admitted to the hospital for a complicated UTI and NADIA

## 2018-08-10 NOTE — PROGRESS NOTE ADULT - PROBLEM SELECTOR PLAN 3
-Dr. Abad is the patients private oncologist.   - ordered FISH studies as well as immunoglobulin levels as per onc recs.

## 2018-08-10 NOTE — CONSULT NOTE ADULT - SUBJECTIVE AND OBJECTIVE BOX
[facility] DIVISION OF KIDNEY DISEASES AND HYPERTENSION -- [phone]  -- INITIAL CONSULT NOTE  --------------------------------------------------------------------------------  HPI: 89 year old man with a history of MI (1980), HFrEF (EF 20%), paroxysmal A-Fib, AICD, CVA (LLE weakness), HTN, T2DM, and CLL   admitted for complicated UTI. Nephrology consulted for NADIA. Brought in after caretaker noted cloudy urine and tachycardia. Patient was discharged on 7/6/18 to subacute rehab with [facility] in [place] following failed trial of void prior to discharge.   Upon review of [facility] SCr WNL 0.69 on 7/4/18. At admission SCr elevated to 1.8 and 1.91 today 8/10/18. Receiving ceftriaxone. Urine cultures growing proteus sp.   Pt was seen and examined at bedside, reports good apettite, feels weak. Denies no suprapubic pain, CVA tenderness, burning sensation, chest pain, SOB, abdominal pain.     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  S/P Inguinal Hernia Repair    FAMILY HISTORY:  No pertinent family history in first degree relatives    PAST SOCIAL HISTORY:    ALLERGIES & MEDICATIONS  --------------------------------------------------------------------------------  Allergies    Cipro (Other)  fluoroquinolone antibiotics (Unknown)  latex (Unknown)    Intolerances    Standing Inpatient Medications  apixaban 2.5 milliGRAM(s) Oral every 12 hours  carvedilol 25 milliGRAM(s) Oral every 12 hours  cefTRIAXone   IVPB 1 Gram(s) IV Intermittent every 24 hours  chlorhexidine 4% Liquid 1 Application(s) Topical two times a day  dextrose 5%. 1000 milliLiter(s) IV Continuous <Continuous>  dextrose 50% Injectable 12.5 Gram(s) IV Push once  dextrose 50% Injectable 25 Gram(s) IV Push once  dextrose 50% Injectable 25 Gram(s) IV Push once  docusate sodium 100 milliGRAM(s) Oral three times a day  finasteride 5 milliGRAM(s) Oral daily  insulin glargine Injectable (LANTUS) 8 Unit(s) SubCutaneous at bedtime  insulin lispro (HumaLOG) corrective regimen sliding scale   SubCutaneous three times a day before meals  insulin lispro (HumaLOG) corrective regimen sliding scale   SubCutaneous at bedtime  insulin lispro Injectable (HumaLOG) 2 Unit(s) SubCutaneous three times a day before meals  polyethylene glycol 3350 17 Gram(s) Oral daily  senna 2 Tablet(s) Oral at bedtime  tamsulosin 0.8 milliGRAM(s) Oral at bedtime    PRN Inpatient Medications  dextrose 40% Gel 15 Gram(s) Oral once PRN  glucagon  Injectable 1 milliGRAM(s) IntraMuscular once PRN      REVIEW OF SYSTEMS  --------------------------------------------------------------------------------  Gen: No fevers  Skin: No rashes  Head/Eyes/Ears: Normal hearing,  Respiratory: No dyspnea  CV: No chest pain  GI: No abdominal pain,  : No dysuria, hematuria  MSK: No  edema  Heme: No easy bruising or bleeding  Psych: No significant depression    VITALS/PHYSICAL EXAM  --------------------------------------------------------------------------------  T(C): 37.1 (08-10-18 @ 13:19), Max: 37.1 (08-10-18 @ 13:19)  HR: 72 (08-10-18 @ 13:19) (72 - 103)  BP: 99/45 (08-10-18 @ 13:19) (99/45 - 120/77)  RR: 17 (08-10-18 @ 13:19) (17 - 18)  SpO2: 100% (08-10-18 @ 13:19) (98% - 100%)  Wt(kg): --    08-09-18 @ 07:01  -  08-10-18 @ 07:00  --------------------------------------------------------  IN: 0 mL / OUT: 1500 mL / NET: -1500 mL    08-10-18 @ 07:01  -  08-10-18 @ 13:43  --------------------------------------------------------  IN: 0 mL / OUT: 300 mL / NET: -300 mL      Physical Exam:  	Gen: NAD, non toxic  	HEENT: Dry mucous membranes   	Pulm: CTA B/L  	CV: S1S2  	Abd: Soft, +BS   	Ext: No LE edema B/L  	Neuro: Awake  	Skin: skin tenting  	  LABS/STUDIES  --------------------------------------------------------------------------------              9.6    31.65 >-----------<  245      [08-10-18 @ 06:16]              31.0     138  |  104  |  46  ----------------------------<  111      [08-10-18 @ 06:16]  4.6   |  23  |  1.91        Ca     8.5     [08-10-18 @ 06:16]      Mg     2.1     [08-10-18 @ 06:16]      Phos  3.0     [08-10-18 @ 06:16]    TPro  6.2  /  Alb  2.3  /  TBili  0.3  /  DBili  x   /  AST  23  /  ALT  15  /  AlkPhos  131  [08-10-18 @ 06:16]    Creatinine Trend:  SCr 1.91 [08-10 @ 06:16]  SCr 1.82 [08-09 @ 07:30]  SCr 1.78 [08-08 @ 10:26]  SCr 1.75 [08-08 @ 06:50]  SCr 1.80 [08-07 @ 22:41]
Chief Complaint:    HPI:  The patient is an 89 year old man with a history of MI (1980 no stent), HFrEF (EF 20%), paroxysmal A-Fib, AICD, CVA (LLE weakness), HTN, T2DM, and recent diagnosis of CLL, discharged to [facility] from prior admission in July with joshi now presenting with lower abdominal pain and generalized weakness in setting of unknown joshi change. Pt was discharged from [facility] on 7/28 to home and pt has HHA 7h x 7d/wk and pt was supposed to see me for an initial consultation earlier this week but had to leave before being seen.     He was started on IV abx for suspected UTI. Today he feels better. Abdominal discomfort resolved. Creatinine, however, is above baseline. Renal and bladder sono is unremarkable.     I have been consulted for a persistent leukocytosis in the 25-35 range, seen during both admissions, predominantly lymphocytes. During his last admission, flow cytometry was done revealing a monoclonal CD5, CD20 positive population, suggestive of either CLL or MCL.       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 1000 milliLiter(s) (50 mL/Hr) IV Continuous <Continuous>  dextrose 50% Injectable 12.5 Gram(s) IV Push once  dextrose 50% Injectable 25 Gram(s) IV Push once  dextrose 50% Injectable 25 Gram(s) IV Push once  docusate sodium 100 milliGRAM(s) Oral three times a day  finasteride 5 milliGRAM(s) Oral daily  insulin glargine Injectable (LANTUS) 8 Unit(s) SubCutaneous at bedtime  insulin lispro (HumaLOG) corrective regimen sliding scale   SubCutaneous three times a day before meals  insulin lispro (HumaLOG) corrective regimen sliding scale   SubCutaneous at bedtime  insulin lispro Injectable (HumaLOG) 2 Unit(s) SubCutaneous three times a day before meals  polyethylene glycol 3350 17 Gram(s) Oral daily  senna 2 Tablet(s) Oral at bedtime  tamsulosin 0.8 milliGRAM(s) Oral at bedtime    MEDICATIONS  (PRN):  dextrose 40% Gel 15 Gram(s) Oral once PRN Blood Glucose LESS THAN 70 milliGRAM(s)/deciliter  glucagon  Injectable 1 milliGRAM(s) IntraMuscular once PRN Glucose LESS THAN 70 milligrams/deciliter      Vital Signs Last 24 Hrs  T(C): 36.7 (09 Aug 2018 05:44), Max: 37.2 (08 Aug 2018 21:15)  T(F): 98 (09 Aug 2018 05:44), Max: 98.9 (08 Aug 2018 21:15)  HR: 105 (09 Aug 2018 05:44) (105 - 113)  BP: 134/74 (09 Aug 2018 05:44) (123/68 - 146/87)  BP(mean): --  RR: 18 (09 Aug 2018 05:44) (18 - 20)  SpO2: 97% (09 Aug 2018 05:44) (96% - 98%)      PHYSICAL EXAM:      Constitutional: chronically ill appearing man, in NAD, awake and alert, conversant    Eyes: anicteric    ENMT:    Neck: normal    Lymph nodes:none    Respiratory: clear    Cardiovascular: irregular. PPM    Gastrointestinal: nontender    Extremities: no edema. Splints on legs    Skin:                    LABS:                          10.6   34.83 )-----------( 257      ( 09 Aug 2018 07:30 )             34.4         Mean Cell Volume : 86.9 fL  Mean Cell Hemoglobin : 26.8 pg  Mean Cell Hemoglobin Concentration : 30.8 %  Auto Neutrophil # : 11.28 K/uL  Auto Lymphocyte # : 17.52 K/uL  Auto Monocyte # : 5.79 K/uL  Auto Eosinophil # : 0.06 K/uL  Auto Basophil # : 0.01 K/uL  Auto Neutrophil % : 32.4 %  Auto Lymphocyte % : 50.3 %  Auto Monocyte % : 16.6 %  Auto Eosinophil % : 0.2 %  Auto Basophil % : 0.0 %      Serial CBC's  08-09 @ 07:30  Hct-34.4 / Hgb-10.6 / Plat-257 / RBC-3.96 / WBC-34.83  Serial CBC's  08-08 @ 10:26  Hct-35.1 / Hgb-10.4 / Plat-247 / RBC-3.89 / WBC-29.11  Serial CBC's  08-08 @ 06:50  Hct-30.5 / Hgb-9.4 / Plat-264 / RBC-3.60 / WBC-34.58  Serial CBC's  08-07 @ 22:41  Hct-32.5 / Hgb-10.2 / Plat-291 / RBC-3.79 / WBC-35.96      08-09    140  |  105  |  44<H>  ----------------------------<  116<H>  4.9   |  22  |  1.82<H>    Ca    8.8      09 Aug 2018 07:30  Phos  3.1     08-09  Mg     2.2     08-09    TPro  6.7  /  Alb  2.6<L>  /  TBili  0.4  /  DBili  x   /  AST  14  /  ALT  10  /  AlkPhos  120  08-09      PT/INR - ( 08 Aug 2018 06:50 )   PT: 16.3 SEC;   INR: 1.41          PTT - ( 08 Aug 2018 06:50 )  PTT:33.4 SEC

## 2018-08-10 NOTE — CONSULT NOTE ADULT - ATTENDING COMMENTS
Patient seen and examined.   NADIA secondary to hemodynamic instability  IVF: NS @ 75cc/hr  Monitor Is/Os, daily weights

## 2018-08-10 NOTE — PROGRESS NOTE ADULT - PROBLEM SELECTOR PLAN 2
- patient with elevated Creatinine still despite treatment.  -holdign lasix.  -likely still ATN in setting of complicated UTI  -f/u renal recs

## 2018-08-11 LAB
BUN SERPL-MCNC: 48 MG/DL — HIGH (ref 7–23)
CALCIUM SERPL-MCNC: 8.1 MG/DL — LOW (ref 8.4–10.5)
CHLORIDE SERPL-SCNC: 103 MMOL/L — SIGNIFICANT CHANGE UP (ref 98–107)
CO2 SERPL-SCNC: 21 MMOL/L — LOW (ref 22–31)
CREAT SERPL-MCNC: 1.82 MG/DL — HIGH (ref 0.5–1.3)
GLUCOSE SERPL-MCNC: 115 MG/DL — HIGH (ref 70–99)
MAGNESIUM SERPL-MCNC: 1.9 MG/DL — SIGNIFICANT CHANGE UP (ref 1.6–2.6)
PHOSPHATE SERPL-MCNC: 2.7 MG/DL — SIGNIFICANT CHANGE UP (ref 2.5–4.5)
POTASSIUM SERPL-MCNC: 4.6 MMOL/L — SIGNIFICANT CHANGE UP (ref 3.5–5.3)
POTASSIUM SERPL-SCNC: 4.6 MMOL/L — SIGNIFICANT CHANGE UP (ref 3.5–5.3)
SODIUM SERPL-SCNC: 137 MMOL/L — SIGNIFICANT CHANGE UP (ref 135–145)

## 2018-08-11 PROCEDURE — 99233 SBSQ HOSP IP/OBS HIGH 50: CPT | Mod: GC

## 2018-08-11 PROCEDURE — 99232 SBSQ HOSP IP/OBS MODERATE 35: CPT | Mod: GC

## 2018-08-11 RX ADMIN — Medication 100 MILLIGRAM(S): at 21:55

## 2018-08-11 RX ADMIN — Medication 2 UNIT(S): at 08:37

## 2018-08-11 RX ADMIN — CARVEDILOL PHOSPHATE 25 MILLIGRAM(S): 80 CAPSULE, EXTENDED RELEASE ORAL at 05:46

## 2018-08-11 RX ADMIN — APIXABAN 2.5 MILLIGRAM(S): 2.5 TABLET, FILM COATED ORAL at 05:46

## 2018-08-11 RX ADMIN — CHLORHEXIDINE GLUCONATE 1 APPLICATION(S): 213 SOLUTION TOPICAL at 05:46

## 2018-08-11 RX ADMIN — APIXABAN 2.5 MILLIGRAM(S): 2.5 TABLET, FILM COATED ORAL at 17:48

## 2018-08-11 RX ADMIN — INSULIN GLARGINE 8 UNIT(S): 100 INJECTION, SOLUTION SUBCUTANEOUS at 21:55

## 2018-08-11 RX ADMIN — SENNA PLUS 2 TABLET(S): 8.6 TABLET ORAL at 21:55

## 2018-08-11 RX ADMIN — CEFTRIAXONE 100 GRAM(S): 500 INJECTION, POWDER, FOR SOLUTION INTRAMUSCULAR; INTRAVENOUS at 21:55

## 2018-08-11 RX ADMIN — Medication 100 MILLIGRAM(S): at 05:46

## 2018-08-11 RX ADMIN — CHLORHEXIDINE GLUCONATE 1 APPLICATION(S): 213 SOLUTION TOPICAL at 17:48

## 2018-08-11 RX ADMIN — Medication 1: at 17:47

## 2018-08-11 RX ADMIN — Medication 3: at 12:04

## 2018-08-11 RX ADMIN — Medication 2 UNIT(S): at 12:04

## 2018-08-11 RX ADMIN — Medication 2 UNIT(S): at 17:47

## 2018-08-11 RX ADMIN — TAMSULOSIN HYDROCHLORIDE 0.8 MILLIGRAM(S): 0.4 CAPSULE ORAL at 21:55

## 2018-08-11 RX ADMIN — FINASTERIDE 5 MILLIGRAM(S): 5 TABLET, FILM COATED ORAL at 12:03

## 2018-08-11 NOTE — PROGRESS NOTE ADULT - SUBJECTIVE AND OBJECTIVE BOX
[facility] DIVISION OF KIDNEY DISEASES AND HYPERTENSION -- FOLLOW UP NOTE  --------------------------------------------------------------------------------  HPI: 89 year old man with a history of MI (1980), HFrEF (EF 20%), paroxysmal A-Fib, AICD, CVA (LLE weakness), HTN, T2DM, and CLL admitted for complicated UTI. Nephrology consulted for NADIA. Brought in after caretaker noted cloudy urine and tachycardia. Patient was discharged on 7/6/18 to subacute rehab with [facility] following failed trial of void prior to discharge.   Upon review of [facility] SCr WNL 0.69 on 7/4/18. At admission SCr elevated to 1.8 and 1.91 on 8/10/18. Receiving ceftriaxone. Urine cultures growing proteus sp.   Pt was seen and examined at bedside this AM. Patient states that he feels well but is preoccupied with his lab results. Denies suprapubic pain, CVA tenderness, burning sensation, chest pain, SOB, abdominal pain.     PAST HISTORY  --------------------------------------------------------------------------------  No significant changes to PMH, PSH, FHx, SHx, unless otherwise noted    ALLERGIES & MEDICATIONS  --------------------------------------------------------------------------------  Allergies    Cipro (Other)  fluoroquinolone antibiotics (Unknown)  latex (Unknown)    Intolerances    Standing Inpatient Medications  apixaban 2.5 milliGRAM(s) Oral every 12 hours  carvedilol 25 milliGRAM(s) Oral every 12 hours  cefTRIAXone   IVPB 1 Gram(s) IV Intermittent every 24 hours  chlorhexidine 4% Liquid 1 Application(s) Topical two times a day  dextrose 5%. 1000 milliLiter(s) IV Continuous <Continuous>  dextrose 50% Injectable 12.5 Gram(s) IV Push once  dextrose 50% Injectable 25 Gram(s) IV Push once  dextrose 50% Injectable 25 Gram(s) IV Push once  docusate sodium 100 milliGRAM(s) Oral three times a day  finasteride 5 milliGRAM(s) Oral daily  insulin glargine Injectable (LANTUS) 8 Unit(s) SubCutaneous at bedtime  insulin lispro (HumaLOG) corrective regimen sliding scale   SubCutaneous three times a day before meals  insulin lispro (HumaLOG) corrective regimen sliding scale   SubCutaneous at bedtime  insulin lispro Injectable (HumaLOG) 2 Unit(s) SubCutaneous three times a day before meals  polyethylene glycol 3350 17 Gram(s) Oral daily  senna 2 Tablet(s) Oral at bedtime  sodium chloride 0.9%. 1000 milliLiter(s) IV Continuous <Continuous>  tamsulosin 0.8 milliGRAM(s) Oral at bedtime    REVIEW OF SYSTEMS  --------------------------------------------------------------------------------  Gen: No fevers  Skin: No rashes  Head/Eyes/Ears: Normal hearing  Respiratory: No dyspnea  CV: No chest pain  GI: No abdominal pain,  : No dysuria, hematuria  MSK: No  edema  Heme: No easy bruising or bleeding  Psych: + anxiety    VITALS/PHYSICAL EXAM  --------------------------------------------------------------------------------  T(C): 36.6 (08-11-18 @ 04:51), Max: 37.1 (08-10-18 @ 13:19)  HR: 87 (08-11-18 @ 04:51) (67 - 95)  BP: 102/68 (08-11-18 @ 04:51) (99/45 - 134/88)  RR: 16 (08-11-18 @ 04:51) (16 - 17)  SpO2: 95% (08-11-18 @ 04:51) (95% - 100%)  Wt(kg): --    08-10-18 @ 07:01  -  08-11-18 @ 07:00  --------------------------------------------------------  IN: 577 mL / OUT: 880 mL / NET: -303 mL    08-11-18 @ 07:01  -  08-11-18 @ 12:27  --------------------------------------------------------  IN: 236 mL / OUT: 0 mL / NET: 236 mL    Physical Exam:  	Gen: NAD  	HEENT: Normal mucous membranes   	Pulm: CTA B/L  	CV: S1S2  	Abd: Soft, +BS   	Ext: No LE edema B/L  	Neuro: Awake  	Skin: No rash    LABS/STUDIES  --------------------------------------------------------------------------------              9.6    31.65 >-----------<  245      [08-10-18 @ 06:16]              31.0     137  |  103  |  48  ----------------------------<  115      [08-11-18 @ 05:00]  4.6   |  21  |  1.82        Ca     8.1     [08-11-18 @ 05:00]      Mg     1.9     [08-11-18 @ 05:00]      Phos  2.7     [08-11-18 @ 05:00]    TPro  6.2  /  Alb  2.3  /  TBili  0.3  /  DBili  x   /  AST  23  /  ALT  15  /  AlkPhos  131  [08-10-18 @ 06:16]    Creatinine Trend:  SCr 1.82 [08-11 @ 05:00]  SCr 1.91 [08-10 @ 06:16]  SCr 1.82 [08-09 @ 07:30]  SCr 1.78 [08-08 @ 10:26]  SCr 1.75 [08-08 @ 06:50]    Urine Creatinine 49.50      [08-10-18 @ 15:30]  Urine Sodium 39      [08-10-18 @ 15:30]  Urine Urea Nitrogen 364.9      [08-09-18 @ 19:15]  Urine Osmolality 357      [08-10-18 @ 15:30]    Iron 74, TIBC 295, %sat --      [06-24-18 @ 16:53]  Ferritin 327.1      [06-24-18 @ 16:53]  HbA1c 5.6      [06-24-18 @ 16:17]  TSH 0.86      [06-25-18 @ 09:08] [facility] DIVISION OF KIDNEY DISEASES AND HYPERTENSION -- FOLLOW UP NOTE  --------------------------------------------------------------------------------  HPI: 89 year old man with a history of MI (1980), HFrEF (EF 20%), paroxysmal A-Fib, AICD, CVA (LLE weakness), HTN, T2DM, and CLL admitted for complicated UTI. Nephrology consulted for NADIA. Brought in after caretaker noted cloudy urine and tachycardia. Patient was discharged on 7/6/18 to subacute rehab with [facility] following failed trial of void prior to discharge.   Upon review of [place] SCr WNL 0.69 on 7/4/18. At admission SCr elevated to 1.8 and 1.91 on 8/10/18. Receiving ceftriaxone. Urine cultures growing proteus sp.   Pt was seen and examined at bedside this AM. Patient states that he feels nervous and is preoccupied with his lab results. Denies suprapubic pain, CVA tenderness, burning sensation, chest pain, SOB, abdominal pain.     PAST HISTORY  --------------------------------------------------------------------------------  No significant changes to PMH, PSH, FHx, SHx, unless otherwise noted    ALLERGIES & MEDICATIONS  --------------------------------------------------------------------------------  Allergies    Cipro (Other)  fluoroquinolone antibiotics (Unknown)  latex (Unknown)    Intolerances    Standing Inpatient Medications  apixaban 2.5 milliGRAM(s) Oral every 12 hours  carvedilol 25 milliGRAM(s) Oral every 12 hours  cefTRIAXone   IVPB 1 Gram(s) IV Intermittent every 24 hours  chlorhexidine 4% Liquid 1 Application(s) Topical two times a day  dextrose 5%. 1000 milliLiter(s) IV Continuous <Continuous>  dextrose 50% Injectable 12.5 Gram(s) IV Push once  dextrose 50% Injectable 25 Gram(s) IV Push once  dextrose 50% Injectable 25 Gram(s) IV Push once  docusate sodium 100 milliGRAM(s) Oral three times a day  finasteride 5 milliGRAM(s) Oral daily  insulin glargine Injectable (LANTUS) 8 Unit(s) SubCutaneous at bedtime  insulin lispro (HumaLOG) corrective regimen sliding scale   SubCutaneous three times a day before meals  insulin lispro (HumaLOG) corrective regimen sliding scale   SubCutaneous at bedtime  insulin lispro Injectable (HumaLOG) 2 Unit(s) SubCutaneous three times a day before meals  polyethylene glycol 3350 17 Gram(s) Oral daily  senna 2 Tablet(s) Oral at bedtime  sodium chloride 0.9%. 1000 milliLiter(s) IV Continuous <Continuous>  tamsulosin 0.8 milliGRAM(s) Oral at bedtime    REVIEW OF SYSTEMS  --------------------------------------------------------------------------------  Gen: No fevers  Skin: No rashes  Head/Eyes/Ears: Normal hearing  Respiratory: No dyspnea  CV: No chest pain  GI: No abdominal pain,  : No dysuria, hematuria  MSK: No  edema  Heme: No easy bruising or bleeding  Psych: + anxiety    VITALS/PHYSICAL EXAM  --------------------------------------------------------------------------------  T(C): 36.6 (08-11-18 @ 04:51), Max: 37.1 (08-10-18 @ 13:19)  HR: 87 (08-11-18 @ 04:51) (67 - 95)  BP: 102/68 (08-11-18 @ 04:51) (99/45 - 134/88)  RR: 16 (08-11-18 @ 04:51) (16 - 17)  SpO2: 95% (08-11-18 @ 04:51) (95% - 100%)  Wt(kg): --    08-10-18 @ 07:01  -  08-11-18 @ 07:00  --------------------------------------------------------  IN: 577 mL / OUT: 880 mL / NET: -303 mL    08-11-18 @ 07:01  -  08-11-18 @ 12:27  --------------------------------------------------------  IN: 236 mL / OUT: 0 mL / NET: 236 mL    Physical Exam:  	Gen: NAD  	HEENT: Normal mucous membranes   	Pulm: CTA B/L  	CV: S1S2  	Abd: Soft, +BS   	Ext: No LE edema B/L  	Neuro: Awake  	Skin: No rash              : joshi catheter noted    LABS/STUDIES  --------------------------------------------------------------------------------              9.6    31.65 >-----------<  245      [08-10-18 @ 06:16]              31.0     137  |  103  |  48  ----------------------------<  115      [08-11-18 @ 05:00]  4.6   |  21  |  1.82        Ca     8.1     [08-11-18 @ 05:00]      Mg     1.9     [08-11-18 @ 05:00]      Phos  2.7     [08-11-18 @ 05:00]    TPro  6.2  /  Alb  2.3  /  TBili  0.3  /  DBili  x   /  AST  23  /  ALT  15  /  AlkPhos  131  [08-10-18 @ 06:16]    Creatinine Trend:  SCr 1.82 [08-11 @ 05:00]  SCr 1.91 [08-10 @ 06:16]  SCr 1.82 [08-09 @ 07:30]  SCr 1.78 [08-08 @ 10:26]  SCr 1.75 [08-08 @ 06:50]    Urine Creatinine 49.50      [08-10-18 @ 15:30]  Urine Sodium 39      [08-10-18 @ 15:30]  Urine Urea Nitrogen 364.9      [08-09-18 @ 19:15]  Urine Osmolality 357      [08-10-18 @ 15:30]    Iron 74, TIBC 295, %sat --      [06-24-18 @ 16:53]  Ferritin 327.1      [06-24-18 @ 16:53]  HbA1c 5.6      [06-24-18 @ 16:17]  TSH 0.86      [06-25-18 @ 09:08]

## 2018-08-11 NOTE — PROGRESS NOTE ADULT - ASSESSMENT
89 yr old man with PMH of htn, DMII, MI, afib, HF, and recent diagnosis of CLL, admitted for complicated UTI (growing proteus) and NADIA.

## 2018-08-11 NOTE — PROGRESS NOTE ADULT - PROBLEM SELECTOR PLAN 1
Pt with hemodynamically mediated NADIA in the setting of dehydration, poor oral intake. Upon review of [place] SCr WNL 0.69 on 7/4/18. At admission SCr elevated to 1.8 and 1.91 on 8/10/18. Currently, Scr has improved to 1.82 today with IVFs. Recommend continuing IV fluid NS at 75 cc/hr. Monitor UOP and daily weights. Monitor BMP daily. Avoid nephrotoxic drugs. Dose all medications renally Pt with hemodynamically mediated NADIA in the setting of dehydration, poor oral intake with lasix and losartan. Upon review of [place] SCr WNL 0.69 on 7/4/18. At admission SCr elevated to 1.8 and 1.91 on 8/10/18. Currently, Scr has improved to 1.82 today with IVFs. Hold off on futher IVF for now.  Monitor UOP and daily weights. Monitor BMP daily. Avoid nephrotoxic drugs. Dose all medications renally

## 2018-08-11 NOTE — PROGRESS NOTE ADULT - SUBJECTIVE AND OBJECTIVE BOX
Alanna Bustamante MD   PGY 3  Pager [phone]  Page [phone] or [phone] after 7 pm ([facility])  Page [phone] after 7 pm ([facility])      Patient is a 89y old  Male who presents with a chief complaint of complicated UTI (08 Aug 2018 09:06)      INTERVAL HPI/OVERNIGHT EVENTS: patient has no complaints this morning and denies overnight issues. At times feels some bladder discomfort. He wants to walk with his walker more often.     REVIEW OF SYSTEMS:  CONSTITUTIONAL: No fever, chills  ENMT:  No difficulty hearing, no change in vision  NECK: No pain or stiffness  RESPIRATORY: No cough, SOB  CARDIOVASCULAR: No chest pain, palpitations  GASTROINTESTINAL: No abdominal pain. No nausea, vomiting, or diarrhea  GENITOURINARY: No dysuria, + bladder discomfort.   NEUROLOGICAL: No HA  SKIN: No itching, burning, rashes, or lesions   LYMPH NODES: No enlarged glands  ENDOCRINE: No heat or cold intolerance; No hair loss  MUSCULOSKELETAL: No joint pain or swelling; No muscle, back, or extremity pain  PSYCHIATRIC: No depression, anxiety  HEME/LYMPH: No easy bruising, or bleeding gums    T(C): 36.6 (08-11-18 @ 04:51), Max: 37.1 (08-10-18 @ 13:19)  HR: 87 (08-11-18 @ 04:51) (67 - 95)  BP: 102/68 (08-11-18 @ 04:51) (99/45 - 134/88)  RR: 16 (08-11-18 @ 04:51) (16 - 17)  SpO2: 95% (08-11-18 @ 04:51) (95% - 100%)  Wt(kg): --Vital Signs Last 24 Hrs  T(C): 36.6 (11 Aug 2018 04:51), Max: 37.1 (10 Aug 2018 13:19)  T(F): 97.9 (11 Aug 2018 04:51), Max: 98.7 (10 Aug 2018 13:19)  HR: 87 (11 Aug 2018 04:51) (67 - 95)  BP: 102/68 (11 Aug 2018 04:51) (99/45 - 134/88)  BP(mean): --  RR: 16 (11 Aug 2018 04:51) (16 - 17)  SpO2: 95% (11 Aug 2018 04:51) (95% - 100%)    PHYSICAL EXAM:  GENERAL: NAD  EYES: pale conjunctiva  ENMT: Dry mucous membranes  CHEST/LUNG: Clear to auscultation bilaterally; No rales, rhonchi, wheezing, or rubs  HEART: S1, S2, Regular rate and rhythm  ABDOMEN: Soft, Nontender, Nondistended; Bowel sounds present  : joshi draining yellow urine with sediment present.   NEURO: Alert & Oriented X3  EXTREMITIES: No LE edema, no calf tenderness  SKIN: No rashes or lesions    Consultant(s) Notes Reviewed:  [x ] YES  [ ] NO  Care Discussed with Consultants/Other Providers [ x] YES  [ ] NO    LABS:                        9.6    31.65 )-----------( 245      ( 10 Aug 2018 06:16 )             31.0     08-11    137  |  103  |  48<H>  ----------------------------<  115<H>  4.6   |  21<L>  |  1.82<H>    Ca    8.1<L>      11 Aug 2018 05:00  Phos  2.7     08-11  Mg     1.9     08-11    TPro  6.2  /  Alb  2.3<L>  /  TBili  0.3  /  DBili  x   /  AST  23  /  ALT  15  /  AlkPhos  131<H>  08-10        CAPILLARY BLOOD GLUCOSE      POCT Blood Glucose.: 116 mg/dL (11 Aug 2018 08:18)  POCT Blood Glucose.: 149 mg/dL (10 Aug 2018 21:30)  POCT Blood Glucose.: 205 mg/dL (10 Aug 2018 17:01)  POCT Blood Glucose.: 165 mg/dL (10 Aug 2018 12:18)            RADIOLOGY & ADDITIONAL TESTS:    Imaging Personally Reviewed:  [ ] YES  [ ] NO

## 2018-08-11 NOTE — PROGRESS NOTE ADULT - PROBLEM SELECTOR PLAN 1
-c/w ceftriaxone day 4/7 for proteus UTI  -Davis exchanged in ED  - renal ultrasound showed no hydronephrosis

## 2018-08-11 NOTE — PROGRESS NOTE ADULT - PROBLEM SELECTOR PLAN 4
- has HFrEF with EF of 20% with AICD placement  -patient appears euvolemic at this time.  -continue with coreg, hold ACE-i in setting of increased Cr

## 2018-08-11 NOTE — PROGRESS NOTE ADULT - ASSESSMENT
89M with hx MI (1980 no stent), HFrEF (EF 20%), paroxysmal A-Fib, AICD, CVA (LLE weakness), HTN, T2DM, and recent diagnosis of CLL, was admitted to the hospital for a complicated UTI 2/2 proteus and NADIA that is now improving.

## 2018-08-12 LAB
BUN SERPL-MCNC: 49 MG/DL — HIGH (ref 7–23)
CALCIUM SERPL-MCNC: 8.2 MG/DL — LOW (ref 8.4–10.5)
CHLORIDE SERPL-SCNC: 102 MMOL/L — SIGNIFICANT CHANGE UP (ref 98–107)
CO2 SERPL-SCNC: 22 MMOL/L — SIGNIFICANT CHANGE UP (ref 22–31)
CREAT SERPL-MCNC: 1.9 MG/DL — HIGH (ref 0.5–1.3)
GLUCOSE SERPL-MCNC: 145 MG/DL — HIGH (ref 70–99)
MAGNESIUM SERPL-MCNC: 2 MG/DL — SIGNIFICANT CHANGE UP (ref 1.6–2.6)
PHOSPHATE SERPL-MCNC: 2.6 MG/DL — SIGNIFICANT CHANGE UP (ref 2.5–4.5)
POTASSIUM SERPL-MCNC: 4.4 MMOL/L — SIGNIFICANT CHANGE UP (ref 3.5–5.3)
POTASSIUM SERPL-SCNC: 4.4 MMOL/L — SIGNIFICANT CHANGE UP (ref 3.5–5.3)
SODIUM SERPL-SCNC: 135 MMOL/L — SIGNIFICANT CHANGE UP (ref 135–145)

## 2018-08-12 PROCEDURE — 99233 SBSQ HOSP IP/OBS HIGH 50: CPT | Mod: GC

## 2018-08-12 RX ORDER — SODIUM CHLORIDE 9 MG/ML
1000 INJECTION INTRAMUSCULAR; INTRAVENOUS; SUBCUTANEOUS
Qty: 0 | Refills: 0 | Status: DISCONTINUED | OUTPATIENT
Start: 2018-08-12 | End: 2018-08-14

## 2018-08-12 RX ADMIN — Medication 2: at 17:38

## 2018-08-12 RX ADMIN — CARVEDILOL PHOSPHATE 25 MILLIGRAM(S): 80 CAPSULE, EXTENDED RELEASE ORAL at 17:38

## 2018-08-12 RX ADMIN — Medication 100 MILLIGRAM(S): at 21:23

## 2018-08-12 RX ADMIN — CARVEDILOL PHOSPHATE 25 MILLIGRAM(S): 80 CAPSULE, EXTENDED RELEASE ORAL at 05:23

## 2018-08-12 RX ADMIN — Medication 100 MILLIGRAM(S): at 05:23

## 2018-08-12 RX ADMIN — SENNA PLUS 2 TABLET(S): 8.6 TABLET ORAL at 21:23

## 2018-08-12 RX ADMIN — Medication 2 UNIT(S): at 12:34

## 2018-08-12 RX ADMIN — Medication 2 UNIT(S): at 08:37

## 2018-08-12 RX ADMIN — TAMSULOSIN HYDROCHLORIDE 0.8 MILLIGRAM(S): 0.4 CAPSULE ORAL at 21:24

## 2018-08-12 RX ADMIN — Medication 2: at 12:34

## 2018-08-12 RX ADMIN — INSULIN GLARGINE 8 UNIT(S): 100 INJECTION, SOLUTION SUBCUTANEOUS at 21:16

## 2018-08-12 RX ADMIN — CHLORHEXIDINE GLUCONATE 1 APPLICATION(S): 213 SOLUTION TOPICAL at 05:23

## 2018-08-12 RX ADMIN — SODIUM CHLORIDE 75 MILLILITER(S): 9 INJECTION INTRAMUSCULAR; INTRAVENOUS; SUBCUTANEOUS at 15:16

## 2018-08-12 RX ADMIN — Medication 2 UNIT(S): at 17:38

## 2018-08-12 RX ADMIN — POLYETHYLENE GLYCOL 3350 17 GRAM(S): 17 POWDER, FOR SOLUTION ORAL at 12:35

## 2018-08-12 RX ADMIN — Medication 100 MILLIGRAM(S): at 13:39

## 2018-08-12 RX ADMIN — CEFTRIAXONE 100 GRAM(S): 500 INJECTION, POWDER, FOR SOLUTION INTRAMUSCULAR; INTRAVENOUS at 21:22

## 2018-08-12 RX ADMIN — APIXABAN 2.5 MILLIGRAM(S): 2.5 TABLET, FILM COATED ORAL at 05:23

## 2018-08-12 RX ADMIN — APIXABAN 2.5 MILLIGRAM(S): 2.5 TABLET, FILM COATED ORAL at 17:38

## 2018-08-12 RX ADMIN — CHLORHEXIDINE GLUCONATE 1 APPLICATION(S): 213 SOLUTION TOPICAL at 17:39

## 2018-08-12 RX ADMIN — FINASTERIDE 5 MILLIGRAM(S): 5 TABLET, FILM COATED ORAL at 12:35

## 2018-08-12 NOTE — PROGRESS NOTE ADULT - PROBLEM SELECTOR PLAN 1
Pt with hemodynamically mediated NADIA in the setting of dehydration, poor oral intake with Lasix and Losartan. [facility] [place] W[facility] 0.69 on 7/4/18. At admission SCr elevated to 1.8 and 1.91 on 8/10/18. Currently, Scr has improved to 1.82 yesterday with IVFs. Hold off on futher IVF for now.  Monitor UOP and daily weights. Monitor BMP daily. Avoid nephrotoxic drugs. Dose all medications renally Pt with hemodynamically mediated NADIA in the setting of dehydration, poor oral intake with Lasix and Losartan. Upon review of [place] SCr WNL 0.69 on 7/4/18. At admission SCr elevated to 1.8 and 1.91 on 8/10/18, improved slightly yesterday to 1.82. Worsened this AM to 1.90. Recommend serological workup including HepBsAg, HepC Ab, ANCAs, anti-GBM, serum and urine immunofixation, C3 and C4. Monitor UOP and daily weights. Monitor BMP daily. Avoid nephrotoxic drugs. Dose all medications renally Pt with hemodynamically mediated NADIA in the setting of dehydration, poor oral intake with Lasix and Losartan and now with relative hypotension overnight. Upon review of [place] SCr WNL 0.69 on 7/4/18. At admission SCr elevated to 1.8 and 1.91 on 8/10/18, improved slightly yesterday to 1.82. Worsened this AM to 1.90. Given hematuria and proteinuria reasonable to pursue GN work up.  Recommend serological workup including HepBsAg, HepC Ab, ANCAs, anti-GBM, serum and urine immunofixation, C3 and C4. Monitor UOP and daily weights. Monitor BMP daily. Avoid nephrotoxic drugs. Dose all medications renally

## 2018-08-12 NOTE — PROGRESS NOTE ADULT - SUBJECTIVE AND OBJECTIVE BOX
[facility] DIVISION OF KIDNEY DISEASES AND HYPERTENSION -- FOLLOW UP NOTE  --------------------------------------------------------------------------------  HPI: 89 year old man with a history of MI (1980), HFrEF (EF 20%), paroxysmal A-Fib, AICD, CVA (LLE weakness), HTN, T2DM, and CLL admitted for complicated UTI. Nephrology consulted for NADIA. Brought in after caretaker noted cloudy urine and tachycardia. Patient was discharged on 7/6/18 to subacute rehab with [place] in [facility] following failed trial of void prior to discharge.   Upon review of [place] SCr WNL 0.69 on 7/4/18. At admission SCr elevated to 1.8 and 1.91 on 8/10/18. Receiving ceftriaxone. Urine cultures growing proteus sp.   Pt was seen and examined at bedside this AM. Patient states that he feels well and has no complaints. Denies difficulty urinating, chest pain, SOB, and abdominal pain.     PAST HISTORY  --------------------------------------------------------------------------------  No significant changes to PMH, PSH, FHx, SHx, unless otherwise noted    ALLERGIES & MEDICATIONS  --------------------------------------------------------------------------------  Allergies    Cipro (Other)  fluoroquinolone antibiotics (Unknown)  latex (Unknown)    Intolerances    Standing Inpatient Medications  apixaban 2.5 milliGRAM(s) Oral every 12 hours  carvedilol 25 milliGRAM(s) Oral every 12 hours  cefTRIAXone   IVPB 1 Gram(s) IV Intermittent every 24 hours  chlorhexidine 4% Liquid 1 Application(s) Topical two times a day  dextrose 5%. 1000 milliLiter(s) IV Continuous <Continuous>  dextrose 50% Injectable 12.5 Gram(s) IV Push once  dextrose 50% Injectable 25 Gram(s) IV Push once  dextrose 50% Injectable 25 Gram(s) IV Push once  docusate sodium 100 milliGRAM(s) Oral three times a day  finasteride 5 milliGRAM(s) Oral daily  insulin glargine Injectable (LANTUS) 8 Unit(s) SubCutaneous at bedtime  insulin lispro (HumaLOG) corrective regimen sliding scale   SubCutaneous three times a day before meals  insulin lispro (HumaLOG) corrective regimen sliding scale   SubCutaneous at bedtime  insulin lispro Injectable (HumaLOG) 2 Unit(s) SubCutaneous three times a day before meals  polyethylene glycol 3350 17 Gram(s) Oral daily  senna 2 Tablet(s) Oral at bedtime  sodium chloride 0.9%. 1000 milliLiter(s) IV Continuous <Continuous>  tamsulosin 0.8 milliGRAM(s) Oral at bedtime    REVIEW OF SYSTEMS  --------------------------------------------------------------------------------  Gen: No fevers  Skin: No rashes  Head/Eyes/Ears: Normal hearing  Respiratory: No dyspnea  CV: No chest pain  GI: No abdominal pain,  : No dysuria, hematuria  MSK: No  edema  Heme: No easy bruising or bleeding  Psych: No anxiety    VITALS/PHYSICAL EXAM  --------------------------------------------------------------------------------  T(C): 36.8 (08-12-18 @ 04:55), Max: 36.8 (08-12-18 @ 04:55)  HR: 94 (08-12-18 @ 04:55) (67 - 94)  BP: 141/84 (08-12-18 @ 04:55) (98/67 - 141/84)  RR: 17 (08-12-18 @ 04:55) (16 - 17)  SpO2: 97% (08-12-18 @ 04:55) (97% - 99%)  Wt(kg): --    08-11-18 @ 07:01  -  08-12-18 @ 07:00  --------------------------------------------------------  IN: 945 mL / OUT: 975 mL / NET: -30 mL    Physical Exam:  	Gen: NAD  	HEENT: Normal mucous membranes   	Pulm: CTA B/L  	CV: S1S2  	Abd: Soft, +BS   	Ext: No LE edema B/L  	Neuro: Awake  	Skin: No rash              : joshi catheter noted with clear urine    LABS/STUDIES  --------------------------------------------------------------------------------    137  |  103  |  48  ----------------------------<  115      [08-11-18 @ 05:00]  4.6   |  21  |  1.82        Ca     8.1     [08-11-18 @ 05:00]      Mg     1.9     [08-11-18 @ 05:00]      Phos  2.7     [08-11-18 @ 05:00]    Creatinine Trend:  SCr 1.82 [08-11 @ 05:00]  SCr 1.91 [08-10 @ 06:16]  SCr 1.82 [08-09 @ 07:30]  SCr 1.78 [08-08 @ 10:26]  SCr 1.75 [08-08 @ 06:50] [facility] DIVISION OF KIDNEY DISEASES AND HYPERTENSION -- FOLLOW UP NOTE  --------------------------------------------------------------------------------  HPI: 89 year old man with a history of MI (1980), HFrEF (EF 20%), paroxysmal A-Fib, AICD, CVA (LLE weakness), HTN, T2DM, and CLL admitted for complicated UTI. Nephrology consulted for NADIA. Brought in after caretaker noted cloudy urine and tachycardia. Patient was discharged on 7/6/18 to subacute rehab with [place] in [facility] following failed trial of void prior to discharge.   Upon review of [place] SCr WNL 0.69 on 7/4/18. At admission SCr elevated to 1.8 and 1.91 on 8/10/18. Receiving ceftriaxone. Urine cultures growing proteus sp.   Pt was seen and examined at bedside this AM. Patient states that he feels well and has no complaints. Denies difficulty urinating, chest pain, SOB, and abdominal pain.     PAST HISTORY  --------------------------------------------------------------------------------  No significant changes to PMH, PSH, FHx, SHx, unless otherwise noted    ALLERGIES & MEDICATIONS  --------------------------------------------------------------------------------  Allergies    Cipro (Other)  fluoroquinolone antibiotics (Unknown)  latex (Unknown)    Intolerances    Standing Inpatient Medications  apixaban 2.5 milliGRAM(s) Oral every 12 hours  carvedilol 25 milliGRAM(s) Oral every 12 hours  cefTRIAXone   IVPB 1 Gram(s) IV Intermittent every 24 hours  chlorhexidine 4% Liquid 1 Application(s) Topical two times a day  dextrose 5%. 1000 milliLiter(s) IV Continuous <Continuous>  dextrose 50% Injectable 12.5 Gram(s) IV Push once  dextrose 50% Injectable 25 Gram(s) IV Push once  dextrose 50% Injectable 25 Gram(s) IV Push once  docusate sodium 100 milliGRAM(s) Oral three times a day  finasteride 5 milliGRAM(s) Oral daily  insulin glargine Injectable (LANTUS) 8 Unit(s) SubCutaneous at bedtime  insulin lispro (HumaLOG) corrective regimen sliding scale   SubCutaneous three times a day before meals  insulin lispro (HumaLOG) corrective regimen sliding scale   SubCutaneous at bedtime  insulin lispro Injectable (HumaLOG) 2 Unit(s) SubCutaneous three times a day before meals  polyethylene glycol 3350 17 Gram(s) Oral daily  senna 2 Tablet(s) Oral at bedtime  sodium chloride 0.9%. 1000 milliLiter(s) IV Continuous <Continuous>  tamsulosin 0.8 milliGRAM(s) Oral at bedtime    REVIEW OF SYSTEMS  --------------------------------------------------------------------------------  Gen: No fevers  Skin: No rashes  Head/Eyes/Ears: Normal hearing  Respiratory: No dyspnea  CV: No chest pain  GI: No abdominal pain,  : No dysuria, hematuria  MSK: No  edema  Heme: No easy bruising or bleeding  Psych: No anxiety    VITALS/PHYSICAL EXAM  --------------------------------------------------------------------------------  T(C): 36.8 (08-12-18 @ 04:55), Max: 36.8 (08-12-18 @ 04:55)  HR: 94 (08-12-18 @ 04:55) (67 - 94)  BP: 141/84 (08-12-18 @ 04:55) (98/67 - 141/84)  RR: 17 (08-12-18 @ 04:55) (16 - 17)  SpO2: 97% (08-12-18 @ 04:55) (97% - 99%)  Wt(kg): --    08-11-18 @ 07:01  -  08-12-18 @ 07:00  --------------------------------------------------------  IN: 945 mL / OUT: 975 mL / NET: -30 mL    Physical Exam:  	Gen: NAD  	HEENT: Normal mucous membranes   	Pulm: CTA B/L  	CV: S1S2  	Abd: Soft, +BS   	Ext: No LE edema B/L  	Neuro: Awake  	Skin: No rash              : joshi catheter noted with clear urine    LABS/STUDIES  --------------------------------------------------------------------------------    135  |  102  |  49  ----------------------------<  145      [08-12-18 @ 06:00]  4.4   |  22  |  1.90        Ca     8.2     [08-12-18 @ 06:00]      Mg     2.0     [08-12-18 @ 06:00]      Phos  2.6     [08-12-18 @ 06:00]    Creatinine Trend:  SCr 1.90 [08-12 @ 06:00]  SCr 1.82 [08-11 @ 05:00]  SCr 1.91 [08-10 @ 06:16]  SCr 1.82 [08-09 @ 07:30]  SCr 1.78 [08-08 @ 10:26] [facility] DIVISION OF KIDNEY DISEASES AND HYPERTENSION -- FOLLOW UP NOTE  --------------------------------------------------------------------------------  HPI: 89 year old man with a history of MI (1980), HFrEF (EF 20%), paroxysmal A-Fib, AICD, CVA (LLE weakness), HTN, T2DM, and CLL admitted for complicated UTI. Nephrology consulted for NADIA. Brought in after caretaker noted cloudy urine and tachycardia. Patient was discharged on 7/6/18 to subacute rehab with [place] in [facility] following failed trial of void prior to discharge.   Upon review of [place]/[facility] SCr WNL 0.69 on 7/4/18. At admission SCr elevated to 1.8 and 1.91 on 8/10/18. Receiving ceftriaxone. Urine cultures growing proteus sp.   Pt was seen and examined at bedside this AM. Patient states that he feels well and has no complaints. Denies difficulty urinating, chest pain, SOB, and abdominal pain.     PAST HISTORY  --------------------------------------------------------------------------------  No significant changes to PMH, PSH, FHx, SHx, unless otherwise noted    ALLERGIES & MEDICATIONS  --------------------------------------------------------------------------------  Allergies    Cipro (Other)  fluoroquinolone antibiotics (Unknown)  latex (Unknown)    Intolerances    Standing Inpatient Medications  apixaban 2.5 milliGRAM(s) Oral every 12 hours  carvedilol 25 milliGRAM(s) Oral every 12 hours  cefTRIAXone   IVPB 1 Gram(s) IV Intermittent every 24 hours  chlorhexidine 4% Liquid 1 Application(s) Topical two times a day  dextrose 5%. 1000 milliLiter(s) IV Continuous <Continuous>  dextrose 50% Injectable 12.5 Gram(s) IV Push once  dextrose 50% Injectable 25 Gram(s) IV Push once  dextrose 50% Injectable 25 Gram(s) IV Push once  docusate sodium 100 milliGRAM(s) Oral three times a day  finasteride 5 milliGRAM(s) Oral daily  insulin glargine Injectable (LANTUS) 8 Unit(s) SubCutaneous at bedtime  insulin lispro (HumaLOG) corrective regimen sliding scale   SubCutaneous three times a day before meals  insulin lispro (HumaLOG) corrective regimen sliding scale   SubCutaneous at bedtime  insulin lispro Injectable (HumaLOG) 2 Unit(s) SubCutaneous three times a day before meals  polyethylene glycol 3350 17 Gram(s) Oral daily  senna 2 Tablet(s) Oral at bedtime  sodium chloride 0.9%. 1000 milliLiter(s) IV Continuous <Continuous>  tamsulosin 0.8 milliGRAM(s) Oral at bedtime    REVIEW OF SYSTEMS  --------------------------------------------------------------------------------  Gen: No fevers  Skin: No rashes  Head/Eyes/Ears: Normal hearing  Respiratory: No dyspnea  CV: No chest pain  GI: No abdominal pain,  : joshi  MSK: No  edema    VITALS/PHYSICAL EXAM  --------------------------------------------------------------------------------  T(C): 36.8 (08-12-18 @ 04:55), Max: 36.8 (08-12-18 @ 04:55)  HR: 94 (08-12-18 @ 04:55) (67 - 94)  BP: 141/84 (08-12-18 @ 04:55) (98/67 - 141/84)  RR: 17 (08-12-18 @ 04:55) (16 - 17)  SpO2: 97% (08-12-18 @ 04:55) (97% - 99%)  Wt(kg): --    08-11-18 @ 07:01  -  08-12-18 @ 07:00  --------------------------------------------------------  IN: 945 mL / OUT: 975 mL / NET: -30 mL    Physical Exam:  	Gen: NAD  	HEENT: Normal mucous membranes   	Pulm: CTA B/L  	CV: S1S2  	Abd: Soft, +BS   	Ext: No LE edema B/L  	Neuro: Awake  	Skin: No rash              : joshi catheter noted with clear urine    LABS/STUDIES  --------------------------------------------------------------------------------    135  |  102  |  49  ----------------------------<  145      [08-12-18 @ 06:00]  4.4   |  22  |  1.90        Ca     8.2     [08-12-18 @ 06:00]      Mg     2.0     [08-12-18 @ 06:00]      Phos  2.6     [08-12-18 @ 06:00]    Creatinine Trend:  SCr 1.90 [08-12 @ 06:00]  SCr 1.82 [08-11 @ 05:00]  SCr 1.91 [08-10 @ 06:16]  SCr 1.82 [08-09 @ 07:30]  SCr 1.78 [08-08 @ 10:26]

## 2018-08-12 NOTE — PROGRESS NOTE ADULT - SUBJECTIVE AND OBJECTIVE BOX
Patient is a 89y old  Male who presents with a chief complaint of complicated UTI (08 Aug 2018 09:06)      SUBJECTIVE / OVERNIGHT EVENTS:  Patient seen and examined at bedside. No acute events overnight. The patient has no complaints other than his chronic back pain. He has no suprapubic pain.     Other Review of Systems Negative.    MEDICATIONS  (STANDING):  apixaban 2.5 milliGRAM(s) Oral every 12 hours  carvedilol 25 milliGRAM(s) Oral every 12 hours  cefTRIAXone   IVPB 1 Gram(s) IV Intermittent every 24 hours  chlorhexidine 4% Liquid 1 Application(s) Topical two times a day  dextrose 5%. 1000 milliLiter(s) (50 mL/Hr) IV Continuous <Continuous>  dextrose 50% Injectable 12.5 Gram(s) IV Push once  dextrose 50% Injectable 25 Gram(s) IV Push once  dextrose 50% Injectable 25 Gram(s) IV Push once  docusate sodium 100 milliGRAM(s) Oral three times a day  finasteride 5 milliGRAM(s) Oral daily  insulin glargine Injectable (LANTUS) 8 Unit(s) SubCutaneous at bedtime  insulin lispro (HumaLOG) corrective regimen sliding scale   SubCutaneous three times a day before meals  insulin lispro (HumaLOG) corrective regimen sliding scale   SubCutaneous at bedtime  insulin lispro Injectable (HumaLOG) 2 Unit(s) SubCutaneous three times a day before meals  polyethylene glycol 3350 17 Gram(s) Oral daily  senna 2 Tablet(s) Oral at bedtime  sodium chloride 0.9%. 1000 milliLiter(s) (75 mL/Hr) IV Continuous <Continuous>  tamsulosin 0.8 milliGRAM(s) Oral at bedtime    MEDICATIONS  (PRN):  dextrose 40% Gel 15 Gram(s) Oral once PRN Blood Glucose LESS THAN 70 milliGRAM(s)/deciliter  glucagon  Injectable 1 milliGRAM(s) IntraMuscular once PRN Glucose LESS THAN 70 milligrams/deciliter      OBJECTIVE:    Vital Signs Last 24 Hrs  T(C): 36.8 (12 Aug 2018 04:55), Max: 36.8 (12 Aug 2018 04:55)  T(F): 98.2 (12 Aug 2018 04:55), Max: 98.2 (12 Aug 2018 04:55)  HR: 94 (12 Aug 2018 04:55) (67 - 94)  BP: 141/84 (12 Aug 2018 04:55) (98/67 - 141/84)  BP(mean): --  RR: 17 (12 Aug 2018 04:55) (16 - 17)  SpO2: 97% (12 Aug 2018 04:55) (97% - 99%)    CAPILLARY BLOOD GLUCOSE      POCT Blood Glucose.: 148 mg/dL (12 Aug 2018 08:23)  POCT Blood Glucose.: 214 mg/dL (11 Aug 2018 21:52)  POCT Blood Glucose.: 157 mg/dL (11 Aug 2018 17:23)  POCT Blood Glucose.: 259 mg/dL (11 Aug 2018 11:53)    I&O's Summary    11 Aug 2018 07:01  -  12 Aug 2018 07:00  --------------------------------------------------------  IN: 945 mL / OUT: 975 mL / NET: -30 mL        PHYSICAL EXAM:  GENERAL: NAD, well-developed  CHEST/LUNG: Clear to auscultation bilaterally; No wheeze  HEART: Regular rate and rhythm; No murmurs, rubs, or gallops  ABDOMEN: Soft, Nontender, Nondistended; Bowel sounds present  EXTREMITIES:  2+ Peripheral Pulses, No clubbing, cyanosis, or edema  PSYCH: AAOx3    LABS:    08-12    135  |  102  |  49<H>  ----------------------------<  145<H>  4.4   |  22  |  1.90<H>  08-11    137  |  103  |  48<H>  ----------------------------<  115<H>  4.6   |  21<L>  |  1.82<H>    Ca    8.2<L>      12 Aug 2018 06:00  Mg     2.0     08-12  Phos  2.6     08-12    Care Discussed with Consultants/Other Providers: yes Patient is a 89y old  Male who presents with a chief complaint of complicated UTI (08 Aug 2018 09:06)      SUBJECTIVE / OVERNIGHT EVENTS:  Patient seen and examined at bedside. No acute events overnight. The patient has no complaints other than his chronic back pain. He has no suprapubic pain.     Other Review of Systems Negative.    MEDICATIONS  (STANDING):  apixaban 2.5 milliGRAM(s) Oral every 12 hours  carvedilol 25 milliGRAM(s) Oral every 12 hours  cefTRIAXone   IVPB 1 Gram(s) IV Intermittent every 24 hours  chlorhexidine 4% Liquid 1 Application(s) Topical two times a day  dextrose 5%. 1000 milliLiter(s) (50 mL/Hr) IV Continuous <Continuous>  dextrose 50% Injectable 12.5 Gram(s) IV Push once  dextrose 50% Injectable 25 Gram(s) IV Push once  dextrose 50% Injectable 25 Gram(s) IV Push once  docusate sodium 100 milliGRAM(s) Oral three times a day  finasteride 5 milliGRAM(s) Oral daily  insulin glargine Injectable (LANTUS) 8 Unit(s) SubCutaneous at bedtime  insulin lispro (HumaLOG) corrective regimen sliding scale   SubCutaneous three times a day before meals  insulin lispro (HumaLOG) corrective regimen sliding scale   SubCutaneous at bedtime  insulin lispro Injectable (HumaLOG) 2 Unit(s) SubCutaneous three times a day before meals  polyethylene glycol 3350 17 Gram(s) Oral daily  senna 2 Tablet(s) Oral at bedtime  sodium chloride 0.9%. 1000 milliLiter(s) (75 mL/Hr) IV Continuous <Continuous>  tamsulosin 0.8 milliGRAM(s) Oral at bedtime    MEDICATIONS  (PRN):  dextrose 40% Gel 15 Gram(s) Oral once PRN Blood Glucose LESS THAN 70 milliGRAM(s)/deciliter  glucagon  Injectable 1 milliGRAM(s) IntraMuscular once PRN Glucose LESS THAN 70 milligrams/deciliter      OBJECTIVE:    Vital Signs Last 24 Hrs  T(C): 36.8 (12 Aug 2018 04:55), Max: 36.8 (12 Aug 2018 04:55)  T(F): 98.2 (12 Aug 2018 04:55), Max: 98.2 (12 Aug 2018 04:55)  HR: 94 (12 Aug 2018 04:55) (67 - 94)  BP: 141/84 (12 Aug 2018 04:55) (98/67 - 141/84)  BP(mean): --  RR: 17 (12 Aug 2018 04:55) (16 - 17)  SpO2: 97% (12 Aug 2018 04:55) (97% - 99%)    CAPILLARY BLOOD GLUCOSE      POCT Blood Glucose.: 148 mg/dL (12 Aug 2018 08:23)  POCT Blood Glucose.: 214 mg/dL (11 Aug 2018 21:52)  POCT Blood Glucose.: 157 mg/dL (11 Aug 2018 17:23)  POCT Blood Glucose.: 259 mg/dL (11 Aug 2018 11:53)    I&O's Summary    11 Aug 2018 07:01  -  12 Aug 2018 07:00  --------------------------------------------------------  IN: 945 mL / OUT: 975 mL / NET: -30 mL        PHYSICAL EXAM:  GENERAL: NAD, well-developed  CHEST/LUNG: Clear to auscultation bilaterally; No wheeze  HEART: Regular rate and rhythm; No murmurs, rubs, or gallops  ABDOMEN: Soft, Nontender, Nondistended; Bowel sounds present  ; Davis cath+ with clear urine   EXTREMITIES:  2+ Peripheral Pulses, No clubbing, cyanosis, or edema  PSYCH: AAOx3    LABS:    08-12    135  |  102  |  49<H>  ----------------------------<  145<H>  4.4   |  22  |  1.90<H>  08-11    137  |  103  |  48<H>  ----------------------------<  115<H>  4.6   |  21<L>  |  1.82<H>    Ca    8.2<L>      12 Aug 2018 06:00  Mg     2.0     08-12  Phos  2.6     08-12    Care Discussed with Consultants/Other Providers: yes

## 2018-08-12 NOTE — PROGRESS NOTE ADULT - PROBLEM SELECTOR PLAN 2
-patient with elevated Creatinine still despite treatment.  -holding lasix.  -likely 2/2  ATN in setting of complicated UTI and possible hypotension?  -creatinine staple at 1.9 continue to monitor avoid nephrotoxins and renally dose medications.  -nephrology recs appreciated.

## 2018-08-13 ENCOUNTER — TRANSCRIPTION ENCOUNTER (OUTPATIENT)
Age: 83
End: 2018-08-13

## 2018-08-13 LAB
BUN SERPL-MCNC: 44 MG/DL — HIGH (ref 7–23)
C3 SERPL-MCNC: 99.9 MG/DL — SIGNIFICANT CHANGE UP (ref 90–180)
C4 SERPL-MCNC: 19.8 MG/DL — SIGNIFICANT CHANGE UP (ref 10–40)
CALCIUM SERPL-MCNC: 8 MG/DL — LOW (ref 8.4–10.5)
CHLORIDE SERPL-SCNC: 105 MMOL/L — SIGNIFICANT CHANGE UP (ref 98–107)
CO2 SERPL-SCNC: 22 MMOL/L — SIGNIFICANT CHANGE UP (ref 22–31)
CREAT SERPL-MCNC: 1.7 MG/DL — HIGH (ref 0.5–1.3)
GLUCOSE SERPL-MCNC: 134 MG/DL — HIGH (ref 70–99)
HBV SURFACE AG SER-ACNC: NEGATIVE — SIGNIFICANT CHANGE UP
HCT VFR BLD CALC: 28 % — LOW (ref 39–50)
HCV AB S/CO SERPL IA: 0.29 S/CO — SIGNIFICANT CHANGE UP
HCV AB SERPL-IMP: SIGNIFICANT CHANGE UP
HGB BLD-MCNC: 8.6 G/DL — LOW (ref 13–17)
MAGNESIUM SERPL-MCNC: 2 MG/DL — SIGNIFICANT CHANGE UP (ref 1.6–2.6)
MCHC RBC-ENTMCNC: 26.5 PG — LOW (ref 27–34)
MCHC RBC-ENTMCNC: 30.7 % — LOW (ref 32–36)
MCV RBC AUTO: 86.4 FL — SIGNIFICANT CHANGE UP (ref 80–100)
NRBC # FLD: 0 — SIGNIFICANT CHANGE UP
PHOSPHATE SERPL-MCNC: 2.6 MG/DL — SIGNIFICANT CHANGE UP (ref 2.5–4.5)
PLATELET # BLD AUTO: 231 K/UL — SIGNIFICANT CHANGE UP (ref 150–400)
PMV BLD: 9.8 FL — SIGNIFICANT CHANGE UP (ref 7–13)
POTASSIUM SERPL-MCNC: 4.5 MMOL/L — SIGNIFICANT CHANGE UP (ref 3.5–5.3)
POTASSIUM SERPL-SCNC: 4.5 MMOL/L — SIGNIFICANT CHANGE UP (ref 3.5–5.3)
RBC # BLD: 3.24 M/UL — LOW (ref 4.2–5.8)
RBC # FLD: 14.7 % — HIGH (ref 10.3–14.5)
SODIUM SERPL-SCNC: 137 MMOL/L — SIGNIFICANT CHANGE UP (ref 135–145)
WBC # BLD: 25.37 K/UL — HIGH (ref 3.8–10.5)
WBC # FLD AUTO: 25.37 K/UL — HIGH (ref 3.8–10.5)

## 2018-08-13 PROCEDURE — 99233 SBSQ HOSP IP/OBS HIGH 50: CPT | Mod: GC

## 2018-08-13 PROCEDURE — 86334 IMMUNOFIX E-PHORESIS SERUM: CPT | Mod: 26

## 2018-08-13 PROCEDURE — 86335 IMMUNFIX E-PHORSIS/URINE/CSF: CPT | Mod: 26

## 2018-08-13 RX ORDER — ACETAMINOPHEN 500 MG
650 TABLET ORAL ONCE
Qty: 0 | Refills: 0 | Status: COMPLETED | OUTPATIENT
Start: 2018-08-13 | End: 2018-08-13

## 2018-08-13 RX ADMIN — Medication 2 UNIT(S): at 09:18

## 2018-08-13 RX ADMIN — POLYETHYLENE GLYCOL 3350 17 GRAM(S): 17 POWDER, FOR SOLUTION ORAL at 12:45

## 2018-08-13 RX ADMIN — Medication 650 MILLIGRAM(S): at 06:52

## 2018-08-13 RX ADMIN — APIXABAN 2.5 MILLIGRAM(S): 2.5 TABLET, FILM COATED ORAL at 17:33

## 2018-08-13 RX ADMIN — TAMSULOSIN HYDROCHLORIDE 0.8 MILLIGRAM(S): 0.4 CAPSULE ORAL at 21:47

## 2018-08-13 RX ADMIN — SENNA PLUS 2 TABLET(S): 8.6 TABLET ORAL at 21:47

## 2018-08-13 RX ADMIN — Medication 2 UNIT(S): at 12:45

## 2018-08-13 RX ADMIN — CHLORHEXIDINE GLUCONATE 1 APPLICATION(S): 213 SOLUTION TOPICAL at 17:33

## 2018-08-13 RX ADMIN — FINASTERIDE 5 MILLIGRAM(S): 5 TABLET, FILM COATED ORAL at 12:45

## 2018-08-13 RX ADMIN — CARVEDILOL PHOSPHATE 25 MILLIGRAM(S): 80 CAPSULE, EXTENDED RELEASE ORAL at 06:51

## 2018-08-13 RX ADMIN — CEFTRIAXONE 100 GRAM(S): 500 INJECTION, POWDER, FOR SOLUTION INTRAMUSCULAR; INTRAVENOUS at 21:46

## 2018-08-13 RX ADMIN — CARVEDILOL PHOSPHATE 25 MILLIGRAM(S): 80 CAPSULE, EXTENDED RELEASE ORAL at 17:33

## 2018-08-13 RX ADMIN — Medication 2 UNIT(S): at 17:33

## 2018-08-13 RX ADMIN — Medication 100 MILLIGRAM(S): at 09:18

## 2018-08-13 RX ADMIN — Medication 650 MILLIGRAM(S): at 07:35

## 2018-08-13 RX ADMIN — Medication 100 MILLIGRAM(S): at 17:33

## 2018-08-13 RX ADMIN — Medication 1: at 21:47

## 2018-08-13 RX ADMIN — Medication 2: at 12:46

## 2018-08-13 RX ADMIN — CHLORHEXIDINE GLUCONATE 1 APPLICATION(S): 213 SOLUTION TOPICAL at 06:51

## 2018-08-13 RX ADMIN — INSULIN GLARGINE 8 UNIT(S): 100 INJECTION, SOLUTION SUBCUTANEOUS at 21:47

## 2018-08-13 RX ADMIN — Medication 1: at 17:33

## 2018-08-13 RX ADMIN — Medication 100 MILLIGRAM(S): at 21:47

## 2018-08-13 RX ADMIN — APIXABAN 2.5 MILLIGRAM(S): 2.5 TABLET, FILM COATED ORAL at 06:51

## 2018-08-13 NOTE — DISCHARGE NOTE ADULT - CARE PROVIDER_API CALL
Kehinde Abad), Hematology; Medical Oncology  [address]  [address]  [place] [address]  Phone: [phone]  Fax: [phone]

## 2018-08-13 NOTE — PROGRESS NOTE ADULT - PROBLEM SELECTOR PLAN 1
Pt with hemodynamically mediated NADIA in the setting of dehydration, poor oral intake with Lasix and Losartan and now with relative hypotension overnight. Upon review of [place] SCr WNL 0.69 on 7/4/18. At admission SCr elevated to 1.8 and 1.91 on 8/10/18, improved slightly to 1.82 on 8/11/18 and worsened this to 1.90 on 8/12/18. Given hematuria and proteinuria reasonable to pursue GN work up. Serological workup including HepBsAg, HepC Ab, ANCAs, anti-GBM, serum and urine immunofixation, C3 and C4 pending. Monitor UOP and daily weights. Monitor BMP daily. Avoid nephrotoxic drugs. Dose all medications renally

## 2018-08-13 NOTE — DISCHARGE NOTE ADULT - MEDICATION SUMMARY - MEDICATIONS TO TAKE
I will START or STAY ON the medications listed below when I get home from the hospital:    finasteride 5 mg oral tablet  -- 1 tab(s) by mouth once a day  -- Indication: For urinary retention     acetaminophen 325 mg oral tablet  -- 2 tab(s) by mouth every 6 hours, As needed, mild and moderate pain  -- Indication: For Pain as needed    tamsulosin 0.4 mg oral capsule  -- 2 cap(s) by mouth once a day (at bedtime)  -- Indication: For Urinary retention     Eliquis 2.5 mg oral tablet  -- 1 tab(s) by mouth 2 times a day  -- Indication: For Paroxysmal A-fib    insulin lispro (concentrated) 200 units/mL subcutaneous solution  -- 4 unit(s) subcutaneous 3 times a day (before meals)  -- Indication: For Diabetes mellitus    insulin glargine 100 units/mL subcutaneous solution  -- 8 unit(s) subcutaneous once a day (at bedtime)  -- Indication: For Diabetes mellitus    insulin lispro  -- 1 Unit(s) if Glucose 151 - 200  2 Unit(s) if Glucose 201 - 250  3 Unit(s) if Glucose 251 - 300  4 Unit(s) if Glucose 301 - 350  5 Unit(s) if Glucose 351 - 400  6 Unit(s) if Glucose Greater Than 400  -- Indication: For Diabetes mellitus    insulin lispro  -- 0 Unit(s) if Glucose 0 - 250  1 Unit(s) if Glucose 251 - 300  2 Unit(s) if Glucose 301 - 350  3 Unit(s) if Glucose 351 - 400  4 Unit(s) if Glucose Greater Than 400  -- Indication: For Diabetes mellitus    chlorhexidine 4% topical liquid  -- Apply on skin to affected area 2 times a day  -- Indication: For Davis catheter    carvedilol 25 mg oral tablet  -- 1 tab(s) by mouth every 12 hours  -- Indication: For Heart failure, left, with LVEF <=30%    furosemide 20 mg oral tablet  -- 1 tab(s) by mouth every other day  -- Indication: For Heart failure, left, with LVEF <=30%    senna oral tablet  -- 2 tab(s) by mouth once a day (at bedtime)  -- Indication: For Constipation    docusate sodium 100 mg oral capsule  -- 1 cap(s) by mouth 3 times a day  -- Indication: For Constipation    polyethylene glycol 3350 oral powder for reconstitution  -- 17 gram(s) by mouth once a day  -- Indication: For Constipation    Fish Oil 1000 mg oral capsule  -- 1 cap(s) by mouth once a day (in the morning)  -- Indication: For Need for prophylactic measure    folic acid 1 mg oral tablet  -- 1 tab(s) by mouth once a day  -- Indication: For Need for prophylactic measure

## 2018-08-13 NOTE — PROGRESS NOTE ADULT - SUBJECTIVE AND OBJECTIVE BOX
[facility] DIVISION OF KIDNEY DISEASES AND HYPERTENSION -- FOLLOW UP NOTE  --------------------------------------------------------------------------------  HPI: 89 year old man with a history of MI (1980), HFrEF (EF 20%), paroxysmal A-Fib, AICD, CVA (LLE weakness), HTN, T2DM, and CLL admitted for complicated UTI. Nephrology consulted for NADIA. Brought in after caretaker noted cloudy urine and tachycardia. Patient was discharged on 7/6/18 to subacute rehab with [place] in [facility] following failed trial of void prior to discharge.   Upon review of [facility] SCr WNL 0.69 on 7/4/18. At admission SCr elevated to 1.8 and 1.91 on 8/10/18. Receiving ceftriaxone. Urine cultures growing proteus sp.     Pt was seen and examined at bedside this AM. Patient reports feeling well and has no complaints. Denies chest pain, SOB, and abdominal pain.     PAST HISTORY  --------------------------------------------------------------------------------  No significant changes to PMH, PSH, FHx, SHx, unless otherwise noted    ALLERGIES & MEDICATIONS  --------------------------------------------------------------------------------  Allergies    Cipro (Other)  fluoroquinolone antibiotics (Unknown)  latex (Unknown)    Intolerances    Standing Inpatient Medications  apixaban 2.5 milliGRAM(s) Oral every 12 hours  carvedilol 25 milliGRAM(s) Oral every 12 hours  cefTRIAXone   IVPB 1 Gram(s) IV Intermittent every 24 hours  chlorhexidine 4% Liquid 1 Application(s) Topical two times a day  dextrose 5%. 1000 milliLiter(s) IV Continuous <Continuous>  dextrose 50% Injectable 12.5 Gram(s) IV Push once  dextrose 50% Injectable 25 Gram(s) IV Push once  dextrose 50% Injectable 25 Gram(s) IV Push once  docusate sodium 100 milliGRAM(s) Oral three times a day  finasteride 5 milliGRAM(s) Oral daily  insulin glargine Injectable (LANTUS) 8 Unit(s) SubCutaneous at bedtime  insulin lispro (HumaLOG) corrective regimen sliding scale   SubCutaneous three times a day before meals  insulin lispro (HumaLOG) corrective regimen sliding scale   SubCutaneous at bedtime  insulin lispro Injectable (HumaLOG) 2 Unit(s) SubCutaneous three times a day before meals  polyethylene glycol 3350 17 Gram(s) Oral daily  senna 2 Tablet(s) Oral at bedtime  sodium chloride 0.9%. 1000 milliLiter(s) IV Continuous <Continuous>  tamsulosin 0.8 milliGRAM(s) Oral at bedtime    PRN Inpatient Medications  dextrose 40% Gel 15 Gram(s) Oral once PRN  glucagon  Injectable 1 milliGRAM(s) IntraMuscular once PRN      REVIEW OF SYSTEMS  --------------------------------------------------------------------------------  Gen: No fevers  Skin: No rashes  Head/Eyes/Ears: Normal hearing  Respiratory: No dyspnea  CV: No chest pain  GI: No abdominal pain,  : madelyn WHITEK: No  edema    VITALS/PHYSICAL EXAM  --------------------------------------------------------------------------------  T(C): 36.6 (08-13-18 @ 05:53), Max: 37.1 (08-12-18 @ 21:44)  HR: 94 (08-13-18 @ 05:53) (67 - 94)  BP: 136/75 (08-13-18 @ 05:53) (104/59 - 145/74)  RR: 20 (08-13-18 @ 05:53) (17 - 20)  SpO2: 98% (08-13-18 @ 05:53) (97% - 99%)  Wt(kg): --    08-12-18 @ 07:01  -  08-13-18 @ 07:00  --------------------------------------------------------  IN: 0 mL / OUT: 1200 mL / NET: -1200 mL    08-13-18 @ 07:01  -  08-13-18 @ 08:50  --------------------------------------------------------  IN: 0 mL / OUT: 100 mL / NET: -100 mL    Physical Exam:  	Gen: NAD  	HEENT: Normal mucous membranes   	Pulm: CTA B/L  	CV: S1S2  	Abd: Soft, +BS   	Ext: No LE edema B/L  	Neuro: Awake  	Skin: No rash              : joshi catheter noted with clear urine    LABS/STUDIES  --------------------------------------------------------------------------------              8.6    25.37 >-----------<  231      [08-13-18 @ 07:15]              28.0     135  |  102  |  49  ----------------------------<  145      [08-12-18 @ 06:00]  4.4   |  22  |  1.90        Ca     8.2     [08-12-18 @ 06:00]      Mg     2.0     [08-12-18 @ 06:00]      Phos  2.6     [08-12-18 @ 06:00]    Creatinine Trend:  SCr 1.90 [08-12 @ 06:00]  SCr 1.82 [08-11 @ 05:00]  SCr 1.91 [08-10 @ 06:16]  SCr 1.82 [08-09 @ 07:30]  SCr 1.78 [08-08 @ 10:26]

## 2018-08-13 NOTE — DISCHARGE NOTE ADULT - HOSPITAL COURSE
HPI: The patient is an 89 year old man with a history of MI (1980 no stent), HFrEF (EF 20%), paroxysmal A-Fib, AICD, CVA (LLE weakness), HTN, T2DM, and recent diagnosis of CLL, discharged to [facility] from prior admission in July with joshi now presenting with lower abdominal pain and generalized weakness in setting of unknown joshi change. Pt was discharged from [facility] on 7/28 to home and pt has HHA 7h x 7d/wk and pt was supposed to f/u with outpatient hematologist (Dr. Ty) yesterday but physician not in the office. Pt scheduled to have appointment with his urologist on Friday but came to the ED for the pain and generalized weakness. The home health aid noted that he had cloudy urine and he was tachycardic.     Hospital Course: The patient was admitted for complicated UTI. The patients Joshi Catheter was exchanged and urine cultures were drawn. He was started on Ceftriaxone empirically until cultures came back positive for proteus UTI that is pan sensitive. He was also found to have an elevated creatinine presumed to be a hemodynamically mediated NADIA which did not improve with fluid administration. Given his history of CLL nephrology recommended a work-up for glomerulonephritis which will be followed up as an outpatient. HPI: The patient is an 89 year old man with a history of MI (1980 no stent), HFrEF (EF 20%), paroxysmal A-Fib, AICD, CVA (LLE weakness), HTN, T2DM, and recent diagnosis of CLL, discharged to [facility] from prior admission in July with joshi now presenting with lower abdominal pain and generalized weakness in setting of unknown joshi change. Pt was discharged from [facility] on 7/28 to home and pt has HHA 7h x 7d/wk and pt was supposed to f/u with outpatient hematologist (Dr. Ty) yesterday but physician not in the office. Pt scheduled to have appointment with his urologist on Friday but came to the ED for the pain and generalized weakness. The home health aid noted that he had cloudy urine and he was tachycardic.     Hospital Course: The patient was admitted for complicated UTI. The patients Joshi Catheter was exchanged and urine cultures were drawn. He was started on Ceftriaxone empirically until cultures came back positive for proteus UTI that is pan sensitive. He was also found to have an elevated creatinine presumed to be a hemodynamically mediated NADIA which did not improve with fluid administration. Given his history of CLL nephrology recommended a work-up for glomerulonephritis which will be followed up as an outpatient. The patients stay in the hospital was complicated by an episode of aspiration confirmed by chest Xray. The patient was monitored off of antibiotics for fevers. HPI: The patient is an 89 year old man with a history of MI (1980 no stent), HFrEF (EF 20%), paroxysmal A-Fib, AICD, CVA (LLE weakness), HTN, T2DM, and recent diagnosis of CLL, discharged to [facility] from prior admission in July with joshi now presenting with lower abdominal pain and generalized weakness in setting of unknown joshi change. Pt was discharged from [facility] on 7/28 to home and pt has HHA 7h x 7d/wk and pt was supposed to f/u with outpatient hematologist (Dr. Ty) yesterday but physician not in the office. Pt scheduled to have appointment with his urologist on Friday but came to the ED for the pain and generalized weakness. The home health aid noted that he had cloudy urine and he was tachycardic.     Hospital Course: The patient was admitted for complicated UTI. The patient's Joshi Catheter was exchanged and urine cultures were drawn. He was started on Ceftriaxone empirically until cultures came back positive for proteus UTI that is pan sensitive. Patient completed a total course of 10 days of antibiotics. He was also found to have an elevated creatinine presumed to be a hemodynamically mediated NADIA which remained stable but did not resolve on discharge and will need follow up outpatient with nephrology. Given his history of CLL nephrology recommended a work-up for glomerulonephritis which will be followed up as an outpatient. The patients stay in the hospital was complicated by an episode of aspiration confirmed by chest Xray, and was seen by the speech and swallow team who recommended a dysphagia 2 diet with nectar thickened liquids. The patient was monitored off of antibiotics and clinically improved. Patient will need a basic metabolic panel to check his creatinine and electrolytes on Monday, August 20th. Patient will continue Lasix on an every other day basis. Patient was seen by PT and was recommended for [facility]. Patient is now stable for discharge to Rehab. HPI: The patient is an 89 year old man with a history of MI (1980 no stent), HFrEF (EF 20%), paroxysmal A-Fib, AICD, CVA (LLE weakness), HTN, T2DM, and recent diagnosis of CLL, discharged to [facility] from prior admission in July with joshi now presenting with lower abdominal pain and generalized weakness in setting of unknown joshi change. Pt was discharged from [facility] on 7/28 to home and pt has HHA 7h x 7d/wk and pt was supposed to f/u with outpatient hematologist (Dr. Ty) yesterday but physician not in the office. Pt scheduled to have appointment with his urologist on Friday but came to the ED for the pain and generalized weakness. The home health aid noted that he had cloudy urine and he was tachycardic.     Hospital Course: The patient was admitted for complicated UTI. The patient's Joshi Catheter was exchanged and urine cultures were drawn. He was started on Ceftriaxone empirically until cultures came back positive for proteus UTI that is pan sensitive. Patient completed a total course of 10 days of antibiotics. He was also found to have an elevated creatinine (presumed to be a hemodynamically mediated NADIA vs cardiorenal syndrome) which remained stable but did not resolve on discharge and will need follow up outpatient with nephrology. Given his history of CLL nephrology recommended a work-up for glomerulonephritis which will be followed up as an outpatient. The patient's stay in the hospital was complicated by an episode of aspiration confirmed by chest x-ray. Developed aspiration pneumonitis. He remained afebrile with improvement of his cough. Patient was seen by the speech and swallow team who recommended a dysphagia 2 mechanical soft diet with nectar thickened liquids. Patient also had left arm swelling without tenderness or rash or erythema in the setting of an infiltrated IV which was removed. Patient continued to have nontender stable scrotal swelling. The patient was monitored off of antibiotics and clinically improved. Patient will need a basic metabolic panel to check his creatinine and electrolytes within 1 week of discharge. Patient will continue Lasix on an every other day basis. Patient was seen by PT and was recommended for [facility]. Patient is now stable for discharge to Rehab. HPI: The patient is an 89 year old man with a history of MI (1980 no stent), chronic HFrEF (EF 20%), paroxysmal A-Fib, AICD, CVA (LLE weakness), HTN, T2DM, and recent diagnosis of CLL, discharged to [facility] from prior admission in July with joshi now presenting with lower abdominal pain and generalized weakness in setting of unknown joshi change. Pt was discharged from [facility] on 7/28 to home and pt has HHA 7h x 7d/wk and pt was supposed to f/u with outpatient hematologist (Dr. Ty) yesterday but physician not in the office. Pt scheduled to have appointment with his urologist on Friday but came to the ED for the pain and generalized weakness. The home health aid noted that he had cloudy urine and he was tachycardic.     Hospital Course: The patient was admitted for complicated UTI. The patient's Joshi Catheter was exchanged and urine cultures were drawn. He was started on Ceftriaxone empirically until cultures came back positive for proteus UTI that is pan sensitive. Patient completed a total course of 10 days of antibiotics. He was also found to have an elevated creatinine (presumed to be a hemodynamically mediated NADIA vs cardiorenal syndrome) which remained stable but did not resolve on discharge and will need follow up outpatient with nephrology. Given his history of CLL nephrology recommended a work-up for glomerulonephritis which will be followed up as an outpatient. The patient's stay in the hospital was complicated by an episode of aspiration confirmed by chest x-ray. Developed aspiration pneumonitis. He remained afebrile with improvement of his cough. Patient was seen by the speech and swallow team who recommended a dysphagia 2 mechanical soft diet with nectar thickened liquids. Patient also had left arm swelling without tenderness or rash or erythema in the setting of an infiltrated IV which was removed. Patient continued to have nontender stable scrotal swelling. The patient was monitored off of antibiotics and clinically improved. Patient will need a basic metabolic panel to check his creatinine and electrolytes within 1 week of discharge. Patient will continue Lasix on an every other day basis. Patient was seen by PT and was recommended for [facility]. Patient is now stable for discharge to Rehab.

## 2018-08-13 NOTE — PROGRESS NOTE ADULT - PROBLEM SELECTOR PLAN 1
-c/w ceftriaxone day 4/7 for proteus UTI  -Davis exchanged in ED  - renal ultrasound showed no hydronephrosis -c/w ceftriaxone day 5/10 for proteus UTI  -Davis exchanged in ED  - renal ultrasound showed no hydronephrosis

## 2018-08-13 NOTE — DISCHARGE NOTE ADULT - MEDICATION SUMMARY - MEDICATIONS TO CHANGE
I will SWITCH the dose or number of times a day I take the medications listed below when I get home from the hospital:    carvedilol 12.5 mg oral tablet  -- 1 tab(s) by mouth every 12 hours    furosemide 20 mg oral tablet  -- 1 tab(s) by mouth once a day

## 2018-08-13 NOTE — DISCHARGE NOTE ADULT - SECONDARY DIAGNOSIS.
CLL (chronic lymphocytic leukemia) NADIA (acute kidney injury) Heart failure, left, with LVEF <=30% Aspiration pneumonitis

## 2018-08-13 NOTE — PROGRESS NOTE ADULT - PROBLEM SELECTOR PLAN 2
-patient with elevated Creatinine still despite treatment.  -holding lasix.  -likely 2/2  ATN in setting of complicated UTI possibly due to glomerulonephritis  -creatinine stable at 1.7, continue to monitor avoid nephrotoxins and renally dose medications.  -nephrology recs appreciated, follow up lab work up for glomerulonephritis.   -will follow up in renal clinic as an outpatient.

## 2018-08-13 NOTE — DISCHARGE NOTE ADULT - ADDITIONAL INSTRUCTIONS
PLEASE HAVE A BASIC METABOLIC PANEL CHECKED ON MONDAY, AUGUST 20TH.  Please follow up with the nephrology clinic at [phone] and make an appointment to check your creatinine level within 1- 2 weeks of discharge. PLEASE HAVE A BASIC METABOLIC PANEL CHECKED WITHIN A WEEK AT REHAB.  Please follow up with the nephrology clinic at [phone] and make an appointment to check your creatinine level within 1- 2 weeks of discharge.  Please follow up with your cardiologist to have your Lasix dose adjusted. PLEASE HAVE A BASIC METABOLIC PANEL CHECKED WITHIN A WEEK AT REHAB.  Please follow up with the nephrology clinic at [phone] and make an appointment to check your creatinine level within 1- 2 weeks of discharge.  Please follow up with your cardiologist to have your Lasix dose adjusted.  Please follow up with your oncologist for management of your CLL

## 2018-08-13 NOTE — PROGRESS NOTE ADULT - SUBJECTIVE AND OBJECTIVE BOX
Patient is a 89y old  Male who presents with a chief complaint of complicated UTI (08 Aug 2018 09:06)      SUBJECTIVE / OVERNIGHT EVENTS:  Patient seen and examined at bedside. No acute events overnight. He is eager to go home. He has no nausea, no vomiting, no diarrhea.     Other Review of Systems Negative.    MEDICATIONS  (STANDING):  apixaban 2.5 milliGRAM(s) Oral every 12 hours  carvedilol 25 milliGRAM(s) Oral every 12 hours  cefTRIAXone   IVPB 1 Gram(s) IV Intermittent every 24 hours  chlorhexidine 4% Liquid 1 Application(s) Topical two times a day  dextrose 5%. 1000 milliLiter(s) (50 mL/Hr) IV Continuous <Continuous>  dextrose 50% Injectable 12.5 Gram(s) IV Push once  dextrose 50% Injectable 25 Gram(s) IV Push once  dextrose 50% Injectable 25 Gram(s) IV Push once  docusate sodium 100 milliGRAM(s) Oral three times a day  finasteride 5 milliGRAM(s) Oral daily  insulin glargine Injectable (LANTUS) 8 Unit(s) SubCutaneous at bedtime  insulin lispro (HumaLOG) corrective regimen sliding scale   SubCutaneous three times a day before meals  insulin lispro (HumaLOG) corrective regimen sliding scale   SubCutaneous at bedtime  insulin lispro Injectable (HumaLOG) 2 Unit(s) SubCutaneous three times a day before meals  polyethylene glycol 3350 17 Gram(s) Oral daily  senna 2 Tablet(s) Oral at bedtime  sodium chloride 0.9%. 1000 milliLiter(s) (75 mL/Hr) IV Continuous <Continuous>  tamsulosin 0.8 milliGRAM(s) Oral at bedtime    MEDICATIONS  (PRN):  dextrose 40% Gel 15 Gram(s) Oral once PRN Blood Glucose LESS THAN 70 milliGRAM(s)/deciliter  glucagon  Injectable 1 milliGRAM(s) IntraMuscular once PRN Glucose LESS THAN 70 milligrams/deciliter      OBJECTIVE:    Vital Signs Last 24 Hrs  T(C): 36.6 (13 Aug 2018 13:30), Max: 37.1 (12 Aug 2018 21:44)  T(F): 97.9 (13 Aug 2018 13:30), Max: 98.7 (12 Aug 2018 21:44)  HR: 64 (13 Aug 2018 13:30) (64 - 94)  BP: 121/64 (13 Aug 2018 13:30) (120/58 - 145/74)  BP(mean): --  RR: 19 (13 Aug 2018 13:30) (17 - 20)  SpO2: 99% (13 Aug 2018 13:30) (97% - 99%)    CAPILLARY BLOOD GLUCOSE      POCT Blood Glucose.: 235 mg/dL (13 Aug 2018 12:26)  POCT Blood Glucose.: 135 mg/dL (13 Aug 2018 08:33)  POCT Blood Glucose.: 222 mg/dL (12 Aug 2018 21:14)  POCT Blood Glucose.: 219 mg/dL (12 Aug 2018 17:12)    I&O's Summary    12 Aug 2018 07:01  -  13 Aug 2018 07:00  --------------------------------------------------------  IN: 0 mL / OUT: 1200 mL / NET: -1200 mL    13 Aug 2018 07:01  -  13 Aug 2018 13:51  --------------------------------------------------------  IN: 0 mL / OUT: 100 mL / NET: -100 mL    PHYSICAL EXAM:  GENERAL: NAD, well-developed  CHEST/LUNG: Clear to auscultation bilaterally; No wheeze  HEART: Regular rate and rhythm; No murmurs, rubs, or gallops  ABDOMEN: no suprapubic tenderness, Soft, Nontender, Nondistended; Bowel sounds present  EXTREMITIES:  2+ Peripheral Pulses, No clubbing, cyanosis, or edema  PSYCH: AAOx3      LABS:                        8.6    25.37 )-----------( 231      ( 13 Aug 2018 07:15 )             28.0     Auto Eosinophil # x     / Auto Eosinophil % x     / Auto Neutrophil # x     / Auto Neutrophil % x     / BANDS % x        08-13    137  |  105  |  44<H>  ----------------------------<  134<H>  4.5   |  22  |  1.70<H>  08-12    135  |  102  |  49<H>  ----------------------------<  145<H>  4.4   |  22  |  1.90<H>    Ca    8.0<L>      13 Aug 2018 07:15  Mg     2.0     08-13  Phos  2.6     08-13      Care Discussed with Consultants/Other Providers: yes

## 2018-08-13 NOTE — DISCHARGE NOTE ADULT - CARE PLAN
Principal Discharge DX:	Complicated urinary tract infection  Goal:	treated  Assessment and plan of treatment:	When you came into the hospital, your Davis catheter was draining cloudy urine. A urine culture was sent which came back positive for a bacterial urinary tract infection. You were started on IV antibiotics and we are sending you home with oral antibiotics for another 7 days to complete the course of 14 days of antibiotics. If the cloudiness of the urine returns or if you start getting acute flank (side of the back) pain. Please seek medical attention. Please follow up with your primary care doctor and with the nephrologist within 1-2 weeks of discharge.  Secondary Diagnosis:	CLL (chronic lymphocytic leukemia)  Assessment and plan of treatment:	While in the hospital you were seen by your oncologist, Dr. Abad. He recommended getting several blood tests for your cancer which you should follow up with him as an outpatient.  Secondary Diagnosis:	NADIA (acute kidney injury)  Assessment and plan of treatment:	When you came into the hospital we saw that the lab values that monitor kidney function were over double their normal value indicating some acute kidney damage. While in the hospital we did extensive blood tests to look for possible causes for your kidney damage. Principal Discharge DX:	Complicated urinary tract infection  Goal:	treated  Assessment and plan of treatment:	When you came into the hospital, your Davis catheter was draining cloudy urine. A urine culture was sent which came back positive for a bacterial urinary tract infection. You were started on IV antibiotics and we are sending you home with oral antibiotics for another 7 days to complete the course of 14 days of antibiotics. If the cloudiness of the urine returns or if you start getting acute flank (side of the back) pain. Please seek medical attention. Please follow up with your primary care doctor and with the nephrologist within 1-2 weeks of discharge.  Secondary Diagnosis:	CLL (chronic lymphocytic leukemia)  Assessment and plan of treatment:	While in the hospital you were seen by your oncologist, Dr. Abad. He recommended getting several blood tests for your cancer which you should follow up with him as an outpatient.  Secondary Diagnosis:	NADIA (acute kidney injury)  Assessment and plan of treatment:	When you came into the hospital we saw that the lab values that monitor kidney function were over double their normal value indicating some acute kidney damage. While in the hospital we did extensive blood tests to look for possible causes for your kidney damage.  Secondary Diagnosis:	Heart failure, left, with LVEF <=30%  Assessment and plan of treatment:	You have a history of heart failure Principal Discharge DX:	Complicated urinary tract infection  Goal:	treated  Assessment and plan of treatment:	When you came into the hospital, your Davis catheter was draining cloudy urine. A urine culture was sent which came back positive for a bacterial urinary tract infection. You were started on IV antibiotics and you completed a total of 10 days of antibiotics. If the cloudiness of the urine returns or if you start getting acute flank (side of the back) pain. Please seek medical attention. Please follow up with your primary care doctor and with the nephrologist within 1-2 weeks of discharge.  Secondary Diagnosis:	CLL (chronic lymphocytic leukemia)  Assessment and plan of treatment:	While in the hospital you were seen by your oncologist, Dr. Abad. He recommended getting several blood tests for your cancer which you should follow up with him as an outpatient.  Secondary Diagnosis:	NADIA (acute kidney injury)  Assessment and plan of treatment:	When you came into the hospital we saw that the lab values that monitor kidney function were over double their normal value indicating some acute kidney damage. While in the hospital we did extensive blood tests to look for possible causes for your kidney damage. PLEASE HAVE A BASIC METABOLIC PANEL REPEATED ON MONDAY, AUGUST 20TH. You will need to make an appointment with the nephrology clinic to monitor your creatinine level.  Secondary Diagnosis:	Heart failure, left, with LVEF <=30%  Assessment and plan of treatment:	You have a history of heart failure, for which you take Lasix for. Please continue to take Lasix 20mg only every other day and follow up with your cardiologist outpatient.  Secondary Diagnosis:	Aspiration pneumonitis  Assessment and plan of treatment:	You had an aspiration event in the hospital. You were evaluated by the speech and swallow team and had a cinesophagram performed. Please continue to be compliant with a dysphagia 2 diet, mechanical soft with nectar thickened liquids. Principal Discharge DX:	Complicated urinary tract infection  Goal:	treated  Assessment and plan of treatment:	When you came into the hospital, your Davis catheter was draining cloudy urine. A urine culture was sent which came back positive for a bacterial urinary tract infection. You were started on IV antibiotics and you completed a total of 10 days of antibiotics. If the cloudiness of the urine returns or if you start getting acute flank (side of the back) pain. Please seek medical attention. Please follow up with your primary care doctor and with the nephrologist within 1-2 weeks of discharge.  Secondary Diagnosis:	CLL (chronic lymphocytic leukemia)  Assessment and plan of treatment:	While in the hospital you were seen by your oncologist, Dr. Abad. He recommended getting several blood tests for your cancer which you should follow up with him as an outpatient.  Secondary Diagnosis:	NADIA (acute kidney injury)  Assessment and plan of treatment:	When you came into the hospital we saw that the lab values that monitor kidney function were over double their normal value indicating some acute kidney damage. While in the hospital we did extensive blood tests to look for possible causes for your kidney damage. PLEASE HAVE A BASIC METABOLIC PANEL REPEATED IN A WEEK AT REHAB. You will need to make an appointment with the nephrology clinic to monitor your creatinine level.  Secondary Diagnosis:	Heart failure, left, with LVEF <=30%  Assessment and plan of treatment:	You have a history of heart failure, for which you take Lasix for. Please continue to take Lasix 20mg only every other day and follow up with your cardiologist outpatient.  Secondary Diagnosis:	Aspiration pneumonitis  Assessment and plan of treatment:	You had an aspiration event in the hospital. You were evaluated by the speech and swallow team and had a cinesophagram performed. Please continue to be compliant with a dysphagia 2 diet, mechanical soft with nectar thickened liquids. Principal Discharge DX:	Complicated urinary tract infection  Goal:	treated  Assessment and plan of treatment:	When you came into the hospital, your Davis catheter was draining cloudy urine. A urine culture was sent which came back positive for a bacterial urinary tract infection. You were started on IV antibiotics and you completed a total of 10 days of antibiotics (ceftriaxone). If the cloudiness of the urine returns or if you start developing pain with urination, fevers, acute flank (side of the back) pain, please seek medical attention. Please follow up with your primary care doctor and with the nephrologist within 1-2 weeks of discharge.  Secondary Diagnosis:	CLL (chronic lymphocytic leukemia)  Assessment and plan of treatment:	While in the hospital you were seen by your oncologist, Dr. Abad. He recommended getting several blood tests for your cancer which you should follow up with him as an outpatient.  Secondary Diagnosis:	NADIA (acute kidney injury)  Assessment and plan of treatment:	When you came into the hospital we saw that the lab values that monitor kidney function were over double their normal value indicating some acute kidney damage. While in the hospital we did extensive blood tests to look for possible causes for your kidney damage. PLEASE HAVE A BASIC METABOLIC PANEL REPEATED IN A WEEK AT REHAB. You will need to make an appointment with the nephrology clinic to monitor your creatinine level.  Secondary Diagnosis:	Heart failure, left, with LVEF <=30%  Assessment and plan of treatment:	You have a history of heart failure, for which you take Lasix for. Please continue to take Lasix 20mg only every other day and follow up with your cardiologist outpatient.  Secondary Diagnosis:	Aspiration pneumonitis  Assessment and plan of treatment:	You had an aspiration event in the hospital. You were evaluated by the speech and swallow team and had a cinesophagram performed. Please continue to be compliant with a dysphagia 2 diet, mechanical soft with nectar thickened liquids. Principal Discharge DX:	Complicated urinary tract infection  Goal:	treated  Assessment and plan of treatment:	When you came into the hospital, your Davis catheter was draining cloudy urine. A urine culture was sent which came back positive for a bacterial urinary tract infection. You were started on IV antibiotics and you completed a total of 10 days of antibiotics (ceftriaxone). If the cloudiness of the urine returns or if you start developing pain with urination, fevers, acute flank (side of the back) pain, please seek medical attention. Please follow up with your primary care doctor and with the nephrologist within 1-2 weeks of discharge.  Secondary Diagnosis:	CLL (chronic lymphocytic leukemia)  Assessment and plan of treatment:	While in the hospital you were seen by your oncologist, Dr. Abad. He recommended getting several blood tests for your cancer which you should follow up with him as an outpatient.  Secondary Diagnosis:	ANDIA (acute kidney injury)  Assessment and plan of treatment:	When you came into the hospital we saw that the lab values that monitor kidney function were over double their normal value indicating some acute kidney damage. While in the hospital we did extensive blood tests to look for possible causes for your kidney damage. Your decreased kidney function may have been hemodynamically mediated and associated with a cardiorenal syndrome or a glomerulonephritis in the setting of CLL. PLEASE HAVE A BASIC METABOLIC PANEL REPEATED IN A WEEK AT REHAB. You will need to make an appointment with the nephrology clinic to monitor your creatinine level.  Secondary Diagnosis:	Heart failure, left, with LVEF <=30%  Assessment and plan of treatment:	You have a history of heart failure, for which you take Lasix 20mg every other day. Please follow up with your cardiologist outpatient for continued management.  Secondary Diagnosis:	Aspiration pneumonitis  Assessment and plan of treatment:	You had an aspiration event in the hospital. You were evaluated by the speech and swallow team and had a cinesophagram performed. Please continue to be compliant with a dysphagia 2 diet, mechanical soft with nectar thickened liquids. Principal Discharge DX:	Complicated urinary tract infection  Goal:	treated  Assessment and plan of treatment:	When you came into the hospital, your Davis catheter was draining cloudy urine. A urine culture was sent which came back positive for a bacterial urinary tract infection. You were started on IV antibiotics and you completed a total of 10 days of antibiotics (ceftriaxone). If the cloudiness of the urine returns or if you start developing pain with urination, fevers, acute flank (side of the back) pain, please seek medical attention. Please follow up with your primary care doctor and with the nephrologist within 1-2 weeks of discharge.  Secondary Diagnosis:	CLL (chronic lymphocytic leukemia)  Assessment and plan of treatment:	While in the hospital you were seen by your oncologist, Dr. Abad. He recommended getting several blood tests for your cancer which you should follow up with him as an outpatient.  Secondary Diagnosis:	NADIA (acute kidney injury)  Assessment and plan of treatment:	When you came into the hospital we saw that the lab values that monitor kidney function were over double their normal value indicating some acute kidney damage. While in the hospital we did extensive blood tests to look for possible causes for your kidney damage. Your decreased kidney function may have been hemodynamically mediated and associated with a cardiorenal syndrome or a glomerulonephritis in the setting of CLL. PLEASE HAVE A BASIC METABOLIC PANEL REPEATED IN A WEEK AT REHAB. You will need to make an appointment with the nephrology clinic to monitor your creatinine level.  Secondary Diagnosis:	Heart failure, left, with LVEF <=30%  Assessment and plan of treatment:	You have a history of heart failure, for which you take Lasix 20mg every other day. Please follow up with your cardiologist outpatient for continued management.  Secondary Diagnosis:	Aspiration pneumonitis  Assessment and plan of treatment:	You had an aspiration event in the hospital. You were evaluated by the speech and swallow team and had a cineesophagogram performed. Please continue to be compliant with a dysphagia 2 diet, mechanical soft with nectar thickened liquids.

## 2018-08-13 NOTE — DISCHARGE NOTE ADULT - MEDICATION SUMMARY - MEDICATIONS TO STOP TAKING
I will STOP taking the medications listed below when I get home from the hospital:    losartan 25 mg oral tablet  -- 1 tab(s) by mouth once a day    enalapril 20 mg oral tablet  -- 1 tab(s) by mouth 2 times a day    furosemide 40 mg oral tablet  -- 1 tab(s) by mouth once a day

## 2018-08-13 NOTE — DISCHARGE NOTE ADULT - INSTRUCTIONS
Dysphagia 2 mechanical soft, nectar consistency fluid, consistent carbohydrate, DASH, no concentrated potassium.

## 2018-08-13 NOTE — DISCHARGE NOTE ADULT - PLAN OF CARE
treated When you came into the hospital, your Davis catheter was draining cloudy urine. A urine culture was sent which came back positive for a bacterial urinary tract infection. You were started on IV antibiotics and we are sending you home with oral antibiotics for another 7 days to complete the course of 14 days of antibiotics. If the cloudiness of the urine returns or if you start getting acute flank (side of the back) pain. Please seek medical attention. Please follow up with your primary care doctor and with the nephrologist within 1-2 weeks of discharge. While in the hospital you were seen by your oncologist, Dr. Abad. He recommended getting several blood tests for your cancer which you should follow up with him as an outpatient. When you came into the hospital we saw that the lab values that monitor kidney function were over double their normal value indicating some acute kidney damage. While in the hospital we did extensive blood tests to look for possible causes for your kidney damage. You have a history of heart failure When you came into the hospital, your Davis catheter was draining cloudy urine. A urine culture was sent which came back positive for a bacterial urinary tract infection. You were started on IV antibiotics and you completed a total of 10 days of antibiotics. If the cloudiness of the urine returns or if you start getting acute flank (side of the back) pain. Please seek medical attention. Please follow up with your primary care doctor and with the nephrologist within 1-2 weeks of discharge. When you came into the hospital we saw that the lab values that monitor kidney function were over double their normal value indicating some acute kidney damage. While in the hospital we did extensive blood tests to look for possible causes for your kidney damage. PLEASE HAVE A BASIC METABOLIC PANEL REPEATED ON MONDAY, AUGUST 20TH. You will need to make an appointment with the nephrology clinic to monitor your creatinine level. You have a history of heart failure, for which you take Lasix for. Please continue to take Lasix 20mg only every other day and follow up with your cardiologist outpatient. You had an aspiration event in the hospital. You were evaluated by the speech and swallow team and had a cinesophagram performed. Please continue to be compliant with a dysphagia 2 diet, mechanical soft with nectar thickened liquids. When you came into the hospital we saw that the lab values that monitor kidney function were over double their normal value indicating some acute kidney damage. While in the hospital we did extensive blood tests to look for possible causes for your kidney damage. PLEASE HAVE A BASIC METABOLIC PANEL REPEATED IN A WEEK AT REHAB. You will need to make an appointment with the nephrology clinic to monitor your creatinine level. When you came into the hospital, your Davis catheter was draining cloudy urine. A urine culture was sent which came back positive for a bacterial urinary tract infection. You were started on IV antibiotics and you completed a total of 10 days of antibiotics (ceftriaxone). If the cloudiness of the urine returns or if you start developing pain with urination, fevers, acute flank (side of the back) pain, please seek medical attention. Please follow up with your primary care doctor and with the nephrologist within 1-2 weeks of discharge. When you came into the hospital we saw that the lab values that monitor kidney function were over double their normal value indicating some acute kidney damage. While in the hospital we did extensive blood tests to look for possible causes for your kidney damage. Your decreased kidney function may have been hemodynamically mediated and associated with a cardiorenal syndrome or a glomerulonephritis in the setting of CLL. PLEASE HAVE A BASIC METABOLIC PANEL REPEATED IN A WEEK AT REHAB. You will need to make an appointment with the nephrology clinic to monitor your creatinine level. You have a history of heart failure, for which you take Lasix 20mg every other day. Please follow up with your cardiologist outpatient for continued management. You had an aspiration event in the hospital. You were evaluated by the speech and swallow team and had a cineesophagogram performed. Please continue to be compliant with a dysphagia 2 diet, mechanical soft with nectar thickened liquids.

## 2018-08-14 ENCOUNTER — APPOINTMENT (OUTPATIENT)
Dept: ELECTROPHYSIOLOGY | Facility: CLINIC | Age: 83
End: 2018-08-14

## 2018-08-14 DIAGNOSIS — J69.0 PNEUMONITIS DUE TO INHALATION OF FOOD AND VOMIT: ICD-10-CM

## 2018-08-14 LAB
BUN SERPL-MCNC: 42 MG/DL — HIGH (ref 7–23)
C-ANCA SER-ACNC: NEGATIVE — SIGNIFICANT CHANGE UP
CALCIUM SERPL-MCNC: 8 MG/DL — LOW (ref 8.4–10.5)
CHLORIDE SERPL-SCNC: 106 MMOL/L — SIGNIFICANT CHANGE UP (ref 98–107)
CO2 SERPL-SCNC: 21 MMOL/L — LOW (ref 22–31)
CREAT SERPL-MCNC: 1.65 MG/DL — HIGH (ref 0.5–1.3)
GBM IGG SER-ACNC: <0.2 U — SIGNIFICANT CHANGE UP
GLUCOSE SERPL-MCNC: 209 MG/DL — HIGH (ref 70–99)
MAGNESIUM SERPL-MCNC: 2 MG/DL — SIGNIFICANT CHANGE UP (ref 1.6–2.6)
P-ANCA SER-ACNC: NEGATIVE — SIGNIFICANT CHANGE UP
PHOSPHATE SERPL-MCNC: 2.6 MG/DL — SIGNIFICANT CHANGE UP (ref 2.5–4.5)
POTASSIUM SERPL-MCNC: 4.3 MMOL/L — SIGNIFICANT CHANGE UP (ref 3.5–5.3)
POTASSIUM SERPL-SCNC: 4.3 MMOL/L — SIGNIFICANT CHANGE UP (ref 3.5–5.3)
SODIUM SERPL-SCNC: 138 MMOL/L — SIGNIFICANT CHANGE UP (ref 135–145)

## 2018-08-14 PROCEDURE — 93010 ELECTROCARDIOGRAM REPORT: CPT

## 2018-08-14 PROCEDURE — 99233 SBSQ HOSP IP/OBS HIGH 50: CPT | Mod: GC

## 2018-08-14 PROCEDURE — 71045 X-RAY EXAM CHEST 1 VIEW: CPT | Mod: 26

## 2018-08-14 PROCEDURE — 99232 SBSQ HOSP IP/OBS MODERATE 35: CPT | Mod: GC

## 2018-08-14 RX ORDER — INSULIN LISPRO 100/ML
2 VIAL (ML) SUBCUTANEOUS
Qty: 0 | Refills: 0 | Status: DISCONTINUED | OUTPATIENT
Start: 2018-08-14 | End: 2018-08-15

## 2018-08-14 RX ORDER — IPRATROPIUM/ALBUTEROL SULFATE 18-103MCG
3 AEROSOL WITH ADAPTER (GRAM) INHALATION ONCE
Qty: 0 | Refills: 0 | Status: COMPLETED | OUTPATIENT
Start: 2018-08-14 | End: 2018-08-14

## 2018-08-14 RX ORDER — INSULIN LISPRO 100/ML
3 VIAL (ML) SUBCUTANEOUS ONCE
Qty: 0 | Refills: 0 | Status: COMPLETED | OUTPATIENT
Start: 2018-08-14 | End: 2018-08-14

## 2018-08-14 RX ORDER — FUROSEMIDE 40 MG
20 TABLET ORAL ONCE
Qty: 0 | Refills: 0 | Status: COMPLETED | OUTPATIENT
Start: 2018-08-14 | End: 2018-08-14

## 2018-08-14 RX ADMIN — CHLORHEXIDINE GLUCONATE 1 APPLICATION(S): 213 SOLUTION TOPICAL at 06:39

## 2018-08-14 RX ADMIN — Medication 3 UNIT(S): at 23:23

## 2018-08-14 RX ADMIN — Medication 100 MILLIGRAM(S): at 06:38

## 2018-08-14 RX ADMIN — CARVEDILOL PHOSPHATE 25 MILLIGRAM(S): 80 CAPSULE, EXTENDED RELEASE ORAL at 06:38

## 2018-08-14 RX ADMIN — CARVEDILOL PHOSPHATE 25 MILLIGRAM(S): 80 CAPSULE, EXTENDED RELEASE ORAL at 17:30

## 2018-08-14 RX ADMIN — Medication 100 MILLIGRAM(S): at 22:14

## 2018-08-14 RX ADMIN — APIXABAN 2.5 MILLIGRAM(S): 2.5 TABLET, FILM COATED ORAL at 17:30

## 2018-08-14 RX ADMIN — SENNA PLUS 2 TABLET(S): 8.6 TABLET ORAL at 22:14

## 2018-08-14 RX ADMIN — TAMSULOSIN HYDROCHLORIDE 0.8 MILLIGRAM(S): 0.4 CAPSULE ORAL at 22:14

## 2018-08-14 RX ADMIN — CEFTRIAXONE 100 GRAM(S): 500 INJECTION, POWDER, FOR SOLUTION INTRAMUSCULAR; INTRAVENOUS at 22:15

## 2018-08-14 RX ADMIN — Medication 20 MILLIGRAM(S): at 13:03

## 2018-08-14 RX ADMIN — INSULIN GLARGINE 8 UNIT(S): 100 INJECTION, SOLUTION SUBCUTANEOUS at 22:15

## 2018-08-14 RX ADMIN — Medication 2: at 22:15

## 2018-08-14 RX ADMIN — APIXABAN 2.5 MILLIGRAM(S): 2.5 TABLET, FILM COATED ORAL at 06:38

## 2018-08-14 RX ADMIN — FINASTERIDE 5 MILLIGRAM(S): 5 TABLET, FILM COATED ORAL at 13:03

## 2018-08-14 RX ADMIN — Medication 2 UNIT(S): at 17:29

## 2018-08-14 RX ADMIN — Medication 3 MILLILITER(S): at 09:15

## 2018-08-14 NOTE — SWALLOW BEDSIDE ASSESSMENT ADULT - ASR SWALLOW ASPIRATION MONITOR
position upright (90Y)/cough/gurgly voice/pneumonia/change of breathing pattern/oral hygiene/fever/upper respiratory infection/throat clearing

## 2018-08-14 NOTE — PROGRESS NOTE ADULT - ASSESSMENT
89M with hx MI (1980 no stent), HFrEF (EF 20%), paroxysmal A-Fib, AICD, CVA (LLE weakness), HTN, T2DM, and recent diagnosis of CLL, was admitted to the hospital for a complicated UTI 2/2 proteus and NADIA that is now improving, complicated by aspiration pneumonitis.

## 2018-08-14 NOTE — PROGRESS NOTE ADULT - PROBLEM SELECTOR PLAN 1
- Patient had chest pain and productive cough since taking AM meds. EKG showed no new changes. CXR shows right lower lobe infiltrates due to aspiration pneumonitis.   - hold antibiotics for now.  - monitor for fevers  - give one time dose of lasix 20 mg IV for possible fluid overload.

## 2018-08-14 NOTE — SWALLOW BEDSIDE ASSESSMENT ADULT - SWALLOW EVAL: RECOMMENDED DIET
1) Dysphagia 2 (Mechanical Soft) with Honey-Thick Liquids; 2) Crush medication; 3) Cinesophagram to objectively assess the swallow mechanism and r/o aspiration

## 2018-08-14 NOTE — PROGRESS NOTE ADULT - PROBLEM SELECTOR PLAN 1
Pt with hemodynamically mediated NADIA in the setting of dehydration, poor oral intake with Lasix and Losartan and now with relative hypotension overnight. Upon review of [facility] SCr WNL 0.69 on 7/4/18. At admission SCr elevated to 1.8 on 8/10/18, peaked at 1.90 on 8/12/18 and improved to 1.65 on 8/14/18. Given hematuria and proteinuria reasonable to pursue GN work up. Serological workup including ANCAs, anti-GBM, serum and urine immunofixation, C3 and C4 pending. HepBsAg, HepC Ab non reactive on 8/14/18. Monitor UOP and daily weights. Monitor BMP daily. Avoid nephrotoxic drugs. Dose all medications renally

## 2018-08-14 NOTE — PROGRESS NOTE ADULT - ASSESSMENT
The patient is an 89 year old man with a history of MI (1980 no stent), HFrEF (EF 20%), paroxysmal A-Fib, AICD, CVA (LLE weakness), HTN, T2DM, and recent diagnosis of CLL, discharged to [facility] from prior admission in July with joshi now treated for UTI  He has persistent leukocytosis in the 25-35 range, seen during both admissions, predominantly lymphocytes. During his last admission, flow cytometry was done revealing a monoclonal CD5, CD20 positive population, suggestive of either CLL or MCL. FISH studies pending to distinguish MCL vs CLL. Immunoglubulin levels OK  I do not think he will require treatment for quite some time specially if it is CLL.  Anemia is likely from chronic disease and CKD. No evidence of iron or B12 def.

## 2018-08-14 NOTE — SWALLOW BEDSIDE ASSESSMENT ADULT - ORAL PREPARATORY PHASE
delayed bolus collection and manipulation slow mastication; delayed bolus collection and manipulation anterior loss of bolus; delayed bolus collection and manipulation

## 2018-08-14 NOTE — PROGRESS NOTE ADULT - PROBLEM SELECTOR PLAN 2
-c/w ceftriaxone day 6/10 for proteus UTI  -Davis exchanged in ED  - renal ultrasound showed no hydronephrosis

## 2018-08-14 NOTE — PROVIDER CONTACT NOTE (OTHER) - ACTION/TREATMENT ORDERED:
MD notified, came to assess patient, EKG and chest xray done, duoneb treatment given, patient made NPO at this time, will continue to monitor . MD notified, came to assess patient, EKG and chest xray done, duoneb treatment given, lasix given, patient made NPO at this time, will continue to monitor .

## 2018-08-14 NOTE — SWALLOW BEDSIDE ASSESSMENT ADULT - SWALLOW EVAL: ORAL MUSCULATURE
slight left facial weakness, reported to be consistent with baseline since CVA as per patient slight left orofacial weakness, reported to be consistent with baseline since CVA as per patient

## 2018-08-14 NOTE — SWALLOW BEDSIDE ASSESSMENT ADULT - SWALLOW EVAL: DIAGNOSIS
Patient demonstrated oropharyngeal dysphagia characterized by weak labial seal with subsequent anterior spillage of thin liquids from left side, slow mastication of regular solids, and delayed bolus collection, manipulation and transfer. The pharyngeal stage is characterized by a suspected delay in pharyngeal trigger with reduced hyolaryngeal excursion upon digital palpation. An inconsistent, delayed cough was noted post trials of thin and nectar-thick liquids suggestive of laryngeal penetration vs aspiration. Given baseline cough and above clinical presentation, patient would benefit from a modified texture diet with follow up objective testing to r/o aspiration at this time. Patient demonstrated oropharyngeal dysphagia characterized by weak labial seal with subsequent anterior spillage of thin and nectar-thick liquids from left side of oral cavity, slow mastication of regular solids, and delayed bolus collection, manipulation and transfer across all textures. The pharyngeal stage is characterized by a suspected delay in pharyngeal trigger with reduced hyolaryngeal excursion upon digital palpation. An inconsistent, delayed cough was noted post trials of thin and nectar-thick liquids suggestive of laryngeal penetration vs aspiration. Given baseline cough and above clinical presentation, patient would benefit from a modified texture diet with follow up objective testing to r/o aspiration at this time.

## 2018-08-14 NOTE — SWALLOW BEDSIDE ASSESSMENT ADULT - PHARYNGEAL PHASE
Delayed pharyngeal swallow/Decreased laryngeal elevation Decreased laryngeal elevation/Delayed pharyngeal swallow inconsistent, delayed cough post swallow/Delayed pharyngeal swallow/Decreased laryngeal elevation

## 2018-08-14 NOTE — PROGRESS NOTE ADULT - SUBJECTIVE AND OBJECTIVE BOX
[facility] DIVISION OF KIDNEY DISEASES AND HYPERTENSION -- FOLLOW UP NOTE  --------------------------------------------------------------------------------  HPI: 89 year old man with a history of MI (1980), HFrEF (EF 20%), paroxysmal A-Fib, AICD, CVA (LLE weakness), HTN, T2DM, and CLL admitted for complicated UTI. Nephrology consulted for NADIA. Brought in after caretaker noted cloudy urine and tachycardia. Patient was discharged on 7/6/18 to subacute rehab with [place] in [facility] following failed trial of void prior to discharge.   Upon review of [facility] SCr WNL 0.69 on 7/4/18. At admission SCr elevated to 1.8 and 1.91 on 8/10/18. Receiving ceftriaxone. Urine cultures growing proteus sp.     Pt was seen and examined at bedside. Patient complaining of mid sternal chest pain, pressure like and noted to be tachypneic.  Denies chest pain, SOB, and abdominal pain.     PAST HISTORY  --------------------------------------------------------------------------------  No significant changes to PMH, PSH, FHx, SHx, unless otherwise noted    ALLERGIES & MEDICATIONS  --------------------------------------------------------------------------------  Allergies    Cipro (Other)  fluoroquinolone antibiotics (Unknown)  latex (Unknown)    Intolerances      Standing Inpatient Medications  apixaban 2.5 milliGRAM(s) Oral every 12 hours  carvedilol 25 milliGRAM(s) Oral every 12 hours  cefTRIAXone   IVPB 1 Gram(s) IV Intermittent every 24 hours  chlorhexidine 4% Liquid 1 Application(s) Topical two times a day  dextrose 5%. 1000 milliLiter(s) IV Continuous <Continuous>  dextrose 50% Injectable 12.5 Gram(s) IV Push once  dextrose 50% Injectable 25 Gram(s) IV Push once  dextrose 50% Injectable 25 Gram(s) IV Push once  docusate sodium 100 milliGRAM(s) Oral three times a day  finasteride 5 milliGRAM(s) Oral daily  furosemide   Injectable 20 milliGRAM(s) IV Push once  insulin glargine Injectable (LANTUS) 8 Unit(s) SubCutaneous at bedtime  insulin lispro (HumaLOG) corrective regimen sliding scale   SubCutaneous three times a day before meals  insulin lispro (HumaLOG) corrective regimen sliding scale   SubCutaneous at bedtime  insulin lispro Injectable (HumaLOG) 2 Unit(s) SubCutaneous three times a day before meals  polyethylene glycol 3350 17 Gram(s) Oral daily  senna 2 Tablet(s) Oral at bedtime  tamsulosin 0.8 milliGRAM(s) Oral at bedtime    PRN Inpatient Medications  dextrose 40% Gel 15 Gram(s) Oral once PRN  glucagon  Injectable 1 milliGRAM(s) IntraMuscular once PRN      REVIEW OF SYSTEMS  --------------------------------------------------------------------------------  Gen: No fevers  Skin: No rashes  Head/Eyes/Ears: Normal hearing  Respiratory: No dyspnea  CV: +chest pain  GI: No abdominal pain,  : madelyn  MSK: No  edema    VITALS/PHYSICAL EXAM  --------------------------------------------------------------------------------  T(C): 36.7 (08-14-18 @ 12:56), Max: 36.9 (08-14-18 @ 06:01)  HR: 65 (08-14-18 @ 12:56) (64 - 95)  BP: 120/65 (08-14-18 @ 12:56) (120/65 - 134/66)  RR: 20 (08-14-18 @ 12:56) (18 - 20)  SpO2: 97% (08-14-18 @ 12:56) (96% - 100%)  Wt(kg): --        08-13-18 @ 07:01  -  08-14-18 @ 07:00  --------------------------------------------------------  IN: 460 mL / OUT: 500 mL / NET: -40 mL    08-14-18 @ 07:01  -  08-14-18 @ 13:03  --------------------------------------------------------  IN: 0 mL / OUT: 600 mL / NET: -600 mL      Physical Exam:  	Gen: NAD  	HEENT: Normal mucous membranes   	Pulm: CTA B/L  	CV: S1S2  	Abd: Soft, +BS   	Ext: No LE edema B/L  	Neuro: Awake  	Skin: No rash              : joshi catheter noted with clear urine    LABS/STUDIES  --------------------------------------------------------------------------------              8.6    25.37 >-----------<  231      [08-13-18 @ 07:15]              28.0     138  |  106  |  42  ----------------------------<  209      [08-14-18 @ 06:30]  4.3   |  21  |  1.65        Ca     8.0     [08-14-18 @ 06:30]      Mg     2.0     [08-14-18 @ 06:30]      Phos  2.6     [08-14-18 @ 06:30]            Creatinine Trend:  SCr 1.65 [08-14 @ 06:30]  SCr 1.70 [08-13 @ 07:15]  SCr 1.90 [08-12 @ 06:00]  SCr 1.82 [08-11 @ 05:00]  SCr 1.91 [08-10 @ 06:16]

## 2018-08-14 NOTE — SWALLOW BEDSIDE ASSESSMENT ADULT - SWALLOW EVAL: RECOMMENDED FEEDING/EATING TECHNIQUES
allow for swallow between intakes/position upright (90 degrees)/maintain upright posture during/after eating for 30 mins/crush medication (when feasible)/no straws

## 2018-08-14 NOTE — SWALLOW BEDSIDE ASSESSMENT ADULT - COMMENTS
Patient is an 89 year old male with PMHx of MI (1980 no stent), HFrEF (EF 20%), paroxysmal A-Fib, AICD, CVA (LLE weakness), HTN, T2DM, and recent diagnosis of CLL, was admitted to the hospital for a complicated UTI and NADIA.    As per patient and primary RN (Jazzmine), patient had a suspected aspiration even this AM after swallowing 3 pills consecutively. Patient was subsequently made NPO and this department was consulted for bedside swallow evaluation. Patient was received awake, alert and cooperative this PM. Occasional congested cough noted at baseline. Upon clinician questioning, patient stated that he has been experiencing some swallow difficulty since having a stroke many years ago. Recommendations discussed with patient's grandson, RN (Jazzmine) and Team 4. Patient is an 89 year old male with PMHx of MI (1980 no stent), HFrEF (EF 20%), paroxysmal A-Fib, AICD, CVA (LLE weakness), HTN, T2DM, and recent diagnosis of CLL, was admitted to the hospital for a complicated UTI and NADIA.    As per patient and primary RN (Jazzmine), patient had a suspected aspiration event this AM after swallowing 3 pills consecutively. Patient was subsequently made NPO and this department was consulted for bedside swallow evaluation. Patient was received awake, alert and cooperative this PM. Occasional congested cough noted at baseline. Upon clinician questioning, patient stated that he has been experiencing some swallow difficulty since having a stroke many years ago, however he continues to consume a regular solid diet with thin liquids. Recommendations were discussed with patient's grandson, RN (Jazzmine) and Team 4.

## 2018-08-14 NOTE — PROGRESS NOTE ADULT - SUBJECTIVE AND OBJECTIVE BOX
HPI:  The patient is an 89 year old man with a history of MI (1980 no stent), HFrEF (EF 20%), paroxysmal A-Fib, AICD, CVA (LLE weakness), HTN, T2DM, and recent diagnosis of CLL, discharged to [facility] from prior admission in July with joshi now presented with lower abdominal pain and generalized weakness, He was started on IV abx for  UTI.   He has persistent leukocytosis in the 25-35 range, seen during both admissions, predominantly lymphocytes. During his last admission, flow cytometry was done revealing a monoclonal CD5, CD20 positive population, suggestive of either CLL or MCL.           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 1000 milliLiter(s) IV Continuous <Continuous>  dextrose 50% Injectable 12.5 Gram(s) IV Push once  dextrose 50% Injectable 25 Gram(s) IV Push once  dextrose 50% Injectable 25 Gram(s) IV Push once  docusate sodium 100 milliGRAM(s) Oral three times a day  finasteride 5 milliGRAM(s) Oral daily  glucagon  Injectable 1 milliGRAM(s) IntraMuscular once PRN Glucose LESS THAN 70 milligrams/deciliter  insulin glargine Injectable (LANTUS) 8 Unit(s) SubCutaneous at bedtime  insulin lispro (HumaLOG) corrective regimen sliding scale   SubCutaneous three times a day before meals  insulin lispro (HumaLOG) corrective regimen sliding scale   SubCutaneous at bedtime  insulin lispro Injectable (HumaLOG) 2 Unit(s) SubCutaneous three times a day before meals  polyethylene glycol 3350 17 Gram(s) Oral daily  senna 2 Tablet(s) Oral at bedtime  tamsulosin 0.8 milliGRAM(s) Oral at bedtime    Labs:  CBC Full  -  ( 13 Aug 2018 07:15 )  WBC Count : 25.37 K/uL  Hemoglobin : 8.6 g/dL  Hematocrit : 28.0 %  Platelet Count - Automated : 231 K/uL  Mean Cell Volume : 86.4 fL  Mean Cell Hemoglobin : 26.5 pg  Mean Cell Hemoglobin Concentration : 30.7 %  Auto Neutrophil # : x  Auto Lymphocyte # : x  Auto Monocyte # : x  Auto Eosinophil # : x  Auto Basophil # : x  Auto Neutrophil % : x  Auto Lymphocyte % : x  Auto Monocyte % : x  Auto Eosinophil % : x  Auto Basophil % : x    08-14    138  |  106  |  42<H>  ----------------------------<  209<H>  4.3   |  21<L>  |  1.65<H>    Ca    8.0<L>      14 Aug 2018 06:30  Phos  2.6     08-14  Mg     2.0     08-14        Radiology:             ROS:  Patient comfortable without distress  No SOB or chest pain  No palpitation  No abdominal pain, diarrhaea or constipation  Feels weak  No skin changes or swelling of legs    Vital Signs Last 24 Hrs  T(C): 36.9 (14 Aug 2018 14:10), Max: 36.9 (14 Aug 2018 06:01)  T(F): 98.5 (14 Aug 2018 14:10), Max: 98.5 (14 Aug 2018 14:10)  HR: 74 (14 Aug 2018 13:10) (65 - 95)  BP: 134/55 (14 Aug 2018 13:10) (120/65 - 134/66)  BP(mean): --  RR: 20 (14 Aug 2018 13:10) (18 - 20)  SpO2: 96% (14 Aug 2018 13:10) (96% - 100%)    Physical exam:  Patient little lethargic  No distress  CVS: S1, S2 regular or murmur  Chest: bilateral breath sound without rales  Abdomen: soft, not tender, no organomegaly or masses  No focal neuro deficit  No edema      Assessment and Plan:

## 2018-08-14 NOTE — PROGRESS NOTE ADULT - SUBJECTIVE AND OBJECTIVE BOX
Patient is a 89y old  Male who presents with a chief complaint of complicated UTI (13 Aug 2018 14:38)      SUBJECTIVE / OVERNIGHT EVENTS:  Patient seen and examined at bedside. When the patient was seen this morning he was complaining of chest pain and cough that has been going on since he took his meds around 6 AM. He could not describe the pain but he was coughing with frothy sputum. An EKG and chest Xray were done at the time.     Other Review of Systems Negative.    MEDICATIONS  (STANDING):  apixaban 2.5 milliGRAM(s) Oral every 12 hours  carvedilol 25 milliGRAM(s) Oral every 12 hours  cefTRIAXone   IVPB 1 Gram(s) IV Intermittent every 24 hours  chlorhexidine 4% Liquid 1 Application(s) Topical two times a day  dextrose 5%. 1000 milliLiter(s) (50 mL/Hr) IV Continuous <Continuous>  dextrose 50% Injectable 12.5 Gram(s) IV Push once  dextrose 50% Injectable 25 Gram(s) IV Push once  dextrose 50% Injectable 25 Gram(s) IV Push once  docusate sodium 100 milliGRAM(s) Oral three times a day  finasteride 5 milliGRAM(s) Oral daily  furosemide   Injectable 20 milliGRAM(s) IV Push once  insulin glargine Injectable (LANTUS) 8 Unit(s) SubCutaneous at bedtime  insulin lispro (HumaLOG) corrective regimen sliding scale   SubCutaneous three times a day before meals  insulin lispro (HumaLOG) corrective regimen sliding scale   SubCutaneous at bedtime  insulin lispro Injectable (HumaLOG) 2 Unit(s) SubCutaneous three times a day before meals  polyethylene glycol 3350 17 Gram(s) Oral daily  senna 2 Tablet(s) Oral at bedtime  tamsulosin 0.8 milliGRAM(s) Oral at bedtime    MEDICATIONS  (PRN):  dextrose 40% Gel 15 Gram(s) Oral once PRN Blood Glucose LESS THAN 70 milliGRAM(s)/deciliter  glucagon  Injectable 1 milliGRAM(s) IntraMuscular once PRN Glucose LESS THAN 70 milligrams/deciliter    OBJECTIVE:    Vital Signs Last 24 Hrs  T(C): 36.8 (14 Aug 2018 07:30), Max: 36.9 (14 Aug 2018 06:01)  T(F): 98.2 (14 Aug 2018 07:30), Max: 98.4 (14 Aug 2018 06:01)  HR: 74 (14 Aug 2018 09:15) (64 - 95)  BP: 134/66 (14 Aug 2018 07:30) (121/64 - 134/66)  BP(mean): --  RR: 20 (14 Aug 2018 07:30) (18 - 20)  SpO2: 96% (14 Aug 2018 07:30) (96% - 100%)    CAPILLARY BLOOD GLUCOSE  POCT Blood Glucose.: 111 mg/dL (14 Aug 2018 12:09)  POCT Blood Glucose.: 197 mg/dL (14 Aug 2018 08:21)  POCT Blood Glucose.: 325 mg/dL (13 Aug 2018 22:33)  POCT Blood Glucose.: 290 mg/dL (13 Aug 2018 21:45)  POCT Blood Glucose.: 191 mg/dL (13 Aug 2018 17:00)    I&O's Summary    13 Aug 2018 07:01  -  14 Aug 2018 07:00  --------------------------------------------------------  IN: 460 mL / OUT: 500 mL / NET: -40 mL    14 Aug 2018 07:01  -  14 Aug 2018 12:53  --------------------------------------------------------  IN: 0 mL / OUT: 600 mL / NET: -600 mL    PHYSICAL EXAM:  GENERAL: coughing in bed  CHEST/LUNG: coarse breath sounds bilaterally with diffuse wheezing.   HEART: Regular rate and rhythm; No murmurs, rubs, or gallops  ABDOMEN: Soft, Nontender, Nondistended; Bowel sounds present  : Davis draining yellow, non-cloudy urine.   EXTREMITIES:  2+ Peripheral Pulses, No clubbing, cyanosis, or edema  PSYCH: AAOx3    LABS:                        8.6    25.37 )-----------( 231      ( 13 Aug 2018 07:15 )             28.0     Auto Eosinophil # x     / Auto Eosinophil % x     / Auto Neutrophil # x     / Auto Neutrophil % x     / BANDS % x        08-14    138  |  106  |  42<H>  ----------------------------<  209<H>  4.3   |  21<L>  |  1.65<H>  08-13    137  |  105  |  44<H>  ----------------------------<  134<H>  4.5   |  22  |  1.70<H>    Ca    8.0<L>      14 Aug 2018 06:30  Mg     2.0     08-14  Phos  2.6     08-14    Care Discussed with Consultants/Other Providers: yes    RADIOLOGY & ADDITIONAL TESTS:  (Imaging Personally Reviewed)   CXR 8/14:   IMPRESSION:  Enlarged cardiac silhouette which appears increased in size   from the prior image. If clinically indicated, echocardiography could be   performed for further evaluation.    New hazy right basilar opacity suggesting a layering right pleural   effusion with passive atelectasis.    New left basilar and retrocardiac opacity which may be due to a left   pleural effusion with passive atelectasis, atelectasis of other cause,   and/or pneumonia

## 2018-08-15 PROCEDURE — 99232 SBSQ HOSP IP/OBS MODERATE 35: CPT | Mod: GC

## 2018-08-15 PROCEDURE — 74230 X-RAY XM SWLNG FUNCJ C+: CPT | Mod: 26

## 2018-08-15 PROCEDURE — 99233 SBSQ HOSP IP/OBS HIGH 50: CPT | Mod: GC

## 2018-08-15 RX ORDER — INSULIN LISPRO 100/ML
4 VIAL (ML) SUBCUTANEOUS
Qty: 0 | Refills: 0 | Status: DISCONTINUED | OUTPATIENT
Start: 2018-08-15 | End: 2018-08-21

## 2018-08-15 RX ADMIN — FINASTERIDE 5 MILLIGRAM(S): 5 TABLET, FILM COATED ORAL at 12:53

## 2018-08-15 RX ADMIN — CARVEDILOL PHOSPHATE 25 MILLIGRAM(S): 80 CAPSULE, EXTENDED RELEASE ORAL at 18:28

## 2018-08-15 RX ADMIN — TAMSULOSIN HYDROCHLORIDE 0.8 MILLIGRAM(S): 0.4 CAPSULE ORAL at 21:21

## 2018-08-15 RX ADMIN — CHLORHEXIDINE GLUCONATE 1 APPLICATION(S): 213 SOLUTION TOPICAL at 05:37

## 2018-08-15 RX ADMIN — Medication 100 MILLIGRAM(S): at 21:21

## 2018-08-15 RX ADMIN — SENNA PLUS 2 TABLET(S): 8.6 TABLET ORAL at 21:20

## 2018-08-15 RX ADMIN — CHLORHEXIDINE GLUCONATE 1 APPLICATION(S): 213 SOLUTION TOPICAL at 18:42

## 2018-08-15 RX ADMIN — Medication 2 UNIT(S): at 09:34

## 2018-08-15 RX ADMIN — Medication 1: at 09:34

## 2018-08-15 RX ADMIN — POLYETHYLENE GLYCOL 3350 17 GRAM(S): 17 POWDER, FOR SOLUTION ORAL at 12:49

## 2018-08-15 RX ADMIN — Medication 2: at 12:48

## 2018-08-15 RX ADMIN — Medication 4 UNIT(S): at 17:27

## 2018-08-15 RX ADMIN — APIXABAN 2.5 MILLIGRAM(S): 2.5 TABLET, FILM COATED ORAL at 05:31

## 2018-08-15 RX ADMIN — Medication 100 MILLIGRAM(S): at 05:31

## 2018-08-15 RX ADMIN — CEFTRIAXONE 100 GRAM(S): 500 INJECTION, POWDER, FOR SOLUTION INTRAMUSCULAR; INTRAVENOUS at 21:19

## 2018-08-15 RX ADMIN — INSULIN GLARGINE 8 UNIT(S): 100 INJECTION, SOLUTION SUBCUTANEOUS at 21:20

## 2018-08-15 RX ADMIN — CARVEDILOL PHOSPHATE 25 MILLIGRAM(S): 80 CAPSULE, EXTENDED RELEASE ORAL at 05:31

## 2018-08-15 RX ADMIN — Medication 2 UNIT(S): at 12:48

## 2018-08-15 RX ADMIN — APIXABAN 2.5 MILLIGRAM(S): 2.5 TABLET, FILM COATED ORAL at 18:28

## 2018-08-15 NOTE — PROGRESS NOTE ADULT - SUBJECTIVE AND OBJECTIVE BOX
[facility] DIVISION OF KIDNEY DISEASES AND HYPERTENSION -- FOLLOW UP NOTE  --------------------------------------------------------------------------------  HPI: 89 year old man with a history of MI (1980), HFrEF (EF 20%), paroxysmal A-Fib, AICD, CVA (LLE weakness), HTN, T2DM, and CLL admitted for complicated UTI. Nephrology consulted for NADIA. Brought in after caretaker noted cloudy urine and tachycardia. Patient was discharged on 7/6/18 to subacute rehab with [place] in [facility] following failed trial of void prior to discharge.   Upon review of [facility] SCr WNL 0.69 on 7/4/18. At admission SCr elevated to 1.8 and 1.91 on 8/10/18. Receiving ceftriaxone. Urine cultures growing proteus sp. Pt had episode of chest pain and SOB likely aspiration pneumonitis on 8/14/15    Pt was seen and examined at bedside. Patient reports feeling better. Denies chest pain, SOB, and abdominal pain.       PAST HISTORY  --------------------------------------------------------------------------------  No significant changes to PMH, PSH, FHx, SHx, unless otherwise noted    ALLERGIES & MEDICATIONS  --------------------------------------------------------------------------------  Allergies    Cipro (Other)  fluoroquinolone antibiotics (Unknown)  latex (Unknown)    Intolerances    Standing Inpatient Medications  apixaban 2.5 milliGRAM(s) Oral every 12 hours  carvedilol 25 milliGRAM(s) Oral every 12 hours  cefTRIAXone   IVPB 1 Gram(s) IV Intermittent every 24 hours  chlorhexidine 4% Liquid 1 Application(s) Topical two times a day  dextrose 5%. 1000 milliLiter(s) IV Continuous <Continuous>  dextrose 50% Injectable 12.5 Gram(s) IV Push once  dextrose 50% Injectable 25 Gram(s) IV Push once  dextrose 50% Injectable 25 Gram(s) IV Push once  docusate sodium 100 milliGRAM(s) Oral three times a day  finasteride 5 milliGRAM(s) Oral daily  insulin glargine Injectable (LANTUS) 8 Unit(s) SubCutaneous at bedtime  insulin lispro (HumaLOG) corrective regimen sliding scale   SubCutaneous three times a day before meals  insulin lispro (HumaLOG) corrective regimen sliding scale   SubCutaneous at bedtime  insulin lispro Injectable (HumaLOG) 2 Unit(s) SubCutaneous three times a day before meals  polyethylene glycol 3350 17 Gram(s) Oral daily  senna 2 Tablet(s) Oral at bedtime  tamsulosin 0.8 milliGRAM(s) Oral at bedtime    PRN Inpatient Medications  dextrose 40% Gel 15 Gram(s) Oral once PRN  glucagon  Injectable 1 milliGRAM(s) IntraMuscular once PRN      REVIEW OF SYSTEMS  --------------------------------------------------------------------------------  Gen: No fevers  Skin: No rashes  Head/Eyes/Ears: Normal hearing  Respiratory: No dyspnea  CV: No chest pain  GI: No abdominal pain,  : madelyn WHITEK: No  edema    VITALS/PHYSICAL EXAM  --------------------------------------------------------------------------------  T(C): 36.7 (08-15-18 @ 05:08), Max: 37 (08-14-18 @ 17:25)  HR: 73 (08-15-18 @ 05:08) (65 - 78)  BP: 139/69 (08-15-18 @ 05:08) (120/65 - 146/66)  RR: 20 (08-15-18 @ 05:08) (17 - 20)  SpO2: 98% (08-15-18 @ 05:08) (96% - 98%)  Wt(kg): --        08-14-18 @ 07:01  -  08-15-18 @ 07:00  --------------------------------------------------------  IN: 0 mL / OUT: 2650 mL / NET: -2650 mL      Physical Exam:  	Gen: NAD  	HEENT: Normal mucous membranes   	Pulm: CTA B/L  	CV: S1S2  	Abd: Soft, +BS   	Ext: No LE edema B/L  	Neuro: Awake  	Skin: No rash              : joshi catheter noted with clear urine    LABS/STUDIES  --------------------------------------------------------------------------------    138  |  106  |  42  ----------------------------<  209      [08-14-18 @ 06:30]  4.3   |  21  |  1.65        Ca     8.0     [08-14-18 @ 06:30]      Mg     2.0     [08-14-18 @ 06:30]      Phos  2.6     [08-14-18 @ 06:30]    Creatinine Trend:  SCr 1.65 [08-14 @ 06:30]  SCr 1.70 [08-13 @ 07:15]  SCr 1.90 [08-12 @ 06:00]  SCr 1.82 [08-11 @ 05:00]  SCr 1.91 [08-10 @ 06:16]

## 2018-08-15 NOTE — PROGRESS NOTE ADULT - PROBLEM SELECTOR PLAN 1
- Patient had chest pain and productive cough since taking AM meds. EKG showed no new changes. CXR shows right lower lobe infiltrates due to aspiration pneumonitis.   - Patient has been afebrile for 24 hours  - hold antibiotics for now.  - monitor for fevers

## 2018-08-15 NOTE — SWALLOW VFSS/MBS ASSESSMENT ADULT - ADDITIONAL RECOMMENDATIONS
This department to continue to follow during this admission as schedule permits. Continued swallowing therapy is recommended upon discharge from [facility] (rehab vs home care vs [facility] outpatient clinic).

## 2018-08-15 NOTE — SWALLOW VFSS/MBS ASSESSMENT ADULT - NS SWALLOW VFSS REC ASPIR MON
fever/change of breathing pattern/oral hygiene/position upright (90Y)/cough/gurgly voice/pneumonia/throat clearing/upper respiratory infection

## 2018-08-15 NOTE — SWALLOW VFSS/MBS ASSESSMENT ADULT - RECOMMENDED FEEDING/EATING TECHNIQUES
oral hygiene/provide rest periods between swallows/check mouth frequently for oral residue/pocketing/hard swallow w/ each bite or sip/no straws/small sips/bites/allow for swallow between intakes/alternate food with liquid/maintain upright posture during/after eating for 30 mins/crush medication (when feasible)/position upright (90 degrees)

## 2018-08-15 NOTE — PROGRESS NOTE ADULT - PROBLEM SELECTOR PLAN 1
Pt with hemodynamically mediated NADIA in the setting of dehydration, poor oral intake with Lasix and Losartan and now with relative hypotension overnight. Upon review of [facility] SCr WNL 0.69 on 7/4/18. At admission SCr elevated to 1.8 on 8/10/18, peaked at 1.90 on 8/12/18 and improved to 1.65 on 8/14/18. Received lasix IV on 8/14/18. Currently non oliguric. Given hematuria and proteinuria reasonable to pursue GN work up. Serological workup including ANCAs, anti-GBM, serum and urine immunofixation, C3 and C4 pending. HepBsAg, HepC Ab non reactive on 8/14/18. Monitor UOP and daily weights. Monitor BMP daily. Avoid nephrotoxic drugs. Dose all medications renally

## 2018-08-15 NOTE — PROGRESS NOTE ADULT - PROBLEM SELECTOR PLAN 8
- DVT prophylaxis: patient on eliquis 2.5 BID.  - PT recommends discharge to a rehab facility but patient would rather go home with home-care. Patient will discuss with family about home care vs rehab.

## 2018-08-15 NOTE — SWALLOW VFSS/MBS ASSESSMENT ADULT - DIAGNOSTIC IMPRESSIONS
1. The patient demonstrated a mild oral dysphagia for puree, honey thick, nectar thick, and thin liquid textures marked by delayed bolus collection, transfer, and AP transit time.  2. The patient demonstrated a mild-moderate oral dysphagia for solid textures marked by prolonged oral manipulation and decreased mastication abilities resulting in delayed bolus collection, transfer, and AP transit. Lingual residue reduced with utilization of a liquid wash.   3. The patient demonstrated a mild pharyngeal dysphagia for puree and solid textures marked by a timely pharyngeal swallow trigger with reduced base of tongue retraction, reduced epiglottic deflection, reduced hyolaryngeal elevation, and reduced pharyngeal contractility. Mild stasis noted along the base of tongue, moderate stasis noted in the vallecula, moderate stasis noted in the pyriforms, and trace stasis noted along the posterior pharyngeal wall. Stasis reduced with a spontaneous re-swallow and utilization of a liquid wash. No laryngeal penetration/aspiration.   4. The patient demonstrated a mild pharyngeal dysphagia for honey thick and nectar thick liquid textures marked by a timely pharyngeal swallow trigger with reduced base of tongue retraction., reduced epiglottic deflection, reduced hyolaryngeal elevation, and reduced pharyngeal contractility. Trace stasis noted along the base of tongue, mild stasis noted in the vallecula, mild stasis noted in the pyriforms, and trace stasis noted along the posterior pharyngeal wall which reduced with a subsequent swallow. No laryngeal penetration/aspiration observed.   5. The patient demonstrated a moderate-severe pharyngeal dysphagia for thin liquid textures marked by a delayed pharyngeal swallow trigger with reduced base of tongue retraction, reduced epiglottic deflection, reduced hyolaryngeal elevation, reduced pharyngeal contractility, and incomplete closure of the laryngeal vestibule resulting in silent laryngeal penetration without full retrieval. Absent patient

## 2018-08-15 NOTE — PROGRESS NOTE ADULT - PROBLEM SELECTOR PLAN 3
-patient with elevated Creatinine still despite treatment.  -holding lasix.  -likely 2/2  ATN in setting of complicated UTI possibly due to glomerulonephritis  -creatinine stable at 1.7, continue to monitor avoid nephrotoxins and renally dose medications.  -nephrology recs appreciated, follow up lab work up for glomerulonephritis.   -will follow up in renal clinic as an outpatient. -patient with elevated Creatinine still despite treatment.  -holding lasix.  -likely 2/2  ATN in setting of complicated UTI possibly due to glomerulonephritis  -creatinine stable at 1.7, continue to monitor avoid nephrotoxins and renally dose medications.  -nephrology recs appreciated, follow up lab work up for glomerulonephritis, so far labs negative (ANCA's)

## 2018-08-15 NOTE — SWALLOW VFSS/MBS ASSESSMENT ADULT - RECOMMENDED CONSISTENCY
1. Dysphagia 2 with nectar thick liquids, as tolerated   2. Small bites and small, single cup sips  3. Alternate soft solids with nectar thick liquids to assist in pharyngeal clearance  4. Crush medication (when feasible)   5. Upright positioning during all meals response indicative of reduced laryngo-pharyngeal sensation. Trace stasis noted along the base of tongue, in the vallecula, in the pyriforms, and along the posterior pharyngeal wall. Utilization of compensatory strategies were unsuccessful in providing adequate airway protection given patient's difficulty following multi-step directives.     Recommendations:  1. Dysphagia 2 with nectar thick liquids, as tolerated   2. Small bites and small, single cup sips  3. Alternate soft solids with nectar thick liquids to assist in pharyngeal clearance  4. Crush medication (when feasible)   5. Upright positioning during all meals

## 2018-08-15 NOTE — PROGRESS NOTE ADULT - SUBJECTIVE AND OBJECTIVE BOX
Patient is a 89y old  Male who presents with a chief complaint of complicated UTI (13 Aug 2018 14:38)      SUBJECTIVE / OVERNIGHT EVENTS:  Patient seen and examined at bedside. No acute events overnight. He says his cough is improving. He has had no chest pain.     Other Review of Systems Negative.    MEDICATIONS  (STANDING):  apixaban 2.5 milliGRAM(s) Oral every 12 hours  carvedilol 25 milliGRAM(s) Oral every 12 hours  cefTRIAXone   IVPB 1 Gram(s) IV Intermittent every 24 hours  chlorhexidine 4% Liquid 1 Application(s) Topical two times a day  dextrose 5%. 1000 milliLiter(s) (50 mL/Hr) IV Continuous <Continuous>  dextrose 50% Injectable 12.5 Gram(s) IV Push once  dextrose 50% Injectable 25 Gram(s) IV Push once  dextrose 50% Injectable 25 Gram(s) IV Push once  docusate sodium 100 milliGRAM(s) Oral three times a day  finasteride 5 milliGRAM(s) Oral daily  insulin glargine Injectable (LANTUS) 8 Unit(s) SubCutaneous at bedtime  insulin lispro (HumaLOG) corrective regimen sliding scale   SubCutaneous three times a day before meals  insulin lispro (HumaLOG) corrective regimen sliding scale   SubCutaneous at bedtime  insulin lispro Injectable (HumaLOG) 2 Unit(s) SubCutaneous three times a day before meals  polyethylene glycol 3350 17 Gram(s) Oral daily  senna 2 Tablet(s) Oral at bedtime  tamsulosin 0.8 milliGRAM(s) Oral at bedtime    MEDICATIONS  (PRN):  dextrose 40% Gel 15 Gram(s) Oral once PRN Blood Glucose LESS THAN 70 milliGRAM(s)/deciliter  glucagon  Injectable 1 milliGRAM(s) IntraMuscular once PRN Glucose LESS THAN 70 milligrams/deciliter      OBJECTIVE:    Vital Signs Last 24 Hrs  T(C): 36.7 (15 Aug 2018 05:08), Max: 37 (14 Aug 2018 17:25)  T(F): 98 (15 Aug 2018 05:08), Max: 98.6 (14 Aug 2018 17:25)  HR: 73 (15 Aug 2018 05:08) (65 - 78)  BP: 139/69 (15 Aug 2018 05:08) (120/65 - 146/66)  BP(mean): --  RR: 20 (15 Aug 2018 05:08) (17 - 20)  SpO2: 98% (15 Aug 2018 05:08) (96% - 98%)    CAPILLARY BLOOD GLUCOSE      POCT Blood Glucose.: 370 mg/dL (14 Aug 2018 23:19)  POCT Blood Glucose.: 412 mg/dL (14 Aug 2018 22:45)  POCT Blood Glucose.: 345 mg/dL (14 Aug 2018 22:13)  POCT Blood Glucose.: 139 mg/dL (14 Aug 2018 17:13)  POCT Blood Glucose.: 111 mg/dL (14 Aug 2018 12:09)  POCT Blood Glucose.: 197 mg/dL (14 Aug 2018 08:21)    I&O's Summary    14 Aug 2018 07:01  -  15 Aug 2018 07:00  --------------------------------------------------------  IN: 0 mL / OUT: 2650 mL / NET: -2650 mL    PHYSICAL EXAM:  GENERAL: well appearing, less coughing  CHEST/LUNG: course breath sounds in the right lower lobe. Wheezing bilaterally.   HEART: Regular rate and rhythm; No murmurs, rubs, or gallops  ABDOMEN: Soft, Nontender, Nondistended; Bowel sounds present  EXTREMITIES:  2+ Peripheral Pulses, No clubbing, cyanosis, or edema    LABS:    08-14    138  |  106  |  42<H>  ----------------------------<  209<H>  4.3   |  21<L>  |  1.65<H>    Ca    8.0<L>      14 Aug 2018 06:30  Mg     2.0     08-14  Phos  2.6     08-14    Care Discussed with Consultants/Other Providers: yes Patient is a 89y old  Male who presents with a chief complaint of complicated UTI (13 Aug 2018 14:38)      SUBJECTIVE / OVERNIGHT EVENTS:  Patient seen and examined at bedside. No acute events overnight. He says his cough is improving. He has had no chest pain.     Other Review of Systems Negative.    MEDICATIONS  (STANDING):  apixaban 2.5 milliGRAM(s) Oral every 12 hours  carvedilol 25 milliGRAM(s) Oral every 12 hours  cefTRIAXone   IVPB 1 Gram(s) IV Intermittent every 24 hours  chlorhexidine 4% Liquid 1 Application(s) Topical two times a day  dextrose 5%. 1000 milliLiter(s) (50 mL/Hr) IV Continuous <Continuous>  dextrose 50% Injectable 12.5 Gram(s) IV Push once  dextrose 50% Injectable 25 Gram(s) IV Push once  dextrose 50% Injectable 25 Gram(s) IV Push once  docusate sodium 100 milliGRAM(s) Oral three times a day  finasteride 5 milliGRAM(s) Oral daily  insulin glargine Injectable (LANTUS) 8 Unit(s) SubCutaneous at bedtime  insulin lispro (HumaLOG) corrective regimen sliding scale   SubCutaneous three times a day before meals  insulin lispro (HumaLOG) corrective regimen sliding scale   SubCutaneous at bedtime  insulin lispro Injectable (HumaLOG) 2 Unit(s) SubCutaneous three times a day before meals  polyethylene glycol 3350 17 Gram(s) Oral daily  senna 2 Tablet(s) Oral at bedtime  tamsulosin 0.8 milliGRAM(s) Oral at bedtime    MEDICATIONS  (PRN):  dextrose 40% Gel 15 Gram(s) Oral once PRN Blood Glucose LESS THAN 70 milliGRAM(s)/deciliter  glucagon  Injectable 1 milliGRAM(s) IntraMuscular once PRN Glucose LESS THAN 70 milligrams/deciliter      OBJECTIVE:    Vital Signs Last 24 Hrs  T(C): 36.7 (15 Aug 2018 05:08), Max: 37 (14 Aug 2018 17:25)  T(F): 98 (15 Aug 2018 05:08), Max: 98.6 (14 Aug 2018 17:25)  HR: 73 (15 Aug 2018 05:08) (65 - 78)  BP: 139/69 (15 Aug 2018 05:08) (120/65 - 146/66)  BP(mean): --  RR: 20 (15 Aug 2018 05:08) (17 - 20)  SpO2: 98% (15 Aug 2018 05:08) (96% - 98%)    CAPILLARY BLOOD GLUCOSE      POCT Blood Glucose.: 370 mg/dL (14 Aug 2018 23:19)  POCT Blood Glucose.: 412 mg/dL (14 Aug 2018 22:45)  POCT Blood Glucose.: 345 mg/dL (14 Aug 2018 22:13)  POCT Blood Glucose.: 139 mg/dL (14 Aug 2018 17:13)  POCT Blood Glucose.: 111 mg/dL (14 Aug 2018 12:09)  POCT Blood Glucose.: 197 mg/dL (14 Aug 2018 08:21)    I&O's Summary    14 Aug 2018 07:01  -  15 Aug 2018 07:00  --------------------------------------------------------  IN: 0 mL / OUT: 2650 mL / NET: -2650 mL    PHYSICAL EXAM:  GENERAL: well appearing, less coughing  CHEST/LUNG: course breath sounds in the right lower lobe.   HEART: Regular rate and rhythm; No murmurs, rubs, or gallops  ABDOMEN: Soft, Nontender, Nondistended; Bowel sounds present  EXTREMITIES:  2+ Peripheral Pulses, No clubbing, cyanosis, or edema  : joshi in place with clear urine    LABS:    08-14    138  |  106  |  42<H>  ----------------------------<  209<H>  4.3   |  21<L>  |  1.65<H>    Ca    8.0<L>      14 Aug 2018 06:30  Mg     2.0     08-14  Phos  2.6     08-14    Care Discussed with Consultants/Other Providers: yes

## 2018-08-15 NOTE — SWALLOW VFSS/MBS ASSESSMENT ADULT - ORAL PHASE
Delayed oral transit time/Reduced anterior - posterior transport Delayed oral transit time/Residue in oral cavity/Reduced anterior - posterior transport Reduced anterior - posterior transport/Delayed oral transit time

## 2018-08-15 NOTE — PROGRESS NOTE ADULT - PROBLEM SELECTOR PLAN 2
-c/w ceftriaxone day 8/10 for proteus UTI  -Davis exchanged in ED  - renal ultrasound showed no hydronephrosis

## 2018-08-15 NOTE — SWALLOW VFSS/MBS ASSESSMENT ADULT - COMMENTS
The patient was received in the radiology suite this AM, at which time he was alert and cooperative. The patient is known to this department as he was seen for a clinical bedside swallow evaluation on 8/14/18 (please see full report for details), at which time a dysphagia 2 with honey thick liquid diet and a cinesophagram was recommended. Per charting, the patient is an 89 y/o M with hx of MI (1980 no stent), HFrEF (EF 20%), ?AF, AICD, CVA (LLE weakness), HTN, T2DM, and recent ?CLL vs SLL discharged to [facility] from prior admission in July w/ joshi now presenting w/ lower abdominal pain and generalized weakness. Patient diagnosed with UTI. Recent CXR revealed, "Enlarged cardiac silhouette which appears increased in size from the prior image. If clinically indicated, echocardiography could be performed for further evaluation. New hazy right basilar opacity suggesting a layering right pleural effusion with passive atelectasis. New left basilar and retrocardiac opacity which may be due to a left pleural effusion with passive atelectasis, atelectasis of other cause, and/or pneumonia." Discussed results and recommendations from this evaluation with the patient and call out to MD.

## 2018-08-16 DIAGNOSIS — E87.5 HYPERKALEMIA: ICD-10-CM

## 2018-08-16 LAB
BUN SERPL-MCNC: 39 MG/DL — HIGH (ref 7–23)
BUN SERPL-MCNC: 41 MG/DL — HIGH (ref 7–23)
CALCIUM SERPL-MCNC: 8.1 MG/DL — LOW (ref 8.4–10.5)
CALCIUM SERPL-MCNC: 8.7 MG/DL — SIGNIFICANT CHANGE UP (ref 8.4–10.5)
CHLORIDE SERPL-SCNC: 108 MMOL/L — HIGH (ref 98–107)
CHLORIDE SERPL-SCNC: 109 MMOL/L — HIGH (ref 98–107)
CO2 SERPL-SCNC: 17 MMOL/L — LOW (ref 22–31)
CO2 SERPL-SCNC: 22 MMOL/L — SIGNIFICANT CHANGE UP (ref 22–31)
CREAT SERPL-MCNC: 1.48 MG/DL — HIGH (ref 0.5–1.3)
CREAT SERPL-MCNC: 1.64 MG/DL — HIGH (ref 0.5–1.3)
GLUCOSE SERPL-MCNC: 134 MG/DL — HIGH (ref 70–99)
GLUCOSE SERPL-MCNC: 251 MG/DL — HIGH (ref 70–99)
POTASSIUM SERPL-MCNC: 4.1 MMOL/L — SIGNIFICANT CHANGE UP (ref 3.5–5.3)
POTASSIUM SERPL-MCNC: 5.8 MMOL/L — HIGH (ref 3.5–5.3)
POTASSIUM SERPL-SCNC: 4.1 MMOL/L — SIGNIFICANT CHANGE UP (ref 3.5–5.3)
POTASSIUM SERPL-SCNC: 5.8 MMOL/L — HIGH (ref 3.5–5.3)
SODIUM SERPL-SCNC: 134 MMOL/L — LOW (ref 135–145)
SODIUM SERPL-SCNC: 145 MMOL/L — SIGNIFICANT CHANGE UP (ref 135–145)

## 2018-08-16 PROCEDURE — 93010 ELECTROCARDIOGRAM REPORT: CPT

## 2018-08-16 PROCEDURE — 99233 SBSQ HOSP IP/OBS HIGH 50: CPT | Mod: GC

## 2018-08-16 PROCEDURE — 99232 SBSQ HOSP IP/OBS MODERATE 35: CPT | Mod: GC

## 2018-08-16 RX ORDER — DEXTROSE 50 % IN WATER 50 %
50 SYRINGE (ML) INTRAVENOUS ONCE
Qty: 0 | Refills: 0 | Status: COMPLETED | OUTPATIENT
Start: 2018-08-16 | End: 2018-08-16

## 2018-08-16 RX ORDER — SODIUM POLYSTYRENE SULFONATE 4.1 MEQ/G
30 POWDER, FOR SUSPENSION ORAL ONCE
Qty: 0 | Refills: 0 | Status: COMPLETED | OUTPATIENT
Start: 2018-08-16 | End: 2018-08-16

## 2018-08-16 RX ORDER — INSULIN LISPRO 100/ML
4 VIAL (ML) SUBCUTANEOUS
Qty: 0 | Refills: 0 | COMMUNITY
Start: 2018-08-16

## 2018-08-16 RX ORDER — CEFTRIAXONE 500 MG/1
1 INJECTION, POWDER, FOR SOLUTION INTRAMUSCULAR; INTRAVENOUS
Qty: 0 | Refills: 0 | COMMUNITY
Start: 2018-08-16

## 2018-08-16 RX ORDER — FUROSEMIDE 40 MG
1 TABLET ORAL
Qty: 0 | Refills: 0 | COMMUNITY

## 2018-08-16 RX ORDER — CARVEDILOL PHOSPHATE 80 MG/1
1 CAPSULE, EXTENDED RELEASE ORAL
Qty: 0 | Refills: 0 | COMMUNITY
Start: 2018-08-16

## 2018-08-16 RX ORDER — INSULIN GLARGINE 100 [IU]/ML
8 INJECTION, SOLUTION SUBCUTANEOUS
Qty: 0 | Refills: 0 | COMMUNITY
Start: 2018-08-16

## 2018-08-16 RX ORDER — CHLORHEXIDINE GLUCONATE 213 G/1000ML
1 SOLUTION TOPICAL
Qty: 0 | Refills: 0 | COMMUNITY
Start: 2018-08-16

## 2018-08-16 RX ORDER — INSULIN HUMAN 100 [IU]/ML
5 INJECTION, SOLUTION SUBCUTANEOUS ONCE
Qty: 0 | Refills: 0 | Status: COMPLETED | OUTPATIENT
Start: 2018-08-16 | End: 2018-08-16

## 2018-08-16 RX ADMIN — Medication 4 UNIT(S): at 12:20

## 2018-08-16 RX ADMIN — CARVEDILOL PHOSPHATE 25 MILLIGRAM(S): 80 CAPSULE, EXTENDED RELEASE ORAL at 05:40

## 2018-08-16 RX ADMIN — Medication 100 MILLIGRAM(S): at 05:40

## 2018-08-16 RX ADMIN — Medication 50 MILLILITER(S): at 12:21

## 2018-08-16 RX ADMIN — CHLORHEXIDINE GLUCONATE 1 APPLICATION(S): 213 SOLUTION TOPICAL at 18:03

## 2018-08-16 RX ADMIN — SODIUM POLYSTYRENE SULFONATE 30 GRAM(S): 4.1 POWDER, FOR SUSPENSION ORAL at 12:21

## 2018-08-16 RX ADMIN — TAMSULOSIN HYDROCHLORIDE 0.8 MILLIGRAM(S): 0.4 CAPSULE ORAL at 22:35

## 2018-08-16 RX ADMIN — CARVEDILOL PHOSPHATE 25 MILLIGRAM(S): 80 CAPSULE, EXTENDED RELEASE ORAL at 18:03

## 2018-08-16 RX ADMIN — INSULIN GLARGINE 8 UNIT(S): 100 INJECTION, SOLUTION SUBCUTANEOUS at 22:35

## 2018-08-16 RX ADMIN — Medication 4 UNIT(S): at 18:03

## 2018-08-16 RX ADMIN — POLYETHYLENE GLYCOL 3350 17 GRAM(S): 17 POWDER, FOR SOLUTION ORAL at 12:21

## 2018-08-16 RX ADMIN — Medication 2: at 22:33

## 2018-08-16 RX ADMIN — CHLORHEXIDINE GLUCONATE 1 APPLICATION(S): 213 SOLUTION TOPICAL at 05:40

## 2018-08-16 RX ADMIN — INSULIN HUMAN 5 UNIT(S): 100 INJECTION, SOLUTION SUBCUTANEOUS at 12:28

## 2018-08-16 RX ADMIN — APIXABAN 2.5 MILLIGRAM(S): 2.5 TABLET, FILM COATED ORAL at 05:39

## 2018-08-16 RX ADMIN — Medication 100 MILLIGRAM(S): at 13:11

## 2018-08-16 RX ADMIN — CEFTRIAXONE 100 GRAM(S): 500 INJECTION, POWDER, FOR SOLUTION INTRAMUSCULAR; INTRAVENOUS at 22:33

## 2018-08-16 RX ADMIN — Medication 3: at 12:20

## 2018-08-16 RX ADMIN — FINASTERIDE 5 MILLIGRAM(S): 5 TABLET, FILM COATED ORAL at 12:21

## 2018-08-16 RX ADMIN — APIXABAN 2.5 MILLIGRAM(S): 2.5 TABLET, FILM COATED ORAL at 18:03

## 2018-08-16 NOTE — PROGRESS NOTE ADULT - PROBLEM SELECTOR PLAN 1
Pt with hemodynamically mediated NADIA in the setting of dehydration, poor oral intake with Lasix and Losartan and now with relative hypotension overnight. Upon review of [facility] SCr WNL 0.69 on 7/4/18. At admission SCr elevated to 1.8 on 8/10/18, peaked at 1.90 on 8/12/18 and improved to 1.65 on 8/14/18. Received lasix IV on 8/14/18. Currently non oliguric. Given hematuria and proteinuria reasonable to pursue GN work up. Serological workup including ANCAs, anti-GBM, serum and urine immunofixation, C3 and C4 pending. HepBsAg, HepC Ab non reactive on 8/14/18. Monitor UOP and daily weights. Monitor BMP daily. Avoid nephrotoxic drugs. Dose all medications renally Pt with hemodynamically mediated NADIA in the setting of dehydration, poor oral intake with Lasix and Losartan and now with relative hypotension overnight. Upon review of [place] [facility] SCr WNL 0.69 on 7/4/18. At admission SCr elevated to 1.8 on 8/10/18, peaked at 1.90 on 8/12/18 and improved to 1.65 on 8/14/18. Received lasix IV on 8/14/18. Currently non oliguric. Given hematuria and proteinuria reasonable to pursue GN work up. Serological workup including ANCAs, anti-GBM, urine immunofixation, C3 and C4 pending. HepBsAg, HepC Ab non reactive on 8/14/18. serum immunoglobulin WNL on 8/9/18. Monitor UOP and daily weights. Monitor BMP daily. Avoid nephrotoxic drugs. Dose all medications renally

## 2018-08-16 NOTE — PROGRESS NOTE ADULT - PROBLEM SELECTOR PLAN 3
-patient with elevated Creatinine still despite treatment.  -holding lasix.  -likely 2/2  ATN in setting of complicated UTI possibly due to glomerulonephritis  -creatinine downtrending at 1.48, continue to monitor avoid nephrotoxins and renally dose medications.  -nephrology recs appreciated, follow up lab work up for glomerulonephritis, so far labs negative (ANCA's) -c/w ceftriaxone day 9/10 for proteus UTI  -Davis exchanged in ED  - renal ultrasound showed no hydronephrosis

## 2018-08-16 NOTE — PROGRESS NOTE ADULT - PROBLEM SELECTOR PLAN 2
-c/w ceftriaxone day 9/10 for proteus UTI  -Davis exchanged in ED  - renal ultrasound showed no hydronephrosis - Patient had chest pain and productive cough since taking AM meds. EKG showed no new changes. CXR shows right lower lobe infiltrates due to aspiration pneumonitis.   - Patient has been afebrile for 48 hours  - hold antibiotics for now.  - monitor for fevers

## 2018-08-16 NOTE — PROGRESS NOTE ADULT - PROBLEM SELECTOR PLAN 1
- Patient had chest pain and productive cough since taking AM meds. EKG showed no new changes. CXR shows right lower lobe infiltrates due to aspiration pneumonitis.   - Patient has been afebrile for 48 hours  - hold antibiotics for now.  - monitor for fevers patient with K=5.8, non-hemolyzed  -given D5/insulin/kayexelate.   -ECG reviewed, no peaked T waves  -repeat BMP stat at 4PM  -change diet to low K, patient has been drinking orange and cranberry juice recently.

## 2018-08-16 NOTE — PROGRESS NOTE ADULT - PROBLEM SELECTOR PLAN 5
- has HFrEF with EF of 20% with AICD placement  -patient appears euvolemic at this time.  -continue with coreg, hold ACE-i in setting of increased Cr -Dr. Abad is the patients private oncologist.   - ordered FISH studies as well as immunoglobulin levels as per onc recs.

## 2018-08-16 NOTE — PROGRESS NOTE ADULT - SUBJECTIVE AND OBJECTIVE BOX
Patient is a 89y old  Male who presents with a chief complaint of complicated UTI (13 Aug 2018 14:38)      SUBJECTIVE / OVERNIGHT EVENTS:  Patient seen and examined at bedside. No acute events overnight. His cough has resolved but is complaining of hoarseness. He is agreeable to go to rehab today.     Other Review of Systems Negative.    MEDICATIONS  (STANDING):  apixaban 2.5 milliGRAM(s) Oral every 12 hours  carvedilol 25 milliGRAM(s) Oral every 12 hours  cefTRIAXone   IVPB 1 Gram(s) IV Intermittent every 24 hours  chlorhexidine 4% Liquid 1 Application(s) Topical two times a day  dextrose 5%. 1000 milliLiter(s) (50 mL/Hr) IV Continuous <Continuous>  dextrose 50% Injectable 12.5 Gram(s) IV Push once  dextrose 50% Injectable 25 Gram(s) IV Push once  dextrose 50% Injectable 25 Gram(s) IV Push once  docusate sodium 100 milliGRAM(s) Oral three times a day  finasteride 5 milliGRAM(s) Oral daily  insulin glargine Injectable (LANTUS) 8 Unit(s) SubCutaneous at bedtime  insulin lispro (HumaLOG) corrective regimen sliding scale   SubCutaneous three times a day before meals  insulin lispro (HumaLOG) corrective regimen sliding scale   SubCutaneous at bedtime  insulin lispro Injectable (HumaLOG) 4 Unit(s) SubCutaneous three times a day before meals  polyethylene glycol 3350 17 Gram(s) Oral daily  senna 2 Tablet(s) Oral at bedtime  tamsulosin 0.8 milliGRAM(s) Oral at bedtime    MEDICATIONS  (PRN):  dextrose 40% Gel 15 Gram(s) Oral once PRN Blood Glucose LESS THAN 70 milliGRAM(s)/deciliter  glucagon  Injectable 1 milliGRAM(s) IntraMuscular once PRN Glucose LESS THAN 70 milligrams/deciliter      OBJECTIVE:    Vital Signs Last 24 Hrs  T(C): 36.8 (16 Aug 2018 05:13), Max: 36.8 (16 Aug 2018 05:13)  T(F): 98.3 (16 Aug 2018 05:13), Max: 98.3 (16 Aug 2018 05:13)  HR: 74 (16 Aug 2018 05:13) (70 - 78)  BP: 108/61 (16 Aug 2018 05:13) (108/61 - 131/66)  BP(mean): --  RR: 16 (16 Aug 2018 05:13) (16 - 20)  SpO2: 97% (16 Aug 2018 05:13) (96% - 100%)    CAPILLARY BLOOD GLUCOSE      POCT Blood Glucose.: 126 mg/dL (16 Aug 2018 08:14)  POCT Blood Glucose.: 189 mg/dL (15 Aug 2018 21:15)  POCT Blood Glucose.: 112 mg/dL (15 Aug 2018 16:59)  POCT Blood Glucose.: 221 mg/dL (15 Aug 2018 12:26)    I&O's Summary    15 Aug 2018 07:01  -  16 Aug 2018 07:00  --------------------------------------------------------  IN: 0 mL / OUT: 975 mL / NET: -975 mL    16 Aug 2018 07:01  -  16 Aug 2018 11:41  --------------------------------------------------------  IN: 0 mL / OUT: 100 mL / NET: -100 mL        PHYSICAL EXAM:  GENERAL: well appearing, less coughing  CHEST/LUNG: clear breath sound bilaterally, no crackles, no wheezing  HEART: Regular rate and rhythm; No murmurs, rubs, or gallops  ABDOMEN: Soft, Nontender, Nondistended; Bowel sounds present  EXTREMITIES:  2+ Peripheral Pulses, No clubbing, cyanosis, or edema  : joshi in place with clear urine    LABS:    08-16    134<L>  |  108<H>  |  39<H>  ----------------------------<  251<H>  5.8<H>   |  17<L>  |  1.48<H>    Ca    8.1<L>      16 Aug 2018 11:00 Patient is a 89y old  Male who presents with a chief complaint of complicated UTI (13 Aug 2018 14:38)      SUBJECTIVE / OVERNIGHT EVENTS:  Patient seen and examined at bedside. No acute events overnight. His cough has resolved but is complaining of hoarseness. He is agreeable to go to rehab today.     Other Review of Systems Negative.    MEDICATIONS  (STANDING):  apixaban 2.5 milliGRAM(s) Oral every 12 hours  carvedilol 25 milliGRAM(s) Oral every 12 hours  cefTRIAXone   IVPB 1 Gram(s) IV Intermittent every 24 hours  chlorhexidine 4% Liquid 1 Application(s) Topical two times a day  dextrose 5%. 1000 milliLiter(s) (50 mL/Hr) IV Continuous <Continuous>  dextrose 50% Injectable 12.5 Gram(s) IV Push once  dextrose 50% Injectable 25 Gram(s) IV Push once  dextrose 50% Injectable 25 Gram(s) IV Push once  docusate sodium 100 milliGRAM(s) Oral three times a day  finasteride 5 milliGRAM(s) Oral daily  insulin glargine Injectable (LANTUS) 8 Unit(s) SubCutaneous at bedtime  insulin lispro (HumaLOG) corrective regimen sliding scale   SubCutaneous three times a day before meals  insulin lispro (HumaLOG) corrective regimen sliding scale   SubCutaneous at bedtime  insulin lispro Injectable (HumaLOG) 4 Unit(s) SubCutaneous three times a day before meals  polyethylene glycol 3350 17 Gram(s) Oral daily  senna 2 Tablet(s) Oral at bedtime  tamsulosin 0.8 milliGRAM(s) Oral at bedtime    MEDICATIONS  (PRN):  dextrose 40% Gel 15 Gram(s) Oral once PRN Blood Glucose LESS THAN 70 milliGRAM(s)/deciliter  glucagon  Injectable 1 milliGRAM(s) IntraMuscular once PRN Glucose LESS THAN 70 milligrams/deciliter      OBJECTIVE:    Vital Signs Last 24 Hrs  T(C): 36.8 (16 Aug 2018 05:13), Max: 36.8 (16 Aug 2018 05:13)  T(F): 98.3 (16 Aug 2018 05:13), Max: 98.3 (16 Aug 2018 05:13)  HR: 74 (16 Aug 2018 05:13) (70 - 78)  BP: 108/61 (16 Aug 2018 05:13) (108/61 - 131/66)  BP(mean): --  RR: 16 (16 Aug 2018 05:13) (16 - 20)  SpO2: 97% (16 Aug 2018 05:13) (96% - 100%)    CAPILLARY BLOOD GLUCOSE      POCT Blood Glucose.: 126 mg/dL (16 Aug 2018 08:14)  POCT Blood Glucose.: 189 mg/dL (15 Aug 2018 21:15)  POCT Blood Glucose.: 112 mg/dL (15 Aug 2018 16:59)  POCT Blood Glucose.: 221 mg/dL (15 Aug 2018 12:26)    I&O's Summary    15 Aug 2018 07:01  -  16 Aug 2018 07:00  --------------------------------------------------------  IN: 0 mL / OUT: 975 mL / NET: -975 mL    16 Aug 2018 07:01  -  16 Aug 2018 11:41  --------------------------------------------------------  IN: 0 mL / OUT: 100 mL / NET: -100 mL        PHYSICAL EXAM:  GENERAL: well appearing, less coughing  CHEST/LUNG: clear breath sound bilaterally, no crackles, no wheezing  HEART: Regular rate and rhythm; No murmurs, rubs, or gallops  ABDOMEN: Soft, Nontender, Nondistended; Bowel sounds present  EXTREMITIES:  2+ Peripheral Pulses, No clubbing, cyanosis, or edema  : joshi in place with clear urine    LABS:    08-16    134<L>  |  108<H>  |  39<H>  ----------------------------<  251<H>  5.8<H>   |  17<L>  |  1.48<H>    Ca    8.1<L>      16 Aug 2018 11:00      [place] notes from: Nephrology    ECG personally reviewed Paced wtih no peaked T waves.

## 2018-08-16 NOTE — PROGRESS NOTE ADULT - ASSESSMENT
89M with hx MI (1980 no stent), HFrEF (EF 20%), paroxysmal A-Fib, AICD, CVA (LLE weakness), HTN, T2DM, and recent diagnosis of CLL, was admitted to the hospital for a complicated UTI 2/2 proteus and NADIA that is now improving, complicated by aspiration pneumonitis. 89M with hx MI (1980 no stent), HFrEF (EF 20%), paroxysmal A-Fib, AICD, CVA (LLE weakness), HTN, T2DM, and recent diagnosis of CLL, was admitted to the hospital for a complicated UTI 2/2 proteus and NADIA that is now improving, complicated by aspiration pneumonitis and today with hyperkalemia

## 2018-08-16 NOTE — PROGRESS NOTE ADULT - SUBJECTIVE AND OBJECTIVE BOX
[facility] DIVISION OF KIDNEY DISEASES AND HYPERTENSION -- FOLLOW UP NOTE  --------------------------------------------------------------------------------  HPI: 89 year old man with a history of MI (1980), HFrEF (EF 20%), paroxysmal A-Fib, AICD, CVA (LLE weakness), HTN, T2DM, and CLL admitted for complicated UTI. Nephrology consulted for NADIA. Brought in after caretaker noted cloudy urine and tachycardia. Patient was discharged on 7/6/18 to subacute rehab with [place] in [facility] following failed trial of void prior to discharge.   Upon review of [facility] SCr WNL 0.69 on 7/4/18. At admission SCr elevated to 1.8 and 1.91 on 8/10/18. Receiving ceftriaxone. Urine cultures growing proteus sp. Pt had episode of chest pain and SOB likely aspiration pneumonitis on 8/14/15    Pt was seen and examined at bedside. Patient reports feeling tired otherwise no complains, . Denies chest pain, SOB, and abdominal pain.       PAST HISTORY  --------------------------------------------------------------------------------  No significant changes to PMH, PSH, FHx, SHx, unless otherwise noted    ALLERGIES & MEDICATIONS  --------------------------------------------------------------------------------  Allergies    Cipro (Other)  fluoroquinolone antibiotics (Unknown)  latex (Unknown)    Intolerances      Standing Inpatient Medications  apixaban 2.5 milliGRAM(s) Oral every 12 hours  carvedilol 25 milliGRAM(s) Oral every 12 hours  cefTRIAXone   IVPB 1 Gram(s) IV Intermittent every 24 hours  chlorhexidine 4% Liquid 1 Application(s) Topical two times a day  dextrose 5%. 1000 milliLiter(s) IV Continuous <Continuous>  dextrose 50% Injectable 12.5 Gram(s) IV Push once  dextrose 50% Injectable 25 Gram(s) IV Push once  dextrose 50% Injectable 25 Gram(s) IV Push once  docusate sodium 100 milliGRAM(s) Oral three times a day  finasteride 5 milliGRAM(s) Oral daily  insulin glargine Injectable (LANTUS) 8 Unit(s) SubCutaneous at bedtime  insulin lispro (HumaLOG) corrective regimen sliding scale   SubCutaneous three times a day before meals  insulin lispro (HumaLOG) corrective regimen sliding scale   SubCutaneous at bedtime  insulin lispro Injectable (HumaLOG) 4 Unit(s) SubCutaneous three times a day before meals  polyethylene glycol 3350 17 Gram(s) Oral daily  senna 2 Tablet(s) Oral at bedtime  tamsulosin 0.8 milliGRAM(s) Oral at bedtime    PRN Inpatient Medications  dextrose 40% Gel 15 Gram(s) Oral once PRN  glucagon  Injectable 1 milliGRAM(s) IntraMuscular once PRN      REVIEW OF SYSTEMS  --------------------------------------------------------------------------------  Gen: No fevers  Skin: No rashes  Head/Eyes/Ears: Normal hearing  Respiratory: No dyspnea  CV: No chest pain  GI: No abdominal pain,  : joshi+  MSK: No  edema    VITALS/PHYSICAL EXAM  --------------------------------------------------------------------------------  T(C): 36.8 (08-16-18 @ 05:13), Max: 36.8 (08-16-18 @ 05:13)  HR: 74 (08-16-18 @ 05:13) (70 - 78)  BP: 108/61 (08-16-18 @ 05:13) (108/61 - 131/66)  RR: 16 (08-16-18 @ 05:13) (16 - 20)  SpO2: 97% (08-16-18 @ 05:13) (96% - 100%)  Wt(kg): --        08-15-18 @ 07:01  -  08-16-18 @ 07:00  --------------------------------------------------------  IN: 0 mL / OUT: 975 mL / NET: -975 mL      Physical Exam:  	Gen: NAD  	HEENT: Normal mucous membranes   	Pulm: CTA B/L  	CV: S1S2  	Abd: Soft, +BS   	Ext: No LE edema B/L  	Neuro: Awake  	Skin: No rash              : joshi catheter noted with clear urine    LABS/STUDIES  --------------------------------------------------------------------------------  Creatinine Trend:  SCr 1.65 [08-14 @ 06:30]  SCr 1.70 [08-13 @ 07:15]  SCr 1.90 [08-12 @ 06:00]  SCr 1.82 [08-11 @ 05:00]  SCr 1.91 [08-10 @ 06:16]

## 2018-08-16 NOTE — PROGRESS NOTE ADULT - PROBLEM SELECTOR PLAN 4
-Dr. Abad is the patients private oncologist.   - ordered FISH studies as well as immunoglobulin levels as per onc recs. -patient with elevated Creatinine still despite treatment.  -holding lasix.  -likely 2/2  ATN in setting of complicated UTI possibly due to glomerulonephritis  -creatinine downtrending at 1.48, continue to monitor avoid nephrotoxins and renally dose medications.  -nephrology recs appreciated, follow up lab work up for glomerulonephritis, so far labs negative (ANCA's)

## 2018-08-16 NOTE — PROGRESS NOTE ADULT - PROBLEM SELECTOR PLAN 6
- continue eliquis 2.5 BID, beta blocker - has HFrEF with EF of 20% with AICD placement  -patient appears euvolemic at this time.  -continue with coreg, hold ACE-i in setting of increased Cr

## 2018-08-16 NOTE — PROGRESS NOTE ADULT - PROBLEM SELECTOR PLAN 7
- currently on sliding scale and lantus 8, humalog 2  - monitor FS. - continue eliquis 2.5 BID, beta blocker

## 2018-08-17 PROCEDURE — 99232 SBSQ HOSP IP/OBS MODERATE 35: CPT | Mod: GC

## 2018-08-17 RX ORDER — FUROSEMIDE 40 MG
20 TABLET ORAL ONCE
Qty: 0 | Refills: 0 | Status: COMPLETED | OUTPATIENT
Start: 2018-08-17 | End: 2018-08-17

## 2018-08-17 RX ORDER — FUROSEMIDE 40 MG
1 TABLET ORAL
Qty: 0 | Refills: 0 | COMMUNITY
Start: 2018-08-17

## 2018-08-17 RX ORDER — INSULIN LISPRO 100/ML
0 VIAL (ML) SUBCUTANEOUS
Qty: 0 | Refills: 0 | COMMUNITY
Start: 2018-08-17

## 2018-08-17 RX ADMIN — CARVEDILOL PHOSPHATE 25 MILLIGRAM(S): 80 CAPSULE, EXTENDED RELEASE ORAL at 17:22

## 2018-08-17 RX ADMIN — Medication 4 UNIT(S): at 12:28

## 2018-08-17 RX ADMIN — FINASTERIDE 5 MILLIGRAM(S): 5 TABLET, FILM COATED ORAL at 12:28

## 2018-08-17 RX ADMIN — Medication 20 MILLIGRAM(S): at 12:33

## 2018-08-17 RX ADMIN — TAMSULOSIN HYDROCHLORIDE 0.8 MILLIGRAM(S): 0.4 CAPSULE ORAL at 22:05

## 2018-08-17 RX ADMIN — CHLORHEXIDINE GLUCONATE 1 APPLICATION(S): 213 SOLUTION TOPICAL at 06:26

## 2018-08-17 RX ADMIN — Medication 4 UNIT(S): at 17:23

## 2018-08-17 RX ADMIN — Medication 4 UNIT(S): at 08:58

## 2018-08-17 RX ADMIN — INSULIN GLARGINE 8 UNIT(S): 100 INJECTION, SOLUTION SUBCUTANEOUS at 22:06

## 2018-08-17 RX ADMIN — Medication 1: at 08:58

## 2018-08-17 RX ADMIN — Medication 1: at 17:22

## 2018-08-17 RX ADMIN — APIXABAN 2.5 MILLIGRAM(S): 2.5 TABLET, FILM COATED ORAL at 06:26

## 2018-08-17 RX ADMIN — CHLORHEXIDINE GLUCONATE 1 APPLICATION(S): 213 SOLUTION TOPICAL at 17:22

## 2018-08-17 RX ADMIN — SENNA PLUS 2 TABLET(S): 8.6 TABLET ORAL at 22:05

## 2018-08-17 RX ADMIN — APIXABAN 2.5 MILLIGRAM(S): 2.5 TABLET, FILM COATED ORAL at 17:21

## 2018-08-17 RX ADMIN — Medication 2: at 12:28

## 2018-08-17 RX ADMIN — Medication 100 MILLIGRAM(S): at 22:05

## 2018-08-17 RX ADMIN — CARVEDILOL PHOSPHATE 25 MILLIGRAM(S): 80 CAPSULE, EXTENDED RELEASE ORAL at 06:26

## 2018-08-17 NOTE — PROGRESS NOTE ADULT - PROBLEM SELECTOR PLAN 3
-c/w ceftriaxone day 10/10 for proteus UTI  -Davis exchanged in ED  - renal ultrasound showed no hydronephrosis

## 2018-08-17 NOTE — PROGRESS NOTE ADULT - PROBLEM SELECTOR PLAN 1
Pt with hemodynamically mediated NADIA in the setting of dehydration, poor oral intake with Lasix and Losartan and now with relative hypotension overnight. Upon review of [facility] SCr WNL 0.69 on 7/4/18. At admission SCr elevated to 1.8 on 8/10/18, peaked at 1.90 on 8/12/18 and improved to 1.6 on 8/16/18. Received lasix IV on 8/14/18. Currently non oliguric. Given hematuria and proteinuria reasonable to pursue GN work up. Serological workup including ANCAs, anti-GBM, urine immunofixation, C3 and C4 pending. HepBsAg, HepC Ab non reactive on 8/14/18. serum immunoglobulin WNL on 8/9/18. Monitor UOP and daily weights. Monitor BMP daily. Avoid nephrotoxic drugs. Dose all medications renally

## 2018-08-17 NOTE — PROGRESS NOTE ADULT - PROBLEM SELECTOR PLAN 6
- has HFrEF with EF of 20% with AICD placement  -patient appears euvolemic at this time.  -continue with coreg, hold ACE-i in setting of increased Cr  - discharge with lasix 20 mg PO every other day

## 2018-08-17 NOTE — PROGRESS NOTE ADULT - SUBJECTIVE AND OBJECTIVE BOX
[facility] DIVISION OF KIDNEY DISEASES AND HYPERTENSION -- FOLLOW UP NOTE  --------------------------------------------------------------------------------  HPI: 89 year old man with a history of MI (1980), HFrEF (EF 20%), paroxysmal A-Fib, AICD, CVA (LLE weakness), HTN, T2DM, and CLL admitted for complicated UTI. Nephrology consulted for NADIA. Brought in after caretaker noted cloudy urine and tachycardia. Patient was discharged on 7/6/18 to subacute rehab with [facility] in [facility] following failed trial of void prior to discharge.   Upon review of [facility] SCr WNL 0.69 on 7/4/18. At admission SCr elevated to 1.8 and 1.91 on 8/10/18. Receiving ceftriaxone. Urine cultures growing proteus sp. Pt had episode of chest pain and SOB likely aspiration pneumonitis on 8/14/15    Pt was seen and examined at bedside. Patient reports feeling well, cough improved, requesting orange juice. Denies chest pain, SOB, and abdominal pain.       PAST HISTORY  --------------------------------------------------------------------------------  No significant changes to PMH, PSH, FHx, SHx, unless otherwise noted    ALLERGIES & MEDICATIONS  --------------------------------------------------------------------------------  Allergies    Cipro (Other)  fluoroquinolone antibiotics (Unknown)  latex (Unknown)    Intolerances      Standing Inpatient Medications  apixaban 2.5 milliGRAM(s) Oral every 12 hours  carvedilol 25 milliGRAM(s) Oral every 12 hours  cefTRIAXone   IVPB 1 Gram(s) IV Intermittent every 24 hours  chlorhexidine 4% Liquid 1 Application(s) Topical two times a day  dextrose 5%. 1000 milliLiter(s) IV Continuous <Continuous>  dextrose 50% Injectable 12.5 Gram(s) IV Push once  dextrose 50% Injectable 25 Gram(s) IV Push once  dextrose 50% Injectable 25 Gram(s) IV Push once  docusate sodium 100 milliGRAM(s) Oral three times a day  finasteride 5 milliGRAM(s) Oral daily  insulin glargine Injectable (LANTUS) 8 Unit(s) SubCutaneous at bedtime  insulin lispro (HumaLOG) corrective regimen sliding scale   SubCutaneous three times a day before meals  insulin lispro (HumaLOG) corrective regimen sliding scale   SubCutaneous at bedtime  insulin lispro Injectable (HumaLOG) 4 Unit(s) SubCutaneous three times a day before meals  polyethylene glycol 3350 17 Gram(s) Oral daily  senna 2 Tablet(s) Oral at bedtime  tamsulosin 0.8 milliGRAM(s) Oral at bedtime    PRN Inpatient Medications  dextrose 40% Gel 15 Gram(s) Oral once PRN  glucagon  Injectable 1 milliGRAM(s) IntraMuscular once PRN      REVIEW OF SYSTEMS  --------------------------------------------------------------------------------  Gen: No fevers  Skin: No rashes  Head/Eyes/Ears: Normal hearing  Respiratory: No dyspnea  CV: No chest pain  GI: No abdominal pain,  : joshi+  MSK: No  edema    VITALS/PHYSICAL EXAM  --------------------------------------------------------------------------------  T(C): 36.6 (08-17-18 @ 05:57), Max: 37.1 (08-16-18 @ 12:54)  HR: 71 (08-17-18 @ 05:57) (71 - 81)  BP: 142/64 (08-17-18 @ 05:57) (130/53 - 142/64)  RR: 18 (08-17-18 @ 05:57) (18 - 19)  SpO2: 98% (08-17-18 @ 05:57) (96% - 100%)  Wt(kg): --        08-16-18 @ 07:01  -  08-17-18 @ 07:00  --------------------------------------------------------  IN: 0 mL / OUT: 700 mL / NET: -700 mL      Physical Exam:  	Gen: NAD  	HEENT: Normal mucous membranes   	Pulm: CTA B/L  	CV: S1S2  	Abd: Soft, +BS   	Ext: No LE edema B/L  	Neuro: Awake  	Skin: No rash              : joshi catheter noted with clear urine    LABS/STUDIES  --------------------------------------------------------------------------------    145  |  109  |  41  ----------------------------<  134      [08-16-18 @ 17:25]  4.1   |  22  |  1.64        Ca     8.7     [08-16-18 @ 17:25]    Creatinine Trend:  SCr 1.64 [08-16 @ 17:25]  SCr 1.48 [08-16 @ 11:00]  SCr 1.65 [08-14 @ 06:30]  SCr 1.70 [08-13 @ 07:15]  SCr 1.90 [08-12 @ 06:00]

## 2018-08-17 NOTE — PROGRESS NOTE ADULT - PROBLEM SELECTOR PLAN 2
- Patient had chest pain and productive cough since taking AM meds 72 hours ago. EKG showed no new changes. CXR shows right lower lobe infiltrates due to aspiration pneumonitis.   - Patient has been afebrile for 72 hours  - hold antibiotics for now.  - monitor for fevers

## 2018-08-17 NOTE — PROGRESS NOTE ADULT - PROBLEM SELECTOR PLAN 4
-patient with elevated Creatinine still despite treatment.  -holding lasix.  -likely 2/2  ATN in setting of complicated UTI possibly due to glomerulonephritis  -creatinine downtrending at 1.48, continue to monitor avoid nephrotoxins and renally dose medications.  -nephrology recs appreciated, follow up lab work up for glomerulonephritis, so far labs negative (ANCA's)

## 2018-08-17 NOTE — PROGRESS NOTE ADULT - SUBJECTIVE AND OBJECTIVE BOX
Patient is a 89y old  Male who presents with a chief complaint of complicated UTI (13 Aug 2018 14:38)      SUBJECTIVE / OVERNIGHT EVENTS:  Patient seen and examined at bedside. No acute events overnight. Cough is improving. He has no shortness of breath.     Other Review of Systems Negative.    MEDICATIONS  (STANDING):  apixaban 2.5 milliGRAM(s) Oral every 12 hours  carvedilol 25 milliGRAM(s) Oral every 12 hours  chlorhexidine 4% Liquid 1 Application(s) Topical two times a day  dextrose 5%. 1000 milliLiter(s) (50 mL/Hr) IV Continuous <Continuous>  dextrose 50% Injectable 12.5 Gram(s) IV Push once  dextrose 50% Injectable 25 Gram(s) IV Push once  dextrose 50% Injectable 25 Gram(s) IV Push once  docusate sodium 100 milliGRAM(s) Oral three times a day  finasteride 5 milliGRAM(s) Oral daily  furosemide   Injectable 20 milliGRAM(s) IV Push once  insulin glargine Injectable (LANTUS) 8 Unit(s) SubCutaneous at bedtime  insulin lispro (HumaLOG) corrective regimen sliding scale   SubCutaneous three times a day before meals  insulin lispro (HumaLOG) corrective regimen sliding scale   SubCutaneous at bedtime  insulin lispro Injectable (HumaLOG) 4 Unit(s) SubCutaneous three times a day before meals  polyethylene glycol 3350 17 Gram(s) Oral daily  senna 2 Tablet(s) Oral at bedtime  tamsulosin 0.8 milliGRAM(s) Oral at bedtime    MEDICATIONS  (PRN):  dextrose 40% Gel 15 Gram(s) Oral once PRN Blood Glucose LESS THAN 70 milliGRAM(s)/deciliter  glucagon  Injectable 1 milliGRAM(s) IntraMuscular once PRN Glucose LESS THAN 70 milligrams/deciliter      OBJECTIVE:    Vital Signs Last 24 Hrs  T(C): 36.6 (17 Aug 2018 05:57), Max: 37.1 (16 Aug 2018 12:54)  T(F): 97.8 (17 Aug 2018 05:57), Max: 98.7 (16 Aug 2018 12:54)  HR: 71 (17 Aug 2018 05:57) (71 - 81)  BP: 142/64 (17 Aug 2018 05:57) (130/53 - 142/64)  BP(mean): --  RR: 18 (17 Aug 2018 05:57) (18 - 19)  SpO2: 98% (17 Aug 2018 05:57) (96% - 100%)    CAPILLARY BLOOD GLUCOSE      POCT Blood Glucose.: 178 mg/dL (17 Aug 2018 08:12)  POCT Blood Glucose.: 331 mg/dL (16 Aug 2018 22:28)  POCT Blood Glucose.: 127 mg/dL (16 Aug 2018 17:16)  POCT Blood Glucose.: 265 mg/dL (16 Aug 2018 12:05)    I&O's Summary    16 Aug 2018 07:01  -  17 Aug 2018 07:00  --------------------------------------------------------  IN: 0 mL / OUT: 700 mL / NET: -700 mL    17 Aug 2018 07:01  -  17 Aug 2018 11:24  --------------------------------------------------------  IN: 0 mL / OUT: 200 mL / NET: -200 mL    PHYSICAL EXAM:  GENERAL: bed bound  NECK: Supple, No JVD  CHEST/LUNG: bilateral wheezing and crackles  HEART: Regular rate and rhythm; No murmurs, rubs, or gallops  ABDOMEN: Soft, Nontender, Nondistended; Bowel sounds present  : indwelling catheter placed.   EXTREMITIES:  2+ Peripheral Pulses, No clubbing, cyanosis, or edema  PSYCH: AAOx3      LABS:    08-16    145  |  109<H>  |  41<H>  ----------------------------<  134<H>  4.1   |  22  |  1.64<H>  08-16    134<L>  |  108<H>  |  39<H>  ----------------------------<  251<H>  5.8<H>   |  17<L>  |  1.48<H>    Ca    8.7      16 Aug 2018 17:25    Care Discussed with Consultants/Other Providers: yes Patient is a 89y old  Male who presents with a chief complaint of complicated UTI (13 Aug 2018 14:38)      SUBJECTIVE / OVERNIGHT EVENTS:  Patient seen and examined at bedside. No acute events overnight. Cough is improving. He has no shortness of breath.     Other Review of Systems Negative.    MEDICATIONS  (STANDING):  apixaban 2.5 milliGRAM(s) Oral every 12 hours  carvedilol 25 milliGRAM(s) Oral every 12 hours  chlorhexidine 4% Liquid 1 Application(s) Topical two times a day  dextrose 5%. 1000 milliLiter(s) (50 mL/Hr) IV Continuous <Continuous>  dextrose 50% Injectable 12.5 Gram(s) IV Push once  dextrose 50% Injectable 25 Gram(s) IV Push once  dextrose 50% Injectable 25 Gram(s) IV Push once  docusate sodium 100 milliGRAM(s) Oral three times a day  finasteride 5 milliGRAM(s) Oral daily  furosemide   Injectable 20 milliGRAM(s) IV Push once  insulin glargine Injectable (LANTUS) 8 Unit(s) SubCutaneous at bedtime  insulin lispro (HumaLOG) corrective regimen sliding scale   SubCutaneous three times a day before meals  insulin lispro (HumaLOG) corrective regimen sliding scale   SubCutaneous at bedtime  insulin lispro Injectable (HumaLOG) 4 Unit(s) SubCutaneous three times a day before meals  polyethylene glycol 3350 17 Gram(s) Oral daily  senna 2 Tablet(s) Oral at bedtime  tamsulosin 0.8 milliGRAM(s) Oral at bedtime    MEDICATIONS  (PRN):  dextrose 40% Gel 15 Gram(s) Oral once PRN Blood Glucose LESS THAN 70 milliGRAM(s)/deciliter  glucagon  Injectable 1 milliGRAM(s) IntraMuscular once PRN Glucose LESS THAN 70 milligrams/deciliter      OBJECTIVE:    Vital Signs Last 24 Hrs  T(C): 36.6 (17 Aug 2018 05:57), Max: 37.1 (16 Aug 2018 12:54)  T(F): 97.8 (17 Aug 2018 05:57), Max: 98.7 (16 Aug 2018 12:54)  HR: 71 (17 Aug 2018 05:57) (71 - 81)  BP: 142/64 (17 Aug 2018 05:57) (130/53 - 142/64)  BP(mean): --  RR: 18 (17 Aug 2018 05:57) (18 - 19)  SpO2: 98% (17 Aug 2018 05:57) (96% - 100%)    CAPILLARY BLOOD GLUCOSE      POCT Blood Glucose.: 178 mg/dL (17 Aug 2018 08:12)  POCT Blood Glucose.: 331 mg/dL (16 Aug 2018 22:28)  POCT Blood Glucose.: 127 mg/dL (16 Aug 2018 17:16)  POCT Blood Glucose.: 265 mg/dL (16 Aug 2018 12:05)    I&O's Summary    16 Aug 2018 07:01  -  17 Aug 2018 07:00  --------------------------------------------------------  IN: 0 mL / OUT: 700 mL / NET: -700 mL    17 Aug 2018 07:01  -  17 Aug 2018 11:24  --------------------------------------------------------  IN: 0 mL / OUT: 200 mL / NET: -200 mL    PHYSICAL EXAM:  GENERAL:lyign in bed, NAD  NECK: Supple, No JVD  CHEST/LUNG: bilateral expiratory hwweze, minimal.   HEART: Regular rate and rhythm; No murmurs, rubs, or gallops  ABDOMEN: Soft, Nontender, Nondistended; Bowel sounds present  : indwelling catheter placed.   EXTREMITIES:  2+ Peripheral Pulses, No clubbing, cyanosis, or edema  PSYCH: AAOx3      LABS:    08-16    145  |  109<H>  |  41<H>  ----------------------------<  134<H>  4.1   |  22  |  1.64<H>  08-16    134<L>  |  108<H>  |  39<H>  ----------------------------<  251<H>  5.8<H>   |  17<L>  |  1.48<H>    Ca    8.7      16 Aug 2018 17:25    Care Discussed with Consultants/Other Providers: yes

## 2018-08-17 NOTE — PROGRESS NOTE ADULT - ASSESSMENT
89M with hx MI (1980 no stent), HFrEF (EF 20%), paroxysmal A-Fib, AICD, CVA (LLE weakness), HTN, T2DM, and recent diagnosis of CLL, was admitted to the hospital for a complicated UTI 2/2 proteus and NADIA that is now improving, complicated by aspiration pneumonitis and today with hyperkalemia

## 2018-08-17 NOTE — PROGRESS NOTE ADULT - PROBLEM SELECTOR PLAN 1
patient with K=5.8, non-hemolyzed  -given D5/insulin/kayexelate.   -ECG reviewed, no peaked T waves  - repeat BMp showed K of 4.1  -change diet to low K, patient has been drinking orange and cranberry juice recently.

## 2018-08-18 LAB
BUN SERPL-MCNC: 34 MG/DL — HIGH (ref 7–23)
CALCIUM SERPL-MCNC: 8.3 MG/DL — LOW (ref 8.4–10.5)
CHLORIDE SERPL-SCNC: 103 MMOL/L — SIGNIFICANT CHANGE UP (ref 98–107)
CO2 SERPL-SCNC: 24 MMOL/L — SIGNIFICANT CHANGE UP (ref 22–31)
CREAT SERPL-MCNC: 1.49 MG/DL — HIGH (ref 0.5–1.3)
GAS PNL BLDMV: SIGNIFICANT CHANGE UP
GAS PNL BLDMV: SIGNIFICANT CHANGE UP
GLUCOSE SERPL-MCNC: 135 MG/DL — HIGH (ref 70–99)
MAGNESIUM SERPL-MCNC: 2 MG/DL — SIGNIFICANT CHANGE UP (ref 1.6–2.6)
PHOSPHATE SERPL-MCNC: 2.8 MG/DL — SIGNIFICANT CHANGE UP (ref 2.5–4.5)
POTASSIUM SERPL-MCNC: 3.9 MMOL/L — SIGNIFICANT CHANGE UP (ref 3.5–5.3)
POTASSIUM SERPL-SCNC: 3.9 MMOL/L — SIGNIFICANT CHANGE UP (ref 3.5–5.3)
SODIUM SERPL-SCNC: 140 MMOL/L — SIGNIFICANT CHANGE UP (ref 135–145)

## 2018-08-18 PROCEDURE — 99233 SBSQ HOSP IP/OBS HIGH 50: CPT | Mod: GC

## 2018-08-18 RX ORDER — CHLORHEXIDINE GLUCONATE 213 G/1000ML
1 SOLUTION TOPICAL
Qty: 0 | Refills: 0 | Status: DISCONTINUED | OUTPATIENT
Start: 2018-08-18 | End: 2018-08-21

## 2018-08-18 RX ORDER — APIXABAN 2.5 MG/1
2.5 TABLET, FILM COATED ORAL EVERY 12 HOURS
Qty: 0 | Refills: 0 | Status: DISCONTINUED | OUTPATIENT
Start: 2018-08-18 | End: 2018-08-21

## 2018-08-18 RX ADMIN — Medication 4 UNIT(S): at 08:25

## 2018-08-18 RX ADMIN — APIXABAN 2.5 MILLIGRAM(S): 2.5 TABLET, FILM COATED ORAL at 17:23

## 2018-08-18 RX ADMIN — Medication 4 UNIT(S): at 12:17

## 2018-08-18 RX ADMIN — Medication 100 MILLIGRAM(S): at 05:36

## 2018-08-18 RX ADMIN — TAMSULOSIN HYDROCHLORIDE 0.8 MILLIGRAM(S): 0.4 CAPSULE ORAL at 21:00

## 2018-08-18 RX ADMIN — CARVEDILOL PHOSPHATE 25 MILLIGRAM(S): 80 CAPSULE, EXTENDED RELEASE ORAL at 05:36

## 2018-08-18 RX ADMIN — CARVEDILOL PHOSPHATE 25 MILLIGRAM(S): 80 CAPSULE, EXTENDED RELEASE ORAL at 17:27

## 2018-08-18 RX ADMIN — Medication 100 MILLIGRAM(S): at 21:00

## 2018-08-18 RX ADMIN — APIXABAN 2.5 MILLIGRAM(S): 2.5 TABLET, FILM COATED ORAL at 05:36

## 2018-08-18 RX ADMIN — FINASTERIDE 5 MILLIGRAM(S): 5 TABLET, FILM COATED ORAL at 12:17

## 2018-08-18 RX ADMIN — SENNA PLUS 2 TABLET(S): 8.6 TABLET ORAL at 21:00

## 2018-08-18 RX ADMIN — CHLORHEXIDINE GLUCONATE 1 APPLICATION(S): 213 SOLUTION TOPICAL at 17:23

## 2018-08-18 RX ADMIN — INSULIN GLARGINE 8 UNIT(S): 100 INJECTION, SOLUTION SUBCUTANEOUS at 21:40

## 2018-08-18 RX ADMIN — Medication 4 UNIT(S): at 17:23

## 2018-08-18 NOTE — PROGRESS NOTE ADULT - PROBLEM SELECTOR PLAN 6
- has HFrEF with EF of 20% with AICD placement  - patient appears euvolemic at this time.  - continue with coreg, hold ACE-i in setting of increased Cr  - discharge with lasix 20 mg PO every other day

## 2018-08-18 NOTE — PROGRESS NOTE ADULT - SUBJECTIVE AND OBJECTIVE BOX
Patient is a 89y old  Male who presents with a chief complaint of complicated UTI (13 Aug 2018 14:38)      SUBJECTIVE / OVERNIGHT EVENTS:  Patient seen and examined at bedside. No acute events overnight. no cough, no shortness of breath.     Other Review of Systems Negative.    MEDICATIONS  (STANDING):  apixaban 2.5 milliGRAM(s) Oral every 12 hours  carvedilol 25 milliGRAM(s) Oral every 12 hours  chlorhexidine 4% Liquid 1 Application(s) Topical two times a day  dextrose 5%. 1000 milliLiter(s) (50 mL/Hr) IV Continuous <Continuous>  dextrose 50% Injectable 12.5 Gram(s) IV Push once  dextrose 50% Injectable 25 Gram(s) IV Push once  dextrose 50% Injectable 25 Gram(s) IV Push once  docusate sodium 100 milliGRAM(s) Oral three times a day  finasteride 5 milliGRAM(s) Oral daily  insulin glargine Injectable (LANTUS) 8 Unit(s) SubCutaneous at bedtime  insulin lispro (HumaLOG) corrective regimen sliding scale   SubCutaneous three times a day before meals  insulin lispro (HumaLOG) corrective regimen sliding scale   SubCutaneous at bedtime  insulin lispro Injectable (HumaLOG) 4 Unit(s) SubCutaneous three times a day before meals  polyethylene glycol 3350 17 Gram(s) Oral daily  senna 2 Tablet(s) Oral at bedtime  tamsulosin 0.8 milliGRAM(s) Oral at bedtime    MEDICATIONS  (PRN):  dextrose 40% Gel 15 Gram(s) Oral once PRN Blood Glucose LESS THAN 70 milliGRAM(s)/deciliter  glucagon  Injectable 1 milliGRAM(s) IntraMuscular once PRN Glucose LESS THAN 70 milligrams/deciliter      OBJECTIVE:    Vital Signs Last 24 Hrs  T(C): 36.6 (18 Aug 2018 05:30), Max: 36.8 (17 Aug 2018 12:56)  T(F): 97.8 (18 Aug 2018 05:30), Max: 98.3 (17 Aug 2018 12:56)  HR: 72 (18 Aug 2018 05:30) (63 - 72)  BP: 127/63 (18 Aug 2018 05:30) (125/53 - 143/56)  BP(mean): --  RR: 17 (18 Aug 2018 05:30) (17 - 18)  SpO2: 97% (18 Aug 2018 05:30) (97% - 98%)    CAPILLARY BLOOD GLUCOSE      POCT Blood Glucose.: 148 mg/dL (18 Aug 2018 06:11)  POCT Blood Glucose.: 203 mg/dL (17 Aug 2018 22:01)  POCT Blood Glucose.: 192 mg/dL (17 Aug 2018 17:04)  POCT Blood Glucose.: 236 mg/dL (17 Aug 2018 12:03)    I&O's Summary    17 Aug 2018 07:01  -  18 Aug 2018 07:00  --------------------------------------------------------  IN: 0 mL / OUT: 1000 mL / NET: -1000 mL      PHYSICAL EXAM:  GENERAL: lying in bed, NAD  NECK: Supple, No JVD  CHEST/LUNG: bilateral expiratory Wheezing, minimal.   HEART: Regular rate and rhythm; No murmurs, rubs, or gallops  ABDOMEN: Soft, Nontender, Nondistended; Bowel sounds present  : indwelling catheter placed.   EXTREMITIES:  2+ Peripheral Pulses, No clubbing, cyanosis, or edema  PSYCH: AAOx3    LABS:    08-16    145  |  109<H>  |  41<H>  ----------------------------<  134<H>  4.1   |  22  |  1.64<H>  08-16    134<L>  |  108<H>  |  39<H>  ----------------------------<  251<H>  5.8<H>   |  17<L>  |  1.48<H>    Ca    8.7      16 Aug 2018 17:25

## 2018-08-18 NOTE — PROGRESS NOTE ADULT - PROBLEM SELECTOR PLAN 3
-completed course of ceftriaxone for complicated UTI.   -Davis exchanged in ED  - renal ultrasound showed no hydronephrosis

## 2018-08-19 PROCEDURE — 99233 SBSQ HOSP IP/OBS HIGH 50: CPT | Mod: GC

## 2018-08-19 PROCEDURE — 84165 PROTEIN E-PHORESIS SERUM: CPT | Mod: 26

## 2018-08-19 RX ORDER — FUROSEMIDE 40 MG
20 TABLET ORAL
Qty: 0 | Refills: 0 | Status: DISCONTINUED | OUTPATIENT
Start: 2018-08-19 | End: 2018-08-21

## 2018-08-19 RX ADMIN — APIXABAN 2.5 MILLIGRAM(S): 2.5 TABLET, FILM COATED ORAL at 06:18

## 2018-08-19 RX ADMIN — INSULIN GLARGINE 8 UNIT(S): 100 INJECTION, SOLUTION SUBCUTANEOUS at 22:44

## 2018-08-19 RX ADMIN — CHLORHEXIDINE GLUCONATE 1 APPLICATION(S): 213 SOLUTION TOPICAL at 17:35

## 2018-08-19 RX ADMIN — CHLORHEXIDINE GLUCONATE 1 APPLICATION(S): 213 SOLUTION TOPICAL at 08:49

## 2018-08-19 RX ADMIN — FINASTERIDE 5 MILLIGRAM(S): 5 TABLET, FILM COATED ORAL at 12:14

## 2018-08-19 RX ADMIN — Medication 4 UNIT(S): at 12:14

## 2018-08-19 RX ADMIN — Medication 20 MILLIGRAM(S): at 12:18

## 2018-08-19 RX ADMIN — APIXABAN 2.5 MILLIGRAM(S): 2.5 TABLET, FILM COATED ORAL at 17:35

## 2018-08-19 RX ADMIN — Medication 100 MILLIGRAM(S): at 06:19

## 2018-08-19 RX ADMIN — Medication 4 UNIT(S): at 17:34

## 2018-08-19 RX ADMIN — SENNA PLUS 2 TABLET(S): 8.6 TABLET ORAL at 22:36

## 2018-08-19 RX ADMIN — CARVEDILOL PHOSPHATE 25 MILLIGRAM(S): 80 CAPSULE, EXTENDED RELEASE ORAL at 17:35

## 2018-08-19 RX ADMIN — Medication 100 MILLIGRAM(S): at 22:36

## 2018-08-19 RX ADMIN — CARVEDILOL PHOSPHATE 25 MILLIGRAM(S): 80 CAPSULE, EXTENDED RELEASE ORAL at 06:19

## 2018-08-19 NOTE — PROGRESS NOTE ADULT - PROBLEM SELECTOR PLAN 7
- continue eliquis 2.5 BID, beta blocker - currently on sliding scale and lantus 8, humalog 2  - monitor FS.

## 2018-08-19 NOTE — PROGRESS NOTE ADULT - SUBJECTIVE AND OBJECTIVE BOX
Dr. Mimi Nagel PGY-3   [facility] Pager [phone]  NS Pager [phone]      Patient is a 89y old  Male who presents with a chief complaint of complicated UTI (13 Aug 2018 14:38)      INTERVAL HPI/OVERNIGHT EVENTS:        REVIEW OF SYSTEMS:  CONSTITUTIONAL: No fever, weight loss, or fatigue  EYES: No eye pain, visual disturbances, or discharge  ENMT:  No difficulty hearing, tinnitus, vertigo; No sinus or throat pain  NECK: No pain or stiffness  BREASTS: No pain, masses, or nipple discharge  RESPIRATORY: No cough, wheezing, chills or hemoptysis; No shortness of breath  CARDIOVASCULAR: No chest pain, palpitations, dizziness, or leg swelling  GASTROINTESTINAL: No abdominal or epigastric pain. No nausea, vomiting, or hematemesis; No diarrhea or constipation. No melena or hematochezia.  GENITOURINARY: No dysuria, frequency, hematuria, or incontinence  NEUROLOGICAL: No headaches, memory loss, loss of strength, numbness, or tremors  SKIN: No itching, burning, rashes, or lesions   LYMPH NODES: No enlarged glands  ENDOCRINE: No heat or cold intolerance; No hair loss  MUSCULOSKELETAL: No joint pain or swelling; No muscle, back, or extremity pain  PSYCHIATRIC: No depression, anxiety, mood swings, or difficulty sleeping  HEME/LYMPH: No easy bruising, or bleeding gums  ALLERY AND IMMUNOLOGIC: No hives or eczema    T(C): 36.6 (08-19-18 @ 05:41), Max: 36.9 (08-18-18 @ 20:56)  HR: 70 (08-19-18 @ 05:41) (65 - 73)  BP: 134/54 (08-19-18 @ 05:41) (114/52 - 137/69)  RR: 18 (08-19-18 @ 05:41) (17 - 18)  SpO2: 96% (08-19-18 @ 05:41) (96% - 100%)  Wt(kg): --Vital Signs Last 24 Hrs  T(C): 36.6 (19 Aug 2018 05:41), Max: 36.9 (18 Aug 2018 20:56)  T(F): 97.9 (19 Aug 2018 05:41), Max: 98.5 (18 Aug 2018 20:56)  HR: 70 (19 Aug 2018 05:41) (65 - 73)  BP: 134/54 (19 Aug 2018 05:41) (114/52 - 137/69)  BP(mean): --  RR: 18 (19 Aug 2018 05:41) (17 - 18)  SpO2: 96% (19 Aug 2018 05:41) (96% - 100%)    PHYSICAL EXAM:  GENERAL: NAD, well-groomed, well-developed  HEAD:  Atraumatic, Normocephalic  EYES: EOMI, PERRLA, conjunctiva and sclera clear  ENMT: No tonsillar erythema, exudates, or enlargement; Moist mucous membranes, Good dentition, No lesions  NECK: Supple, No JVD, Normal thyroid  NERVOUS SYSTEM:  Alert & Oriented X3, Good concentration; Motor Strength 5/5 B/L upper and lower extremities; DTRs 2+ intact and symmetric  CHEST/LUNG: Clear to percussion bilaterally; No rales, rhonchi, wheezing, or rubs  HEART: Regular rate and rhythm; No murmurs, rubs, or gallops  ABDOMEN: Soft, Nontender, Nondistended; Bowel sounds present  EXTREMITIES:  2+ Peripheral Pulses, No clubbing, cyanosis, or edema  LYMPH: No lymphadenopathy noted  SKIN: No rashes or lesions    Consultant(s) Notes Reviewed:  [x ] YES  [ ] NO  Care Discussed with Consultants/Other Providers [ x] YES  [ ] NO    LABS:    08-18    140  |  103  |  34<H>  ----------------------------<  135<H>  3.9   |  24  |  1.49<H>    Ca    8.3<L>      18 Aug 2018 13:18  Phos  2.8     08-18  Mg     2.0     08-18          CAPILLARY BLOOD GLUCOSE      POCT Blood Glucose.: 167 mg/dL (18 Aug 2018 21:36)  POCT Blood Glucose.: 129 mg/dL (18 Aug 2018 17:02)  POCT Blood Glucose.: 116 mg/dL (18 Aug 2018 12:07)  POCT Blood Glucose.: 133 mg/dL (18 Aug 2018 08:22)                RADIOLOGY & ADDITIONAL TESTS:    Imaging Personally Reviewed:  [ ] YES  [ ] NO Dr. Mimi Nagel PGY-3   [facility] Pager [phone]  NS Pager [phone]      Patient is a 89y old  Male who presents with a chief complaint of complicated UTI (13 Aug 2018 14:38)      INTERVAL HPI/OVERNIGHT EVENTS: No acute events overnight. Patient this morning feels well, minimal cough, and requesting cereal and oatmeal to eat for breakfast. He is understanding that he is waiting for rehab placement likely tomorrow. No other complaints at this time.     REVIEW OF SYSTEMS:  CONSTITUTIONAL: No fever or chills   NECK: No pain or stiffness  RESPIRATORY: + mild cough, no wheezing, no shortness of breath  CARDIOVASCULAR: No chest pain, palpitations, dizziness, or leg swelling  GASTROINTESTINAL: No abdominal or epigastric pain. No nausea, vomiting, or hematemesis; No diarrhea or constipation. No melena or hematochezia.    T(C): 36.6 (08-19-18 @ 05:41), Max: 36.9 (08-18-18 @ 20:56)  HR: 70 (08-19-18 @ 05:41) (65 - 73)  BP: 134/54 (08-19-18 @ 05:41) (114/52 - 137/69)  RR: 18 (08-19-18 @ 05:41) (17 - 18)  SpO2: 96% (08-19-18 @ 05:41) (96% - 100%)  Wt(kg): --Vital Signs Last 24 Hrs  T(C): 36.6 (19 Aug 2018 05:41), Max: 36.9 (18 Aug 2018 20:56)  T(F): 97.9 (19 Aug 2018 05:41), Max: 98.5 (18 Aug 2018 20:56)  HR: 70 (19 Aug 2018 05:41) (65 - 73)  BP: 134/54 (19 Aug 2018 05:41) (114/52 - 137/69)  BP(mean): --  RR: 18 (19 Aug 2018 05:41) (17 - 18)  SpO2: 96% (19 Aug 2018 05:41) (96% - 100%)    PHYSICAL EXAM:  GENERAL: lying in bed, NAD  NECK: Supple, No JVD  CHEST/LUNG: bilateral expiratory Wheezing, minimal.   HEART: Regular rate and rhythm; No murmurs, rubs, or gallops  ABDOMEN: Soft, Nontender, Nondistended; Bowel sounds present  : indwelling catheter placed.   EXTREMITIES:  2+ Peripheral Pulses, No clubbing, cyanosis, or edema  PSYCH: AAOx3    Consultant(s) Notes Reviewed:  [x ] YES  [ ] NO  Care Discussed with Consultants/Other Providers [ x] YES  [ ] NO    LABS:    08-18    140  |  103  |  34<H>  ----------------------------<  135<H>  3.9   |  24  |  1.49<H>    Ca    8.3<L>      18 Aug 2018 13:18  Phos  2.8     08-18  Mg     2.0     08-18          CAPILLARY BLOOD GLUCOSE      POCT Blood Glucose.: 167 mg/dL (18 Aug 2018 21:36)  POCT Blood Glucose.: 129 mg/dL (18 Aug 2018 17:02)  POCT Blood Glucose.: 116 mg/dL (18 Aug 2018 12:07)  POCT Blood Glucose.: 133 mg/dL (18 Aug 2018 08:22)                RADIOLOGY & ADDITIONAL TESTS:    Imaging Personally Reviewed:  [ ] YES  [ ] NO

## 2018-08-19 NOTE — PROGRESS NOTE ADULT - PROBLEM SELECTOR PLAN 4
-patient with elevated Creatinine still despite treatment.  -holding lasix.  -likely 2/2  ATN in setting of complicated UTI possibly due to glomerulonephritis  -creatinine downtrending at 1.48, continue to monitor avoid nephrotoxins and renally dose medications.  -nephrology recs appreciated, follow up lab work up for glomerulonephritis, so far labs negative (ANCA's) -Dr. Abad is the patients private oncologist.   - ordered FISH studies as well as immunoglobulin levels as per onc recs.

## 2018-08-19 NOTE — PROGRESS NOTE ADULT - PROBLEM SELECTOR PLAN 9
- DVT prophylaxis: patient on eliquis 2.5 BID.  - PT recommends discharge to a rehab facility and patient is now agreeable.
- DVT prophylaxis: patient on eliquis 2.5 BID.  - PT recommends discharge to a rehab facility and patient is now agreeable, pending placement.
- DVT prophylaxis: patient on eliquis 2.5 BID.  - PT recommends discharge to a rehab facility and patient is now agreeable, pending placement.
- DVT prophylaxis: patient on eliquis 2.5 BID.  - PT recommends discharge to a rehab facility and patient is now agreeable.

## 2018-08-19 NOTE — PROGRESS NOTE ADULT - PROBLEM SELECTOR PLAN 3
-completed course of ceftriaxone for complicated UTI.   -Davis exchanged in ED  - renal ultrasound showed no hydronephrosis -Now resolving, will continue to trend BMPs, and will need repeat BMP at rehab   -likely 2/2  ATN in setting of complicated UTI possibly due to glomerulonephritis  -creatinine downtrending, continue to monitor avoid nephrotoxins and renally dose medications.  -nephrology recs appreciated, follow up lab work up for glomerulonephritis, so far labs negative (ANCA's)

## 2018-08-19 NOTE — PROGRESS NOTE ADULT - ASSESSMENT
89M with hx MI (1980 no stent), HFrEF (EF 20%), paroxysmal A-Fib, AICD, CVA (LLE weakness), HTN, T2DM, and recent diagnosis of CLL, was admitted to the hospital for a complicated UTI 2/2 proteus and NADIA that is now improving, complicated by aspiration pneumonitis. 89M with hx MI (1980 no stent), HFrEF (EF 20%), paroxysmal A-Fib, AICD, CVA (LLE weakness), HTN, T2DM, and recent diagnosis of CLL, was admitted to the hospital for a complicated UTI 2/2 proteus and NADIA that is now improving, complicated by aspiration pneumonitis which has now resolved.

## 2018-08-19 NOTE — PROGRESS NOTE ADULT - PROBLEM SELECTOR PLAN 2
- Patient had chest pain and productive cough since taking AM meds 72 hours ago. EKG showed no new changes. CXR shows right lower lobe infiltrates due to aspiration pneumonitis.   - Patient has been afebrile for 72 hours  - hold antibiotics for now.  - monitor for fevers - Now resolved, had aspiration event >72 hours ago. EKG showed no new changes. CXR shows right lower lobe infiltrates due to aspiration pneumonitis.   - Seen by speech and swallow who recommended dysphagia diet, Cinesophagram also performed. Diet changed to soft with nectar thick fluids.   - hold antibiotics for now.  - monitor for fevers

## 2018-08-19 NOTE — PROGRESS NOTE ADULT - PROBLEM SELECTOR PLAN 6
- has HFrEF with EF of 20% with AICD placement  - patient appears euvolemic at this time.  - continue with coreg, hold ACE-i in setting of increased Cr  - discharge with lasix 20 mg PO every other day - continue eliquis 2.5 BID, beta blocker

## 2018-08-19 NOTE — PROGRESS NOTE ADULT - PROBLEM SELECTOR PLAN 8
- currently on sliding scale and lantus 8, humalog 2  - monitor FS. - DVT prophylaxis: patient on eliquis 2.5 BID.  - PT recommends discharge to a rehab facility and patient is now agreeable, pending placement.

## 2018-08-19 NOTE — DOWNTIME INTERRUPTION NOTE - WHICH MANUAL FORMS INITIATED?
Downtime recovery initiated at 2:30am, system was accessible again at 5am but many important functions unavailable. Medications administered manually, verifying patient with two identifiers and verifying medication dosages. System remains unavailable as of 6:25am.

## 2018-08-19 NOTE — PROGRESS NOTE ADULT - PROBLEM SELECTOR PLAN 5
-Dr. Abad is the patients private oncologist.   - ordered FISH studies as well as immunoglobulin levels as per onc recs. - has HFrEF with EF of 20% with AICD placement  - patient appears euvolemic at this time.  - continue with coreg, hold ACE-i in setting of increased Cr  - discharge with lasix 20 mg PO every other day

## 2018-08-19 NOTE — PROGRESS NOTE ADULT - SUBJECTIVE AND OBJECTIVE BOX
[facility] DIVISION OF KIDNEY DISEASES AND HYPERTENSION -- FOLLOW UP NOTE  --------------------------------------------------------------------------------    HPI: 89 year old man with a history of MI (1980), HFrEF (EF 20%), paroxysmal A-Fib, AICD, CVA (LLE weakness), HTN, T2DM, and CLL admitted for complicated UTI. Nephrology consulted for NADIA.  Patient was discharged on 7/6/18 to subacute rehab with [place] in [facility] hospital following failed trial of void prior to discharge.   Upon review of [facility] SCr WNL 0.69 on 7/4/18. At admission SCr elevated to 1.8 and 1.91 on 8/10/18. Receiving ceftriaxone. Urine cultures growing proteus sp. Pt had episode of chest pain and SOB likely aspiration pneumonitis on 8/14/15.    Pt was seen and examined at bedside. Patient reports persistent cough. Denies chest pain, SOB, and abdominal pain.     PAST HISTORY  --------------------------------------------------------------------------------  No significant changes to PMH, PSH, FHx, SHx, unless otherwise noted    ALLERGIES & MEDICATIONS  --------------------------------------------------------------------------------  Allergies    Cipro (Other)  fluoroquinolone antibiotics (Unknown)  latex (Unknown)    Intolerances      Standing Inpatient Medications  apixaban 2.5 milliGRAM(s) Oral every 12 hours  carvedilol 25 milliGRAM(s) Oral every 12 hours  chlorhexidine 4% Liquid 1 Application(s) Topical two times a day  dextrose 5%. 1000 milliLiter(s) IV Continuous <Continuous>  dextrose 50% Injectable 12.5 Gram(s) IV Push once  dextrose 50% Injectable 25 Gram(s) IV Push once  dextrose 50% Injectable 25 Gram(s) IV Push once  docusate sodium 100 milliGRAM(s) Oral three times a day  finasteride 5 milliGRAM(s) Oral daily  insulin glargine Injectable (LANTUS) 8 Unit(s) SubCutaneous at bedtime  insulin lispro (HumaLOG) corrective regimen sliding scale   SubCutaneous three times a day before meals  insulin lispro (HumaLOG) corrective regimen sliding scale   SubCutaneous at bedtime  insulin lispro Injectable (HumaLOG) 4 Unit(s) SubCutaneous three times a day before meals  polyethylene glycol 3350 17 Gram(s) Oral daily  senna 2 Tablet(s) Oral at bedtime  tamsulosin 0.8 milliGRAM(s) Oral at bedtime    PRN Inpatient Medications  dextrose 40% Gel 15 Gram(s) Oral once PRN  glucagon  Injectable 1 milliGRAM(s) IntraMuscular once PRN      REVIEW OF SYSTEMS  --------------------------------------------------------------------------------  Gen: No fevers  Skin: No rashes  Head/Eyes/Ears: Normal hearing  Respiratory: No dyspnea  CV: No chest pain  GI: No abdominal pain,  MSK: No  edema    All other systems were reviewed and are negative, except as noted.    VITALS/PHYSICAL EXAM  --------------------------------------------------------------------------------  T(C): 36.6 (08-19-18 @ 05:41), Max: 36.9 (08-18-18 @ 20:56)  HR: 70 (08-19-18 @ 05:41) (65 - 73)  BP: 134/54 (08-19-18 @ 05:41) (114/52 - 137/69)  RR: 18 (08-19-18 @ 05:41) (17 - 18)  SpO2: 96% (08-19-18 @ 05:41) (96% - 100%)  Wt(kg): --    08-18-18 @ 07:01  -  08-19-18 @ 07:00  --------------------------------------------------------  IN: 0 mL / OUT: 400 mL / NET: -400 mL    Physical Exam:  	Gen: NAD  	HEENT: Normal mucous membranes   	Pulm: Course BS B/L  	CV: S1S2  	Abd: Soft, +BS   	Ext: No LE edema B/L  	Neuro: Awake  	Skin: No rash             LABS/STUDIES  --------------------------------------------------------------------------------    140  |  103  |  34  ----------------------------<  135      [08-18-18 @ 13:18]  3.9   |  24  |  1.49        Ca     8.3     [08-18-18 @ 13:18]      Mg     2.0     [08-18-18 @ 13:18]      Phos  2.8     [08-18-18 @ 13:18]    Creatinine Trend:  SCr 1.49 [08-18 @ 13:18]  SCr 1.64 [08-16 @ 17:25]  SCr 1.48 [08-16 @ 11:00]  SCr 1.65 [08-14 @ 06:30]  SCr 1.70 [08-13 @ 07:15]    Urinalysis - [08-07-18 @ 23:40]      Color YELLOW / Appearance TURBID / SG 1.020 / pH 6.5      Gluc NEGATIVE / Ketone NEGATIVE  / Bili NEGATIVE / Urobili NORMAL       Blood MODERATE / Protein 100 / Leuk Est LARGE / Nitrite NEGATIVE      RBC 25-50 / WBC >50 / Hyaline  / Gran  / Sq Epi FEW / Non Sq Epi  / Bacteria       Iron 74, TIBC 295, %sat --      [06-24-18 @ 16:53]  Ferritin 327.1      [06-24-18 @ 16:53]  HbA1c 5.6      [06-24-18 @ 16:17]  TSH 0.86      [06-25-18 @ 09:08]    HBsAg NEGATIVE      [08-13-18 @ 07:15]  HCV 0.29, Nonreactive Hepatitis C AB  S/CO Ratio                        Interpretation  < 1.0                                     Non-Reactive  1.0 - 4.9                           Weakly-Reactive  > 5.0                                 Reactive  Non-Reactive: [place] with a non-reactive HCV antibody  result is considered uninfected.  No further action is  needed unless recent infection is suspected.  In these  cases, consider repeat testing later to detect  seroconversion..  Weakly-Reactive: HCV antibody test is abnormal, HCV RNA  Qualitative test will follow.  Reactive: HCV antibody test is abnormal, HCV RNA  Qualitative test will follow.  Note: HCV antibody testing is performed on the [employer]   system.      [08-13-18 @ 07:15]    C3 Complement 99.9      [08-13-18 @ 07:15]  C4 Complement 19.8      [08-13-18 @ 07:15]  ANCA: cANCA Negative, pANCA Negative, atypical ANCA --      [08-13-18 @ 07:15]  anti-GBM <0.2      [08-13-18 @ 07:15]

## 2018-08-19 NOTE — PROGRESS NOTE ADULT - PROBLEM SELECTOR PLAN 1
Pt with hemodynamically mediated NADIA in the setting of dehydration, poor oral intake with Lasix and Losartan and now with relative hypotension overnight. [facility] 0.69 on 7/4/18. At admission SCr elevated to 1.8 on 8/10/18, peaked at 1.90 on 8/12/18 and improved to 1.6 on 8/16/18. Received lasix IV on 8/14/18. SCr remains at 1.49 today.   Currently non oliguric with 1L urine output and decreasing daily weights.   Given hematuria and proteinuria did the following GN work up: Serological workup included negative ANCAs, anti-GBM, C3 and C4. HepBsAg, HepC Ab non reactive on 8/14/18. serum immunoglobulin WNL on 8/9/18. Urine immunofixation positive with weak kappa band. 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 Advise to check free light chains and SPEP today.   Monitor UOP and daily weights. Monitor BMP daily. Avoid nephrotoxic drugs. Dose all medications renally

## 2018-08-19 NOTE — PROGRESS NOTE ADULT - PROBLEM SELECTOR PLAN 1
patient with K=5.8, non-hemolyzed  -given D5/insulin/kayexelate.   -ECG reviewed, no peaked T waves  - repeat BMp showed K of 4.1  -change diet to low K, patient has been drinking orange and cranberry juice recently. -completed course of ceftriaxone for complicated UTI.   -Davis exchanged in ED  - renal ultrasound showed no hydronephrosis

## 2018-08-20 LAB
BUN SERPL-MCNC: 36 MG/DL — HIGH (ref 7–23)
CALCIUM SERPL-MCNC: 8.2 MG/DL — LOW (ref 8.4–10.5)
CHLORIDE SERPL-SCNC: 104 MMOL/L — SIGNIFICANT CHANGE UP (ref 98–107)
CO2 SERPL-SCNC: 29 MMOL/L — SIGNIFICANT CHANGE UP (ref 22–31)
CREAT SERPL-MCNC: 1.63 MG/DL — HIGH (ref 0.5–1.3)
GLUCOSE BLDC GLUCOMTR-MCNC: 210 MG/DL — HIGH (ref 70–99)
GLUCOSE BLDC GLUCOMTR-MCNC: 222 MG/DL — HIGH (ref 70–99)
GLUCOSE BLDC GLUCOMTR-MCNC: 90 MG/DL — SIGNIFICANT CHANGE UP (ref 70–99)
GLUCOSE SERPL-MCNC: 110 MG/DL — HIGH (ref 70–99)
KAPPA FREE LIGHT CHAINS, SERUM: 12.6 MG/DL — HIGH (ref 0.33–1.94)
LAMBDA FREE LIGHT CHAINS, SERUM: 7.11 MG/DL — HIGH (ref 0.57–2.63)
MAGNESIUM SERPL-MCNC: 2 MG/DL — SIGNIFICANT CHANGE UP (ref 1.6–2.6)
MISCELLANEOUS - CHEM: SIGNIFICANT CHANGE UP
PHOSPHATE SERPL-MCNC: 3.9 MG/DL — SIGNIFICANT CHANGE UP (ref 2.5–4.5)
POTASSIUM SERPL-MCNC: 4.1 MMOL/L — SIGNIFICANT CHANGE UP (ref 3.5–5.3)
POTASSIUM SERPL-SCNC: 4.1 MMOL/L — SIGNIFICANT CHANGE UP (ref 3.5–5.3)
SODIUM SERPL-SCNC: 143 MMOL/L — SIGNIFICANT CHANGE UP (ref 135–145)

## 2018-08-20 PROCEDURE — 99233 SBSQ HOSP IP/OBS HIGH 50: CPT

## 2018-08-20 PROCEDURE — 99232 SBSQ HOSP IP/OBS MODERATE 35: CPT | Mod: GC

## 2018-08-20 RX ADMIN — Medication 4 UNIT(S): at 17:50

## 2018-08-20 RX ADMIN — INSULIN GLARGINE 8 UNIT(S): 100 INJECTION, SOLUTION SUBCUTANEOUS at 22:50

## 2018-08-20 RX ADMIN — Medication 2: at 12:34

## 2018-08-20 RX ADMIN — CARVEDILOL PHOSPHATE 25 MILLIGRAM(S): 80 CAPSULE, EXTENDED RELEASE ORAL at 17:50

## 2018-08-20 RX ADMIN — Medication 100 MILLIGRAM(S): at 22:50

## 2018-08-20 RX ADMIN — APIXABAN 2.5 MILLIGRAM(S): 2.5 TABLET, FILM COATED ORAL at 06:56

## 2018-08-20 RX ADMIN — CHLORHEXIDINE GLUCONATE 1 APPLICATION(S): 213 SOLUTION TOPICAL at 17:50

## 2018-08-20 RX ADMIN — Medication 4 UNIT(S): at 12:35

## 2018-08-20 RX ADMIN — APIXABAN 2.5 MILLIGRAM(S): 2.5 TABLET, FILM COATED ORAL at 17:50

## 2018-08-20 RX ADMIN — CARVEDILOL PHOSPHATE 25 MILLIGRAM(S): 80 CAPSULE, EXTENDED RELEASE ORAL at 06:56

## 2018-08-20 RX ADMIN — CHLORHEXIDINE GLUCONATE 1 APPLICATION(S): 213 SOLUTION TOPICAL at 06:56

## 2018-08-20 RX ADMIN — SENNA PLUS 2 TABLET(S): 8.6 TABLET ORAL at 22:50

## 2018-08-20 RX ADMIN — FINASTERIDE 5 MILLIGRAM(S): 5 TABLET, FILM COATED ORAL at 12:35

## 2018-08-20 RX ADMIN — TAMSULOSIN HYDROCHLORIDE 0.8 MILLIGRAM(S): 0.4 CAPSULE ORAL at 22:50

## 2018-08-20 NOTE — PROGRESS NOTE ADULT - SUBJECTIVE AND OBJECTIVE BOX
[facility] DIVISION OF KIDNEY DISEASES AND HYPERTENSION -- FOLLOW UP NOTE  --------------------------------------------------------------------------------  HPI: 89 year old man with a history of MI (1980), HFrEF (EF 20%), paroxysmal A-Fib, AICD, CVA (LLE weakness), HTN, T2DM, and CLL admitted for complicated UTI. Nephrology consulted for NADIA.  Patient was discharged on 7/6/18 to subacute rehab with [place] in [facility] hospital following failed trial of void prior to discharge.   Upon review of [facility] SCr WNL 0.69 on 7/4/18. At admission SCr elevated to 1.8 and 1.91 on 8/10/18. Receiving ceftriaxone. Urine cultures growing proteus sp. Pt had episode of chest pain and SOB likely aspiration pneumonitis on 8/14/15.    Pt was seen and examined at bedside. Patient reports improved productive cough. Denies chest pain, SOB, and abdominal pain.     PAST HISTORY  --------------------------------------------------------------------------------  No significant changes to PMH, PSH, FHx, SHx, unless otherwise noted    ALLERGIES & MEDICATIONS  --------------------------------------------------------------------------------  Allergies    Cipro (Other)  fluoroquinolone antibiotics (Unknown)  latex (Unknown)    Intolerances      Standing Inpatient Medications  apixaban 2.5 milliGRAM(s) Oral every 12 hours  carvedilol 25 milliGRAM(s) Oral every 12 hours  chlorhexidine 4% Liquid 1 Application(s) Topical two times a day  dextrose 5%. 1000 milliLiter(s) IV Continuous <Continuous>  dextrose 50% Injectable 12.5 Gram(s) IV Push once  dextrose 50% Injectable 25 Gram(s) IV Push once  dextrose 50% Injectable 25 Gram(s) IV Push once  docusate sodium 100 milliGRAM(s) Oral three times a day  finasteride 5 milliGRAM(s) Oral daily  furosemide    Tablet 20 milliGRAM(s) Oral <User Schedule>  insulin glargine Injectable (LANTUS) 8 Unit(s) SubCutaneous at bedtime  insulin lispro (HumaLOG) corrective regimen sliding scale   SubCutaneous three times a day before meals  insulin lispro (HumaLOG) corrective regimen sliding scale   SubCutaneous at bedtime  insulin lispro Injectable (HumaLOG) 4 Unit(s) SubCutaneous three times a day before meals  polyethylene glycol 3350 17 Gram(s) Oral daily  senna 2 Tablet(s) Oral at bedtime  tamsulosin 0.8 milliGRAM(s) Oral at bedtime    PRN Inpatient Medications  dextrose 40% Gel 15 Gram(s) Oral once PRN  glucagon  Injectable 1 milliGRAM(s) IntraMuscular once PRN      REVIEW OF SYSTEMS  --------------------------------------------------------------------------------  Gen: No fevers  Skin: No rashes  Head/Eyes/Ears: Normal hearing  Respiratory: No dyspnea  CV: No chest pain  GI: No abdominal pain,  MSK: No  edema    All other systems were reviewed and are negative, except as noted.    VITALS/PHYSICAL EXAM  --------------------------------------------------------------------------------  T(C): 36.9 (08-20-18 @ 06:50), Max: 36.9 (08-20-18 @ 06:50)  HR: 80 (08-20-18 @ 06:50) (70 - 80)  BP: 130/56 (08-20-18 @ 06:50) (126/65 - 132/66)  RR: 18 (08-20-18 @ 06:50) (18 - 18)  SpO2: 94% (08-20-18 @ 06:50) (94% - 96%)  Wt(kg): --        08-19-18 @ 07:01  -  08-20-18 @ 07:00  --------------------------------------------------------  IN: 0 mL / OUT: 1000 mL / NET: -1000 mL      Physical Exam:  	Gen: NAD  	HEENT: Normal mucous membranes   	Pulm: Course BS B/L  	CV: S1S2  	Abd: Soft, +BS   	Ext: No LE edema B/L  	Neuro: Awake  	Skin: No rash           LABS/STUDIES  --------------------------------------------------------------------------------    140  |  103  |  34  ----------------------------<  135      [08-18-18 @ 13:18]  3.9   |  24  |  1.49        Ca     8.3     [08-18-18 @ 13:18]      Mg     2.0     [08-18-18 @ 13:18]      Phos  2.8     [08-18-18 @ 13:18]      Creatinine Trend:  SCr 1.49 [08-18 @ 13:18]  SCr 1.64 [08-16 @ 17:25]  SCr 1.48 [08-16 @ 11:00]  SCr 1.65 [08-14 @ 06:30]  SCr 1.70 [08-13 @ 07:15]

## 2018-08-20 NOTE — CHART NOTE - NSCHARTNOTEFT_GEN_A_CORE
NUTRITION SERVICES     Upon Nutritional Assessment by the Registered Dietitian your patient was determined to meet criteria/ has evidence of the following diagnosis/diagnoses:  [ ] Mild Protein Calorie Malnutrition   [X] Moderate Protein Calorie Malnutrition   [ ] Severe Protein Calorie Malnutrition   [ ] Unspecified Protein Calorie Malnutrition   [ ] Underweight / BMI <19  [ ] Morbid Obesity / BMI >40    Findings as based on:  •  Comprehensive nutritional assessment and consultation    Please refer to Initial Dietitian Evaluation via documents section of [facility] EMR for further recommendations.    Maria Isabel Moise RDN, CDN   pager: [phone]

## 2018-08-20 NOTE — PROGRESS NOTE ADULT - PROBLEM SELECTOR PLAN 2
- Now resolved, had aspiration event >72 hours ago. EKG showed no new changes. CXR shows right lower lobe infiltrates due to aspiration pneumonitis.   - Seen by speech and swallow who recommended dysphagia diet, Cinesophagram also performed. Diet changed to soft with nectar thick fluids.   - hold antibiotics for now.  - monitor for fevers - Now resolved, had aspiration event >72 hours ago. EKG showed no new changes. CXR shows right lower lobe infiltrates due to aspiration pneumonitis.   - Seen by speech and swallow who recommended dysphagia 2 diet, Cinesophagram also performed. Diet changed to soft with nectar thick fluids.  - hold antibiotics for now  - monitor for fevers  - cinesophogram and MBS tomorrow - Now resolved, had aspiration event >72 hours ago. EKG showed no new changes. CXR shows right lower lobe infiltrates due to aspiration pneumonitis.   - Seen by speech and swallow who recommended dysphagia 2 diet, Cinesophagram also performed. Diet changed to soft with nectar thick fluids.  - hold antibiotics for now  - monitor for fevers

## 2018-08-20 NOTE — DIETITIAN INITIAL EVALUATION ADULT. - NS AS NUTRI INTERV COLLABORAT
1) Add Nepro 8oz PO 2x daily to dysphagia II (mechanical soft foods) w nectar thickened liquids, consistent carbohydrate, DASH/TLC (cholesterol & Na restricted), no concentrated potassium diet.                     2)Obtain daily weights                        3)Aspiration precautions                  4)Provide assistance w feeding                  5)Add Nephrovite for micronutrient coverage                    6)RDN remains available.  Maria Isabel Moise, GONZALON, CDN  pager [phone]

## 2018-08-20 NOTE — PROGRESS NOTE ADULT - ASSESSMENT
89M with hx MI (1980 no stent), HFrEF (EF 20%), paroxysmal A-Fib, AICD, CVA (LLE weakness), HTN, T2DM, and recent diagnosis of CLL, was admitted to the hospital for a complicated UTI 2/2 proteus and NADIA that is now improving, complicated by aspiration pneumonitis which has now resolved. 89M with hx MI (1980 no stent), HFrEF (EF 20%), paroxysmal A-Fib s/p AICD, CVA (LLE weakness), HTN, T2DM, and recent diagnosis of CLL, was admitted to the hospital for a complicated UTI 2/2 proteus c/b NADIA (hemodynamically mediatedthat is now improving, complicated by aspiration pneumonitis which has now resolved.

## 2018-08-20 NOTE — DIETITIAN INITIAL EVALUATION ADULT. - OTHER INFO
Pt. observed w <50% intake of breakfast.  He endorses fair appetite/PO intake & denies food allergies, nausea/vomiting/diarrhea/constipation.  Per flowsheets, 4+ scrotal edema.  No noted pressure injuries.  Pt. s/p MBS (8/15/18) where findings are consistent w dysphagia and recommendations are for Dysphagia II (mechanical soft foods) w nectar thickened liquids diet.  Informed/explained dysphagia diet to Pt.  Noted banana at bedside.... also discussed prescribed potassium restriction, which nephrology suggested continuing upon RDN asking.   Offered Nepro supplement to help meet nutrient needs.... Pt. states he was consuming this @ rehab PTA.  Pt. reports weight prior to this hospitalization was 131lbs.  Per prior admission chart review... 132.2lbs [60kg] on 6/26/18.  This is indicative of significant 7.1% decrease x 2 months.

## 2018-08-20 NOTE — DIETITIAN INITIAL EVALUATION ADULT. - PROBLEM SELECTOR PLAN 1
- Patient presented with cloudy urine in the Joshi catheter and mild abdominal pain.   - UA was positive for Leukocyte esterase  - patient received ceftriaxone 1 g in the ED. continue Ceftriaxone 1 g QD  -joshi exchanged in ED  - f/u urine culture and sensitivities.   - f/u renal ultrasound

## 2018-08-20 NOTE — PROGRESS NOTE ADULT - SUBJECTIVE AND OBJECTIVE BOX
Joselito Dallas, PGY1  Pager [phone]    Patient is a 89y old  Male who presents with a chief complaint of complicated UTI (13 Aug 2018 14:38)      SUBJECTIVE/INTERVAL EVENTS: Patient seen and examined at bedside. EVA o/n.    MEDICATIONS  (STANDING):  apixaban 2.5 milliGRAM(s) Oral every 12 hours  carvedilol 25 milliGRAM(s) Oral every 12 hours  chlorhexidine 4% Liquid 1 Application(s) Topical two times a day  dextrose 5%. 1000 milliLiter(s) (50 mL/Hr) IV Continuous <Continuous>  dextrose 50% Injectable 12.5 Gram(s) IV Push once  dextrose 50% Injectable 25 Gram(s) IV Push once  dextrose 50% Injectable 25 Gram(s) IV Push once  docusate sodium 100 milliGRAM(s) Oral three times a day  finasteride 5 milliGRAM(s) Oral daily  furosemide    Tablet 20 milliGRAM(s) Oral <User Schedule>  insulin glargine Injectable (LANTUS) 8 Unit(s) SubCutaneous at bedtime  insulin lispro (HumaLOG) corrective regimen sliding scale   SubCutaneous three times a day before meals  insulin lispro (HumaLOG) corrective regimen sliding scale   SubCutaneous at bedtime  insulin lispro Injectable (HumaLOG) 4 Unit(s) SubCutaneous three times a day before meals  polyethylene glycol 3350 17 Gram(s) Oral daily  senna 2 Tablet(s) Oral at bedtime  tamsulosin 0.8 milliGRAM(s) Oral at bedtime    MEDICATIONS  (PRN):  dextrose 40% Gel 15 Gram(s) Oral once PRN Blood Glucose LESS THAN 70 milliGRAM(s)/deciliter  glucagon  Injectable 1 milliGRAM(s) IntraMuscular once PRN Glucose LESS THAN 70 milligrams/deciliter      VITAL SIGNS:  T(F): 98.4 (08-20-18 @ 06:50), Max: 98.4 (08-20-18 @ 06:50)  HR: 80 (08-20-18 @ 06:50) (70 - 80)  BP: 130/56 (08-20-18 @ 06:50) (126/65 - 132/66)  RR: 18 (08-20-18 @ 06:50) (18 - 18)  SpO2: 94% (08-20-18 @ 06:50) (94% - 96%)    I&O's Summary    19 Aug 2018 07:01  -  20 Aug 2018 07:00  --------------------------------------------------------  IN: 0 mL / OUT: 1000 mL / NET: -1000 mL      Daily     Daily     PHYSICAL EXAM:  Gen: Alert, well-developed, NAD  HEENT: NCAT, PERRL, EOMI, conjunctiva clear, sclera anicteric, no erythema or exudates in the oropharynx, mmm  Neck: Supple, no JVD  CV: RRR, S1S2, no m/r/g  Resp: CTAB, normal respiratory effort  Abd: Soft, nontender, nondistended, normal bowel sounds  : Davis in place  Ext: + peripheral pulses, no edema, clubbing, or cyanosis  Neuro: AOx3, no focal deficits  SKIN: No rashes or lesions    LABS:    08-18    140  |  103  |  34<H>  ----------------------------<  135<H>  3.9   |  24  |  1.49<H>    Ca    8.3<L>      18 Aug 2018 13:18  Phos  2.8     08-18  Mg     2.0     08-18                  CAPILLARY BLOOD GLUCOSE      POCT Blood Glucose.: 171 mg/dL (19 Aug 2018 22:25)  POCT Blood Glucose.: 111 mg/dL (19 Aug 2018 17:16)  POCT Blood Glucose.: 141 mg/dL (19 Aug 2018 12:05)  POCT Blood Glucose.: 83 mg/dL (19 Aug 2018 08:20)      RADIOLOGY & ADDITIONAL TESTS: Reviewed    Imaging Personally Reviewed:    Consultant(s) Notes Reviewed:      Care Discussed with Consultants/Other Providers: Joselito Celena, PGY1  Pager [phone]    Patient is a 89y old  Male who presents with a chief complaint of complicated UTI (13 Aug 2018 14:38)      SUBJECTIVE/INTERVAL EVENTS: Patient seen and examined at bedside. EVA o/n. Cough is improved. C/o back pain that he's had for years. Continues to be weak. Otherwise feels okay.    MEDICATIONS  (STANDING):  apixaban 2.5 milliGRAM(s) Oral every 12 hours  carvedilol 25 milliGRAM(s) Oral every 12 hours  chlorhexidine 4% Liquid 1 Application(s) Topical two times a day  dextrose 5%. 1000 milliLiter(s) (50 mL/Hr) IV Continuous <Continuous>  dextrose 50% Injectable 12.5 Gram(s) IV Push once  dextrose 50% Injectable 25 Gram(s) IV Push once  dextrose 50% Injectable 25 Gram(s) IV Push once  docusate sodium 100 milliGRAM(s) Oral three times a day  finasteride 5 milliGRAM(s) Oral daily  furosemide    Tablet 20 milliGRAM(s) Oral <User Schedule>  insulin glargine Injectable (LANTUS) 8 Unit(s) SubCutaneous at bedtime  insulin lispro (HumaLOG) corrective regimen sliding scale   SubCutaneous three times a day before meals  insulin lispro (HumaLOG) corrective regimen sliding scale   SubCutaneous at bedtime  insulin lispro Injectable (HumaLOG) 4 Unit(s) SubCutaneous three times a day before meals  polyethylene glycol 3350 17 Gram(s) Oral daily  senna 2 Tablet(s) Oral at bedtime  tamsulosin 0.8 milliGRAM(s) Oral at bedtime    MEDICATIONS  (PRN):  dextrose 40% Gel 15 Gram(s) Oral once PRN Blood Glucose LESS THAN 70 milliGRAM(s)/deciliter  glucagon  Injectable 1 milliGRAM(s) IntraMuscular once PRN Glucose LESS THAN 70 milligrams/deciliter      VITAL SIGNS:  T(F): 98.4 (08-20-18 @ 06:50), Max: 98.4 (08-20-18 @ 06:50)  HR: 80 (08-20-18 @ 06:50) (70 - 80)  BP: 130/56 (08-20-18 @ 06:50) (126/65 - 132/66)  RR: 18 (08-20-18 @ 06:50) (18 - 18)  SpO2: 94% (08-20-18 @ 06:50) (94% - 96%)    I&O's Summary    19 Aug 2018 07:01  -  20 Aug 2018 07:00  --------------------------------------------------------  IN: 0 mL / OUT: 1000 mL / NET: -1000 mL      Daily     Daily     PHYSICAL EXAM:  Gen: Alert, well-developed, NAD  HEENT: NCAT, PERRL, EOMI, conjunctiva clear, sclera anicteric, no erythema or exudates in the oropharynx, mmm  Neck: Supple, no JVD  CV: RRR, S1S2, no m/r/g  Resp: CTAB, normal respiratory effort  Abd: Soft, nontender, nondistended, normal bowel sounds  : Davis in place  Ext: + peripheral pulses, no edema, clubbing, or cyanosis  Neuro: AOx3, no focal deficits  SKIN: No rashes or lesions    LABS:    08-18    140  |  103  |  34<H>  ----------------------------<  135<H>  3.9   |  24  |  1.49<H>    Ca    8.3<L>      18 Aug 2018 13:18  Phos  2.8     08-18  Mg     2.0     08-18      CAPILLARY BLOOD GLUCOSE    POCT Blood Glucose.: 171 mg/dL (19 Aug 2018 22:25)  POCT Blood Glucose.: 111 mg/dL (19 Aug 2018 17:16)  POCT Blood Glucose.: 141 mg/dL (19 Aug 2018 12:05)  POCT Blood Glucose.: 83 mg/dL (19 Aug 2018 08:20)      RADIOLOGY & ADDITIONAL TESTS: Reviewed    Imaging Personally Reviewed:    Consultant(s) Notes Reviewed:      Care Discussed with Consultants/Other Providers:

## 2018-08-20 NOTE — DIETITIAN INITIAL EVALUATION ADULT. - PERTINENT LABORATORY DATA
08-18 Na140 mmol/L Glu 135 mg/dL<H> K+ 3.9 mmol/L Cr  1.49 mg/dL<H> BUN 34 mg/dL<H> 08-18 Phos 2.8 mg/dL

## 2018-08-20 NOTE — DIETITIAN INITIAL EVALUATION ADULT. - PHYSICAL APPEARANCE
underweight/Moderate subcutaneous fat loss of orbital & upper arm regions.  Muscle loss of temple, clavicle, acromion and patellar regions as evidenced by protruding, prominent bone with minimal muscle definition of posterior calf region.

## 2018-08-20 NOTE — DIETITIAN INITIAL EVALUATION ADULT. - DIET TYPE
consistent carbohydrate (no snacks)/no concentrated potassium/dysphagia 2, mechanical soft, nectar consistency fld/DASH/TLC (sodium and cholesterol restricted diet)

## 2018-08-20 NOTE — PROGRESS NOTE ADULT - PROBLEM SELECTOR PLAN 3
-Now resolving, will continue to trend BMPs, and will need repeat BMP at rehab   -likely 2/2  ATN in setting of complicated UTI possibly due to glomerulonephritis  -creatinine downtrending, continue to monitor avoid nephrotoxins and renally dose medications.  -nephrology recs appreciated, follow up lab work up for glomerulonephritis, so far labs negative (ANCA's) -Now resolving, will continue to trend BMPs, and will need repeat BMP at rehab   -likely 2/2  ATN in setting of complicated UTI possibly due to glomerulonephritis  -creatinine downtrending, continue to monitor avoid nephrotoxins and renally dose medications.  -nephrology recs appreciated, follow up lab work up for glomerulonephritis, so far labs negative (ANCA's)  -nepro 8oz daily

## 2018-08-20 NOTE — PROGRESS NOTE ADULT - PROBLEM SELECTOR PLAN 8
- DVT prophylaxis: patient on eliquis 2.5 BID.  - PT recommends discharge to a rehab facility and patient is now agreeable, pending placement.

## 2018-08-20 NOTE — DIETITIAN INITIAL EVALUATION ADULT. - PROBLEM SELECTOR PLAN 5
- currently on sliding scale, did not receive basal dosing with elevated glucose this AM. REsume home basal insulin and conitnue with sliding scale.

## 2018-08-20 NOTE — PROGRESS NOTE ADULT - PROBLEM SELECTOR PLAN 1
Pt with hemodynamically mediated NADIA in the setting of dehydration, poor oral intake with Lasix and Losartan and now with relative hypotension overnight. Upon review of [facility] SCr WNL 0.69 on 7/4/18. At admission SCr elevated to 1.8 on 8/10/18, peaked at 1.90 on 8/12/18 and improved to 1.6 on 8/16/18. Received lasix IV on 8/14/18. SCr remains at 1.49 on 8/19/18.   Currently non oliguric with 1L urine output and decreasing daily weights.   Given hematuria and proteinuria did the following GN work up: Serological workup included negative ANCAs, anti-GBM, C3 and C4. HepBsAg, HepC Ab non reactive on 8/14/18. serum immunoglobulin WNL on 8/9/18. Urine immunofixation positive with weak kappa band. 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 Free light chains and SPEP pending. Monitor UOP and daily weights. Monitor BMP daily. Avoid nephrotoxic drugs. Dose all medications renally

## 2018-08-20 NOTE — DIETITIAN INITIAL EVALUATION ADULT. - PROBLEM SELECTOR PLAN 3
- has HFrEF with EF of 20% with AICD placement  - f/u AICD interrogation.  -patient appears euvolemic at this time.  -continue with coreg, hold ACE-i in setting of increased creatiine.

## 2018-08-21 VITALS
SYSTOLIC BLOOD PRESSURE: 104 MMHG | HEART RATE: 68 BPM | TEMPERATURE: 97 F | RESPIRATION RATE: 20 BRPM | OXYGEN SATURATION: 93 % | DIASTOLIC BLOOD PRESSURE: 69 MMHG

## 2018-08-21 PROBLEM — I48.0 PAROXYSMAL ATRIAL FIBRILLATION: Chronic | Status: ACTIVE | Noted: 2018-08-08

## 2018-08-21 PROBLEM — C91.90 LYMPHOID LEUKEMIA, UNSPECIFIED NOT HAVING ACHIEVED REMISSION: Chronic | Status: ACTIVE | Noted: 2018-08-08

## 2018-08-21 LAB
BUN SERPL-MCNC: 42 MG/DL — HIGH (ref 7–23)
CALCIUM SERPL-MCNC: 7.8 MG/DL — LOW (ref 8.4–10.5)
CHLORIDE SERPL-SCNC: 103 MMOL/L — SIGNIFICANT CHANGE UP (ref 98–107)
CO2 SERPL-SCNC: 26 MMOL/L — SIGNIFICANT CHANGE UP (ref 22–31)
CREAT SERPL-MCNC: 1.58 MG/DL — HIGH (ref 0.5–1.3)
GLUCOSE BLDC GLUCOMTR-MCNC: 269 MG/DL — HIGH (ref 70–99)
GLUCOSE BLDC GLUCOMTR-MCNC: 87 MG/DL — SIGNIFICANT CHANGE UP (ref 70–99)
GLUCOSE SERPL-MCNC: 199 MG/DL — HIGH (ref 70–99)
POTASSIUM SERPL-MCNC: 3.7 MMOL/L — SIGNIFICANT CHANGE UP (ref 3.5–5.3)
POTASSIUM SERPL-SCNC: 3.7 MMOL/L — SIGNIFICANT CHANGE UP (ref 3.5–5.3)
SODIUM SERPL-SCNC: 140 MMOL/L — SIGNIFICANT CHANGE UP (ref 135–145)

## 2018-08-21 PROCEDURE — 99239 HOSP IP/OBS DSCHRG MGMT >30: CPT

## 2018-08-21 PROCEDURE — 99232 SBSQ HOSP IP/OBS MODERATE 35: CPT | Mod: GC

## 2018-08-21 RX ADMIN — FINASTERIDE 5 MILLIGRAM(S): 5 TABLET, FILM COATED ORAL at 13:22

## 2018-08-21 RX ADMIN — POLYETHYLENE GLYCOL 3350 17 GRAM(S): 17 POWDER, FOR SOLUTION ORAL at 13:22

## 2018-08-21 RX ADMIN — Medication 100 MILLIGRAM(S): at 13:22

## 2018-08-21 RX ADMIN — CARVEDILOL PHOSPHATE 25 MILLIGRAM(S): 80 CAPSULE, EXTENDED RELEASE ORAL at 06:56

## 2018-08-21 RX ADMIN — CHLORHEXIDINE GLUCONATE 1 APPLICATION(S): 213 SOLUTION TOPICAL at 06:56

## 2018-08-21 RX ADMIN — Medication 3: at 12:27

## 2018-08-21 RX ADMIN — Medication 4 UNIT(S): at 08:43

## 2018-08-21 RX ADMIN — APIXABAN 2.5 MILLIGRAM(S): 2.5 TABLET, FILM COATED ORAL at 06:56

## 2018-08-21 RX ADMIN — Medication 20 MILLIGRAM(S): at 13:21

## 2018-08-21 RX ADMIN — Medication 4 UNIT(S): at 12:27

## 2018-08-21 NOTE — PROGRESS NOTE ADULT - PROBLEM SELECTOR PLAN 4
-Dr. Abad is the patients private oncologist.   - ordered FISH studies as well as immunoglobulin levels as per onc recs. -Dr. Abad is the patients private oncologist.   - ordered FISH studies as well as immunoglobulin levels as per onc recs.  - otpt onc f/u

## 2018-08-21 NOTE — PROGRESS NOTE ADULT - PROBLEM SELECTOR PLAN 2
- Now resolved, had aspiration event >72 hours ago. EKG showed no new changes. CXR shows right lower lobe infiltrates due to aspiration pneumonitis.   - Seen by speech and swallow who recommended dysphagia 2 diet, Cinesophagram also performed. Diet changed to soft with nectar thick fluids.  - hold antibiotics for now  - monitor for fevers

## 2018-08-21 NOTE — PROGRESS NOTE ADULT - NSHPATTENDINGPLANDISCUSS_GEN_ALL_CORE
family
patient
primary team
primary team
team
medicine attending
primary team
patient and house staff
patient, family  and house staff
resident
resident

## 2018-08-21 NOTE — PROGRESS NOTE ADULT - PROBLEM SELECTOR PLAN 1
Pt with hemodynamically mediated NADIA in the setting of dehydration, poor oral intake with Lasix and Losartan and now with relative hypotension overnight. Upon review of [facility] SCr WNL 0.69 on 7/4/18. At admission SCr elevated to 1.8 on 8/10/18, peaked at 1.90 on 8/12/18. SCr remains at 1.63 on 8/20/18.   Currently non oliguric with 800 cc urine output.   Given hematuria and proteinuria did the following GN work up: Serological workup included negative ANCAs, anti-GBM, C3 and C4. HepBsAg, HepC Ab non reactive on 8/14/18. serum immunoglobulin WNL on 8/9/18. Urine immunofixation positive with weak kappa band. 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 Free light chains and SPEP pending. Monitor UOP and daily weights. Monitor BMP daily. Avoid nephrotoxic drugs. Dose all medications renally

## 2018-08-21 NOTE — PROGRESS NOTE ADULT - PROBLEM SELECTOR PLAN 1
-completed course of ceftriaxone for complicated UTI.   -Davis exchanged in ED  - renal ultrasound showed no hydronephrosis -completed course of ceftriaxone for complicated UTI.   -Davis exchanged in ED  -renal ultrasound showed no hydronephrosis

## 2018-08-21 NOTE — PROGRESS NOTE ADULT - ATTENDING COMMENTS
cr has been trending down but no recent cr. volume status is good. please check recent BMP
labs stable.  DC planning to rehab soon   will sign off
Patient's worsening in creatinine may be related to relative hypotension throughout the day yesterday.  Recommend continue IVF for now. other work up as above.  consider renal sonogram although obtstruction unlikely given joshi recently changed.
Pt seen and examined by me Agree with resident  no acute distress noted  no acute events over night  continue CTX   FU UC sensitivities    renal f/u appreciated, recommend monitor off IVF now, avoids nephrotoxic, renally dose medications
Agree with above.  Creatiine stable at 1.7.  Discussed persoanlyl with renal attending, will send for GN workup, if clinically stable can be discharged with outpatietn follow up. Patient's daughter is coming home tomorrow, likely D/C tomorrow once speaks to daughter, who patient lives with as patient does not want to go to rehab.
Patient seen and examined by myself , case discussed  with resident ,agree with the above finding and plan  no acute distress noted  no acute events over night  continue CTX   FU UC sensitivities   Renal f/u noted, cr slightly worsened to 1.9 , Renal recommend  GN work up, given  hematuria and proteinuria .Will check serological workup including HepBsAg, HepC Ab, ANCAs, anti-GBM, serum and urine immunofixation, C3 and C4. Monitor UOP and daily weights.   Avoid nephrotoxic drugs. Dose all medications renally.  C/w IVf as per Renal recommendation , Repeat Renal sono   will closely monitor renal function  PT recommended rehab when medically stable
Pt seen and examined by me Agree with resident  pt reports fatigue, lethargy   No new events  continue CTX   FU UC sensitivities   FU renal consult
Agree with above. Patient with aspiration event with coughing and pain after swallowing pills.  Upon further interview, patietn states does occasionally cough with food.     #Aspiration pneuonitis  -hold off Abx as no evidence of PNA  -speech and swallow eval    #NADIA  -ATN vs. GN 2/2 CLL  -GN labs sent, negative to date.  -outpatient renal follow up.    rest as above.
Agree with above with following addendum:    #aspiration pneumonitis  -appreciate speech and swallow. MBS performed, they recommend soft diet with nectar thickend liquid, ordered    #HFrEF  -patient saturating well, no edema, however had fluid for NADIA s/p lasix yesterday  -will hold off lasix for now, but advise patient that if becomes SOB at home or if worsening edema to restart lasix  -outpatietn follow up with PMD    #A-fib  -continue with eliquis    #DM type 2, uncontrolled  -increase humolog to 4 units premeal, continue with current lantus given AM sugars 190's today  -trend fingersticks    #Disposition  -awaiting family to come in this afternoon in which R1 will have family discussion re: home vs. rehab as patient prefers home and family wants patient to go to rehab.  Disposition will be decided after this conversation.
trend Cr for antonieta - pt with good urine outpt via joshi.  pt awaiting rehab placement.
Pt seen and examined. Pt feels well, no sob/cp. appears euvolemic   Cr improved today  f/u nephrology rec   avoid nephrotoxins
Agree with above.  Patient to be dischraged to rehab once accepted by rehab.    -repeat BMP 1 week  -outpatient follow up with PMd and cardiologist.  -will give number for nephrologist for follow up creatiine.  -restart lasix 20mg every other day for heart failure with reduced EF and slightly hypervolemic on today's exam.    D/C planning 34 minutes.
Patient seen and examined, d/w HS, agree with above.     Cr 1.58 today, stable, d/w Dr. Severino (renal), no renal objection to discharge. Patient enjoying eating his fish for lunch. Denies CP/SOB/LE edema, is urinating, feels ready to leave the hospital. Appreciate CM/SW assistance with rehab placement. To followup with heme/onc as outpatient.     Discharge time 35 minutes
Agree with above.  Patient with proteus UTI.  Will need 10-14 day treatment for complicated UTI.  Awaiting downtrending of Creatinine prior to D/C.  If no improvement would consult renal in AM.  Check Uurea and UCre for FE urea
Agree with above and modified as susana.    #hyperkalemia  -please see above, awaiting repeat.  Low K diet, advicsed patient to avoid orange juice and cranberry juice.  No medications that cause hyperkalemia on med review.    Rest as above.  Patient decided on rehab over home, choices given to patient and family.
Pt seen and examined. Pt feels well, no sob/cp. appears euvolemic   NADIA, f/u nephrology rec, avoid nephrotoxins, f/u GN work up  chronic systolic heart failure, c/w lasix, watch volume status

## 2018-08-21 NOTE — PROGRESS NOTE ADULT - ASSESSMENT
89M with hx MI (1980 no stent), HFrEF (EF 20%), paroxysmal A-Fib s/p AICD, CVA (LLE weakness), HTN, T2DM, and recent diagnosis of CLL, was admitted to the hospital for a complicated UTI 2/2 proteus c/b NADIA (hemodynamically mediatedthat is now improving, complicated by aspiration pneumonitis which has now resolved. 89M with hx MI (1980 no stent), HFrEF (EF 20%), paroxysmal A-Fib s/p AICD, CVA (LLE weakness), HTN, T2DM, and recent diagnosis of CLL, was admitted to the hospital for a complicated UTI 2/2 proteus c/b NADIA (hemodynamically mediated that is now improving), complicated by aspiration pneumonitis which has now resolved. Stable for discharge to rehab.

## 2018-08-21 NOTE — PROGRESS NOTE ADULT - SUBJECTIVE AND OBJECTIVE BOX
Joselito Dallas, PGY1  Pager [phone]    Patient is a 89y old  Male who presents with a chief complaint of complicated UTI (13 Aug 2018 14:38)      SUBJECTIVE/INTERVAL EVENTS: Patient seen and examined at bedside. EVA o/n.    MEDICATIONS  (STANDING):  apixaban 2.5 milliGRAM(s) Oral every 12 hours  carvedilol 25 milliGRAM(s) Oral every 12 hours  chlorhexidine 4% Liquid 1 Application(s) Topical two times a day  dextrose 5%. 1000 milliLiter(s) (50 mL/Hr) IV Continuous <Continuous>  dextrose 50% Injectable 12.5 Gram(s) IV Push once  dextrose 50% Injectable 25 Gram(s) IV Push once  dextrose 50% Injectable 25 Gram(s) IV Push once  docusate sodium 100 milliGRAM(s) Oral three times a day  finasteride 5 milliGRAM(s) Oral daily  furosemide    Tablet 20 milliGRAM(s) Oral <User Schedule>  insulin glargine Injectable (LANTUS) 8 Unit(s) SubCutaneous at bedtime  insulin lispro (HumaLOG) corrective regimen sliding scale   SubCutaneous three times a day before meals  insulin lispro (HumaLOG) corrective regimen sliding scale   SubCutaneous at bedtime  insulin lispro Injectable (HumaLOG) 4 Unit(s) SubCutaneous three times a day before meals  polyethylene glycol 3350 17 Gram(s) Oral daily  senna 2 Tablet(s) Oral at bedtime  tamsulosin 0.8 milliGRAM(s) Oral at bedtime    MEDICATIONS  (PRN):  dextrose 40% Gel 15 Gram(s) Oral once PRN Blood Glucose LESS THAN 70 milliGRAM(s)/deciliter  glucagon  Injectable 1 milliGRAM(s) IntraMuscular once PRN Glucose LESS THAN 70 milligrams/deciliter      VITAL SIGNS:  T(F): 97.9 (18 @ 06:54), Max: 98.4 (18 @ 14:15)  HR: 69 (18 @ 06:54) (66 - 73)  BP: 130/57 (18 @ 06:54) (111/57 - 130/57)  RR: 18 (18 @ 06:54) (17 - 18)  SpO2: 96% (18 @ 06:54) (93% - 100%)    I&O's Summary    20 Aug 2018 07:01  -  21 Aug 2018 07:00  --------------------------------------------------------  IN: 0 mL / OUT: 800 mL / NET: -800 mL      Daily     Daily Weight in k.3 (21 Aug 2018 06:54)    PHYSICAL EXAM:  Gen: Alert, well-developed, NAD  HEENT: NCAT, PERRL, EOMI, conjunctiva clear, sclera anicteric, no erythema or exudates in the oropharynx, mmm  Neck: Supple, no JVD  CV: RRR, S1S2, no m/r/g  Resp: CTAB, normal respiratory effort  Abd: Soft, nontender, nondistended, normal bowel sounds  : Davis in place  Ext: + peripheral pulses, no edema, clubbing, or cyanosis  Neuro: AOx3, no focal deficits  SKIN: No rashes or lesions    LABS:        143  |  104  |  36<H>  ----------------------------<  110<H>  4.1   |  29  |  1.63<H>    Ca    8.2<L>      20 Aug 2018 15:55  Phos  3.9       Mg     2.0           CAPILLARY BLOOD GLUCOSE  POCT Blood Glucose.: 210 mg/dL (20 Aug 2018 22:49)  POCT Blood Glucose.: 90 mg/dL (20 Aug 2018 17:07)  POCT Blood Glucose.: 222 mg/dL (20 Aug 2018 12:22)  POCT Blood Glucose.: 87 mg/dL (20 Aug 2018 08:29)      RADIOLOGY & ADDITIONAL TESTS: Reviewed    Imaging Personally Reviewed:    Consultant(s) Notes Reviewed:      Care Discussed with Consultants/Other Providers: Joselito Dallas, PGY1  Pager [phone]    Patient is a 89y old  Male who presents with a chief complaint of complicated UTI (13 Aug 2018 14:38)      SUBJECTIVE/INTERVAL EVENTS: Patient seen and examined at bedside. EVA o/n.    MEDICATIONS  (STANDING):  apixaban 2.5 milliGRAM(s) Oral every 12 hours  carvedilol 25 milliGRAM(s) Oral every 12 hours  chlorhexidine 4% Liquid 1 Application(s) Topical two times a day  dextrose 5%. 1000 milliLiter(s) (50 mL/Hr) IV Continuous <Continuous>  dextrose 50% Injectable 12.5 Gram(s) IV Push once  dextrose 50% Injectable 25 Gram(s) IV Push once  dextrose 50% Injectable 25 Gram(s) IV Push once  docusate sodium 100 milliGRAM(s) Oral three times a day  finasteride 5 milliGRAM(s) Oral daily  furosemide    Tablet 20 milliGRAM(s) Oral <User Schedule>  insulin glargine Injectable (LANTUS) 8 Unit(s) SubCutaneous at bedtime  insulin lispro (HumaLOG) corrective regimen sliding scale   SubCutaneous three times a day before meals  insulin lispro (HumaLOG) corrective regimen sliding scale   SubCutaneous at bedtime  insulin lispro Injectable (HumaLOG) 4 Unit(s) SubCutaneous three times a day before meals  polyethylene glycol 3350 17 Gram(s) Oral daily  senna 2 Tablet(s) Oral at bedtime  tamsulosin 0.8 milliGRAM(s) Oral at bedtime    MEDICATIONS  (PRN):  dextrose 40% Gel 15 Gram(s) Oral once PRN Blood Glucose LESS THAN 70 milliGRAM(s)/deciliter  glucagon  Injectable 1 milliGRAM(s) IntraMuscular once PRN Glucose LESS THAN 70 milligrams/deciliter      VITAL SIGNS:  T(F): 97.9 (18 @ 06:54), Max: 98.4 (18 @ 14:15)  HR: 69 (18 @ 06:54) (66 - 73)  BP: 130/57 (18 @ 06:54) (111/57 - 130/57)  RR: 18 (18 @ 06:54) (17 - 18)  SpO2: 96% (18 @ 06:54) (93% - 100%)    I&O's Summary    20 Aug 2018 07:01  -  21 Aug 2018 07:00  --------------------------------------------------------  IN: 0 mL / OUT: 800 mL / NET: -800 mL      Daily     Daily Weight in k.3 (21 Aug 2018 06:54)    PHYSICAL EXAM:  Gen: Alert, well-developed, NAD  HEENT: NCAT, PERRL, EOMI, conjunctiva clear, sclera anicteric, no erythema or exudates in the oropharynx, mmm  Neck: Supple, no JVD  CV: RRR, S1S2, no m/r/g  Resp: CTAB, normal respiratory effort  Abd: Soft, nontender, nondistended, normal bowel sounds  : Davis in place  Ext: + peripheral pulses, no edema, clubbing, or cyanosis  Neuro: AOx3, no focal deficits  SKIN: No rashes or lesions    LABS:        143  |  104  |  36<H>  ----------------------------<  110<H>  4.1   |  29  |  1.63<H>    Ca    8.2<L>      20 Aug 2018 15:55  Phos  3.9     08-20  Mg     2.0         140  |  103  |  42<H>  ----------------------------<  199<H>  3.7   |  26  |  1.58<H>    Ca    7.8<L>      21 Aug 2018 11:15  Phos  3.9     08-20  Mg     2.0           CAPILLARY BLOOD GLUCOSE  POCT Blood Glucose.: 210 mg/dL (20 Aug 2018 22:49)  POCT Blood Glucose.: 90 mg/dL (20 Aug 2018 17:07)  POCT Blood Glucose.: 222 mg/dL (20 Aug 2018 12:22)  POCT Blood Glucose.: 87 mg/dL (20 Aug 2018 08:29)      RADIOLOGY & ADDITIONAL TESTS: Reviewed    Imaging Personally Reviewed:    Consultant(s) Notes Reviewed:      Care Discussed with Consultants/Other Providers:

## 2018-08-21 NOTE — PROGRESS NOTE ADULT - SUBJECTIVE AND OBJECTIVE BOX
[facility] DIVISION OF KIDNEY DISEASES AND HYPERTENSION -- FOLLOW UP NOTE  --------------------------------------------------------------------------------  HPI: 89 year old man with a history of MI (1980), HFrEF (EF 20%), paroxysmal A-Fib, AICD, CVA (LLE weakness), HTN, T2DM, and CLL admitted for complicated UTI. Nephrology consulted for NADIA.  Patient was discharged on 7/6/18 to subacute rehab with [place] in [facility] hospital following failed trial of void prior to discharge.   Upon review of [facility] SCr WNL 0.69 on 7/4/18. At admission SCr elevated to 1.8 and 1.91 on 8/10/18. Receiving ceftriaxone. Urine cultures growing proteus sp. Pt had episode of chest pain and SOB likely aspiration pneumonitis on 8/14/15.    Pt was seen and examined at bedside. Patient reports feeling well, tolerating oral diet. Denies chest pain, SOB, and abdominal pain.     PAST HISTORY  --------------------------------------------------------------------------------  No significant changes to PMH, PSH, FHx, SHx, unless otherwise noted    ALLERGIES & MEDICATIONS  --------------------------------------------------------------------------------  Allergies    Cipro (Other)  fluoroquinolone antibiotics (Unknown)  latex (Unknown)    Intolerances      Standing Inpatient Medications  apixaban 2.5 milliGRAM(s) Oral every 12 hours  carvedilol 25 milliGRAM(s) Oral every 12 hours  chlorhexidine 4% Liquid 1 Application(s) Topical two times a day  dextrose 5%. 1000 milliLiter(s) IV Continuous <Continuous>  dextrose 50% Injectable 12.5 Gram(s) IV Push once  dextrose 50% Injectable 25 Gram(s) IV Push once  dextrose 50% Injectable 25 Gram(s) IV Push once  docusate sodium 100 milliGRAM(s) Oral three times a day  finasteride 5 milliGRAM(s) Oral daily  furosemide    Tablet 20 milliGRAM(s) Oral <User Schedule>  insulin glargine Injectable (LANTUS) 8 Unit(s) SubCutaneous at bedtime  insulin lispro (HumaLOG) corrective regimen sliding scale   SubCutaneous three times a day before meals  insulin lispro (HumaLOG) corrective regimen sliding scale   SubCutaneous at bedtime  insulin lispro Injectable (HumaLOG) 4 Unit(s) SubCutaneous three times a day before meals  polyethylene glycol 3350 17 Gram(s) Oral daily  senna 2 Tablet(s) Oral at bedtime  tamsulosin 0.8 milliGRAM(s) Oral at bedtime    PRN Inpatient Medications  dextrose 40% Gel 15 Gram(s) Oral once PRN  glucagon  Injectable 1 milliGRAM(s) IntraMuscular once PRN      REVIEW OF SYSTEMS  --------------------------------------------------------------------------------  Gen: No fevers  Skin: No rashes  Head/Eyes/Ears: Normal hearing  Respiratory: No dyspnea  CV: No chest pain  GI: No abdominal pain,  MSK: No  edema    All other systems were reviewed and are negative, except as noted.    VITALS/PHYSICAL EXAM  --------------------------------------------------------------------------------  T(C): 36.6 (08-21-18 @ 06:54), Max: 36.9 (08-20-18 @ 14:15)  HR: 69 (08-21-18 @ 06:54) (66 - 73)  BP: 130/57 (08-21-18 @ 06:54) (111/57 - 130/57)  RR: 18 (08-21-18 @ 06:54) (17 - 18)  SpO2: 96% (08-21-18 @ 06:54) (93% - 100%)  Wt(kg): --        08-20-18 @ 07:01  -  08-21-18 @ 07:00  --------------------------------------------------------  IN: 0 mL / OUT: 800 mL / NET: -800 mL      Physical Exam:  	Gen: NAD  	HEENT: Normal mucous membranes   	Pulm: Course BS B/L  	CV: S1S2  	Abd: Soft, +BS   	Ext: No LE edema B/L  	Neuro: Awake  	Skin: No rash    LABS/STUDIES  --------------------------------------------------------------------------------    143  |  104  |  36  ----------------------------<  110      [08-20-18 @ 15:55]  4.1   |  29  |  1.63        Ca     8.2     [08-20-18 @ 15:55]      Mg     2.0     [08-20-18 @ 15:55]      Phos  3.9     [08-20-18 @ 15:55]            Creatinine Trend:  SCr 1.63 [08-20 @ 15:55]  SCr 1.49 [08-18 @ 13:18]  SCr 1.64 [08-16 @ 17:25]  SCr 1.48 [08-16 @ 11:00]  SCr 1.65 [08-14 @ 06:30]

## 2018-08-21 NOTE — PROGRESS NOTE ADULT - PROVIDER SPECIALTY LIST ADULT
Heme/Onc
Internal Medicine
Nephrology
Heme/Onc
Nephrology
Internal Medicine

## 2018-08-21 NOTE — PROGRESS NOTE ADULT - ASSESSMENT
The patient is an 89 year old man with a history of MI (1980 no stent), HFrEF (EF 20%), paroxysmal A-Fib, AICD, CVA (LLE weakness), HTN, T2DM, and recent diagnosis of CLL, discharged to [facility] from prior admission in July with joshi treated for UTI, aspiration pneumonitis and now NADIA  He has persistent leukocytosis in the 25-35 range, seen during both admissions, predominantly lymphocytes. During his last admission, flow cytometry was done revealing a monoclonal CD5, CD20 positive population, suggestive of either CLL or MCL. FISH studies ? to distinguish MCL vs CLL. Immunoglubulin levels OK  He will not require treatment for quite some time specially if it is CLL.  Anemia is likely from chronic disease and CKD. No evidence of iron or B12 def.   Rest as per medicine

## 2018-08-21 NOTE — PROGRESS NOTE ADULT - PROBLEM SELECTOR PLAN 3
-Now resolving, will continue to trend BMPs, and will need repeat BMP at rehab   -likely 2/2  ATN in setting of complicated UTI possibly due to glomerulonephritis  -creatinine downtrending, continue to monitor avoid nephrotoxins and renally dose medications.  -nephrology recs appreciated, follow up lab work up for glomerulonephritis, so far labs negative (ANCA's)  -nepro 8oz daily

## 2018-08-21 NOTE — PROGRESS NOTE ADULT - SUBJECTIVE AND OBJECTIVE BOX
HPI:  The patient is an 89 year old man with a history of MI (1980 no stent), HFrEF (EF 20%), paroxysmal A-Fib, AICD, CVA (LLE weakness), HTN, T2DM, and recent diagnosis of CLL, discharged to [facility] from prior admission in July with joshi presented with lower abdominal pain and generalized weakness, He was started on IV abx for  UTI. Had episode of aspiration pneumonitis in hospital. Now being managed for medical issues including NADIA  He has persistent leukocytosis in the 25-35 range, seen during both admissions, predominantly lymphocytes. During his last admission, flow cytometry was done revealing a monoclonal CD5, CD20 positive population, suggestive of either CLL or MCL.       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 1000 milliLiter(s) IV Continuous <Continuous>  dextrose 50% Injectable 12.5 Gram(s) IV Push once  dextrose 50% Injectable 25 Gram(s) IV Push once  dextrose 50% Injectable 25 Gram(s) IV Push once  docusate sodium 100 milliGRAM(s) Oral three times a day  finasteride 5 milliGRAM(s) Oral daily  furosemide    Tablet 20 milliGRAM(s) Oral <User Schedule>  glucagon  Injectable 1 milliGRAM(s) IntraMuscular once PRN Glucose LESS THAN 70 milligrams/deciliter  insulin glargine Injectable (LANTUS) 8 Unit(s) SubCutaneous at bedtime  insulin lispro (HumaLOG) corrective regimen sliding scale   SubCutaneous three times a day before meals  insulin lispro (HumaLOG) corrective regimen sliding scale   SubCutaneous at bedtime  insulin lispro Injectable (HumaLOG) 4 Unit(s) SubCutaneous three times a day before meals  polyethylene glycol 3350 17 Gram(s) Oral daily  senna 2 Tablet(s) Oral at bedtime  tamsulosin 0.8 milliGRAM(s) Oral at bedtime    Labs:  Complete Blood Count in AM (08.13.18 @ 07:15)    WBC Count: 25.37 K/uL    RBC Count: 3.24 M/uL    Hemoglobin: 8.6 g/dL    Hematocrit: 28.0 %    Mean Cell Volume: 86.4 fL    Mean Cell Hemoglobin: 26.5 pg    Mean Cell Hemoglobin Conc: 30.7 %    Red Cell Distrib Width: 14.7 %    Platelet Count - Automated: 231 K/uL    MPV: 9.8 fl    Nucleated RBC #: 0      08-21    140  |  103  |  42<H>  ----------------------------<  199<H>  3.7   |  26  |  1.58<H>    Ca    7.8<L>      21 Aug 2018 11:15  Phos  3.9     08-20  Mg     2.0     08-20        Radiology:             ROS:  Patient comfortable without distress  Weakness +  No SOB or chest pain  No palpitation  No abdominal pain, diarrhaea or constipation  No weakness of extremities  No skin changes or swelling of legs    Vital Signs Last 24 Hrs  T(C): 36.2 (21 Aug 2018 12:40), Max: 36.9 (20 Aug 2018 14:15)  T(F): 97.1 (21 Aug 2018 12:40), Max: 98.4 (20 Aug 2018 14:15)  HR: 68 (21 Aug 2018 12:40) (66 - 73)  BP: 104/69 (21 Aug 2018 12:40) (104/69 - 130/57)  BP(mean): --  RR: 20 (21 Aug 2018 12:40) (17 - 20)  SpO2: 93% (21 Aug 2018 12:40) (93% - 100%)    Physical exam:  Patient alert and oriented  No distress  CVS: S1, S2 regular or murmur  Chest: bilateral breath sound without rales  Abdomen: soft, not tender, no organomegaly or masses  No focal neuro deficit  No edema      Assessment and Plan:

## 2018-08-22 LAB
GAS PNL BLDMV: SIGNIFICANT CHANGE UP
KAPPA/LAMBDA FREE LIGHT CHAIN RATIO, SERUM: SIGNIFICANT CHANGE UP

## 2018-09-24 ENCOUNTER — INPATIENT (INPATIENT)
Facility: HOSPITAL | Age: 83
LOS: 17 days | Discharge: HOME | DRG: 871 | End: 2018-10-12
Attending: HOSPITALIST | Admitting: HOSPITALIST
Payer: MEDICARE

## 2018-09-24 PROCEDURE — 99285 EMERGENCY DEPT VISIT HI MDM: CPT | Mod: 25

## 2018-09-25 VITALS
SYSTOLIC BLOOD PRESSURE: 137 MMHG | OXYGEN SATURATION: 97 % | RESPIRATION RATE: 25 BRPM | TEMPERATURE: 99 F | HEART RATE: 77 BPM | DIASTOLIC BLOOD PRESSURE: 62 MMHG

## 2018-09-25 DIAGNOSIS — A41.9 SEPSIS, UNSPECIFIED ORGANISM: ICD-10-CM

## 2018-09-25 DIAGNOSIS — I24.8 OTHER FORMS OF ACUTE ISCHEMIC HEART DISEASE: ICD-10-CM

## 2018-09-25 DIAGNOSIS — R09.02 HYPOXEMIA: ICD-10-CM

## 2018-09-25 DIAGNOSIS — N18.3 CHRONIC KIDNEY DISEASE, STAGE 3 (MODERATE): ICD-10-CM

## 2018-09-25 DIAGNOSIS — I50.9 HEART FAILURE, UNSPECIFIED: ICD-10-CM

## 2018-09-25 DIAGNOSIS — C91.90 LYMPHOID LEUKEMIA, UNSPECIFIED NOT HAVING ACHIEVED REMISSION: ICD-10-CM

## 2018-09-25 DIAGNOSIS — E11.9 TYPE 2 DIABETES MELLITUS WITHOUT COMPLICATIONS: ICD-10-CM

## 2018-09-25 DIAGNOSIS — I48.0 PAROXYSMAL ATRIAL FIBRILLATION: ICD-10-CM

## 2018-09-25 DIAGNOSIS — J96.01 ACUTE RESPIRATORY FAILURE WITH HYPOXIA: ICD-10-CM

## 2018-09-25 DIAGNOSIS — Z29.9 ENCOUNTER FOR PROPHYLACTIC MEASURES, UNSPECIFIED: ICD-10-CM

## 2018-09-25 LAB
ALBUMIN SERPL ELPH-MCNC: 3.1 G/DL — LOW (ref 3.3–5)
ALP SERPL-CCNC: 126 U/L — HIGH (ref 40–120)
ALT FLD-CCNC: 10 U/L — SIGNIFICANT CHANGE UP (ref 10–45)
ANION GAP SERPL CALC-SCNC: 6 MMOL/L — SIGNIFICANT CHANGE UP (ref 5–17)
ANION GAP SERPL CALC-SCNC: 7 MMOL/L — SIGNIFICANT CHANGE UP (ref 5–17)
APPEARANCE UR: ABNORMAL
APTT BLD: 31.7 SEC — SIGNIFICANT CHANGE UP (ref 27.5–37.4)
AST SERPL-CCNC: 20 U/L — SIGNIFICANT CHANGE UP (ref 10–40)
BACTERIA # UR AUTO: ABNORMAL
BASOPHILS # BLD AUTO: 0.2 K/UL — SIGNIFICANT CHANGE UP (ref 0–0.2)
BILIRUB SERPL-MCNC: 0.3 MG/DL — SIGNIFICANT CHANGE UP (ref 0.2–1.2)
BILIRUB UR-MCNC: NEGATIVE — SIGNIFICANT CHANGE UP
BUN SERPL-MCNC: 24 MG/DL — HIGH (ref 7–23)
BUN SERPL-MCNC: 25 MG/DL — HIGH (ref 7–23)
CALCIUM SERPL-MCNC: 8.5 MG/DL — SIGNIFICANT CHANGE UP (ref 8.4–10.5)
CALCIUM SERPL-MCNC: 9 MG/DL — SIGNIFICANT CHANGE UP (ref 8.4–10.5)
CHLORIDE SERPL-SCNC: 102 MMOL/L — SIGNIFICANT CHANGE UP (ref 96–108)
CHLORIDE SERPL-SCNC: 102 MMOL/L — SIGNIFICANT CHANGE UP (ref 96–108)
CO2 SERPL-SCNC: 29 MMOL/L — SIGNIFICANT CHANGE UP (ref 22–31)
CO2 SERPL-SCNC: 30 MMOL/L — SIGNIFICANT CHANGE UP (ref 22–31)
COLOR SPEC: YELLOW — SIGNIFICANT CHANGE UP
CREAT SERPL-MCNC: 1.27 MG/DL — SIGNIFICANT CHANGE UP (ref 0.5–1.3)
CREAT SERPL-MCNC: 1.31 MG/DL — HIGH (ref 0.5–1.3)
DIFF PNL FLD: ABNORMAL
EOSINOPHIL # BLD AUTO: 0.1 K/UL — SIGNIFICANT CHANGE UP (ref 0–0.5)
EOSINOPHIL NFR BLD AUTO: 2 % — SIGNIFICANT CHANGE UP (ref 0–6)
EPI CELLS # UR: 1 /HPF — SIGNIFICANT CHANGE UP
GAS PNL BLDV: SIGNIFICANT CHANGE UP
GLUCOSE SERPL-MCNC: 120 MG/DL — HIGH (ref 70–99)
GLUCOSE SERPL-MCNC: 154 MG/DL — HIGH (ref 70–99)
GLUCOSE UR QL: NEGATIVE — SIGNIFICANT CHANGE UP
HCT VFR BLD CALC: 29.7 % — LOW (ref 39–50)
HCT VFR BLD CALC: 33.1 % — LOW (ref 39–50)
HGB BLD-MCNC: 10 G/DL — LOW (ref 13–17)
HGB BLD-MCNC: 9.2 G/DL — LOW (ref 13–17)
HYALINE CASTS # UR AUTO: 3 /LPF — HIGH (ref 0–2)
INR BLD: 1.47 RATIO — HIGH (ref 0.88–1.16)
KETONES UR-MCNC: NEGATIVE — SIGNIFICANT CHANGE UP
LEUKOCYTE ESTERASE UR-ACNC: ABNORMAL
LYMPHOCYTES # BLD AUTO: 11.6 K/UL — HIGH (ref 1–3.3)
LYMPHOCYTES # BLD AUTO: 56 % — HIGH (ref 13–44)
MAGNESIUM SERPL-MCNC: 1.9 MG/DL — SIGNIFICANT CHANGE UP (ref 1.6–2.6)
MCHC RBC-ENTMCNC: 24.7 PG — LOW (ref 27–34)
MCHC RBC-ENTMCNC: 25.1 PG — LOW (ref 27–34)
MCHC RBC-ENTMCNC: 30 GM/DL — LOW (ref 32–36)
MCHC RBC-ENTMCNC: 30.8 GM/DL — LOW (ref 32–36)
MCV RBC AUTO: 81.5 FL — SIGNIFICANT CHANGE UP (ref 80–100)
MCV RBC AUTO: 82.1 FL — SIGNIFICANT CHANGE UP (ref 80–100)
MONOCYTES # BLD AUTO: 1.3 K/UL — HIGH (ref 0–0.9)
MONOCYTES NFR BLD AUTO: 2 % — SIGNIFICANT CHANGE UP (ref 2–14)
NEUTROPHILS # BLD AUTO: 4.8 K/UL — SIGNIFICANT CHANGE UP (ref 1.8–7.4)
NEUTROPHILS NFR BLD AUTO: 31 % — LOW (ref 43–77)
NITRITE UR-MCNC: POSITIVE
PH UR: 6 — SIGNIFICANT CHANGE UP (ref 5–8)
PHOSPHATE SERPL-MCNC: 2.6 MG/DL — SIGNIFICANT CHANGE UP (ref 2.5–4.5)
PLATELET # BLD AUTO: 184 K/UL — SIGNIFICANT CHANGE UP (ref 150–400)
PLATELET # BLD AUTO: 212 K/UL — SIGNIFICANT CHANGE UP (ref 150–400)
POTASSIUM SERPL-MCNC: 3.5 MMOL/L — SIGNIFICANT CHANGE UP (ref 3.5–5.3)
POTASSIUM SERPL-MCNC: 3.9 MMOL/L — SIGNIFICANT CHANGE UP (ref 3.5–5.3)
POTASSIUM SERPL-SCNC: 3.5 MMOL/L — SIGNIFICANT CHANGE UP (ref 3.5–5.3)
POTASSIUM SERPL-SCNC: 3.9 MMOL/L — SIGNIFICANT CHANGE UP (ref 3.5–5.3)
PROT SERPL-MCNC: 7 G/DL — SIGNIFICANT CHANGE UP (ref 6–8.3)
PROT UR-MCNC: ABNORMAL
PROTHROM AB SERPL-ACNC: 16.2 SEC — HIGH (ref 9.8–12.7)
RAPID RVP RESULT: DETECTED
RBC # BLD: 3.64 M/UL — LOW (ref 4.2–5.8)
RBC # BLD: 4.03 M/UL — LOW (ref 4.2–5.8)
RBC # FLD: 15.2 % — HIGH (ref 10.3–14.5)
RBC # FLD: 15.3 % — HIGH (ref 10.3–14.5)
RBC CASTS # UR COMP ASSIST: 41 /HPF — HIGH (ref 0–4)
RSV RNA SPEC QL NAA+PROBE: DETECTED
SODIUM SERPL-SCNC: 138 MMOL/L — SIGNIFICANT CHANGE UP (ref 135–145)
SODIUM SERPL-SCNC: 138 MMOL/L — SIGNIFICANT CHANGE UP (ref 135–145)
SP GR SPEC: 1.02 — SIGNIFICANT CHANGE UP
TROPONIN T, HIGH SENSITIVITY RESULT: 145 NG/L — HIGH (ref 0–51)
TROPONIN T, HIGH SENSITIVITY RESULT: 155 NG/L — HIGH (ref 0–51)
UROBILINOGEN FLD QL: ABNORMAL
WBC # BLD: 16.5 K/UL — HIGH (ref 3.8–10.5)
WBC # BLD: 18 K/UL — HIGH (ref 3.8–10.5)
WBC # FLD AUTO: 16.5 K/UL — HIGH (ref 3.8–10.5)
WBC # FLD AUTO: 18 K/UL — HIGH (ref 3.8–10.5)
WBC UR QL: 598 /HPF — HIGH (ref 0–5)

## 2018-09-25 PROCEDURE — 99223 1ST HOSP IP/OBS HIGH 75: CPT | Mod: GC

## 2018-09-25 PROCEDURE — 71275 CT ANGIOGRAPHY CHEST: CPT | Mod: 26

## 2018-09-25 PROCEDURE — 12345: CPT | Mod: NC,GC

## 2018-09-25 PROCEDURE — 71045 X-RAY EXAM CHEST 1 VIEW: CPT | Mod: 26

## 2018-09-25 RX ORDER — DEXTROSE 50 % IN WATER 50 %
25 SYRINGE (ML) INTRAVENOUS ONCE
Qty: 0 | Refills: 0 | Status: DISCONTINUED | OUTPATIENT
Start: 2018-09-25 | End: 2018-09-26

## 2018-09-25 RX ORDER — SODIUM CHLORIDE 9 MG/ML
1000 INJECTION, SOLUTION INTRAVENOUS
Qty: 0 | Refills: 0 | Status: DISCONTINUED | OUTPATIENT
Start: 2018-09-25 | End: 2018-09-26

## 2018-09-25 RX ORDER — GLUCAGON INJECTION, SOLUTION 0.5 MG/.1ML
1 INJECTION, SOLUTION SUBCUTANEOUS ONCE
Qty: 0 | Refills: 0 | Status: DISCONTINUED | OUTPATIENT
Start: 2018-09-25 | End: 2018-09-26

## 2018-09-25 RX ORDER — APIXABAN 2.5 MG/1
2.5 TABLET, FILM COATED ORAL EVERY 12 HOURS
Qty: 0 | Refills: 0 | Status: DISCONTINUED | OUTPATIENT
Start: 2018-09-25 | End: 2018-10-12

## 2018-09-25 RX ORDER — PIPERACILLIN AND TAZOBACTAM 4; .5 G/20ML; G/20ML
3.38 INJECTION, POWDER, LYOPHILIZED, FOR SOLUTION INTRAVENOUS ONCE
Qty: 0 | Refills: 0 | Status: COMPLETED | OUTPATIENT
Start: 2018-09-25 | End: 2018-09-25

## 2018-09-25 RX ORDER — INSULIN GLARGINE 100 [IU]/ML
4 INJECTION, SOLUTION SUBCUTANEOUS AT BEDTIME
Qty: 0 | Refills: 0 | Status: DISCONTINUED | OUTPATIENT
Start: 2018-09-25 | End: 2018-09-26

## 2018-09-25 RX ORDER — DOCUSATE SODIUM 100 MG
100 CAPSULE ORAL THREE TIMES A DAY
Qty: 0 | Refills: 0 | Status: DISCONTINUED | OUTPATIENT
Start: 2018-09-25 | End: 2018-09-30

## 2018-09-25 RX ORDER — AZITHROMYCIN 500 MG/1
500 TABLET, FILM COATED ORAL ONCE
Qty: 0 | Refills: 0 | Status: COMPLETED | OUTPATIENT
Start: 2018-09-25 | End: 2018-09-25

## 2018-09-25 RX ORDER — DEXTROSE 50 % IN WATER 50 %
25 SYRINGE (ML) INTRAVENOUS ONCE
Qty: 0 | Refills: 0 | Status: DISCONTINUED | OUTPATIENT
Start: 2018-09-25 | End: 2018-10-03

## 2018-09-25 RX ORDER — SENNA PLUS 8.6 MG/1
2 TABLET ORAL AT BEDTIME
Qty: 0 | Refills: 0 | Status: DISCONTINUED | OUTPATIENT
Start: 2018-09-25 | End: 2018-09-30

## 2018-09-25 RX ORDER — INSULIN LISPRO 100/ML
VIAL (ML) SUBCUTANEOUS AT BEDTIME
Qty: 0 | Refills: 0 | Status: DISCONTINUED | OUTPATIENT
Start: 2018-09-25 | End: 2018-10-03

## 2018-09-25 RX ORDER — FOLIC ACID 0.8 MG
1 TABLET ORAL DAILY
Qty: 0 | Refills: 0 | Status: DISCONTINUED | OUTPATIENT
Start: 2018-09-25 | End: 2018-10-12

## 2018-09-25 RX ORDER — ASPIRIN/CALCIUM CARB/MAGNESIUM 324 MG
162 TABLET ORAL ONCE
Qty: 0 | Refills: 0 | Status: COMPLETED | OUTPATIENT
Start: 2018-09-25 | End: 2018-09-25

## 2018-09-25 RX ORDER — VANCOMYCIN HCL 1 G
750 VIAL (EA) INTRAVENOUS EVERY 12 HOURS
Qty: 0 | Refills: 0 | Status: DISCONTINUED | OUTPATIENT
Start: 2018-09-25 | End: 2018-09-25

## 2018-09-25 RX ORDER — AZITHROMYCIN 500 MG/1
TABLET, FILM COATED ORAL
Qty: 0 | Refills: 0 | Status: DISCONTINUED | OUTPATIENT
Start: 2018-09-25 | End: 2018-09-26

## 2018-09-25 RX ORDER — VANCOMYCIN HCL 1 G
1000 VIAL (EA) INTRAVENOUS EVERY 24 HOURS
Qty: 0 | Refills: 0 | Status: DISCONTINUED | OUTPATIENT
Start: 2018-09-25 | End: 2018-09-26

## 2018-09-25 RX ORDER — CARVEDILOL PHOSPHATE 80 MG/1
25 CAPSULE, EXTENDED RELEASE ORAL EVERY 12 HOURS
Qty: 0 | Refills: 0 | Status: DISCONTINUED | OUTPATIENT
Start: 2018-09-25 | End: 2018-10-12

## 2018-09-25 RX ORDER — FUROSEMIDE 40 MG
40 TABLET ORAL DAILY
Qty: 0 | Refills: 0 | Status: DISCONTINUED | OUTPATIENT
Start: 2018-09-25 | End: 2018-09-27

## 2018-09-25 RX ORDER — INSULIN LISPRO 100/ML
VIAL (ML) SUBCUTANEOUS
Qty: 0 | Refills: 0 | Status: DISCONTINUED | OUTPATIENT
Start: 2018-09-25 | End: 2018-10-03

## 2018-09-25 RX ORDER — DEXTROSE 50 % IN WATER 50 %
12.5 SYRINGE (ML) INTRAVENOUS ONCE
Qty: 0 | Refills: 0 | Status: DISCONTINUED | OUTPATIENT
Start: 2018-09-25 | End: 2018-09-26

## 2018-09-25 RX ORDER — FUROSEMIDE 40 MG
20 TABLET ORAL
Qty: 0 | Refills: 0 | Status: DISCONTINUED | OUTPATIENT
Start: 2018-09-25 | End: 2018-09-25

## 2018-09-25 RX ORDER — IPRATROPIUM/ALBUTEROL SULFATE 18-103MCG
3 AEROSOL WITH ADAPTER (GRAM) INHALATION EVERY 6 HOURS
Qty: 0 | Refills: 0 | Status: DISCONTINUED | OUTPATIENT
Start: 2018-09-25 | End: 2018-09-28

## 2018-09-25 RX ORDER — VANCOMYCIN HCL 1 G
1000 VIAL (EA) INTRAVENOUS ONCE
Qty: 0 | Refills: 0 | Status: COMPLETED | OUTPATIENT
Start: 2018-09-25 | End: 2018-09-25

## 2018-09-25 RX ORDER — LOSARTAN POTASSIUM 100 MG/1
25 TABLET, FILM COATED ORAL DAILY
Qty: 0 | Refills: 0 | Status: DISCONTINUED | OUTPATIENT
Start: 2018-09-25 | End: 2018-09-30

## 2018-09-25 RX ORDER — AZITHROMYCIN 500 MG/1
500 TABLET, FILM COATED ORAL EVERY 24 HOURS
Qty: 0 | Refills: 0 | Status: DISCONTINUED | OUTPATIENT
Start: 2018-09-26 | End: 2018-09-26

## 2018-09-25 RX ORDER — VANCOMYCIN HCL 1 G
1000 VIAL (EA) INTRAVENOUS EVERY 12 HOURS
Qty: 0 | Refills: 0 | Status: DISCONTINUED | OUTPATIENT
Start: 2018-09-25 | End: 2018-09-25

## 2018-09-25 RX ORDER — DEXTROSE 50 % IN WATER 50 %
15 SYRINGE (ML) INTRAVENOUS ONCE
Qty: 0 | Refills: 0 | Status: DISCONTINUED | OUTPATIENT
Start: 2018-09-25 | End: 2018-09-26

## 2018-09-25 RX ORDER — ACETAMINOPHEN 500 MG
1000 TABLET ORAL ONCE
Qty: 0 | Refills: 0 | Status: COMPLETED | OUTPATIENT
Start: 2018-09-25 | End: 2018-09-25

## 2018-09-25 RX ORDER — PIPERACILLIN AND TAZOBACTAM 4; .5 G/20ML; G/20ML
3.38 INJECTION, POWDER, LYOPHILIZED, FOR SOLUTION INTRAVENOUS EVERY 8 HOURS
Qty: 0 | Refills: 0 | Status: DISCONTINUED | OUTPATIENT
Start: 2018-09-25 | End: 2018-09-28

## 2018-09-25 RX ORDER — FINASTERIDE 5 MG/1
5 TABLET, FILM COATED ORAL DAILY
Qty: 0 | Refills: 0 | Status: DISCONTINUED | OUTPATIENT
Start: 2018-09-25 | End: 2018-10-02

## 2018-09-25 RX ORDER — FUROSEMIDE 40 MG
40 TABLET ORAL ONCE
Qty: 0 | Refills: 0 | Status: COMPLETED | OUTPATIENT
Start: 2018-09-25 | End: 2018-09-25

## 2018-09-25 RX ADMIN — AZITHROMYCIN 250 MILLIGRAM(S): 500 TABLET, FILM COATED ORAL at 13:38

## 2018-09-25 RX ADMIN — Medication 40 MILLIGRAM(S): at 09:30

## 2018-09-25 RX ADMIN — Medication 2: at 13:16

## 2018-09-25 RX ADMIN — Medication 1 MILLIGRAM(S): at 13:16

## 2018-09-25 RX ADMIN — Medication 162 MILLIGRAM(S): at 03:54

## 2018-09-25 RX ADMIN — Medication 3: at 17:36

## 2018-09-25 RX ADMIN — Medication 250 MILLIGRAM(S): at 21:30

## 2018-09-25 RX ADMIN — FINASTERIDE 5 MILLIGRAM(S): 5 TABLET, FILM COATED ORAL at 13:16

## 2018-09-25 RX ADMIN — APIXABAN 2.5 MILLIGRAM(S): 2.5 TABLET, FILM COATED ORAL at 17:20

## 2018-09-25 RX ADMIN — Medication 400 MILLIGRAM(S): at 05:09

## 2018-09-25 RX ADMIN — Medication 100 MILLIGRAM(S): at 13:50

## 2018-09-25 RX ADMIN — PIPERACILLIN AND TAZOBACTAM 200 GRAM(S): 4; .5 INJECTION, POWDER, LYOPHILIZED, FOR SOLUTION INTRAVENOUS at 03:30

## 2018-09-25 RX ADMIN — Medication 40 MILLIGRAM(S): at 03:17

## 2018-09-25 RX ADMIN — LOSARTAN POTASSIUM 25 MILLIGRAM(S): 100 TABLET, FILM COATED ORAL at 09:27

## 2018-09-25 RX ADMIN — Medication 250 MILLIGRAM(S): at 09:27

## 2018-09-25 RX ADMIN — PIPERACILLIN AND TAZOBACTAM 25 GRAM(S): 4; .5 INJECTION, POWDER, LYOPHILIZED, FOR SOLUTION INTRAVENOUS at 21:30

## 2018-09-25 RX ADMIN — PIPERACILLIN AND TAZOBACTAM 25 GRAM(S): 4; .5 INJECTION, POWDER, LYOPHILIZED, FOR SOLUTION INTRAVENOUS at 13:16

## 2018-09-25 RX ADMIN — CARVEDILOL PHOSPHATE 25 MILLIGRAM(S): 80 CAPSULE, EXTENDED RELEASE ORAL at 17:20

## 2018-09-25 NOTE — H&P ADULT - PROBLEM SELECTOR PLAN 5
low cytometry was done revealing a monoclonal CD5, CD20 positive population, suggestive of either CLL or MCL. WBC lower than previous 25-30 WBC  -continue to monitor for now low cytometry was done revealing a monoclonal CD5, CD20 positive population, suggestive of either CLL or MCL. WBC lower than previous 25-30 WBC  -continue to monitor for now, counts acceptable without evidence of bleeding.

## 2018-09-25 NOTE — PATIENT PROFILE ADULT. - LOCATION
L heel wound of unknown etiology-unsure if wound is a pressure ulcer-wound care consult ordered to determine

## 2018-09-25 NOTE — PROGRESS NOTE ADULT - PROBLEM SELECTOR PLAN 7
On glargine 8U at bedtime w/ 2U lispro   -will c/w home regimen, FSG pre meal and bedtime Home meds Lantus 8u qHS, Lispro 2u    - Switch to Lantus 4u qHS  - FSG pre meal and bedtime

## 2018-09-25 NOTE — PROGRESS NOTE ADULT - PROBLEM SELECTOR PLAN 4
elevated troponin w/o ischemic changes on EKG, likely in setting of demand ischemia, patient w/o chest pain  -will trend second set cardiac enzymes to asses for delta  -pt already therapeutically a/c with eliquis hold off on heparin for now. No chest pain. Elevated troponin w/o ischemic changes on EKG, likely in setting of demand ischemia.    - f/u second set cardiac enzymes  - Pt already therapeutically A/C with Eliquis - no need for Heparin

## 2018-09-25 NOTE — H&P ADULT - NSHPSOCIALHISTORY_GEN_ALL_CORE
arrived from rehab facility, otherwise lives w/ family, ambulates w/ walker arrived from rehab facility, otherwise lives w/ family, ambulates w/ walker denies smoking

## 2018-09-25 NOTE — PROGRESS NOTE ADULT - PROBLEM SELECTOR PLAN 1
Likely combination of RSV+/- Aspiration PNA & atelectasis & Acute decompensated CHF (exacerbated by RSV)   s/p Lasix IV 40mG, currently on 4L NC saturation 92%   -CTA negative for PE  continue w/ supplemental O2 Likely 2/2 Acute Decompensated exacerbated by a combination fo RSV+ & Aspiration PNA & Atelectasis. s/p Lasix IV 40 in ED.  CTA on admission: b/l groundglass opacities, b/l pleural effusions, no PE.    -c/w Lasix IV 40mg qD  -c/w NC 4L O2 for goal of sat >92%  -CTA negative for PE

## 2018-09-25 NOTE — PROGRESS NOTE ADULT - PROBLEM SELECTOR PLAN 2
elevated WBC + tachycardia + RSV -/+ aspiration PNA  -c/w zosyn, add vancomycin coverage as pt w/ hospitalization in last 30days, check urine legionella if + can add on azithro, currently no diarrhea.   -UA significant for infection how ever pt w/ chronic Davis ?colonization   -f/u urine cultures, obtain x2 set blood cultures (patient already started on abx blood culture not sent in ER) Leukocytosis + Tachycardia with sources: RSV, aspiration PNA, +UA.   Note has +UA but has chronic indwelling joshi for BPH.  CTA on admission: b/l groundglass opacities, b/l pleural effusions, no PE.    - c/w zosyn to target possible Aspiration PNA and +UA  - started Vancomycin & Azithromycin as patient hospitalized within last 30days  - f/u urine legionella, currently no diarrhea.   - f/u urine cultures  - f/u blood cultures (sent after abx started in ED)

## 2018-09-25 NOTE — ED PROVIDER NOTE - CARE PLAN
Principal Discharge DX:	Acute on chronic heart failure, unspecified heart failure type  Secondary Diagnosis:	Demand ischemia  Secondary Diagnosis:	Respiratory distress

## 2018-09-25 NOTE — H&P ADULT - PROBLEM SELECTOR PLAN 3
s/p IV lasix 40mg   daily weights, strict i/o's   -c/w carvedilol 25mg q12hrs, patient previously on losartan 40mg however held in setting of NADIA on past hospitalization s/p IV lasix 40mg   daily weights, strict i/o's   -c/w carvedilol 25mg q12hrs, patient previously on losartan 40mg however held in setting of NADIA on past hospitalization  -c/w lasix 40 iv qd (home dose 20 po qod)  -bipap if pt has increasing wob.

## 2018-09-25 NOTE — ED PROVIDER NOTE - PROGRESS NOTE DETAILS
Tino HIDALGO - discussed with Dr. Barr cardiology, given story, would give lasix, aspirin and trend the troponin. does not think heparin is appropriate at this time unless ekg changes and trop increases. then admit to hospitalist. discussed with Dr. Dial, hospitalist, will admit. requested zosyn for possible pna, in the setting of aspiration. will give first dose and admit to tele.

## 2018-09-25 NOTE — PROGRESS NOTE ADULT - PROBLEM SELECTOR PLAN 3
s/p IV lasix 40mg   daily weights, strict i/o's   -c/w carvedilol 25mg q12hrs, patient previously on losartan 40mg however held in setting of NADIA on past hospitalization  -c/w lasix 40 iv qd (home dose 20 po qod)  -bipap if pt has increasing wob. No chest pain. EKG without remarkable ischemic changes.   Troponins elevated 155. BNP elevated 59658. s/p IV lasix 40mg.    - follow daily weights  - strict I/O's   - c/w carvedilol 25mg q12hrs (patient previously on losartan 40mg however held in setting of NADIA on past hospitalization)  - c/w lasix 40 IV qD (home dose 20 PO qD)  - Bipap PRN if pt has increasing WOB; can also help with the heart failure

## 2018-09-25 NOTE — H&P ADULT - PROBLEM SELECTOR PLAN 9
IMPROVE score: 3   DVT PPX: patient on Eliquis for afib     DIET: patient seen by S&S s/p Cinesophagram on previous admission recommended Dysphagia 2 with nectar thick liquids    Dr.Benziger Horton   Internal Medicine   PGY-2   [phone] (long range pager)  [phone] (short range)

## 2018-09-25 NOTE — H&P ADULT - PROBLEM SELECTOR PLAN 2
elevated WBC + tachycardia + RSV -/+ aspiration PNA  -c/w zosyn, add vancomycin coverage as pt w/ hospitalization in last 30days  -UA significant for infection how ever pt w/ chronic Davis ?colonization   -f/u urine cultures, obtain x2 set blood cultures (patient already started on abx blood culture not sent in ER) elevated WBC + tachycardia + RSV -/+ aspiration PNA  -c/w zosyn, add vancomycin coverage as pt w/ hospitalization in last 30days, check urine legionella if + can add on azithro, currently no diarrhea.   -UA significant for infection how ever pt w/ chronic Davis ?colonization   -f/u urine cultures, obtain x2 set blood cultures (patient already started on abx blood culture not sent in ER)

## 2018-09-25 NOTE — PROGRESS NOTE ADULT - PROBLEM SELECTOR PLAN 8
-pt with antonieta on last admission and appears to have downtrended to new baseline  -trend bmp  -avoid nephrotoxins  -renally dose meds Previous hospitalization had Cr ~ 1.8-2.0, has progressively downtrended since prior admission. New baseline Cr ~ 1.3.    - Trend bmp  - Avoid nephrotoxins  - Renally dose meds

## 2018-09-25 NOTE — H&P ADULT - HISTORY OF PRESENT ILLNESS
89 year old man with a history of MI (1980), HFrEF (EF 20%), paroxysmal A-Fib, AICD, CVA (LLE weakness), HTN, T2DM, BPH w/ chronic indwelling Davis and CLL presenting to the hospital for acute SOB.    Patient endorses 3 days of progressive SOB at rest w/ sudden decompensation requiring supplemental O2 in rehab facility. States that 2 weeks ago he developed productive cough (patient unsure of color), chills and night sweats, no subjective fevers. He was recently admitted for Proteus UTI s/p 10 days abx course complicated by aspiration pneumonitis from 8/8-8/21 subsequently d/c to rehab facility.     Denies chest pain, palpations, nausea/vomiting, change in BM, headaches. 89 year old man with a history of MI (1980), HFrEF (EF 20%), paroxysmal A-Fib, AICD, CVA (LLE weakness), HTN, T2DM, BPH w/ chronic indwelling Davis and CLL presenting to the hospital for acute SOB.    Patient endorses 3 days of progressive SOB at rest w/ sudden decompensation requiring supplemental O2 in rehab facility. +lugo, +orthopnea States that 2 weeks ago he developed productive cough (patient unsure of color), chills and night sweats, no subjective fevers. He was recently admitted for Proteus UTI s/p 10 days abx course complicated by aspiration pneumonitis from 8/8-8/21 subsequently d/c to rehab facility.     Denies chest pain, palpations, nausea/vomiting, change in BM, headaches.

## 2018-09-25 NOTE — PROGRESS NOTE ADULT - ASSESSMENT
89 year old man with a history of MI (1980), HFrEF (EF 20%), paroxysmal A-Fib, AICD, CVA (LLE weakness), HTN, T2DM, BPH w/ chronic indwelling Davis and CLL presenting to the hospital for acute SOB. Found to be in hypoxic respiratory failure in setting of RSV and ?superimposed aspiration PNA, near total atelectasis of left lower lobe. 89 year old man with a history of MI (1980), HFrEF (EF 20%), paroxysmal A-Fib, AICD, CVA (LLE weakness), HTN, T2DM, BPH w/ chronic indwelling Davis and CLL presenting to the hospital with acute hypoxic respiratory failure 2/2 acute on chronic HF, likely exacerbated by RSV infection & superimposed aspiration PNA with near total atelectasis of left lower lobe. Also found to have a grossly positive UA. Met sepsis criteria on admission (leukocytosis and tachycardia).

## 2018-09-25 NOTE — H&P ADULT - ASSESSMENT
89 year old man with a history of MI (1980), HFrEF (EF 20%), paroxysmal A-Fib, AICD, CVA (LLE weakness), HTN, T2DM, BPH w/ chronic indwelling Davis and CLL presenting to the hospital for acute SOB. Found to be in hypoxic respiratory failure in setting of RSV and ?superimposed aspiration PNA, near total atelectasis of left lower lobe.

## 2018-09-25 NOTE — H&P ADULT - PROBLEM SELECTOR PLAN 4
elevated troponin w/o ST changes on EKG, likely in setting of demand ischemia, patient w/o chest pain elevated troponin w/o ischemic changes on EKG, likely in setting of demand ischemia, patient w/o chest pain  -will trend second set cardiac enzymes to asses for delta  -pt already therapeutically a/c with eliquis hold off on heparin for now.

## 2018-09-25 NOTE — PROGRESS NOTE ADULT - PROBLEM SELECTOR PLAN 5
low cytometry was done revealing a monoclonal CD5, CD20 positive population, suggestive of either CLL or MCL. WBC lower than previous 25-30 WBC  -continue to monitor for now, counts acceptable without evidence of bleeding.

## 2018-09-25 NOTE — PROGRESS NOTE ADULT - PROBLEM SELECTOR PLAN 9
IMPROVE score: 3   DVT PPX: patient on Eliquis for afib     DIET: patient seen by S&S s/p Cinesophagram on previous admission recommended Dysphagia 2 with nectar thick liquids    Dr.Benziger Horton   Internal Medicine   PGY-2   [phone] (long range pager)  [phone] (short range) DVT PPX: patient on Eliquis for afib   DIET: NPO, patient to be seen by S&S. Prior admission was on Dysphagia 2 with nectar thick liquids.  Dispo: Rehab

## 2018-09-25 NOTE — ED PROVIDER NOTE - OBJECTIVE STATEMENT
89yoM hx of CAD, HFrEF, paroxysmal A-Fib, AICD, CVA (LLE weakness), HTN, T2DM, and recent diagnosis of CLL, has chronic indwelling Davis BIBEMS from nursing facility with sob and hypoxia. Per EMS, patient had O2 sats in 80s with increased work of breathing. patient felt the sob worsened today, and occurred when he was taking his meds. no chest pain, fevers, chills, nausea, vomiting, diarrhea, weakness, numbness, tingling or other issues.

## 2018-09-25 NOTE — H&P ADULT - PROBLEM SELECTOR PLAN 8
IMPROVE score: 3   DVT PPX: patient on Eliquis for afib     DIET: patient seen by S&S s/p Cinesophagram on previous admission recommended Dysphagia 2 with nectar thick liquids    Dr.Benziger Horton   Internal Medicine   PGY-2   [phone] (long range pager)  [phone] (short range) -pt with antonieta on last admission and appears to have downtrended to new baseline  -trend bmp  -avoid nephrotoxins  -renally dose meds

## 2018-09-25 NOTE — PROGRESS NOTE ADULT - SUBJECTIVE AND OBJECTIVE BOX
***************************************************************  Bhavik Navarro MD  Internal Medicine, PGY-1  Pager: [phone]/ [phone]  ***************************************************************    MARIAH CAMACHO  89y Male  MRN-[id number]     HPI:  89 year old man with a history of MI (), HFrEF (EF 20%), paroxysmal A-Fib, AICD, CVA (LLE weakness), HTN, T2DM, BPH w/ chronic indwelling Davis and CLL presenting to the hospital for acute SOB.    Patient endorses 3 days of progressive SOB at rest w/ sudden decompensation requiring supplemental O2 in rehab facility. +lugo, +orthopnea States that 2 weeks ago he developed productive cough (patient unsure of color), chills and night sweats, no subjective fevers. He was recently admitted for Proteus UTI s/p 10 days abx course complicated by aspiration pneumonitis from - subsequently d/c to rehab facility.     Denies chest pain, palpations, nausea/vomiting, change in BM, headaches.       Subjective:     Pt seen at bedside. Poor historian due to severe slurring of speech 2/2 past CVA. States he is not having SOB, CP, or chills. Came from Rehab facility. Recently admitted in - for Proteus UTI and Aspiration PNA.       Denies fever, HA, CP, SOB, abn pain, dysuria.      REVIEW OF SYSTEMS:  All other review of systems is negative unless indicated above.      MEDICATIONS  (STANDING):  apixaban 2.5 milliGRAM(s) Oral every 12 hours  azithromycin  IVPB 500 milliGRAM(s) IV Intermittent once  azithromycin  IVPB      carvedilol 25 milliGRAM(s) Oral every 12 hours  dextrose 5%. 1000 milliLiter(s) (50 mL/Hr) IV Continuous <Continuous>  dextrose 50% Injectable 12.5 Gram(s) IV Push once  dextrose 50% Injectable 25 Gram(s) IV Push once  dextrose 50% Injectable 25 Gram(s) IV Push once  docusate sodium 100 milliGRAM(s) Oral three times a day  finasteride 5 milliGRAM(s) Oral daily  folic acid 1 milliGRAM(s) Oral daily  furosemide   Injectable 40 milliGRAM(s) IV Push daily  insulin glargine Injectable (LANTUS) 4 Unit(s) SubCutaneous at bedtime  insulin lispro (HumaLOG) corrective regimen sliding scale   SubCutaneous three times a day before meals  insulin lispro (HumaLOG) corrective regimen sliding scale   SubCutaneous at bedtime  losartan 25 milliGRAM(s) Oral daily  piperacillin/tazobactam IVPB. 3.375 Gram(s) IV Intermittent every 8 hours  senna 2 Tablet(s) Oral at bedtime  vancomycin  IVPB 750 milliGRAM(s) IV Intermittent every 12 hours    MEDICATIONS  (PRN):  ALBUTerol/ipratropium for Nebulization 3 milliLiter(s) Nebulizer every 6 hours PRN Shortness of Breath and/or Wheezing  dextrose 40% Gel 15 Gram(s) Oral once PRN Blood Glucose LESS THAN 70 milliGRAM(s)/deciliter  glucagon  Injectable 1 milliGRAM(s) IntraMuscular once PRN Glucose LESS THAN 70 milligrams/deciliter        Objective:    Vital Signs (24 Hrs):  T(C): 36.6 (18 @ 10:10), Max: 37.3 (18 @ 00:20)  HR: 71 (18 @ 10:10) (68 - 90)  BP: 136/74 (18 @ 10:10) (136/74 - 156/96)  RR: 18 (18 @ 10:10) (18 - 25)  SpO2: 96% (18 @ 10:10) (95% - 97%)  Wt(kg): --  Daily     Daily     I&O's Summary    25 Sep 2018 07:01  -  25 Sep 2018 12:22  --------------------------------------------------------  IN: 0 mL / OUT: 800 mL / NET: -800 mL          GENERAL: No acute distress, well-developed  HEAD:  Atraumatic, Normocephalic  ENT: EOMI, PERRLA, conjunctiva and sclera clear, Neck supple, No JVD, moist mucosa  CHEST/LUNG: Prominent expiratory wheezes on R lung; dull/decreased bibasilar breath sounds.   HEART: Regular rate and rhythm; No murmurs, rubs, or gallops  ABDOMEN: Soft, Nontender, Nondistended; Bowel sounds present  BACK: No spinal tenderness  EXTREMITIES:  No clubbing, cyanosis, or edema  PSYCH: Nl behavior, nl affect  NEUROLOGY: AAOx3, non-focal, cranial nerves intact  SKIN: Stage 3 ulcer on heel                       LABS:                         9.2    16.5  )-----------( 184      ( 25 Sep 2018 08:40 )             29.7         138  |  102  |  24<H>  ----------------------------<  154<H>  3.5   |  30  |  1.31<H>    Ca    8.5      25 Sep 2018 08:40  Phos  2.6       Mg     1.9         TPro  7.0  /  Alb  3.1<L>  /  TBili  0.3  /  DBili  x   /  AST  20  /  ALT  10  /  AlkPhos  126<H>      PT/INR - ( 25 Sep 2018 00:53 )   PT: 16.2 sec;   INR: 1.47 ratio         PTT - ( 25 Sep 2018 00:53 )  PTT:31.7 sec  Urinalysis Basic - ( 25 Sep 2018 00:52 )    Color: Yellow / Appearance: Slightly Turbid / S.019 / pH: x  Gluc: x / Ketone: Negative  / Bili: Negative / Urobili: 3 mg/dL   Blood: x / Protein: 100 mg/dL / Nitrite: Positive   Leuk Esterase: Large / RBC: 41 /hpf /  /hpf   Sq Epi: x / Non Sq Epi: 1 /hpf / Bacteria: Few        RADIOLOGY, EKG & ADDITIONAL TESTS: Reviewed. ***************************************************************  Bhavik Navarro MD  Internal Medicine, PGY-1  Pager: [phone]/ [phone]  ***************************************************************    MARIAH CAMACHO  89y Male  MRN-[id number]    HPI:  89 year old man with a history of MI (), HFrEF (EF 20%), paroxysmal A-Fib, AICD, CVA (LLE weakness), HTN, T2DM, BPH w/ chronic indwelling Davis and CLL presenting to the hospital for acute SOB.    Patient endorses 3 days of progressive SOB at rest w/ sudden decompensation requiring supplemental O2 in rehab facility. +lugo, +orthopnea States that 2 weeks ago he developed productive cough (patient unsure of color), chills and night sweats, no subjective fevers. He was recently admitted for Proteus UTI s/p 10 days abx course complicated by aspiration pneumonitis from - subsequently d/c to rehab facility.     Denies chest pain, palpations, nausea/vomiting, change in BM, headaches.       Subjective:     Pt seen at bedside. Poor historian due to severe slurring of speech 2/2 past CVA. States he is not having SOB, CP, or chills. Came from Rehab facility. Recently admitted in - for Proteus UTI and Aspiration PNA.       Denies fever, HA, CP, SOB, abn pain, dysuria.      REVIEW OF SYSTEMS:  All other review of systems is negative unless indicated above.      MEDICATIONS  (STANDING):  apixaban 2.5 milliGRAM(s) Oral every 12 hours  azithromycin  IVPB 500 milliGRAM(s) IV Intermittent once  azithromycin  IVPB      carvedilol 25 milliGRAM(s) Oral every 12 hours  dextrose 5%. 1000 milliLiter(s) (50 mL/Hr) IV Continuous <Continuous>  dextrose 50% Injectable 12.5 Gram(s) IV Push once  dextrose 50% Injectable 25 Gram(s) IV Push once  dextrose 50% Injectable 25 Gram(s) IV Push once  docusate sodium 100 milliGRAM(s) Oral three times a day  finasteride 5 milliGRAM(s) Oral daily  folic acid 1 milliGRAM(s) Oral daily  furosemide   Injectable 40 milliGRAM(s) IV Push daily  insulin glargine Injectable (LANTUS) 4 Unit(s) SubCutaneous at bedtime  insulin lispro (HumaLOG) corrective regimen sliding scale   SubCutaneous three times a day before meals  insulin lispro (HumaLOG) corrective regimen sliding scale   SubCutaneous at bedtime  losartan 25 milliGRAM(s) Oral daily  piperacillin/tazobactam IVPB. 3.375 Gram(s) IV Intermittent every 8 hours  senna 2 Tablet(s) Oral at bedtime  vancomycin  IVPB 750 milliGRAM(s) IV Intermittent every 12 hours    MEDICATIONS  (PRN):  ALBUTerol/ipratropium for Nebulization 3 milliLiter(s) Nebulizer every 6 hours PRN Shortness of Breath and/or Wheezing  dextrose 40% Gel 15 Gram(s) Oral once PRN Blood Glucose LESS THAN 70 milliGRAM(s)/deciliter  glucagon  Injectable 1 milliGRAM(s) IntraMuscular once PRN Glucose LESS THAN 70 milligrams/deciliter        Objective:    Vital Signs (24 Hrs):  T(C): 36.6 (18 @ 10:10), Max: 37.3 (18 @ 00:20)  HR: 71 (18 @ 10:10) (68 - 90)  BP: 136/74 (18 @ 10:10) (136/74 - 156/96)  RR: 18 (18 @ 10:10) (18 - 25)  SpO2: 96% (18 @ 10:10) (95% - 97%)  Wt(kg): --  Daily     Daily     I&O's Summary    25 Sep 2018 07:01  -  25 Sep 2018 12:22  --------------------------------------------------------  IN: 0 mL / OUT: 800 mL / NET: -800 mL          GENERAL: No acute distress, well-developed  HEAD:  Atraumatic, Normocephalic  ENT: EOMI, PERRLA, conjunctiva and sclera clear, Neck supple, No JVD, moist mucosa  CHEST/LUNG: Prominent expiratory wheezes on R lung; dull/decreased bibasilar breath sounds.   HEART: Regular rate and rhythm; No murmurs, rubs, or gallops  ABDOMEN: Soft, Nontender, Nondistended; Bowel sounds present  BACK: No spinal tenderness  EXTREMITIES:  No clubbing, cyanosis, or edema  PSYCH: Nl behavior, nl affect  NEUROLOGY: AAOx3, non-focal, cranial nerves intact  SKIN: Stage 3 ulcer on heel                       LABS:                         9.2    16.5  )-----------( 184      ( 25 Sep 2018 08:40 )             29.7         138  |  102  |  24<H>  ----------------------------<  154<H>  3.5   |  30  |  1.31<H>    Ca    8.5      25 Sep 2018 08:40  Phos  2.6       Mg     1.9         TPro  7.0  /  Alb  3.1<L>  /  TBili  0.3  /  DBili  x   /  AST  20  /  ALT  10  /  AlkPhos  126<H>      PT/INR - ( 25 Sep 2018 00:53 )   PT: 16.2 sec;   INR: 1.47 ratio         PTT - ( 25 Sep 2018 00:53 )  PTT:31.7 sec  Urinalysis Basic - ( 25 Sep 2018 00:52 )    Color: Yellow / Appearance: Slightly Turbid / S.019 / pH: x  Gluc: x / Ketone: Negative  / Bili: Negative / Urobili: 3 mg/dL   Blood: x / Protein: 100 mg/dL / Nitrite: Positive   Leuk Esterase: Large / RBC: 41 /hpf /  /hpf   Sq Epi: x / Non Sq Epi: 1 /hpf / Bacteria: Few        RADIOLOGY, EKG & ADDITIONAL TESTS: Reviewed.

## 2018-09-25 NOTE — ED PROVIDER NOTE - MEDICAL DECISION MAKING DETAILS
Attending Royce Parekh DO: 88 yo male presents with NH, hx of CHF, with shortness of breath, found to be hypoxic to 70% on RA, given nebs x3 and placed on NRB, with improvement. HX of aspiration PNA in the past. No fevers, no ab pain. PE: well appearing, nad, no resp distress at this time (improved per ems), lungs with rales at bases, no wheezing or rhonchi, RRR, ab soft nt/nd. A/P: concern for pna vs reactive airway disease vs possible PE vs CHF exacerbation. Will obtain labs, cxr, possible CTA chest, reevaluate and likely admit.

## 2018-09-25 NOTE — ED ADULT NURSE NOTE - NSIMPLEMENTINTERV_GEN_ALL_ED
Implemented All Fall with Harm Risk Interventions:  [place] to call system. Call bell, personal items and telephone within reach. Instruct patient to call for assistance. Room bathroom lighting operational. Non-slip footwear when patient is off stretcher. Physically safe environment: no spills, clutter or unnecessary equipment. Stretcher in lowest position, wheels locked, appropriate side rails in place. Provide visual cue, wrist band, yellow gown, etc. Monitor gait and stability. Monitor for mental status changes and reorient to person, place, and time. Review medications for side effects contributing to fall risk. Reinforce activity limits and safety measures with patient and family. Provide visual clues: red socks.

## 2018-09-25 NOTE — H&P ADULT - PROBLEM SELECTOR PLAN 1
Likely combination of RSV+/- Aspiration PNA & atelectasis & Acute decompensated CHF (exacerbated by RSV)   s/p Lasix IV 40mG, currently on 4L NC saturation 92%   -CTA negative for PE  continue w/ supplemental O2

## 2018-09-25 NOTE — H&P ADULT - NSHPLABSRESULTS_GEN_ALL_CORE
.  Labs reviewed personally.                          10   18.0  )-----------( 212      ( 25 Sep 2018 00:53 )             33.1     Hgb Trend: 10.0<--, 8.9<--      138  |  102  |  25<H>  ----------------------------<  120<H>  3.9   |  29  |  1.27    Ca    9.0      25 Sep 2018 00:53    TPro  7.0  /  Alb  3.1<L>  /  TBili  0.3  /  DBili  x   /  AST  20  /  ALT  10  /  AlkPhos  126<H>      Creatinine Trend: 1.27<--, 1.21<--, 1.50<--, 1.60<--, 1.71<--, 1.55<--  PT/INR - ( 25 Sep 2018 00:53 )   PT: 16.2 sec;   INR: 1.47 ratio         PTT - ( 25 Sep 2018 00:53 )  PTT:31.7 sec  Urinalysis Basic - ( 25 Sep 2018 00:52 )    Color: Yellow / Appearance: Slightly Turbid / S.019 / pH: x  Gluc: x / Ketone: Negative  / Bili: Negative / Urobili: 3 mg/dL   Blood: x / Protein: 100 mg/dL / Nitrite: Positive   Leuk Esterase: Large / RBC: 41 /hpf /  /hpf   Sq Epi: x / Non Sq Epi: 1 /hpf / Bacteria: Few        Imaging reviewed personally.  < from: CT Angio Chest w/ IV Cont (18 @ 01:01) >    LUNGS: Patchy groundglass opacities in the right upper, right middle and right lower lobes, suspicious for pneumonia.  Subsegmental atelectasis in the right lower lobe.  Near total atelectasis of the basilar segments of the left lower lobe and inferior segment of the lingula.  Punctate calcification in the right lower lobe probably a granuloma. PLEURA: Moderate bilateral pleural effusions, left larger than right. LARGE AIRWAYS: The distal trachea and mainstem bronchi are partially   obscured by respiratory motion artifact. VESSELS: No pulmonary artery embolism.  No thoracic aortic aneurysm.    Atherosclerotic calcifications of the thoracic aorta and arch vessels. HEART: Mild cardiomegaly.  No pericardial effusion.  Heavy atherosclerotic calcification of the coronary arteries.  AICD lead terminates in the right ventricle and pacing leads terminate in the right atrium and overlying the left ventricle. MEDIASTINUM AND YOLANDE: No lymphadenopathy.    CHEST WALL AND LOWER NECK: AICD battery in the left chest wall.  Approximately 8 x 4 x 9 cm fatty mass in the anterior right chest wall, probably a lipoma. 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  A high riding right humeral head may reflect chronic rotator cuff tear.  A moderate to large disc osteophyte complex is partially visualized at C5-C6.  Flowing anterolateral vertebral marginal osteophytes in the thoracic spine and calcification of the supraspinous ligament, compatible with diffuse idiopathic skeletal hyperostosis (DISH).    VISUALIZED UPPER ABDOMEN: Reflux of contrast into hepatic veins compatible with elevated right atrial pressure.  Cholelithiasis.  Nonspecific left adrenal gland thickening.  Partially visualized left renal cyst.  Anasarca in the flanks and lower back.    IMPRESSION:   1.  No pulmonary embolism.  2.  Patchy groundglass opacities in the rightupper, right middle and   right lower lobe suspicious for pneumonia.  3.  Cardiomegaly.  Reflux of contrast in the hepatic veins compatible   with elevated right atrial pressures.  Moderate bilateral pleural   effusions.  Anasarca.  Correlate clinically for congestive heart failure.    < end of copied text > .  Labs reviewed personally.                          10   18.0  )-----------( 212      ( 25 Sep 2018 00:53 )             33.1     Hgb Trend: 10.0<--, 8.9<--      138  |  102  |  25<H>  ----------------------------<  120<H>  3.9   |  29  |  1.27    Ca    9.0      25 Sep 2018 00:53    TPro  7.0  /  Alb  3.1<L>  /  TBili  0.3  /  DBili  x   /  AST  20  /  ALT  10  /  AlkPhos  126<H>      Creatinine Trend: 1.27<--, 1.21<--, 1.50<--, 1.60<--, 1.71<--, 1.55<--  PT/INR - ( 25 Sep 2018 00:53 )   PT: 16.2 sec;   INR: 1.47 ratio         PTT - ( 25 Sep 2018 00:53 )  PTT:31.7 sec  Urinalysis Basic - ( 25 Sep 2018 00:52 )    Color: Yellow / Appearance: Slightly Turbid / S.019 / pH: x  Gluc: x / Ketone: Negative  / Bili: Negative / Urobili: 3 mg/dL   Blood: x / Protein: 100 mg/dL / Nitrite: Positive   Leuk Esterase: Large / RBC: 41 /hpf /  /hpf   Sq Epi: x / Non Sq Epi: 1 /hpf / Bacteria: Few        Imaging reviewed personally.  < from: CT Angio Chest w/ IV Cont (18 @ 01:01) >    LUNGS: Patchy groundglass opacities in the right upper, right middle and right lower lobes, suspicious for pneumonia.  Subsegmental atelectasis in the right lower lobe.  Near total atelectasis of the basilar segments of the left lower lobe and inferior segment of the lingula.  Punctate calcification in the right lower lobe probably a granuloma. PLEURA: Moderate bilateral pleural effusions, left larger than right. LARGE AIRWAYS: The distal trachea and mainstem bronchi are partially   obscured by respiratory motion artifact. VESSELS: No pulmonary artery embolism.  No thoracic aortic aneurysm.    Atherosclerotic calcifications of the thoracic aorta and arch vessels. HEART: Mild cardiomegaly.  No pericardial effusion.  Heavy atherosclerotic calcification of the coronary arteries.  AICD lead terminates in the right ventricle and pacing leads terminate in the right atrium and overlying the left ventricle. MEDIASTINUM AND YOLANDE: No lymphadenopathy.    CHEST WALL AND LOWER NECK: AICD battery in the left chest wall.  Approximately 8 x 4 x 9 cm fatty mass in the anterior right chest wall, probably a lipoma. 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  A high riding right humeral head may reflect chronic rotator cuff tear.  A moderate to large disc osteophyte complex is partially visualized at C5-C6.  Flowing anterolateral vertebral marginal osteophytes in the thoracic spine and calcification of the supraspinous ligament, compatible with diffuse idiopathic skeletal hyperostosis (DISH).    VISUALIZED UPPER ABDOMEN: Reflux of contrast into hepatic veins compatible with elevated right atrial pressure.  Cholelithiasis.  Nonspecific left adrenal gland thickening.  Partially visualized left renal cyst.  Anasarca in the flanks and lower back.    IMPRESSION:   1.  No pulmonary embolism.  2.  Patchy groundglass opacities in the rightupper, right middle and   right lower lobe suspicious for pneumonia.  3.  Cardiomegaly.  Reflux of contrast in the hepatic veins compatible   with elevated right atrial pressures.  Moderate bilateral pleural   effusions.  Anasarca.  Correlate clinically for congestive heart failure.    < end of copied text >  EKG personally reviewed

## 2018-09-26 LAB
ALBUMIN SERPL ELPH-MCNC: 2.6 G/DL — LOW (ref 3.3–5)
ALP SERPL-CCNC: 95 U/L — SIGNIFICANT CHANGE UP (ref 40–120)
ALT FLD-CCNC: 8 U/L — LOW (ref 10–45)
ANION GAP SERPL CALC-SCNC: 9 MMOL/L — SIGNIFICANT CHANGE UP (ref 5–17)
AST SERPL-CCNC: 13 U/L — SIGNIFICANT CHANGE UP (ref 10–40)
BASOPHILS # BLD AUTO: 0 K/UL — SIGNIFICANT CHANGE UP (ref 0–0.2)
BASOPHILS NFR BLD AUTO: 0 % — SIGNIFICANT CHANGE UP (ref 0–2)
BILIRUB SERPL-MCNC: 0.5 MG/DL — SIGNIFICANT CHANGE UP (ref 0.2–1.2)
BUN SERPL-MCNC: 22 MG/DL — SIGNIFICANT CHANGE UP (ref 7–23)
CALCIUM SERPL-MCNC: 8.2 MG/DL — LOW (ref 8.4–10.5)
CHLORIDE SERPL-SCNC: 102 MMOL/L — SIGNIFICANT CHANGE UP (ref 96–108)
CO2 SERPL-SCNC: 27 MMOL/L — SIGNIFICANT CHANGE UP (ref 22–31)
CREAT SERPL-MCNC: 1.43 MG/DL — HIGH (ref 0.5–1.3)
EOSINOPHIL # BLD AUTO: 0 K/UL — SIGNIFICANT CHANGE UP (ref 0–0.5)
EOSINOPHIL NFR BLD AUTO: 0 % — SIGNIFICANT CHANGE UP (ref 0–6)
GLUCOSE SERPL-MCNC: 153 MG/DL — HIGH (ref 70–99)
HBA1C BLD-MCNC: 6.1 % — HIGH (ref 4–5.6)
HCT VFR BLD CALC: 28 % — LOW (ref 39–50)
HGB BLD-MCNC: 8.6 G/DL — LOW (ref 13–17)
LEGIONELLA AG UR QL: NEGATIVE — SIGNIFICANT CHANGE UP
LYMPHOCYTES # BLD AUTO: 53 % — HIGH (ref 13–44)
LYMPHOCYTES # BLD AUTO: 9.5 K/UL — HIGH (ref 1–3.3)
MAGNESIUM SERPL-MCNC: 1.8 MG/DL — SIGNIFICANT CHANGE UP (ref 1.6–2.6)
MCHC RBC-ENTMCNC: 24.9 PG — LOW (ref 27–34)
MCHC RBC-ENTMCNC: 30.7 GM/DL — LOW (ref 32–36)
MCV RBC AUTO: 80.9 FL — SIGNIFICANT CHANGE UP (ref 80–100)
MONOCYTES # BLD AUTO: 0.72 K/UL — SIGNIFICANT CHANGE UP (ref 0–0.9)
MONOCYTES NFR BLD AUTO: 4 % — SIGNIFICANT CHANGE UP (ref 2–14)
NEUTROPHILS # BLD AUTO: 6.99 K/UL — SIGNIFICANT CHANGE UP (ref 1.8–7.4)
NEUTROPHILS NFR BLD AUTO: 39 % — LOW (ref 43–77)
PHOSPHATE SERPL-MCNC: 3.5 MG/DL — SIGNIFICANT CHANGE UP (ref 2.5–4.5)
PLATELET # BLD AUTO: 183 K/UL — SIGNIFICANT CHANGE UP (ref 150–400)
POTASSIUM SERPL-MCNC: 3.6 MMOL/L — SIGNIFICANT CHANGE UP (ref 3.5–5.3)
POTASSIUM SERPL-SCNC: 3.6 MMOL/L — SIGNIFICANT CHANGE UP (ref 3.5–5.3)
PROT SERPL-MCNC: 6.2 G/DL — SIGNIFICANT CHANGE UP (ref 6–8.3)
RBC # BLD: 3.46 M/UL — LOW (ref 4.2–5.8)
RBC # FLD: 16 % — HIGH (ref 10.3–14.5)
SODIUM SERPL-SCNC: 138 MMOL/L — SIGNIFICANT CHANGE UP (ref 135–145)
WBC # BLD: 17.92 K/UL — HIGH (ref 3.8–10.5)
WBC # FLD AUTO: 17.92 K/UL — HIGH (ref 3.8–10.5)

## 2018-09-26 PROCEDURE — 99232 SBSQ HOSP IP/OBS MODERATE 35: CPT

## 2018-09-26 PROCEDURE — 99233 SBSQ HOSP IP/OBS HIGH 50: CPT | Mod: GC

## 2018-09-26 RX ORDER — COLLAGENASE CLOSTRIDIUM HIST. 250 UNIT/G
1 OINTMENT (GRAM) TOPICAL DAILY
Qty: 0 | Refills: 0 | Status: DISCONTINUED | OUTPATIENT
Start: 2018-09-26 | End: 2018-10-04

## 2018-09-26 RX ADMIN — PIPERACILLIN AND TAZOBACTAM 25 GRAM(S): 4; .5 INJECTION, POWDER, LYOPHILIZED, FOR SOLUTION INTRAVENOUS at 13:07

## 2018-09-26 RX ADMIN — Medication 1 MILLIGRAM(S): at 11:28

## 2018-09-26 RX ADMIN — SENNA PLUS 2 TABLET(S): 8.6 TABLET ORAL at 21:41

## 2018-09-26 RX ADMIN — FINASTERIDE 5 MILLIGRAM(S): 5 TABLET, FILM COATED ORAL at 11:28

## 2018-09-26 RX ADMIN — PIPERACILLIN AND TAZOBACTAM 25 GRAM(S): 4; .5 INJECTION, POWDER, LYOPHILIZED, FOR SOLUTION INTRAVENOUS at 05:17

## 2018-09-26 RX ADMIN — APIXABAN 2.5 MILLIGRAM(S): 2.5 TABLET, FILM COATED ORAL at 17:15

## 2018-09-26 RX ADMIN — PIPERACILLIN AND TAZOBACTAM 25 GRAM(S): 4; .5 INJECTION, POWDER, LYOPHILIZED, FOR SOLUTION INTRAVENOUS at 21:41

## 2018-09-26 RX ADMIN — LOSARTAN POTASSIUM 25 MILLIGRAM(S): 100 TABLET, FILM COATED ORAL at 05:18

## 2018-09-26 RX ADMIN — CARVEDILOL PHOSPHATE 25 MILLIGRAM(S): 80 CAPSULE, EXTENDED RELEASE ORAL at 17:15

## 2018-09-26 RX ADMIN — Medication 40 MILLIGRAM(S): at 05:18

## 2018-09-26 RX ADMIN — CARVEDILOL PHOSPHATE 25 MILLIGRAM(S): 80 CAPSULE, EXTENDED RELEASE ORAL at 05:17

## 2018-09-26 RX ADMIN — Medication 1: at 11:45

## 2018-09-26 RX ADMIN — APIXABAN 2.5 MILLIGRAM(S): 2.5 TABLET, FILM COATED ORAL at 05:18

## 2018-09-26 RX ADMIN — Medication 100 MILLIGRAM(S): at 13:07

## 2018-09-26 RX ADMIN — Medication 1: at 17:15

## 2018-09-26 NOTE — PROGRESS NOTE ADULT - PROBLEM SELECTOR PLAN 1
Likely 2/2 Acute Decompensated exacerbated by a combination fo RSV+ & Aspiration PNA & Atelectasis. s/p Lasix IV 40 in ED.  CTA on admission: b/l groundglass opacities, b/l pleural effusions, no PE.    -c/w Lasix IV 40mg qD  -c/w NC 4L O2 for goal of sat >92%  -CTA negative for PE

## 2018-09-26 NOTE — PROGRESS NOTE ADULT - SUBJECTIVE AND OBJECTIVE BOX
***************************************************************  Bhavik Navarro MD  Internal Medicine, PGY-1  Pager: [phone]/ [phone]  ***************************************************************    MARIAH CAMACHO  89y Male  MRN-[id number]    HPI:  89 year old man with a history of MI (1980), HFrEF (EF 20%), paroxysmal A-Fib, AICD, CVA (LLE weakness), HTN, T2DM, BPH w/ chronic indwelling Davis and CLL presenting to the hospital for acute SOB.    Patient endorses 3 days of progressive SOB at rest w/ sudden decompensation requiring supplemental O2 in rehab facility. +lugo, +orthopnea States that 2 weeks ago he developed productive cough (patient unsure of color), chills and night sweats, no subjective fevers. He was recently admitted for Proteus UTI s/p 10 days abx course complicated by aspiration pneumonitis from 8/8-8/21 subsequently d/c to rehab facility.     Denies chest pain, palpations, nausea/vomiting, change in BM, headaches.       Subjective:     Pt seen at bedside. Poor historian due to severe slurring of speech 2/2 past CVA.    Denies SOB, CP, or chills. Came from Rehab facility. Recently admitted in 8/8-8/21 for Proteus UTI and Aspiration PNA.     Denies fever, HA, CP, SOB, abn pain, dysuria.      REVIEW OF SYSTEMS:  All other review of systems is negative unless indicated above.      MEDICATIONS  (STANDING):  apixaban 2.5 milliGRAM(s) Oral every 12 hours  carvedilol 25 milliGRAM(s) Oral every 12 hours  dextrose 5%. 1000 milliLiter(s) (50 mL/Hr) IV Continuous <Continuous>  dextrose 50% Injectable 12.5 Gram(s) IV Push once  dextrose 50% Injectable 25 Gram(s) IV Push once  dextrose 50% Injectable 25 Gram(s) IV Push once  docusate sodium 100 milliGRAM(s) Oral three times a day  finasteride 5 milliGRAM(s) Oral daily  folic acid 1 milliGRAM(s) Oral daily  furosemide   Injectable 40 milliGRAM(s) IV Push daily  insulin glargine Injectable (LANTUS) 4 Unit(s) SubCutaneous at bedtime  insulin lispro (HumaLOG) corrective regimen sliding scale   SubCutaneous three times a day before meals  insulin lispro (HumaLOG) corrective regimen sliding scale   SubCutaneous at bedtime  losartan 25 milliGRAM(s) Oral daily  piperacillin/tazobactam IVPB. 3.375 Gram(s) IV Intermittent every 8 hours  senna 2 Tablet(s) Oral at bedtime  vancomycin  IVPB 1000 milliGRAM(s) IV Intermittent every 24 hours    MEDICATIONS  (PRN):  ALBUTerol/ipratropium for Nebulization 3 milliLiter(s) Nebulizer every 6 hours PRN Shortness of Breath and/or Wheezing  dextrose 40% Gel 15 Gram(s) Oral once PRN Blood Glucose LESS THAN 70 milliGRAM(s)/deciliter  glucagon  Injectable 1 milliGRAM(s) IntraMuscular once PRN Glucose LESS THAN 70 milligrams/deciliter        Objective:    Vital Signs Last 24 Hrs  T(C): 36.9 (26 Sep 2018 05:17), Max: 37.2 (25 Sep 2018 13:22)  T(F): 98.5 (26 Sep 2018 05:17), Max: 99 (25 Sep 2018 13:22)  HR: 76 (26 Sep 2018 05:17) (68 - 76)  BP: 151/81 (26 Sep 2018 05:17) (126/72 - 156/66)  BP(mean): --  RR: 18 (26 Sep 2018 05:17) (18 - 18)  SpO2: 99% (26 Sep 2018 05:17) (95% - 100%)        GENERAL: No acute distress, well-developed  HEAD:  Atraumatic, Normocephalic  ENT: EOMI, PERRLA, conjunctiva and sclera clear, Neck supple, No JVD, moist mucosa  CHEST/LUNG: Prominent expiratory wheezes on R lung; dull/decreased bibasilar breath sounds.   HEART: Regular rate and rhythm; No murmurs, rubs, or gallops  ABDOMEN: Soft, Nontender, Nondistended; Bowel sounds present  BACK: No spinal tenderness  EXTREMITIES:  No clubbing, cyanosis, or edema  PSYCH: Nl behavior, nl affect  NEUROLOGY: AAOx3, non-focal, cranial nerves intact  SKIN: Stage 3 ulcer on heel                       LABS:                           RADIOLOGY, EKG & ADDITIONAL TESTS: Reviewed.

## 2018-09-26 NOTE — PHYSICAL THERAPY INITIAL EVALUATION ADULT - TRANSFER SAFETY CONCERNS NOTED: SIT/STAND, REHAB EVAL
B LE's sliding forward. Able to follow cues to adjust COG but still very unsteady in standing/losing balance/decreased sequencing ability/decreased weight-shifting ability

## 2018-09-26 NOTE — PROGRESS NOTE ADULT - PROBLEM SELECTOR PLAN 9
DVT PPX: patient on Eliquis for afib   DIET: NPO, patient to be seen by [facility]. Prior admission was on Dysphagia 2 with nectar thick liquids.  Dispo: Rehab

## 2018-09-26 NOTE — PROGRESS NOTE ADULT - SUBJECTIVE AND OBJECTIVE BOX
Podiatry pager #: [phone]/ [phone]    Patient is a 89y old  Male who presents with a chief complaint of dyspnea (26 Sep 2018 10:15) podiatry consulted today for left heel wound.      HPI:  89 year old man with a history of MI (1980), HFrEF (EF 20%), paroxysmal A-Fib, AICD, CVA (LLE weakness), HTN, T2DM, BPH w/ chronic indwelling Davis and CLL presenting to the hospital for acute SOB.    Patient endorses 3 days of progressive SOB at rest w/ sudden decompensation requiring supplemental O2 in rehab facility. +lugo, +orthopnea States that 2 weeks ago he developed productive cough (patient unsure of color), chills and night sweats, no subjective fevers. He was recently admitted for Proteus UTI s/p 10 days abx course complicated by aspiration pneumonitis from 8/8-8/21 subsequently d/c to rehab facility.     Denies chest pain, palpations, nausea/vomiting, change in BM, headaches. 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  S/P Inguinal Hernia Repair      MEDICATIONS  (STANDING):  apixaban 2.5 milliGRAM(s) Oral every 12 hours  carvedilol 25 milliGRAM(s) Oral every 12 hours  dextrose 50% Injectable 25 Gram(s) IV Push once  docusate sodium 100 milliGRAM(s) Oral three times a day  finasteride 5 milliGRAM(s) Oral daily  folic acid 1 milliGRAM(s) Oral daily  furosemide   Injectable 40 milliGRAM(s) IV Push daily  insulin lispro (HumaLOG) corrective regimen sliding scale   SubCutaneous three times a day before meals  insulin lispro (HumaLOG) corrective regimen sliding scale   SubCutaneous at bedtime  losartan 25 milliGRAM(s) Oral daily  piperacillin/tazobactam IVPB. 3.375 Gram(s) IV Intermittent every 8 hours  senna 2 Tablet(s) Oral at bedtime    MEDICATIONS  (PRN):  ALBUTerol/ipratropium for Nebulization 3 milliLiter(s) Nebulizer every 6 hours PRN Shortness of Breath and/or Wheezing      Allergies    Cipro (Other)  fluoroquinolone antibiotics (Unknown)  latex (Unknown)    Intolerances        VITALS:    Vital Signs Last 24 Hrs  T(C): 36.8 (26 Sep 2018 14:08), Max: 36.9 (26 Sep 2018 05:17)  T(F): 98.2 (26 Sep 2018 14:08), Max: 98.5 (26 Sep 2018 05:17)  HR: 69 (26 Sep 2018 14:08) (69 - 82)  BP: 139/70 (26 Sep 2018 14:08) (137/80 - 151/81)  BP(mean): --  RR: 169 (26 Sep 2018 14:08) (18 - 169)  SpO2: 98% (26 Sep 2018 14:08) (98% - 99%)    LABS:                          8.6    17.92 )-----------( 183      ( 26 Sep 2018 10:33 )             28.0       09-26    138  |  102  |  22  ----------------------------<  153<H>  3.6   |  27  |  1.43<H>    Ca    8.2<L>      26 Sep 2018 08:57  Phos  3.5     09-26  Mg     1.8     09-26    TPro  6.2  /  Alb  2.6<L>  /  TBili  0.5  /  DBili  x   /  AST  13  /  ALT  8<L>  /  AlkPhos  95  09-26      CAPILLARY BLOOD GLUCOSE      POCT Blood Glucose.: 161 mg/dL (26 Sep 2018 17:06)  POCT Blood Glucose.: 154 mg/dL (26 Sep 2018 11:35)  POCT Blood Glucose.: 142 mg/dL (26 Sep 2018 07:42)  POCT Blood Glucose.: 134 mg/dL (26 Sep 2018 01:54)  POCT Blood Glucose.: 105 mg/dL (25 Sep 2018 21:46)      PT/INR - ( 25 Sep 2018 00:53 )   PT: 16.2 sec;   INR: 1.47 ratio         PTT - ( 25 Sep 2018 00:53 )  PTT:31.7 sec    LOWER EXTREMITY PHYSICAL EXAM:    Vasular: DP/PT n/p B/L, CFT <4seconds B/L, Temperature gradient warm, B/L.   Neuro: Epicritic sensation diminished to the level of toes B/L.  Musculoskeletal/Ortho: cavus foot type  Skin:  Wound #1: left heel wound with necrotic eschar  Location: plantar posterior aspect  Size: 3cm diameter  Depth: unstageable/fat pad atrophy heel  Wound bed: necrotic eschar  Drainage: none  Odor: none  Periwound: erythema  Etiology: pressure

## 2018-09-26 NOTE — PHYSICAL THERAPY INITIAL EVALUATION ADULT - LIVES WITH, PROFILE
daughter- who works. Has a HHA 7hr/day. Pt lives in a house with 2 steps and 1 railing to enter. Pt was ambulatory with a rolling walker. Unclear as to last time pt walked. Pt has been in a rehab PTA

## 2018-09-26 NOTE — PHYSICAL THERAPY INITIAL EVALUATION ADULT - BALANCE TRAINING, PT EVAL
GOAL: Pt's static/dynamic sitting/standing balance will improve to fair to increase stability, and decrease risk for falls when ambulating or performing ADL's

## 2018-09-26 NOTE — PROGRESS NOTE ADULT - PROBLEM SELECTOR PLAN 4
No chest pain. Elevated troponin w/o ischemic changes on EKG, likely in setting of demand ischemia.    - f/u second set cardiac enzymes  - Pt already therapeutically A/C with Eliquis - no need for Heparin

## 2018-09-26 NOTE — PROGRESS NOTE ADULT - PROBLEM SELECTOR PLAN 2
Leukocytosis + Tachycardia with sources: RSV, aspiration PNA, +UA.   Note has +UA but has chronic indwelling joshi for BPH.  CTA on admission: b/l groundglass opacities, b/l pleural effusions, no PE.    - c/w zosyn to target possible Aspiration PNA and +UA  - started Vancomycin & Azithromycin as patient hospitalized within last 30days  - f/u urine legionella, currently no diarrhea.   - f/u urine cultures  - f/u blood cultures (sent after abx started in ED)

## 2018-09-26 NOTE — CONSULT NOTE ADULT - SUBJECTIVE AND OBJECTIVE BOX
HPI:  89 year old man with a history of MI (), HFrEF (EF 20%), paroxysmal A-Fib, AICD, CVA (LLE weakness), HTN, T2DM, BPH w/ chronic indwelling Davis and CLL presenting to the hospital for acute SOB. 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 1000 milliLiter(s) IV Continuous <Continuous>  dextrose 50% Injectable 12.5 Gram(s) IV Push once  dextrose 50% Injectable 25 Gram(s) IV Push once  dextrose 50% Injectable 25 Gram(s) IV Push once  docusate sodium 100 milliGRAM(s) Oral three times a day  finasteride 5 milliGRAM(s) Oral daily  folic acid 1 milliGRAM(s) Oral daily  furosemide   Injectable 40 milliGRAM(s) IV Push daily  glucagon  Injectable 1 milliGRAM(s) IntraMuscular once PRN Glucose LESS THAN 70 milligrams/deciliter  insulin glargine Injectable (LANTUS) 4 Unit(s) SubCutaneous at bedtime  insulin lispro (HumaLOG) corrective regimen sliding scale   SubCutaneous three times a day before meals  insulin lispro (HumaLOG) corrective regimen sliding scale   SubCutaneous at bedtime  losartan 25 milliGRAM(s) Oral daily  piperacillin/tazobactam IVPB. 3.375 Gram(s) IV Intermittent every 8 hours  senna 2 Tablet(s) Oral at bedtime  vancomycin  IVPB 1000 milliGRAM(s) IV Intermittent every 24 hours    Labs:  CBC Full  -  ( 25 Sep 2018 08:40 )  WBC Count : 16.5 K/uL  Hemoglobin : 9.2 g/dL  Hematocrit : 29.7 %  Platelet Count - Automated : 184 K/uL  Mean Cell Volume : 81.5 fl  Mean Cell Hemoglobin : 25.1 pg  Mean Cell Hemoglobin Concentration : 30.8 gm/dL  Auto Neutrophil # : x  Auto Lymphocyte # : x  Auto Monocyte # : x  Auto Eosinophil # : x  Auto Basophil # : x  Auto Neutrophil % : x  Auto Lymphocyte % : x  Auto Monocyte % : x  Auto Eosinophil % : x  Auto Basophil % : x        138  |  102  |  22  ----------------------------<  153<H>  3.6   |  27  |  1.43<H>    Ca    8.2<L>      26 Sep 2018 08:57  Phos  3.5       Mg     1.8         TPro  6.2  /  Alb  2.6<L>  /  TBili  0.5  /  DBili  x   /  AST  13  /  ALT  8<L>  /  AlkPhos  95    Special Hematology Pathology (18 @ 08:44)                                                       SERUM PROTEIN ELECTROPHORESIS      ===========================================================                                            Reference Range         Total Protein -  6.2                6.2 - 8.2 G/DL             Albumin   -  2.10                3.3 - 4.4 G/DL      Alpha-1 Globulin -  0.16                0.1 - 0.3 G/DL      Alpha-2 Globulin -  1.21                0.6 - 1.0 G/DL      Beta Globulins   -  0.89                0.6 - 1.1 G/DL      Gamma Globulins  -  1.83                0.7 - 1.7 G/DL             ASSESSMENT:        DECREASED SERUM ALBUMIN AND INCREASED POLYCLONAL GAMMA      FRACTION CONSISTENT WITH CHRONIC INFLAMMATORY CONDITION.     Immunoglobulin Free Light Chains, Serum (18 @ 10:53)    [place] Free Light Chains, Serum: 12.60 mg/dL    Lambda Free Light Chains, Serum: 7.11 mg/dL    Kappa/Lambda Free Light Chain Ratio, Serum: 1.77 H: REF RANGE             0.26-1.65 Ratio    ANALYSIS PERFORMED BY:  [id number]  [address] [place], [place] [id number]  Phone: [phone]  Fax:   [phone]  :           TEZ VALENTINE MD    Immunoglobulin, Serum (18 @ 11:11)    Quantitative IgA: 325 mg/dL    Quantitative Ig mg/dL    Quantitative IgM: 43 mg/dL                                                                                                         Radiology:     < from: CT Angio Chest w/ IV Cont (18 @ 01:01) >  1.  No pulmonary embolism.  2.  Patchy groundglass opacities in the rightupper, right middle and   right lower lobe suspicious for pneumonia.  3.  Cardiomegaly.  Reflux of contrast in the hepatic veins compatible   with elevated right atrial pressures.  Moderate bilateral pleural   effusions.  Anasarca.  Correlate clinically for congestive heart failure.      < end of copied text >          ROS:  No pain, no fever  No lumps in neck or pain  No Sob or chest pain  No palpitations   No abdominal pain. No change in bowel habit. No blood in stools  No weakness in extremities  No leg swelling      Vital Signs Last 24 Hrs  T(C): 36.9 (26 Sep 2018 05:17), Max: 37.2 (25 Sep 2018 13:22)  T(F): 98.5 (26 Sep 2018 05:17), Max: 99 (25 Sep 2018 13:22)  HR: 76 (26 Sep 2018 05:17) (68 - 76)  BP: 151/81 (26 Sep 2018 05:17) (126/72 - 156/66)  BP(mean): --  RR: 18 (26 Sep 2018 05:17) (18 - 18)  SpO2: 99% (26 Sep 2018 05:17) (95% - 100%)    Physical Exam:  Patient comfortable  AXOX3  Neck supple, no LN's  Chest: bilateral breath sounds, no wheeze or rales  CVS: regular heart rate without murmur  Abdomen: soft, BS+, no massess or organomegaly  CNS: no gross deficit  Ext: no edema HPI:  89 year old man with a history of MI (), HFrEF (EF 20%), paroxysmal A-Fib, AICD, CVA (LLE weakness), HTN, T2DM, BPH w/ chronic indwelling Davis and CLL presenting to the hospital for acute SOB. 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 1000 milliLiter(s) IV Continuous <Continuous>  dextrose 50% Injectable 12.5 Gram(s) IV Push once  dextrose 50% Injectable 25 Gram(s) IV Push once  dextrose 50% Injectable 25 Gram(s) IV Push once  docusate sodium 100 milliGRAM(s) Oral three times a day  finasteride 5 milliGRAM(s) Oral daily  folic acid 1 milliGRAM(s) Oral daily  furosemide   Injectable 40 milliGRAM(s) IV Push daily  glucagon  Injectable 1 milliGRAM(s) IntraMuscular once PRN Glucose LESS THAN 70 milligrams/deciliter  insulin glargine Injectable (LANTUS) 4 Unit(s) SubCutaneous at bedtime  insulin lispro (HumaLOG) corrective regimen sliding scale   SubCutaneous three times a day before meals  insulin lispro (HumaLOG) corrective regimen sliding scale   SubCutaneous at bedtime  losartan 25 milliGRAM(s) Oral daily  piperacillin/tazobactam IVPB. 3.375 Gram(s) IV Intermittent every 8 hours  senna 2 Tablet(s) Oral at bedtime  vancomycin  IVPB 1000 milliGRAM(s) IV Intermittent every 24 hours    Labs:  CBC Full  -  ( 25 Sep 2018 08:40 )  WBC Count : 16.5 K/uL  Hemoglobin : 9.2 g/dL  Hematocrit : 29.7 %  Platelet Count - Automated : 184 K/uL  Mean Cell Volume : 81.5 fl  Mean Cell Hemoglobin : 25.1 pg  Mean Cell Hemoglobin Concentration : 30.8 gm/dL  Auto Neutrophil # : x  Auto Lymphocyte # : x  Auto Monocyte # : x  Auto Eosinophil # : x  Auto Basophil # : x  Auto Neutrophil % : x  Auto Lymphocyte % : x  Auto Monocyte % : x  Auto Eosinophil % : x  Auto Basophil % : x        138  |  102  |  22  ----------------------------<  153<H>  3.6   |  27  |  1.43<H>    Ca    8.2<L>      26 Sep 2018 08:57  Phos  3.5       Mg     1.8         TPro  6.2  /  Alb  2.6<L>  /  TBili  0.5  /  DBili  x   /  AST  13  /  ALT  8<L>  /  AlkPhos  95                                         SERUM PROTEIN ELECTROPHORESIS      ===========================================================                                            Reference Range         Total Protein -  6.2                6.2 - 8.2 G/DL             Albumin   -  2.10                3.3 - 4.4 G/DL      Alpha-1 Globulin -  0.16                0.1 - 0.3 G/DL      Alpha-2 Globulin -  1.21                0.6 - 1.0 G/DL      Beta Globulins   -  0.89                0.6 - 1.1 G/DL      Gamma Globulins  -  1.83                0.7 - 1.7 G/DL             ASSESSMENT:        DECREASED SERUM ALBUMIN AND INCREASED POLYCLONAL GAMMA      FRACTION CONSISTENT WITH CHRONIC INFLAMMATORY CONDITION.     Immunoglobulin Free Light Chains, Serum (18 @ 10:53)    [place] Free Light Chains, Serum: 12.60 mg/dL    Lambda Free Light Chains, Serum: 7.11 mg/dL    Kappa/Lambda Free Light Chain Ratio, Serum: 1.77 H: REF RANGE             0.26-1.65 Ratio    ANALYSIS PERFORMED BY:  [facility]  [address] [place] [address]  Phone: [phone]  Fax:   [phone]  :           TEZ VALENTINE MD    Immunoglobulin, Serum (18 @ 11:11)    Quantitative IgA: 325 mg/dL    Quantitative Ig mg/dL    Quantitative IgM: 43 mg/dL              Radiology:     < from: CT Angio Chest w/ IV Cont (18 @ 01:01) >  1.  No pulmonary embolism.  2.  Patchy groundglass opacities in the rightupper, right middle and   right lower lobe suspicious for pneumonia.  3.  Cardiomegaly.  Reflux of contrast in the hepatic veins compatible   with elevated right atrial pressures.  Moderate bilateral pleural   effusions.  Anasarca.  Correlate clinically for congestive heart failure.      < end of copied text >          ROS:  No pain, no fever, feels better than on admission  No lumps in neck or pain  No Sob or chest pain  No palpitations   No abdominal pain. No change in bowel habit. No blood in stools  No weakness in extremities  No leg swelling      Vital Signs Last 24 Hrs  T(C): 36.9 (26 Sep 2018 05:17), Max: 37.2 (25 Sep 2018 13:22)  T(F): 98.5 (26 Sep 2018 05:17), Max: 99 (25 Sep 2018 13:22)  HR: 76 (26 Sep 2018 05:17) (68 - 76)  BP: 151/81 (26 Sep 2018 05:17) (126/72 - 156/66)  BP(mean): --  RR: 18 (26 Sep 2018 05:17) (18 - 18)  SpO2: 99% (26 Sep 2018 05:17) (95% - 100%)    Physical Exam:  Patient comfortable  Neck supple, no LN's  Chest: bilateral breath sounds, no wheeze or rales  CVS: regular heart rate without murmur  Abdomen: soft, BS+, no massess or organomegaly  CNS: no gross deficit  Ext: no edema

## 2018-09-26 NOTE — PROGRESS NOTE ADULT - ASSESSMENT
Assessment/Plan:    1. non-infected clinically left heel pressure wound.  2. Patient currently on iv abx and reviewed for UTI and or pneumonia  3. Continue with z offloading boots and decubitus precautions.  4. Recommend saul/pvr to evaluate vascular status.  5. Recommend normal saline cleanse with santyl and dsd left heel qd.  6. Recommend left foot xray to r/o underlying chronic OM.  7. Will monitor for signs of infection and wound care recommendations. podiatry to follow.

## 2018-09-26 NOTE — PROGRESS NOTE ADULT - ASSESSMENT
89 year old man with a history of MI (1980), HFrEF (EF 20%), paroxysmal A-Fib, AICD, CVA (LLE weakness), HTN, T2DM, BPH w/ chronic indwelling Davis and CLL presenting to the hospital with acute hypoxic respiratory failure 2/2 acute on chronic HF, likely exacerbated by RSV infection & superimposed aspiration PNA with near total atelectasis of left lower lobe. Also found to have a grossly positive UA. Met sepsis criteria on admission (leukocytosis and tachycardia).

## 2018-09-26 NOTE — PHYSICAL THERAPY INITIAL EVALUATION ADULT - PERTINENT HX OF CURRENT PROBLEM, REHAB EVAL
Pt w/ chronic indwelling Davis and CLL presenting to the hospital for acute SOB.  He was recently admitted for Proteus UTI s/p 10 days abx course complicated by aspiration pneumonitis from 8/8-8/21 subsequently d/c to rehab facility. Patient endorses 3 days of progressive SOB at rest w/ sudden decompensation requiring supplemental O2.

## 2018-09-26 NOTE — PROGRESS NOTE ADULT - PROBLEM SELECTOR PLAN 3
No chest pain. EKG without remarkable ischemic changes.   Troponins elevated 155. BNP elevated 07604. s/p IV lasix 40mg.    - follow daily weights  - strict I/O's   - c/w carvedilol 25mg q12hrs (patient previously on losartan 40mg however held in setting of NADIA on past hospitalization)  - c/w lasix 40 IV qD (home dose 20 PO qD)  - Bipap PRN if pt has increasing WOB; can also help with the heart failure

## 2018-09-26 NOTE — PROGRESS NOTE ADULT - SUBJECTIVE AND OBJECTIVE BOX
SUBJECTIVE: Pt seen, chart reviewed.  89 yo non ambulatory diabetic male a/w sob noted to have a left heel wound    Allergies    Cipro (Other)  fluoroquinolone antibiotics (Unknown)  latex (Unknown)    Intolerances        apixaban 2.5 milliGRAM(s) Oral every 12 hours  piperacillin/tazobactam IVPB. 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  S/P Inguinal Hernia Repair      HEALTH ISSUES - PROBLEM Dx:  Acute respiratory failure with hypoxia: Acute respiratory failure with hypoxia  CKD (chronic kidney disease) stage 3, GFR 30-59 ml/min: CKD (chronic kidney disease) stage 3, GFR 30-59 ml/min  Need for prophylactic measure: Need for prophylactic measure  Diabetes mellitus: Diabetes mellitus  Paroxysmal A-fib: Paroxysmal A-fib  CLL (chronic lymphocytic leukemia): CLL (chronic lymphocytic leukemia)  Demand ischemia: Demand ischemia  Acute on chronic heart failure, unspecified heart failure type: Acute on chronic heart failure, unspecified heart failure type  Sepsis: Sepsis  Hypoxia: Hypoxia          FAMILY HISTORY:  No pertinent family history in first degree relatives      Physical Exam:  Vital Signs Last 24 Hrs  T(C): 36.9 (26 Sep 2018 05:17), Max: 37.2 (25 Sep 2018 13:22)  T(F): 98.5 (26 Sep 2018 05:17), Max: 99 (25 Sep 2018 13:22)  HR: 76 (26 Sep 2018 05:17) (68 - 76)  BP: 151/81 (26 Sep 2018 05:17) (126/72 - 156/66)  BP(mean): --  RR: 18 (26 Sep 2018 05:17) (18 - 18)  SpO2: 99% (26 Sep 2018 05:17) (95% - 100%)    Elderly diabetic male Currently bedbound with slurred speech  left heel with an adherent dry gangrenous eschar  left foot is warm  2+ left popliteal pulse  Z Floats are in place  wound is primarily  pressure related  Cavilon applied  Continue Z Floats  Consider arterial duplex exam- probable distal atherosclerotic disease  Podiatry follow up- will contact    LABS:                        9.2    16.5  )-----------( 184      ( 25 Sep 2018 08:40 )             29.7     PT/INR - ( 25 Sep 2018 00:53 )   PT: 16.2 sec;   INR: 1.47 ratio         PTT - ( 25 Sep 2018 00:53 )  PTT:31.7 sec      Outpatient follow up information provided- [phone]

## 2018-09-26 NOTE — CONSULT NOTE ADULT - ASSESSMENT
89 year old man with a history of MI (1980), HFrEF (EF 20%), paroxysmal A-Fib, AICD, CVA (LLE weakness), HTN, T2DM, BPH w/ chronic indwelling Davis and CLL presented to the hospital for acute SOB. He is being treated for pneumonia  He was admitted in August 2018 with UTI followed by aspiration pneumonia  Hem consult for CLL And evaluation of IVIG if needed  Patient has CLL or mantle cell lymphoma by flow cytometry without thrombocytopenia and mild anemia likely related to CKD and chronic inflammation. He does not have organomegaly or significant lymphadenopathy. Thus his CLL by itself is not needing treatment at present. FISH Should be sent to exclude mantle cell lymphoma, it was not done earlier by records though ordered.  The patient had several hospitalizations for infection, including UTI , which likely has increased incidence because of indwelling Davis catheter.  However his IgG level is above 500 mg/dL. Thus we do not have indication to give IVIG.  Management at this time is supportive and treatment of infections as needed.

## 2018-09-26 NOTE — PROGRESS NOTE ADULT - PROBLEM SELECTOR PLAN 8
Previous hospitalization had Cr ~ 1.8-2.0, has progressively downtrended since prior admission. New baseline Cr ~ 1.3.    - Trend bmp  - Avoid nephrotoxins  - Renally dose meds

## 2018-09-27 DIAGNOSIS — L97.409 NON-PRESSURE CHRONIC ULCER OF UNSPECIFIED HEEL AND MIDFOOT WITH UNSPECIFIED SEVERITY: ICD-10-CM

## 2018-09-27 LAB
ALBUMIN SERPL ELPH-MCNC: 2.6 G/DL — LOW (ref 3.3–5)
ALP SERPL-CCNC: 88 U/L — SIGNIFICANT CHANGE UP (ref 40–120)
ALT FLD-CCNC: 7 U/L — LOW (ref 10–45)
ANION GAP SERPL CALC-SCNC: 11 MMOL/L — SIGNIFICANT CHANGE UP (ref 5–17)
AST SERPL-CCNC: 9 U/L — LOW (ref 10–40)
BASOPHILS # BLD AUTO: 0 K/UL — SIGNIFICANT CHANGE UP (ref 0–0.2)
BASOPHILS NFR BLD AUTO: 0 % — SIGNIFICANT CHANGE UP (ref 0–2)
BILIRUB SERPL-MCNC: 0.5 MG/DL — SIGNIFICANT CHANGE UP (ref 0.2–1.2)
BUN SERPL-MCNC: 24 MG/DL — HIGH (ref 7–23)
CALCIUM SERPL-MCNC: 8.1 MG/DL — LOW (ref 8.4–10.5)
CHLORIDE SERPL-SCNC: 100 MMOL/L — SIGNIFICANT CHANGE UP (ref 96–108)
CO2 SERPL-SCNC: 28 MMOL/L — SIGNIFICANT CHANGE UP (ref 22–31)
CREAT SERPL-MCNC: 1.57 MG/DL — HIGH (ref 0.5–1.3)
EOSINOPHIL # BLD AUTO: 0 K/UL — SIGNIFICANT CHANGE UP (ref 0–0.5)
EOSINOPHIL NFR BLD AUTO: 0 % — SIGNIFICANT CHANGE UP (ref 0–6)
GLUCOSE SERPL-MCNC: 139 MG/DL — HIGH (ref 70–99)
HCT VFR BLD CALC: 28.6 % — LOW (ref 39–50)
HGB BLD-MCNC: 8.4 G/DL — LOW (ref 13–17)
LYMPHOCYTES # BLD AUTO: 11.86 K/UL — HIGH (ref 1–3.3)
LYMPHOCYTES # BLD AUTO: 58 % — HIGH (ref 13–44)
MAGNESIUM SERPL-MCNC: 1.8 MG/DL — SIGNIFICANT CHANGE UP (ref 1.6–2.6)
MCHC RBC-ENTMCNC: 23.8 PG — LOW (ref 27–34)
MCHC RBC-ENTMCNC: 29.4 GM/DL — LOW (ref 32–36)
MCV RBC AUTO: 81 FL — SIGNIFICANT CHANGE UP (ref 80–100)
MONOCYTES # BLD AUTO: 1.43 K/UL — HIGH (ref 0–0.9)
MONOCYTES NFR BLD AUTO: 7 % — SIGNIFICANT CHANGE UP (ref 2–14)
NEUTROPHILS # BLD AUTO: 6.13 K/UL — SIGNIFICANT CHANGE UP (ref 1.8–7.4)
NEUTROPHILS NFR BLD AUTO: 30 % — LOW (ref 43–77)
PHOSPHATE SERPL-MCNC: 3.7 MG/DL — SIGNIFICANT CHANGE UP (ref 2.5–4.5)
PLATELET # BLD AUTO: 186 K/UL — SIGNIFICANT CHANGE UP (ref 150–400)
POTASSIUM SERPL-MCNC: 3.4 MMOL/L — LOW (ref 3.5–5.3)
POTASSIUM SERPL-SCNC: 3.4 MMOL/L — LOW (ref 3.5–5.3)
PROT SERPL-MCNC: 6.3 G/DL — SIGNIFICANT CHANGE UP (ref 6–8.3)
RBC # BLD: 3.53 M/UL — LOW (ref 4.2–5.8)
RBC # FLD: 16.3 % — HIGH (ref 10.3–14.5)
SODIUM SERPL-SCNC: 139 MMOL/L — SIGNIFICANT CHANGE UP (ref 135–145)
WBC # BLD: 20.44 K/UL — HIGH (ref 3.8–10.5)
WBC # FLD AUTO: 20.44 K/UL — HIGH (ref 3.8–10.5)

## 2018-09-27 PROCEDURE — 73630 X-RAY EXAM OF FOOT: CPT | Mod: 26,LT

## 2018-09-27 PROCEDURE — 71045 X-RAY EXAM CHEST 1 VIEW: CPT | Mod: 26

## 2018-09-27 PROCEDURE — 99233 SBSQ HOSP IP/OBS HIGH 50: CPT | Mod: GC

## 2018-09-27 RX ORDER — FUROSEMIDE 40 MG
40 TABLET ORAL
Qty: 0 | Refills: 0 | Status: DISCONTINUED | OUTPATIENT
Start: 2018-09-27 | End: 2018-09-30

## 2018-09-27 RX ORDER — POTASSIUM CHLORIDE 20 MEQ
20 PACKET (EA) ORAL ONCE
Qty: 0 | Refills: 0 | Status: COMPLETED | OUTPATIENT
Start: 2018-09-27 | End: 2018-09-27

## 2018-09-27 RX ORDER — POTASSIUM CHLORIDE 20 MEQ
40 PACKET (EA) ORAL ONCE
Qty: 0 | Refills: 0 | Status: DISCONTINUED | OUTPATIENT
Start: 2018-09-27 | End: 2018-09-27

## 2018-09-27 RX ADMIN — Medication 1: at 13:37

## 2018-09-27 RX ADMIN — Medication 100 MILLIGRAM(S): at 13:38

## 2018-09-27 RX ADMIN — Medication 1 APPLICATION(S): at 12:51

## 2018-09-27 RX ADMIN — PIPERACILLIN AND TAZOBACTAM 25 GRAM(S): 4; .5 INJECTION, POWDER, LYOPHILIZED, FOR SOLUTION INTRAVENOUS at 23:01

## 2018-09-27 RX ADMIN — Medication 1: at 17:24

## 2018-09-27 RX ADMIN — APIXABAN 2.5 MILLIGRAM(S): 2.5 TABLET, FILM COATED ORAL at 05:35

## 2018-09-27 RX ADMIN — Medication 40 MILLIGRAM(S): at 17:19

## 2018-09-27 RX ADMIN — PIPERACILLIN AND TAZOBACTAM 25 GRAM(S): 4; .5 INJECTION, POWDER, LYOPHILIZED, FOR SOLUTION INTRAVENOUS at 12:52

## 2018-09-27 RX ADMIN — FINASTERIDE 5 MILLIGRAM(S): 5 TABLET, FILM COATED ORAL at 12:51

## 2018-09-27 RX ADMIN — CARVEDILOL PHOSPHATE 25 MILLIGRAM(S): 80 CAPSULE, EXTENDED RELEASE ORAL at 17:19

## 2018-09-27 RX ADMIN — Medication 1 MILLIGRAM(S): at 12:51

## 2018-09-27 RX ADMIN — Medication 40 MILLIGRAM(S): at 05:35

## 2018-09-27 RX ADMIN — PIPERACILLIN AND TAZOBACTAM 25 GRAM(S): 4; .5 INJECTION, POWDER, LYOPHILIZED, FOR SOLUTION INTRAVENOUS at 05:35

## 2018-09-27 RX ADMIN — CARVEDILOL PHOSPHATE 25 MILLIGRAM(S): 80 CAPSULE, EXTENDED RELEASE ORAL at 05:35

## 2018-09-27 RX ADMIN — Medication 50 MILLIEQUIVALENT(S): at 08:18

## 2018-09-27 RX ADMIN — LOSARTAN POTASSIUM 25 MILLIGRAM(S): 100 TABLET, FILM COATED ORAL at 05:35

## 2018-09-27 RX ADMIN — APIXABAN 2.5 MILLIGRAM(S): 2.5 TABLET, FILM COATED ORAL at 17:19

## 2018-09-27 NOTE — PROGRESS NOTE ADULT - PROBLEM SELECTOR PLAN 8
Previous hospitalization had Cr ~ 1.8-2.0, has progressively downtrended since prior admission. New baseline Cr ~ 1.3.    - Trend bmp  - Avoid nephrotoxins  - Renally dose meds Previous hospitalization had Cr ~ 1.8-2.0, has progressively downtrended since prior admission. New baseline Cr ~ 1.3.  - Trend bmp  - Avoid nephrotoxins  - Renally dose meds Home meds Lantus 8u qHS, Lispro 2u  -holding premeals and long acting insulin as pt is NPO. Continue to monitor FS

## 2018-09-27 NOTE — PROGRESS NOTE ADULT - PROBLEM SELECTOR PLAN 4
No chest pain. Elevated troponin w/o ischemic changes on EKG, likely in setting of demand ischemia.    - f/u second set cardiac enzymes  - Pt already therapeutically A/C with Eliquis - no need for Heparin -stage 3 ulceration of the left heel. Appreciate wound care and podiatry recs  -follow up LE duplex and ABIs. Follow up xray to eval for osteomyelitis  -continue with wound care

## 2018-09-27 NOTE — PROGRESS NOTE ADULT - SUBJECTIVE AND OBJECTIVE BOX
Patient is a 89y old  Male who presents with a chief complaint of dyspnea (26 Sep 2018 20:32)      SUBJECTIVE / OVERNIGHT EVENTS:        ROS: (  ) Fever, (  )Chills,  (  )Nausea/Vomiting, (  ) Cough, (  )Shortness of breath, (  )Chest Pain    MEDICATIONS  (STANDING):  apixaban 2.5 milliGRAM(s) Oral every 12 hours  carvedilol 25 milliGRAM(s) Oral every 12 hours  collagenase Ointment 1 Application(s) Topical daily  dextrose 50% Injectable 25 Gram(s) IV Push once  docusate sodium 100 milliGRAM(s) Oral three times a day  finasteride 5 milliGRAM(s) Oral daily  folic acid 1 milliGRAM(s) Oral daily  furosemide   Injectable 40 milliGRAM(s) IV Push daily  insulin lispro (HumaLOG) corrective regimen sliding scale   SubCutaneous three times a day before meals  insulin lispro (HumaLOG) corrective regimen sliding scale   SubCutaneous at bedtime  losartan 25 milliGRAM(s) Oral daily  piperacillin/tazobactam IVPB. 3.375 Gram(s) IV Intermittent every 8 hours  potassium chloride  20 mEq/100 mL IVPB 20 milliEquivalent(s) IV Intermittent once  senna 2 Tablet(s) Oral at bedtime    MEDICATIONS  (PRN):  ALBUTerol/ipratropium for Nebulization 3 milliLiter(s) Nebulizer every 6 hours PRN Shortness of Breath and/or Wheezing      T(C): 36.7 (09-27 @ 05:24), Max: 36.8 (09-26 @ 14:08)   HR: 79   BP: 147/63   RR: 18   SpO2: 94%    PHYSICAL EXAM:  GENERAL: NAD, well-developed  HEAD:  Atraumatic, Normocephalic  CHEST/LUNG: Clear to auscultation bilaterally; No wheeze  HEART: Regular rate and rhythm; No murmurs, rubs, or gallops  ABDOMEN: Soft, Nontender, Nondistended; Bowel sounds present  EXTREMITIES:  2+ Peripheral Pulses, No clubbing, cyanosis, or edema  PSYCH: AAOx3  NEUROLOGY: non-focal    LABS:                        8.6    17.92 )-----------( 183      ( 26 Sep 2018 10:33 )             28.0      09-27    139  |  100  |  24<H>  ----------------------------<  139<H>  3.4<L>   |  28  |  1.57<H>    Ca    8.1<L>      27 Sep 2018 06:14  Phos  3.7     09-27  Mg     1.8     09-27    TPro  6.3  /  Alb  2.6<L>  /  TBili  0.5  /  DBili  x   /  AST  9<L>  /  ALT  7<L>  /  AlkPhos  88  09-27       CAPILLARY BLOOD GLUCOSE      POCT Blood Glucose.: 150 mg/dL (27 Sep 2018 07:25)  POCT Blood Glucose.: 183 mg/dL (27 Sep 2018 00:47)  POCT Blood Glucose.: 154 mg/dL (26 Sep 2018 21:19)  POCT Blood Glucose.: 161 mg/dL (26 Sep 2018 17:06)  POCT Blood Glucose.: 154 mg/dL (26 Sep 2018 11:35)  POCT Blood Glucose.: 142 mg/dL (26 Sep 2018 07:42)      RADIOLOGY & ADDITIONAL TESTS:    Imaging Personally Reviewed:  Consultant(s) Notes Reviewed:    Care Discussed with Consultants/Other Providers: Patient is a 89y old  Male who presents with a chief complaint of dyspnea (26 Sep 2018 20:32)      SUBJECTIVE / OVERNIGHT EVENTS:    Pt complaining of not being fed. Otherwise reports some pain in the left heel. Denies chest pain,       ROS: (  ) Fever, (  )Chills,  (  )Nausea/Vomiting, (  ) Cough, ( x )Shortness of breath, (  )Chest Pain    MEDICATIONS  (STANDING):  apixaban 2.5 milliGRAM(s) Oral every 12 hours  carvedilol 25 milliGRAM(s) Oral every 12 hours  collagenase Ointment 1 Application(s) Topical daily  dextrose 50% Injectable 25 Gram(s) IV Push once  docusate sodium 100 milliGRAM(s) Oral three times a day  finasteride 5 milliGRAM(s) Oral daily  folic acid 1 milliGRAM(s) Oral daily  furosemide   Injectable 40 milliGRAM(s) IV Push daily  insulin lispro (HumaLOG) corrective regimen sliding scale   SubCutaneous three times a day before meals  insulin lispro (HumaLOG) corrective regimen sliding scale   SubCutaneous at bedtime  losartan 25 milliGRAM(s) Oral daily  piperacillin/tazobactam IVPB. 3.375 Gram(s) IV Intermittent every 8 hours  potassium chloride  20 mEq/100 mL IVPB 20 milliEquivalent(s) IV Intermittent once  senna 2 Tablet(s) Oral at bedtime    MEDICATIONS  (PRN):  ALBUTerol/ipratropium for Nebulization 3 milliLiter(s) Nebulizer every 6 hours PRN Shortness of Breath and/or Wheezing      T(C): 36.7 (09-27 @ 05:24), Max: 36.8 (09-26 @ 14:08)   HR: 79   BP: 147/63   RR: 18   SpO2: 94%    GENERAL: No acute distress, well-developed  HEAD:  Atraumatic, Normocephalic  ENT: EOMI, PERRLA, conjunctiva and sclera clear, Neck supple, No JVD, moist mucosa  CHEST/LUNG: Prominent expiratory wheezes on R lung; dull/decreased bibasilar breath sounds.   HEART: Regular rate and rhythm; No murmurs, rubs, or gallops  ABDOMEN: Soft, Nontender, Nondistended; Bowel sounds present  BACK: No spinal tenderness  EXTREMITIES:  No clubbing, cyanosis, or edema  PSYCH: Nl behavior, nl affect  NEUROLOGY: AAOx3, non-focal, cranial nerves intact  SKIN: Stage 3 ulcer on heel    LABS:                        8.6    17.92 )-----------( 183      ( 26 Sep 2018 10:33 )             28.0      09-27    139  |  100  |  24<H>  ----------------------------<  139<H>  3.4<L>   |  28  |  1.57<H>    Ca    8.1<L>      27 Sep 2018 06:14  Phos  3.7     09-27  Mg     1.8     09-27    TPro  6.3  /  Alb  2.6<L>  /  TBili  0.5  /  DBili  x   /  AST  9<L>  /  ALT  7<L>  /  AlkPhos  88  09-27       CAPILLARY BLOOD GLUCOSE      POCT Blood Glucose.: 150 mg/dL (27 Sep 2018 07:25)  POCT Blood Glucose.: 183 mg/dL (27 Sep 2018 00:47)  POCT Blood Glucose.: 154 mg/dL (26 Sep 2018 21:19)  POCT Blood Glucose.: 161 mg/dL (26 Sep 2018 17:06)  POCT Blood Glucose.: 154 mg/dL (26 Sep 2018 11:35)  POCT Blood Glucose.: 142 mg/dL (26 Sep 2018 07:42)      RADIOLOGY & ADDITIONAL TESTS:    Imaging Personally Reviewed: CXR reviewed, evidence of pulmonary edema  Consultant(s) Notes Reviewed:  heme, podiatry   Care Discussed with Consultants/Other Providers: Patient is a 89y old  Male who presents with a chief complaint of dyspnea (26 Sep 2018 20:32)      SUBJECTIVE / OVERNIGHT EVENTS:    Pt complaining of not being fed. Otherwise reports some pain in the left heel. Denies chest pain,       ROS: (  ) Fever, (  )Chills,  (  )Nausea/Vomiting, (  ) Cough, ( x )Shortness of breath, (  )Chest Pain    MEDICATIONS  (STANDING):  apixaban 2.5 milliGRAM(s) Oral every 12 hours  carvedilol 25 milliGRAM(s) Oral every 12 hours  collagenase Ointment 1 Application(s) Topical daily  dextrose 50% Injectable 25 Gram(s) IV Push once  docusate sodium 100 milliGRAM(s) Oral three times a day  finasteride 5 milliGRAM(s) Oral daily  folic acid 1 milliGRAM(s) Oral daily  furosemide   Injectable 40 milliGRAM(s) IV Push daily  insulin lispro (HumaLOG) corrective regimen sliding scale   SubCutaneous three times a day before meals  insulin lispro (HumaLOG) corrective regimen sliding scale   SubCutaneous at bedtime  losartan 25 milliGRAM(s) Oral daily  piperacillin/tazobactam IVPB. 3.375 Gram(s) IV Intermittent every 8 hours  potassium chloride  20 mEq/100 mL IVPB 20 milliEquivalent(s) IV Intermittent once  senna 2 Tablet(s) Oral at bedtime    MEDICATIONS  (PRN):  ALBUTerol/ipratropium for Nebulization 3 milliLiter(s) Nebulizer every 6 hours PRN Shortness of Breath and/or Wheezing      T(C): 36.7 (09-27 @ 05:24), Max: 36.8 (09-26 @ 14:08)   HR: 79   BP: 147/63   RR: 18   SpO2: 94%    GENERAL: No acute distress, well-developed  HEAD:  Atraumatic, Normocephalic  ENT: EOMI, PERRLA, conjunctiva and sclera clear, Neck supple, No JVD, moist mucosa  CHEST/LUNG: coarse scattered BS/decreased bibasilar breath sounds.   HEART: Regular rate and rhythm; No murmurs, rubs, or gallops  ABDOMEN: Soft, Nontender, Nondistended; Bowel sounds present  BACK: No spinal tenderness  EXTREMITIES:  No clubbing, cyanosis, or edema  PSYCH: Nl behavior, nl affect  NEUROLOGY: AAOx3, non-focal, cranial nerves intact  SKIN: Stage 3 ulcer on heel    LABS:                        8.6    17.92 )-----------( 183      ( 26 Sep 2018 10:33 )             28.0      09-27    139  |  100  |  24<H>  ----------------------------<  139<H>  3.4<L>   |  28  |  1.57<H>    Ca    8.1<L>      27 Sep 2018 06:14  Phos  3.7     09-27  Mg     1.8     09-27    TPro  6.3  /  Alb  2.6<L>  /  TBili  0.5  /  DBili  x   /  AST  9<L>  /  ALT  7<L>  /  AlkPhos  88  09-27       CAPILLARY BLOOD GLUCOSE      POCT Blood Glucose.: 150 mg/dL (27 Sep 2018 07:25)  POCT Blood Glucose.: 183 mg/dL (27 Sep 2018 00:47)  POCT Blood Glucose.: 154 mg/dL (26 Sep 2018 21:19)  POCT Blood Glucose.: 161 mg/dL (26 Sep 2018 17:06)  POCT Blood Glucose.: 154 mg/dL (26 Sep 2018 11:35)  POCT Blood Glucose.: 142 mg/dL (26 Sep 2018 07:42)      RADIOLOGY & ADDITIONAL TESTS:    Imaging Personally Reviewed: CXR reviewed, evidence of pulmonary edema  Consultant(s) Notes Reviewed:  heme, podiatry   Care Discussed with Consultants/Other Providers: Patient is a 89y old  Male who presents with a chief complaint of dyspnea (26 Sep 2018 20:32)      SUBJECTIVE / OVERNIGHT EVENTS:    Pt complaining of not being fed. Otherwise reports some pain in the left heel. Denies chest pain, fevers, chills or abdominal pain.      ROS: (  ) Fever, (  )Chills,  (  )Nausea/Vomiting, (  ) Cough, ( x )Shortness of breath, (  )Chest Pain    MEDICATIONS  (STANDING):  apixaban 2.5 milliGRAM(s) Oral every 12 hours  carvedilol 25 milliGRAM(s) Oral every 12 hours  collagenase Ointment 1 Application(s) Topical daily  dextrose 50% Injectable 25 Gram(s) IV Push once  docusate sodium 100 milliGRAM(s) Oral three times a day  finasteride 5 milliGRAM(s) Oral daily  folic acid 1 milliGRAM(s) Oral daily  furosemide   Injectable 40 milliGRAM(s) IV Push daily  insulin lispro (HumaLOG) corrective regimen sliding scale   SubCutaneous three times a day before meals  insulin lispro (HumaLOG) corrective regimen sliding scale   SubCutaneous at bedtime  losartan 25 milliGRAM(s) Oral daily  piperacillin/tazobactam IVPB. 3.375 Gram(s) IV Intermittent every 8 hours  potassium chloride  20 mEq/100 mL IVPB 20 milliEquivalent(s) IV Intermittent once  senna 2 Tablet(s) Oral at bedtime    MEDICATIONS  (PRN):  ALBUTerol/ipratropium for Nebulization 3 milliLiter(s) Nebulizer every 6 hours PRN Shortness of Breath and/or Wheezing      T(C): 36.7 (09-27 @ 05:24), Max: 36.8 (09-26 @ 14:08)   HR: 79   BP: 147/63   RR: 18   SpO2: 94%    GENERAL: No acute distress, well-developed  HEAD:  Atraumatic, Normocephalic  ENT: EOMI, PERRLA, conjunctiva and sclera clear, Neck supple, No JVD, moist mucosa  CHEST/LUNG: coarse scattered BS/decreased bibasilar breath sounds.   HEART: Regular rate and rhythm; No murmurs, rubs, or gallops  ABDOMEN: Soft, Nontender, Nondistended; Bowel sounds present  BACK: No spinal tenderness  EXTREMITIES:  No clubbing, cyanosis, or edema  PSYCH: Nl behavior, nl affect  NEUROLOGY: AAOx3, non-focal, cranial nerves intact  SKIN: Stage 3 ulcer on heel    LABS:                        8.6    17.92 )-----------( 183      ( 26 Sep 2018 10:33 )             28.0      09-27    139  |  100  |  24<H>  ----------------------------<  139<H>  3.4<L>   |  28  |  1.57<H>    Ca    8.1<L>      27 Sep 2018 06:14  Phos  3.7     09-27  Mg     1.8     09-27    TPro  6.3  /  Alb  2.6<L>  /  TBili  0.5  /  DBili  x   /  AST  9<L>  /  ALT  7<L>  /  AlkPhos  88  09-27       CAPILLARY BLOOD GLUCOSE      POCT Blood Glucose.: 150 mg/dL (27 Sep 2018 07:25)  POCT Blood Glucose.: 183 mg/dL (27 Sep 2018 00:47)  POCT Blood Glucose.: 154 mg/dL (26 Sep 2018 21:19)  POCT Blood Glucose.: 161 mg/dL (26 Sep 2018 17:06)  POCT Blood Glucose.: 154 mg/dL (26 Sep 2018 11:35)  POCT Blood Glucose.: 142 mg/dL (26 Sep 2018 07:42)      RADIOLOGY & ADDITIONAL TESTS:    Imaging Personally Reviewed: CXR reviewed, evidence of pulmonary edema  Consultant(s) Notes Reviewed:  heme, podiatry   Care Discussed with Consultants/Other Providers:

## 2018-09-27 NOTE — PROGRESS NOTE ADULT - PROBLEM SELECTOR PLAN 3
No chest pain. EKG without remarkable ischemic changes.   Troponins elevated 155. BNP elevated 89911. s/p IV lasix 40mg.    - follow daily weights  - strict I/O's   - c/w carvedilol 25mg q12hrs (patient previously on losartan 40mg however held in setting of NADIA on past hospitalization)  - c/w lasix 40 IV qD (home dose 20 PO qD)  - Bipap PRN if pt has increasing WOB; can also help with the heart failure -continues to be hypoxic, clinically appear   No chest pain. EKG without remarkable ischemic changes.   Troponins elevated 155. BNP elevated 89452. s/p IV lasix 40mg.    - follow daily weights  - strict I/O's   - c/w carvedilol 25mg q12hrs (patient previously on losartan 40mg however held in setting of NADIA on past hospitalization)  - c/w lasix 40 IV qD (home dose 20 PO qD)  - Bipap PRN if pt has increasing WOB; can also help with the heart failure -continues to be hypoxic, clinically appears volume overloaded  -will increase to 40 mg iv lasix BID  No chest pain. EKG without remarkable ischemic changes.   Troponins elevated 155. BNP elevated 19587  - follow daily weights  - strict I/O's   - c/w carvedilol 25mg q12hrs (patient previously on losartan 40mg however held in setting of NADIA on past hospitalization)  - Bipap PRN if pt has increasing WOB; can also help with the heart failure

## 2018-09-27 NOTE — PROGRESS NOTE ADULT - PROBLEM SELECTOR PLAN 1
Likely 2/2 Acute Decompensated exacerbated by a combination fo RSV+ & Aspiration PNA & Atelectasis. s/p Lasix IV 40 in ED.  CTA on admission: b/l groundglass opacities, b/l pleural effusions, no PE.    -c/w Lasix IV 40mg qD  -c/w NC 4L O2 for goal of sat >92%  -CTA negative for PE Likely 2/2 Acute Decompensated exacerbated by a combination fo RSV+ & Aspiration PNA & Atelectasis. s/p Lasix IV 40 in ED.   Continues to appear volume overload requiring 3L oxygen. Coarse rales on exam. CXR reviewed with evidence of bilateral pulmonary infiltrates likely   CTA on admission: b/l groundglass opacities, b/l pleural effusions, no PE.    -c/w Lasix IV 40mg qD  -c/w NC 4L O2 for goal of sat >92%  -CTA negative for PE -Likely 2/2 Acute Decompensated exacerbated by a combination fo RSV+ & Aspiration PNA & Atelectasis. s/p Lasix IV 40 in ED.   Continues to appear volume overload requiring 3L oxygen. Coarse rales on exam. CXR reviewed with evidence of bilateral pulmonary infiltrates likely volume overloaded. Will increase lasix 40 mg IV push to twice a day  -c/w NC 3L O2 for goal of sat >92%  -CTA negative for PE -Likely 2/2 Acute Decompensated exacerbated by a combination fo RSV+ & Aspiration PNA & Atelectasis. s/p Lasix IV 40 in ED.   Continues to appear volume overload requiring 3L oxygen. Coarse rales on exam. CXR reviewed with evidence of bilateral pulmonary infiltrates likely volume overloaded. Will increase lasix 40 mg IV push to twice a day  -c/w NC 3L O2 for goal of sat >92%  -CTA negative for PE  -pending speech and swallow eval given concern for aspiration event. NPO for now

## 2018-09-27 NOTE — PROGRESS NOTE ADULT - PROBLEM SELECTOR PLAN 2
Leukocytosis + Tachycardia with sources: RSV, aspiration PNA, +UA.   Note has +UA but has chronic indwelling joshi for BPH.  CTA on admission: b/l groundglass opacities, b/l pleural effusions, no PE.    - c/w zosyn to target possible Aspiration PNA and +UA  - started Vancomycin & Azithromycin as patient hospitalized within last 30days  - f/u urine legionella, currently no diarrhea.   - f/u urine cultures  - f/u blood cultures (sent after abx started in ED) Leukocytosis + Tachycardia with sources: RSV, aspiration PNA, +UA.   Note has +UA but has chronic indwelling joshi for BPH.  CTA on admission: b/l groundglass opacities, b/l pleural effusions, no PE.    - c/w zosyn to target possible Aspiration PNA and +UA  - started Vancomycin & Azithromycin as patient hospitalized within last 30days   - urine legionella negative, azithromycin d/c. Blood Cx negative   - f/u urine cultures  - f/u blood cultures (sent after abx started in ED) Leukocytosis + Tachycardia with sources: RSV, aspiration PNA, +UA.   Note has +UA but has chronic indwelling joshi for BPH.  CTA on admission: b/l groundglass opacities, b/l pleural effusions, no PE.  - c/w zosyn to target possible Aspiration PNA and +UA  - started Vancomycin & Azithromycin as patient hospitalized within last 30days   - urine legionella negative, azithromycin d/c. Blood Cx negative   - f/u sensitivities for urine cx (growing pseudomonas)  -blood cx NGTD

## 2018-09-27 NOTE — PROGRESS NOTE ADULT - ASSESSMENT
89 year old man with a history of MI (1980), HFrEF (EF 20%), paroxysmal A-Fib, AICD, CVA (LLE weakness), HTN, T2DM, BPH w/ chronic indwelling Davis and CLL presenting to the hospital with acute hypoxic respiratory failure 2/2 acute on chronic HF, likely exacerbated by RSV infection & superimposed aspiration PNA with near total atelectasis of left lower lobe. Also found to have a grossly positive UA. Met sepsis criteria on admission (leukocytosis and tachycardia). 89 year old man with a history of MI (1980), HFrEF (EF 20%), paroxysmal A-Fib, AICD, CVA (LLE weakness), HTN, T2DM, BPH w/ chronic indwelling Davis and CLL presenting to the hospital with acute hypoxic respiratory failure 2/2 acute on chronic HF, likely exacerbated by RSV infection & superimposed aspiration PNA with near total atelectasis of left lower lobe. Also found to have a grossly positive UA. Met sepsis criteria on admission (leukocytosis and tachycardia). Course further complicated by volume overload

## 2018-09-27 NOTE — PROGRESS NOTE ADULT - PROBLEM SELECTOR PLAN 5
low cytometry was done revealing a monoclonal CD5, CD20 positive population, suggestive of either CLL or MCL. WBC lower than previous 25-30 WBC  -continue to monitor for now, counts acceptable without evidence of bleeding. low cytometry was done revealing a monoclonal CD5, CD20 positive population, suggestive of either CLL or MCL. WBC lower than previous 25-30 WBC  -continue to monitor for now, counts acceptable without evidence of bleeding  -appreciate heme onc recs. Despite recurrent infection, IgG level >500 therefore not a candidate for IVIG No chest pain. Elevated troponin w/o ischemic changes on EKG, likely in setting of demand ischemia.    - f/u second set cardiac enzymes  - Pt already therapeutically A/C with Eliquis - no need for Heparin

## 2018-09-27 NOTE — PROGRESS NOTE ADULT - PROBLEM SELECTOR PLAN 7
Home meds Lantus 8u qHS, Lispro 2u    - Switch to Lantus 4u qHS  - FSG pre meal and bedtime Home meds Lantus 8u qHS, Lispro 2u  -holding premeals and long acting insulin as pt is NPO. Continue to monitor FS c/w eliquis 2.5mG q12  c/w carvedilol 25mg BID

## 2018-09-27 NOTE — PROGRESS NOTE ADULT - PROBLEM SELECTOR PLAN 6
c/w eliquis 2.5mG q12  c/w carvedilol 25mg BID low cytometry was done revealing a monoclonal CD5, CD20 positive population, suggestive of either CLL or MCL. WBC lower than previous 25-30 WBC  -continue to monitor for now, counts acceptable without evidence of bleeding  -appreciate heme onc recs. Despite recurrent infection, IgG level >500 therefore not a candidate for IVIG

## 2018-09-27 NOTE — PROGRESS NOTE ADULT - PROBLEM SELECTOR PLAN 9
DVT PPX: patient on Eliquis for afib   DIET: NPO, patient to be seen by S[facility]S. Prior admission was on Dysphagia 2 with nectar thick liquids.  Dispo: Rehab Previous hospitalization had Cr ~ 1.8-2.0, has progressively downtrended since prior admission. New baseline Cr ~ 1.3.  - Trend bmp  - Avoid nephrotoxins  - Renally dose meds

## 2018-09-28 ENCOUNTER — TRANSCRIPTION ENCOUNTER (OUTPATIENT)
Age: 83
End: 2018-09-28

## 2018-09-28 LAB
-  AMIKACIN: SIGNIFICANT CHANGE UP
-  AZTREONAM: SIGNIFICANT CHANGE UP
-  CEFEPIME: SIGNIFICANT CHANGE UP
-  CEFTAZIDIME: SIGNIFICANT CHANGE UP
-  CIPROFLOXACIN: SIGNIFICANT CHANGE UP
-  GENTAMICIN: SIGNIFICANT CHANGE UP
-  IMIPENEM: SIGNIFICANT CHANGE UP
-  LEVOFLOXACIN: SIGNIFICANT CHANGE UP
-  MEROPENEM: SIGNIFICANT CHANGE UP
-  PIPERACILLIN/TAZOBACTAM: SIGNIFICANT CHANGE UP
-  TOBRAMYCIN: SIGNIFICANT CHANGE UP
ANION GAP SERPL CALC-SCNC: 15 MMOL/L — SIGNIFICANT CHANGE UP (ref 5–17)
BASOPHILS # BLD AUTO: 0 K/UL — SIGNIFICANT CHANGE UP (ref 0–0.2)
BASOPHILS NFR BLD AUTO: 0 % — SIGNIFICANT CHANGE UP (ref 0–2)
BUN SERPL-MCNC: 27 MG/DL — HIGH (ref 7–23)
CALCIUM SERPL-MCNC: 8.5 MG/DL — SIGNIFICANT CHANGE UP (ref 8.4–10.5)
CHLORIDE SERPL-SCNC: 101 MMOL/L — SIGNIFICANT CHANGE UP (ref 96–108)
CO2 SERPL-SCNC: 24 MMOL/L — SIGNIFICANT CHANGE UP (ref 22–31)
CREAT SERPL-MCNC: 1.76 MG/DL — HIGH (ref 0.5–1.3)
EOSINOPHIL # BLD AUTO: 0 K/UL — SIGNIFICANT CHANGE UP (ref 0–0.5)
EOSINOPHIL NFR BLD AUTO: 0 % — SIGNIFICANT CHANGE UP (ref 0–6)
GLUCOSE SERPL-MCNC: 113 MG/DL — HIGH (ref 70–99)
HCT VFR BLD CALC: 28.5 % — LOW (ref 39–50)
HGB BLD-MCNC: 8.4 G/DL — LOW (ref 13–17)
LYMPHOCYTES # BLD AUTO: 10.75 K/UL — HIGH (ref 1–3.3)
LYMPHOCYTES # BLD AUTO: 52 % — HIGH (ref 13–44)
MAGNESIUM SERPL-MCNC: 1.9 MG/DL — SIGNIFICANT CHANGE UP (ref 1.6–2.6)
MCHC RBC-ENTMCNC: 23.4 PG — LOW (ref 27–34)
MCHC RBC-ENTMCNC: 29.5 GM/DL — LOW (ref 32–36)
MCV RBC AUTO: 79.4 FL — LOW (ref 80–100)
METHOD TYPE: SIGNIFICANT CHANGE UP
MONOCYTES # BLD AUTO: 1.24 K/UL — HIGH (ref 0–0.9)
MONOCYTES NFR BLD AUTO: 6 % — SIGNIFICANT CHANGE UP (ref 2–14)
NEUTROPHILS # BLD AUTO: 7.86 K/UL — HIGH (ref 1.8–7.4)
NEUTROPHILS NFR BLD AUTO: 38 % — LOW (ref 43–77)
PHOSPHATE SERPL-MCNC: 3.5 MG/DL — SIGNIFICANT CHANGE UP (ref 2.5–4.5)
PLATELET # BLD AUTO: 176 K/UL — SIGNIFICANT CHANGE UP (ref 150–400)
POTASSIUM SERPL-MCNC: 3.3 MMOL/L — LOW (ref 3.5–5.3)
POTASSIUM SERPL-SCNC: 3.3 MMOL/L — LOW (ref 3.5–5.3)
RBC # BLD: 3.59 M/UL — LOW (ref 4.2–5.8)
RBC # FLD: 16 % — HIGH (ref 10.3–14.5)
SODIUM SERPL-SCNC: 140 MMOL/L — SIGNIFICANT CHANGE UP (ref 135–145)
WBC # BLD: 20.68 K/UL — HIGH (ref 3.8–10.5)
WBC # FLD AUTO: 20.68 K/UL — HIGH (ref 3.8–10.5)

## 2018-09-28 PROCEDURE — 99222 1ST HOSP IP/OBS MODERATE 55: CPT

## 2018-09-28 PROCEDURE — 99233 SBSQ HOSP IP/OBS HIGH 50: CPT | Mod: GC

## 2018-09-28 RX ORDER — MEROPENEM 1 G/30ML
1000 INJECTION INTRAVENOUS EVERY 12 HOURS
Qty: 0 | Refills: 0 | Status: DISCONTINUED | OUTPATIENT
Start: 2018-09-28 | End: 2018-09-28

## 2018-09-28 RX ORDER — IPRATROPIUM/ALBUTEROL SULFATE 18-103MCG
3 AEROSOL WITH ADAPTER (GRAM) INHALATION EVERY 6 HOURS
Qty: 0 | Refills: 0 | Status: DISCONTINUED | OUTPATIENT
Start: 2018-09-28 | End: 2018-10-12

## 2018-09-28 RX ORDER — POTASSIUM CHLORIDE 20 MEQ
10 PACKET (EA) ORAL
Qty: 0 | Refills: 0 | Status: COMPLETED | OUTPATIENT
Start: 2018-09-28 | End: 2018-09-28

## 2018-09-28 RX ORDER — PIPERACILLIN AND TAZOBACTAM 4; .5 G/20ML; G/20ML
3.38 INJECTION, POWDER, LYOPHILIZED, FOR SOLUTION INTRAVENOUS EVERY 8 HOURS
Qty: 0 | Refills: 0 | Status: DISCONTINUED | OUTPATIENT
Start: 2018-09-28 | End: 2018-10-02

## 2018-09-28 RX ORDER — MEROPENEM 1 G/30ML
1000 INJECTION INTRAVENOUS EVERY 8 HOURS
Qty: 0 | Refills: 0 | Status: DISCONTINUED | OUTPATIENT
Start: 2018-09-28 | End: 2018-09-28

## 2018-09-28 RX ADMIN — CARVEDILOL PHOSPHATE 25 MILLIGRAM(S): 80 CAPSULE, EXTENDED RELEASE ORAL at 17:18

## 2018-09-28 RX ADMIN — Medication 100 MILLIGRAM(S): at 14:05

## 2018-09-28 RX ADMIN — Medication 2: at 17:26

## 2018-09-28 RX ADMIN — LOSARTAN POTASSIUM 25 MILLIGRAM(S): 100 TABLET, FILM COATED ORAL at 06:07

## 2018-09-28 RX ADMIN — Medication 3 MILLILITER(S): at 22:16

## 2018-09-28 RX ADMIN — PIPERACILLIN AND TAZOBACTAM 25 GRAM(S): 4; .5 INJECTION, POWDER, LYOPHILIZED, FOR SOLUTION INTRAVENOUS at 22:16

## 2018-09-28 RX ADMIN — APIXABAN 2.5 MILLIGRAM(S): 2.5 TABLET, FILM COATED ORAL at 17:18

## 2018-09-28 RX ADMIN — APIXABAN 2.5 MILLIGRAM(S): 2.5 TABLET, FILM COATED ORAL at 06:07

## 2018-09-28 RX ADMIN — CARVEDILOL PHOSPHATE 25 MILLIGRAM(S): 80 CAPSULE, EXTENDED RELEASE ORAL at 06:07

## 2018-09-28 RX ADMIN — Medication 100 MILLIGRAM(S): at 22:16

## 2018-09-28 RX ADMIN — Medication 3 MILLILITER(S): at 17:18

## 2018-09-28 RX ADMIN — Medication 100 MILLIEQUIVALENT(S): at 08:53

## 2018-09-28 RX ADMIN — Medication 40 MILLIGRAM(S): at 17:18

## 2018-09-28 RX ADMIN — SENNA PLUS 2 TABLET(S): 8.6 TABLET ORAL at 22:17

## 2018-09-28 RX ADMIN — FINASTERIDE 5 MILLIGRAM(S): 5 TABLET, FILM COATED ORAL at 11:28

## 2018-09-28 RX ADMIN — PIPERACILLIN AND TAZOBACTAM 25 GRAM(S): 4; .5 INJECTION, POWDER, LYOPHILIZED, FOR SOLUTION INTRAVENOUS at 15:34

## 2018-09-28 RX ADMIN — Medication 100 MILLIEQUIVALENT(S): at 11:27

## 2018-09-28 RX ADMIN — Medication 2: at 12:50

## 2018-09-28 RX ADMIN — PIPERACILLIN AND TAZOBACTAM 25 GRAM(S): 4; .5 INJECTION, POWDER, LYOPHILIZED, FOR SOLUTION INTRAVENOUS at 06:07

## 2018-09-28 RX ADMIN — Medication 1: at 22:17

## 2018-09-28 RX ADMIN — Medication 40 MILLIGRAM(S): at 06:07

## 2018-09-28 RX ADMIN — Medication 1 MILLIGRAM(S): at 11:28

## 2018-09-28 RX ADMIN — Medication 3 MILLILITER(S): at 11:28

## 2018-09-28 RX ADMIN — Medication 100 MILLIEQUIVALENT(S): at 10:35

## 2018-09-28 RX ADMIN — Medication 1 APPLICATION(S): at 14:01

## 2018-09-28 NOTE — DISCHARGE NOTE ADULT - CARE PROVIDER_API CALL
Mariela Hernandez), Hematology; Internal Medicine; Medical Oncology  [address]  [place] [address]  Phone: [phone]  Fax: [phone]

## 2018-09-28 NOTE — CHART NOTE - NSCHARTNOTEFT_GEN_A_CORE
This patient Bandarabi Raffaeleluceroclyde is being transferred from the Team 4 service to the Hospitalist/ADS Team on 2DSU.     The patient was signed out to the nurse practitioner CHARU who is assigned to this patient and will be assuming care for this patient.

## 2018-09-28 NOTE — DISCHARGE NOTE ADULT - PATIENT PORTAL LINK FT
You can access the [facility] Patient Portal, offered by [facility], by registering with the following website: [email or web address]

## 2018-09-28 NOTE — SWALLOW BEDSIDE ASSESSMENT ADULT - SLP PERTINENT HISTORY OF CURRENT PROBLEM
89 year old man with h/o MI (1980), HFrEF (EF 20%), paroxysmal A-Fib, AICD, CVA (LLE weakness), HTN, T2DM, BPH w/ chronic indwelling Davis and CLL presenting to the hospital for acute SOB. Pt endorses 3 days of progressive SOB at rest w/ sudden decompensation requiring supplemental O2 in rehab facility. +lugo, +orthopnea States that 2 wks ago developed productive cough (pt unsure of color), chills and night sweats, no subjective fevers. Recently admitted for Proteus UTI s/p 10 days abx course c/b aspiration pneumonitis from 8/8-8/21 subsequently d/c to rehab facility. Denies chest pain, palpations, nausea/vomiting, change in BM, headaches. Found to be in hypoxic respiratory failure in setting of RSV and ?superimposed aspiration PNA, near total atelectasis of left lower lobe. Also found to have a grossly positive UA. Met sepsis criteria on admission (leukocytosis and tachycardia). Course further complicated by volume overload.

## 2018-09-28 NOTE — DISCHARGE NOTE ADULT - MEDICATION SUMMARY - MEDICATIONS TO STOP TAKING
I will STOP taking the medications listed below when I get home from the hospital:    Fish Oil 1000 mg oral capsule  -- 1 cap(s) by mouth once a day (in the morning)    finasteride 5 mg oral tablet  -- 1 tab(s) by mouth once a day    polyethylene glycol 3350 oral powder for reconstitution  -- 17 gram(s) by mouth once a day    tamsulosin 0.4 mg oral capsule  -- 2 cap(s) by mouth once a day (at bedtime)    chlorhexidine 4% topical liquid  -- Apply on skin to affected area 2 times a day    insulin lispro (concentrated) 200 units/mL subcutaneous solution  -- 4 unit(s) subcutaneous 3 times a day (before meals)    insulin glargine 100 units/mL subcutaneous solution  -- 8 unit(s) subcutaneous once a day (at bedtime)    insulin lispro  -- 1 Unit(s) if Glucose 151 - 200  2 Unit(s) if Glucose 201 - 250  3 Unit(s) if Glucose 251 - 300  4 Unit(s) if Glucose 301 - 350  5 Unit(s) if Glucose 351 - 400  6 Unit(s) if Glucose Greater Than 400    insulin lispro  -- 0 Unit(s) if Glucose 0 - 250  1 Unit(s) if Glucose 251 - 300  2 Unit(s) if Glucose 301 - 350  3 Unit(s) if Glucose 351 - 400  4 Unit(s) if Glucose Greater Than 400    furosemide 20 mg oral tablet  -- 1 tab(s) by mouth every other day

## 2018-09-28 NOTE — PROGRESS NOTE ADULT - PROBLEM SELECTOR PLAN 6
low cytometry was done revealing a monoclonal CD5, CD20 positive population, suggestive of either CLL or MCL. WBC lower than previous 25-30 WBC  -continue to monitor for now, counts acceptable without evidence of bleeding  -appreciate heme onc recs. Despite recurrent infection, IgG level >500 therefore not a candidate for IVIG

## 2018-09-28 NOTE — SWALLOW BEDSIDE ASSESSMENT ADULT - COMMENTS
MBS at [facility] 8/15/2018: Oropharyngeal dysphagia marked by prolonged oral manipulation and decreased mastication abilities, delayed bolus collection, transfer, and AP transit time, reduced base of tongue retraction, reduced epiglottic deflection, reduced hyolaryngeal elevation, and reduced pharyngeal contractility, and silent laryngeal penetration without full retrieval. Absent patient response indicative of reduced laryngo-pharyngeal sensation. Utilization of compensatory strategies were unsuccessful in providing adequate airway protection given patient's difficulty following multi-step directives. Rx: 1. Dysphagia 2 with nectar thick liquids, as tolerated. 2. Small bites and small, single cup sips. 3. Alternate soft solids with nectar thick liquids to assist in pharyngeal clearance. 4. Crush medication (when feasible). 5. Upright positioning during all meals. Per Medicine: Acute respiratory failure with hypoxia - Likely 2/2 Acute Decompensated exacerbated by a combination fo RSV+ & Aspiration PNA & Atelectasis. s/p Lasix IV 40 in ED. Continues to appear volume overload requiring 3L oxygen. Coarse rales on exam. CXR with evidence of bilateral pulmonary infiltrates likely volume overloaded. CTA on admission: b/l groundglass opacities, b/l pleural effusions, no PE. Pending speech and swallow eval given concern for aspiration event. NPO for now. Sepsis - Leukocytosis + Tachycardia with sources: RSV, aspiration PNA, +UA. Acute on chronic heart failure, clinically appears volume overloaded. EKG without remarkable ischemic changes. Troponins elevated 155. BNP elevated. Bipap PRN if pt has increasing WOB; can also help with the heart failure. Heel ulcer - Stage 3 ulceration of the left heel. Foot XR neg for osteomyelitis (Wound care/ Podiatry following). Elevated troponin w/o ischemic changes on EKG, likely in setting of demand ischemia.     MBS at [facility] 8/15/2018: Oropharyngeal dysphagia marked by prolonged oral manipulation and decreased mastication abilities, delayed bolus collection, transfer, and AP transit time, reduced base of tongue retraction, reduced epiglottic deflection, reduced hyolaryngeal elevation, and reduced pharyngeal contractility, and silent laryngeal penetration without full retrieval. Absent patient response indicative of reduced laryngo-pharyngeal sensation. Utilization of compensatory strategies were unsuccessful in providing adequate airway protection given patient's difficulty following multi-step directives. Rx: 1. Dysphagia 2 with nectar thick liquids, as tolerated. 2. Small bites and small, single cup sips. 3. Alternate soft solids with nectar thick liquids to assist in pharyngeal clearance. 4. Crush medication (when feasible). 5. Upright positioning during all meals. Per Medicine: Acute respiratory failure with hypoxia - Likely 2/2 Acute Decompensated HF exacerbated by a combination fo RSV+ & Aspiration PNA & Atelectasis. s/p Lasix IV 40 in ED. Continues to appear volume overload requiring 3L oxygen. Coarse rales on exam. CXR with evidence of b/l pulmonary infiltrates likely volume overloaded. CTA on admission: b/l groundglass opacities, b/l pleural effusions, no PE. Pending speech and swallow eval given concern for aspiration event. NPO for now. Sepsis - Leukocytosis + Tachycardia with sources: RSV, aspiration PNA, +UA. Acute on chronic heart failure, clinically appears volume overloaded. EKG without remarkable ischemic changes. Troponins elevated 155. BNP elevated. Bipap PRN if pt has increasing WOB; can also help with the heart failure. Heel ulcer - Stage 3 ulceration of the left heel. Foot XR neg for osteomyelitis (Wound care/ Podiatry following). Elevated troponin w/o ischemic changes on EKG, likely in setting of demand ischemia.     MBS at [facility] 8/15/2018: Oropharyngeal dysphagia marked by prolonged oral manipulation and decreased mastication abilities, delayed bolus collection, transfer, and AP transit time, reduced base of tongue retraction, reduced epiglottic deflection, reduced hyolaryngeal elevation, and reduced pharyngeal contractility, and silent laryngeal penetration without full retrieval. Absent patient response indicative of reduced laryngo-pharyngeal sensation. Utilization of compensatory strategies were unsuccessful in providing adequate airway protection given patient's difficulty following multi-step directives. Rx: 1. Dysphagia 2 with nectar thick liquids, as tolerated. 2. Small bites and small, single cup sips. 3. Alternate soft solids with nectar thick liquids to assist in pharyngeal clearance. 4. Crush medication (when feasible). 5. Upright positioning during all meals.

## 2018-09-28 NOTE — CONSULT NOTE ADULT - ASSESSMENT
89 year old man with a history of MI (1980), HFrEF (EF 20%), paroxysmal A-Fib, AICD, CVA (LLE weakness), HTN, T2DM, BPH w/ chronic indwelling Joshi and CLL presenting to the hospital for acute SOB.  CXR s/o pna  Also with pseudomonas bacteriuria though has joshi.  Leucocytosis -chronic from CLL  Overall is clinically better on day 4 of zosyn  Pseudomonas is sensitive to zosyn    Would rec:  1) Follow clinically.Monitor for any nosocomial process/infection and C diff  2) follow blood Cx  3) Continue zosyn  4) Monitor creatinine  5) if stable around 7 day course    Will tailor plan for ID issues  per course,results.Will defer to primary team on management of other issues.  case d/w primary team  Infectious Diseases Service will cover over weekend.  Please call [phone] if issues

## 2018-09-28 NOTE — PROGRESS NOTE ADULT - PROBLEM SELECTOR PLAN 3
Continues to be hypoxic, clinically appears volume overloaded.   No chest pain. EKG without remarkable ischemic changes.   Troponins elevated 155. BNP elevated [id number]    - c/w 40 mg iv lasix BID  - follow daily weights  - strict I/O's   - c/w carvedilol 25mg q12hrs (patient previously on losartan 40mg however held in setting of NADIA on past hospitalization)  - Bipap PRN if pt has increasing WOB; can also help with the heart failure

## 2018-09-28 NOTE — PROGRESS NOTE ADULT - PROBLEM SELECTOR PLAN 2
Leukocytosis + Tachycardia with sources: RSV, aspiration PNA, +UA.   Note has +UA but has chronic indwelling joshi for BPH.  CTA on admission: b/l groundglass opacities, b/l pleural effusions, no PE.  Urine legionella negative (azithromycin d/c). Vancomycin d/c. Blood Cx NGTD    - c/w zosyn? to target possible Aspiration PNA and +UA  - f/u sensitivities for urine cx (growing pseudomonas) Leukocytosis + Tachycardia with sources: RSV, aspiration PNA, +UA.   Note has +UA but has chronic indwelling joshi for BPH.  CTA on admission: b/l groundglass opacities, b/l pleural effusions, no PE.  Urine legionella negative (azithromycin d/c). Vancomycin d/c. Blood Cx NGTD    - ID consulted regarding Zosyn sensitivity of 8 towards Pseudomonas; ID feels this is adequate sensitivity and recs c/w Zosyn til 10/2  - c/w Zosyn q8 (9/25-- ) for 1 week to target possible Aspiration PNA and +UA  - f/u sensitivities for urine cx (growing pseudomonas)

## 2018-09-28 NOTE — PROGRESS NOTE ADULT - PROBLEM SELECTOR PLAN 10
DVT PPX: patient on Eliquis for afib   DIET: NPO, patient to be seen by [facility]. Prior admission was on Dysphagia 2 with nectar thick liquids.  Dispo: Rehab DVT PPX: patient on Eliquis for afib   DIET: Dysphagia 1 with honey thick diet   Dispo: Rehab

## 2018-09-28 NOTE — DISCHARGE NOTE ADULT - MEDICATION SUMMARY - MEDICATIONS TO TAKE
I will START or STAY ON the medications listed below when I get home from the hospital:    acetaminophen 325 mg oral tablet  -- 2 tab(s) by mouth every 6 hours, As needed, mild and moderate pain  -- Indication: For Chronic back pain, unspecified back location, unspecified back pain laterality    aspirin 81 mg oral tablet, chewable  -- 1 tab(s) by mouth once a day  -- Indication: For CAD    Eliquis 2.5 mg oral tablet  -- 1 tab(s) by mouth 2 times a day  -- Indication: For Paroxysmal A-fib    Tradjenta 5 mg oral tablet  -- 1 tab(s) by mouth once a day   -- Check with your doctor before becoming pregnant.  Obtain medical advice before taking any non-prescription drugs as some may affect the action of this medication.    -- Indication: For Diabetes mellitus    atorvastatin 40 mg oral tablet  -- 1 tab(s) by mouth once a day (at bedtime)  -- Indication: For CAD    carvedilol 25 mg oral tablet  -- 1 tab(s) by mouth every 12 hours  -- Indication: For Heart failure    ipratropium-albuterol 0.5 mg-2.5 mg/3 mLinhalation solution  -- 3 milliliter(s) inhaled every 6 hours, As Needed  -- Indication: For Acute respiratory failure with hypoxia    petrolatum topical ointment  -- 1 application on skin 2 times a day  -- Indication: For Wound care    camphor-menthol 0.5%-0.5% topical lotion  -- 1 application on skin 3 times a day  -- Indication: For Wound care    senna oral tablet  -- 2 tab(s) by mouth once a day (at bedtime), As Needed  -- Indication: For Need for prophylactic measure    docusate sodium 100 mg oral capsule  -- 1 cap(s) by mouth 3 times a day, As Needed  -- Indication: For Need for prophylactic measure    folic acid 1 mg oral tablet  -- 1 tab(s) by mouth once a day  -- Indication: For Need for prophylactic measure I will START or STAY ON the medications listed below when I get home from the hospital:    acetaminophen 325 mg oral tablet  -- 2 tab(s) by mouth every 6 hours, As needed, mild and moderate pain  -- Indication: For Chronic back pain, unspecified back location, unspecified back pain laterality    aspirin 81 mg oral tablet, chewable  -- 1 tab(s) by mouth once a day  -- Indication: For CAD    Eliquis 2.5 mg oral tablet  -- 1 tab(s) by mouth 2 times a day  -- Indication: For Afib    Tradjenta 5 mg oral tablet  -- 1 tab(s) by mouth once a day   -- Check with your doctor before becoming pregnant.  Obtain medical advice before taking any non-prescription drugs as some may affect the action of this medication.    -- Indication: For Diabetes mellitus    atorvastatin 40 mg oral tablet  -- 1 tab(s) by mouth once a day (at bedtime)  -- Indication: For CAD    carvedilol 25 mg oral tablet  -- 1 tab(s) by mouth every 12 hours  -- Indication: For Acute on chronic heart failure, unspecified heart failure type    ipratropium-albuterol 0.5 mg-2.5 mg/3 mLinhalation solution  -- 3 milliliter(s) inhaled every 6 hours, As Needed  -- Indication: For Dyspnea    petrolatum topical ointment  -- 1 application on skin 2 times a day  -- Indication: For Wound care    camphor-menthol 0.5%-0.5% topical lotion  -- 1 application on skin 3 times a day  -- Indication: For Wound care    senna oral tablet  -- 2 tab(s) by mouth once a day (at bedtime), As Needed  -- Indication: For Need for prophylactic measure    docusate sodium 100 mg oral capsule  -- 1 cap(s) by mouth 3 times a day, As Needed  -- Indication: For Need for prophylactic measure    folic acid 1 mg oral tablet  -- 1 tab(s) by mouth once a day  -- Indication: For Need for prophylactic measure

## 2018-09-28 NOTE — SWALLOW BEDSIDE ASSESSMENT ADULT - SLP GENERAL OBSERVATIONS
Pt seen at bedside, awake and alert, cooperative for exam. Pt verbally responsive, but with reduced intelligibility, suspected largely due reduced vocal intensity, ? mild component of dysarthria. Pt inconsistently follows directions for the purposes of the exam. Pt seen at bedside, awake and alert, cooperative for exam. Pt verbally responsive, but with reduced intelligibility, suspected largely due reduced vocal intensity, +hypophonia, ? mild component of dysarthria. Pt inconsistently follows directions for the purposes of the exam.

## 2018-09-28 NOTE — PROGRESS NOTE ADULT - PROBLEM SELECTOR PLAN 5
No chest pain. Elevated troponin w/o ischemic changes on EKG, likely in setting of demand ischemia. Trops 155 --> 145    - Pt already therapeutically A/C with Eliquis - no need for Heparin

## 2018-09-28 NOTE — PROGRESS NOTE ADULT - ASSESSMENT
89 year old man with a history of MI (1980), HFrEF (EF 20%), paroxysmal A-Fib, AICD, CVA (LLE weakness), HTN, T2DM, BPH w/ chronic indwelling Davis and CLL presenting to the hospital with acute hypoxic respiratory failure 2/2 acute on chronic HF, likely exacerbated by RSV infection & superimposed aspiration PNA with near total atelectasis of left lower lobe. Also found to have a grossly positive UA. Met sepsis criteria on admission (leukocytosis and tachycardia). Course further complicated by volume overload

## 2018-09-28 NOTE — DISCHARGE NOTE ADULT - ADDITIONAL INSTRUCTIONS
# Left Heel Ulcer- Nonhealing due to PAD, Xray negative for bone infection, you were evaluated by podiatry, s/p leg arterial duplex w/ flow-limiting lesion affecting the left anterior tibial artery in the mid calf, No vascular intervention at this time as discussed with Your family, Continue ASA/Lipitor for PAD, continue with wound care with Betadine , off loading with Z- float boot

## 2018-09-28 NOTE — DISCHARGE NOTE ADULT - [id number]_GEN_ALL_CORE
observation and assessment/rehabilitation services/medication teaching and assessment/teaching and training/wound care and assessment

## 2018-09-28 NOTE — PROGRESS NOTE ADULT - PROBLEM SELECTOR PLAN 8
Home meds Lantus 8u qHS, Lispro 2u  -holding premeals and long acting insulin as pt is NPO. Continue to monitor FS

## 2018-09-28 NOTE — CONSULT NOTE ADULT - SUBJECTIVE AND OBJECTIVE BOX
Patient is a 89y old  Male who presents with a chief complaint of dyspnea (28 Sep 2018 13:08)    From HPI" HPI:  89 year old man with a history of MI (1980), HFrEF (EF 20%), paroxysmal A-Fib, AICD, CVA (LLE weakness), HTN, T2DM, BPH w/ chronic indwelling Joshi and CLL presenting to the hospital for acute SOB.    Patient endorses 3 days of progressive SOB at rest w/ sudden decompensation requiring supplemental O2 in rehab facility. +lugo, +orthopnea States that 2 weeks ago he developed productive cough (patient unsure of color), chills and night sweats, no subjective fevers. He was recently admitted for Proteus UTI s/p 10 days abx course complicated by aspiration pneumonitis from 8/8-8/21 subsequently d/c to rehab facility.     Denies chest pain, palpations, nausea/vomiting, change in BM, headaches. (25 Sep 2018 05:51)  "    Above verified-agree with above unless noted below.    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    REVIEW OF SYSTEMS  General:	Denies any malaise fatigue or chills. Fevers absent    Skin:No rash  	  Ophthalmologic:Denies any visual complaints,discharge redness or photophobia  	  ENMT:No nasal discharge,headache,sinus congestion or throat pain.No dental complaints    Respiratory and Thorax:some  cough some ,sputum or chest pain.Denies shortness of breath  	  Cardiovascular:	No chest pain,palpitaions or dizziness    Gastrointestinal:	NO nausea,abdominal pain or diarrhea.    Genitourinary:	has joshi-    Musculoskeletal:	No joint swelling or pain.No weakness    Neurological:No confusion,diziness.No extremity weakness.No bladder or bowel incontinence	    Psychiatric:No delusions or hallucinations	    Hematology/Lymphatics:	No LN swelling.No gum bleeding     Endocrine:	No recent weight gain or loss.No abnormal heat/cold intolerance    Allergic/Immunologic:	No hives or rash   Allergies    Cipro (Other)  fluoroquinolone antibiotics (Unknown)-pt denies  latex (Unknown)    Intolerances        Antimicrobials:    piperacillin/tazobactam IVPB. 3.375 Gram(s) IV Intermittent every 8 hours since 9/25    Other medications reviewed      Vital Signs Last 24 Hrs  T(C): 36.4 (28 Sep 2018 05:09), Max: 36.9 (27 Sep 2018 13:37)  T(F): 97.5 (28 Sep 2018 05:09), Max: 98.5 (27 Sep 2018 13:37)  HR: 69 (28 Sep 2018 05:09) (69 - 80)  BP: 149/64 (28 Sep 2018 05:09) (125/58 - 149/64)  BP(mean): --  RR: 20 (28 Sep 2018 05:09) (18 - 20)  SpO2: 98% (28 Sep 2018 05:09) (96% - 98%)    PHYSICAL EXAM:Pleasant patient in no acute distress.      Constitutional:Comfortable.Awake and alert  No cachexia     Eyes:PERRL EOMI.NO discharge or conjunctival injection    ENMT:No sinus tenderness.No thrush.No pharyngeal exudate or erythema.Fair dental hygiene    Neck:Supple,No LN,no JVD      Respiratory:Good air entry bilaterally,occ crackles    Cardiovascular:S1 S2 wnl, No murmurs,rub or gallops    Gastrointestinal:Soft BS(+) no tenderness no masses ,No rebound or guarding    Genitourinary:No CVA tendereness  joshi      Extremities:No cyanosis,clubbing or edema.    Vascular:peripheral pulses felt    Neurological:AAO X 3,No grossly focal deficits    Skin:No rash     Lymph Nodes:No palpable LNs    Musculoskeletal:No joint swelling or LOM    Psychiatric:Affect normal.          Labs:                            8.4    20.68 )-----------( 176      ( 28 Sep 2018 08:29 )             28.5     WBC Count: 20.68 (09-28-18 @ 08:29)  WBC Count: 20.44 (09-27-18 @ 08:38)  WBC Count: 17.92 (09-26-18 @ 10:33)  WBC Count: 16.5 (09-25-18 @ 08:40)  WBC Count: 18.0 (09-25-18 @ 00:53)  WBC Count: 17.07 (09-24-18 @ 07:49)      09-28    140  |  101  |  27<H>  ----------------------------<  113<H>  3.3<L>   |  24  |  1.76<H>    Ca    8.5      28 Sep 2018 06:55  Phos  3.5     09-28  Mg     1.9     09-28    TPro  6.3  /  Alb  2.6<L>  /  TBili  0.5  /  DBili  x   /  AST  9<L>  /  ALT  7<L>  /  AlkPhos  88  09-27            Culture - Blood (collected 25 Sep 2018 11:39)  Source: .Blood Blood-Venous  Preliminary Report (26 Sep 2018 12:01):    No growth to date.    Culture - Blood (collected 25 Sep 2018 11:39)  Source: .Blood Blood-Peripheral  Preliminary Report (26 Sep 2018 12:01):    No growth to date.    Culture - Urine (collected 25 Sep 2018 03:23)  Source: .Urine Catheterized  Preliminary Report (28 Sep 2018 07:38):    >100,000 CFU/ml Pseudomonas aeruginosa  Organism: Pseudomonas aeruginosa (28 Sep 2018 07:35)  Organism: Pseudomonas aeruginosa (28 Sep 2018 07:35)      -  Amikacin: S <=8      -  Aztreonam: S <=4      -  Cefepime: S 8      -  Ceftazidime: S 4      -  Ciprofloxacin: S <=0.5      -  Gentamicin: S 4      -  Imipenem: S <=1      -  Levofloxacin: S <=1      -  Meropenem: S <=1      -  Piperacillin/Tazobactam: S <=8      -  Tobramycin: S <=2      Method Type: ILA      < from: Xray Chest 1 View- PORTABLE-Urgent (09.27.18 @ 12:02) >    IMPRESSION:    ICD leads are unchanged in position. The cardiomediastinal silhouette is   stable. There is no pneumothorax.    There are small to moderate bilateral pleural effusions. There is   compressive atelectasis inthe left lower lobe. There are patchy   bilateral pulmonary opacities, similar to the prior study, which may   represent pulmonary edema and/or pneumonia. There are severe degenerative   changes of the right shoulder joint.      < end of copied text >      < from: CT Angio Chest w/ IV Cont (09.25.18 @ 01:01) >  IMPRESSION:   1.  No pulmonary embolism.  2.  Patchy groundglass opacities in the rightupper, right middle and   right lower lobe suspicious for pneumonia.  3.  Cardiomegaly.  Reflux of contrast in the hepatic veins compatible   with elevated right atrial pressures.  Moderate bilateral pleural   effusions.  Anasarca.  Correlate clinically for congestive heart failure.    < end of copied text >

## 2018-09-28 NOTE — DISCHARGE NOTE ADULT - CARE PLAN
Principal Discharge DX:	Acute respiratory failure with hypoxia  Secondary Diagnosis:	Acute on chronic heart failure, unspecified heart failure type  Secondary Diagnosis:	RSV infection  Secondary Diagnosis:	Aspiration pneumonia  Secondary Diagnosis:	UTI (urinary tract infection)  Secondary Diagnosis:	Dysphagia Principal Discharge DX:	Acute respiratory failure with hypoxia  Goal:	To adequately oxygenate your body  Assessment and plan of treatment:	You were admitted for being hypoxic; you improved with oxygen treatment.  Secondary Diagnosis:	Acute on chronic heart failure, unspecified heart failure type  Assessment and plan of treatment:	You were found to have fluid in your lungs, which we treated with Lasix diuretics.  Secondary Diagnosis:	RSV infection  Assessment and plan of treatment:	You were found to have a respiratory viral infection which commonly affects the elderly.  Secondary Diagnosis:	Aspiration pneumonia  Assessment and plan of treatment:	You were thought to have possibly aspirated some food contents into your lungs at your rehab facility; you were started on antibiotics which would treat any potential infections.  Secondary Diagnosis:	UTI (urinary tract infection)  Assessment and plan of treatment:	You were found to have a UTI pseudomonas infection for which we treated you with antibiotics.  Secondary Diagnosis:	Dysphagia  Assessment and plan of treatment:	You were found to have difficulty swallowing and at times appeared to be at risk of aspirating. We evaluated you and adjusted your diet as necessary (Dysphagia I with honey thick liquids). Principal Discharge DX:	Acute respiratory failure with hypoxia  Goal:	To adequately oxygenate your body  Assessment and plan of treatment:	You were admitted for being hypoxic; you improved with oxygen treatment.  Secondary Diagnosis:	Acute on chronic heart failure, unspecified heart failure type  Assessment and plan of treatment:	You were found to have fluid in your lungs, which we treated with Lasix diuretics.  Weigh yourself daily.  If you gain 3lbs in 3 days, or 5lbs in a week call your Health Care Provider.  Do not eat or drink foods containing more than 2000mg of salt (sodium) in your diet every day.  Call your Health Care Provider if you have any swelling or increased swelling in your feet, ankles, and/or stomach.  The Pt was provided with CHF diet instruction (low sodium diet, daily weights, label reading, Heart Healthy Cooking Tips & Heart Healthy shopping Tips).  Take all of your medication as directed.  If you become dizzy call your Health Care Provider.  Secondary Diagnosis:	RSV infection  Assessment and plan of treatment:	You were found to have a respiratory viral infection which commonly affects the elderly.  Respiratory symptoms resolved  Secondary Diagnosis:	Aspiration pneumonia  Assessment and plan of treatment:	You were thought to have possibly aspirated some food contents into your lungs at your rehab facility; you were treated with antibiotic  Secondary Diagnosis:	UTI (urinary tract infection)  Assessment and plan of treatment:	You were found to have a UTI pseudomonas infection for which we treated you with antibiotics.  Secondary Diagnosis:	Dysphagia  Assessment and plan of treatment:	You were found to have difficulty swallowing and at times appeared to be at risk of aspirating. We evaluated you and adjusted your diet as necessary (Dysphagia I with honey thick liquids).  Secondary Diagnosis:	Paroxysmal A-fib  Assessment and plan of treatment:	Continue Eliquis and Coreg as directed Principal Discharge DX:	Acute respiratory failure with hypoxia  Goal:	To adequately oxygenate your body  Assessment and plan of treatment:	You were admitted for being hypoxic; you improved with oxygen treatment.  Secondary Diagnosis:	Acute on chronic heart failure, unspecified heart failure type  Assessment and plan of treatment:	You were found to have fluid in your lungs, which we treated with Lasix diuretics. You will be discharged without lasix, but notify your hospice care provider if any change in shortness of breath or edema.  Secondary Diagnosis:	RSV infection  Assessment and plan of treatment:	You were found to have a respiratory viral infection which commonly affects the elderly.  Respiratory symptoms resolved  Secondary Diagnosis:	Aspiration pneumonia  Assessment and plan of treatment:	You were thought to have possibly aspirated some food contents into your lungs at your rehab facility; you were treated with antibiotic  Secondary Diagnosis:	UTI (urinary tract infection)  Assessment and plan of treatment:	You were found to have a UTI pseudomonas infection for which we treated you with antibiotics.  Secondary Diagnosis:	Dysphagia  Assessment and plan of treatment:	You were found to have difficulty swallowing and at times appeared to be at risk of aspirating. We evaluated you and adjusted your diet as necessary (Dysphagia I with honey thick liquids).  Secondary Diagnosis:	Paroxysmal A-fib  Assessment and plan of treatment:	Continue Eliquis and Coreg as directed

## 2018-09-28 NOTE — DISCHARGE NOTE ADULT - SECONDARY DIAGNOSIS.
Acute on chronic heart failure, unspecified heart failure type RSV infection Aspiration pneumonia UTI (urinary tract infection) Dysphagia Paroxysmal A-fib

## 2018-09-28 NOTE — DISCHARGE NOTE ADULT - HOSPITAL COURSE
HPI:  89 year old man with a history of MI (1980), HFrEF (EF 20%), paroxysmal A-Fib, AICD, CVA (LLE weakness), HTN, T2DM, BPH w/ chronic indwelling Davis and CLL presenting to the hospital for acute SOB.    Patient endorses 3 days of progressive SOB at rest w/ sudden decompensation requiring supplemental O2 in rehab facility. +lugo, +orthopnea States that 2 weeks ago he developed productive cough (patient unsure of color), chills and night sweats, no subjective fevers. He was recently admitted for Proteus UTI s/p 10 days abx course complicated by aspiration pneumonitis from 8/8-8/21 subsequently d/c to rehab facility.     Denies chest pain, palpations, nausea/vomiting, change in BM, headaches. (25 Sep 2018 05:51)      Hospital Course: HPI:  89 year old man with a history of MI (1980), HFrEF (EF 20%), paroxysmal A-Fib, AICD, CVA (LLE weakness), HTN, T2DM, BPH w/ chronic indwelling Joshi and CLL presenting to the hospital for acute SOB.    Patient endorses 3 days of progressive SOB at rest w/ sudden decompensation requiring supplemental O2 in rehab facility. +lugo, +orthopnea States that 2 weeks ago he developed productive cough (patient unsure of color), chills and night sweats, no subjective fevers. He was recently admitted for Proteus UTI s/p 10 days abx course complicated by aspiration pneumonitis from 8/8-8/21 subsequently d/c to rehab facility.     Denies chest pain, palpations, nausea/vomiting, change in BM, headaches. (25 Sep 2018 05:51)    Hospital Course:  Pt was initially treated with nasal cannula to improve your hypoxia and started on antibiotics to treat a potential urinary and pulmonary infection. Pt as found to have a UTI with pseudomonas bacteria; in addition to replacing the joshi, we started pt on specific antibiotics. Speech and swallow recommended a dysphagia I with honey thick liquid diet. We treated your UTI with a 7 day course of IV antibiotics. HPI:  89 year old man with a history of MI (1980), HFrEF (EF 20%), paroxysmal A-Fib, AICD, CVA (LLE weakness), HTN, T2DM, BPH w/ chronic indwelling Joshi and CLL presenting to the hospital for acute SOB.    Patient endorses 3 days of progressive SOB at rest w/ sudden decompensation requiring supplemental O2 in rehab facility. +lugo, +orthopnea States that 2 weeks ago he developed productive cough (patient unsure of color), chills and night sweats, no subjective fevers. He was recently admitted for Proteus UTI s/p 10 days abx course complicated by aspiration pneumonitis from 8/8-8/21 subsequently d/c to rehab facility.     Denies chest pain, palpations, nausea/vomiting, change in BM, headaches. (25 Sep 2018 05:51)    Hospital Course:  Pt was initially treated with nasal cannula to improve your hypoxia and started on antibiotics to treat a potential urinary and pulmonary infection. Pt as found to have a UTI with pseudomonas bacteria; in addition to replacing the joshi, we started pt on specific antibiotics. Speech and swallow recommended a dysphagia I with honey thick liquid diet. We treated your UTI with a 7 day course of IV antibiotics. In addition, you were found to be volume overloaded on your chest x-ray, and we adjusted your diuretics as necessary. You were evaluated by your hematologist Dr. Hernandez who did not feel IVIG treatment was necessary. You were also evaluated for osteomyelitis in your foot with an x-ray, DOLORES, and duplex evaluation. 88 y/o man w/ PMH CAD w/ MI (1980), HFrEF (EF 20%) s/p AICD, paroxysmal A-Fib (on pradaxa), s/p CVA (LLE weakness), HTN, T2DM, BPH w/ chronic indwelling Davis and CLL originally p/w acute respiratory failure with hypoxia 2/2 acute on chronic CHF exacerbation, likely exacerbated by sepsis 2' RSV infection & superimposed aspiration PNA with near total atelectasis of left lower lobe, now improving.     Sepsis present on admission d/t aspiration PNA, pseudomonas UTI, and RSV infection. Now resolved, s/p Zosyn    Acute on chronic systolic heart failure, likely decompensated by acute infection, improved with diuresis, currently diuresis on hold as clinically euvolemic  NADIA on CKD likely d/t ATN from [facility], baseline cr 1.3, Cr trending down to baseline, holding ARB and Lasix for now   For left heel ulcer . xray neg for osteomyelitis, LE arterial duplex w/ flow-limiting lesion affecting the left anterior tibial artery in tthe mid calf, family does not want vascular intervention at this time, to continue ASA, Lipitor and wound care. Evaluated by Podiatry   For CLL with chronic leukocytosis, evluated by Heme, IgG level >500 therefore not a candidate for IVIG, CBC and H & H monitored  For Paroxysmal A-fib, Eliquis and Coreg continued   For Dysphagia, s/p swallow eval, tolerating dyspahgia diet well 88 y/o man w/ PMH CAD w/ MI (1980), HFrEF (EF 20%) s/p AICD, paroxysmal A-Fib (on pradaxa), s/p CVA (LLE weakness), HTN, T2DM, BPH w/ chronic indwelling Davis and CLL originally p/w acute respiratory failure with hypoxia 2/2 acute on chronic CHF exacerbation, likely exacerbated by sepsis 2' RSV infection & superimposed aspiration PNA with near total atelectasis of left lower lobe, improved.    Sepsis present on admission d/t aspiration PNA, pseudomonas UTI, and RSV infection. Now resolved, s/p Zosyn course with improvement.    Acute on chronic systolic heart failure, likely decompensated by acute infection, improved with diuresis. Lasix held over the last week of hospitalization as patient remained euvolemic. Will be discharged without lasix, but family and hospice aware may be needed to restart in the future. Hydralazine held at time of D/C due to soft BP, risks > benefits.   NADIA on CKD likely d/t ATN from [facility], baseline cr 1.3, Cr trended down to 1.6. Off ARB at time of D/C.  For left heel ulcer . xray neg for osteomyelitis, LE arterial duplex w/ flow-limiting lesion affecting the left anterior tibial artery in tthe mid calf, family did not want vascular intervention at this time, to continue ASA, Lipitor and wound care. Evaluated by Podiatry   For CLL with chronic leukocytosis, evaluated by Heme, IgG level >500 therefore not a candidate for IVIG, CBC and H & H monitored. Hgb remained > 7.5 without need for transfusion.   For Paroxysmal A-fib, Eliquis and Coreg continued.  For Dysphagia, s/p swallow eval, tolerating dysphagia diet well.  Patient evaluated by palliative care and hospice referral made. Patient and family agreeable to D/C home w/ hospice given patient's end stage CHF. Clear instructions on diet and medications in addition to wound care provided to family prior to discharge. All family in agreement with plan.

## 2018-09-28 NOTE — PROGRESS NOTE ADULT - PROBLEM SELECTOR PLAN 4
Stage 3 ulceration of the left heel. Foot XR neg for osteomyelitis    -Appreciate wound care and podiatry recs  -f/u LE duplex and ABIs.   -continue with wound care

## 2018-09-28 NOTE — DISCHARGE NOTE ADULT - PLAN OF CARE
To adequately oxygenate your body You were admitted for being hypoxic; you improved with oxygen treatment. You were found to have fluid in your lungs, which we treated with Lasix diuretics. You were found to have a respiratory viral infection which commonly affects the elderly. You were thought to have possibly aspirated some food contents into your lungs at your rehab facility; you were started on antibiotics which would treat any potential infections. You were found to have a UTI pseudomonas infection for which we treated you with antibiotics. You were found to have difficulty swallowing and at times appeared to be at risk of aspirating. We evaluated you and adjusted your diet as necessary (Dysphagia I with honey thick liquids). You were found to have fluid in your lungs, which we treated with Lasix diuretics.  Weigh yourself daily.  If you gain 3lbs in 3 days, or 5lbs in a week call your Health Care Provider.  Do not eat or drink foods containing more than 2000mg of salt (sodium) in your diet every day.  Call your Health Care Provider if you have any swelling or increased swelling in your feet, ankles, and/or stomach.  The Pt was provided with CHF diet instruction (low sodium diet, daily weights, label reading, Heart Healthy Cooking Tips & Heart Healthy shopping Tips).  Take all of your medication as directed.  If you become dizzy call your Health Care Provider. You were found to have a respiratory viral infection which commonly affects the elderly.  Respiratory symptoms resolved You were thought to have possibly aspirated some food contents into your lungs at your rehab facility; you were treated with antibiotic Continue Eliquis and Coreg as directed You were found to have fluid in your lungs, which we treated with Lasix diuretics. You will be discharged without lasix, but notify your hospice care provider if any change in shortness of breath or edema.

## 2018-09-28 NOTE — SWALLOW BEDSIDE ASSESSMENT ADULT - PHARYNGEAL PHASE
Delayed pharyngeal swallow/no overt s/s laryngeal penetration/aspiration slight throat clear after intake/Delayed pharyngeal swallow/Multiple swallows/Wet vocal quality post oral intake

## 2018-09-28 NOTE — PROGRESS NOTE ADULT - PROBLEM SELECTOR PLAN 1
Likely 2/2 Acute Decompensated exacerbated by a combination fo RSV+ & Aspiration PNA & Atelectasis. s/p Lasix IV 40 in ED.   Continues to appear volume overload requiring 3L oxygen. Coarse rales on exam. CXR reviewed with evidence of bilateral pulmonary infiltrates likely volume overloaded. CTA negative for PE    - c/w Lasix 40 mg IV push to twice a day  - c/w NC 3L O2 for goal of sat >92%  - pending speech and swallow eval given concern for aspiration event. NPO for now

## 2018-09-28 NOTE — PROGRESS NOTE ADULT - SUBJECTIVE AND OBJECTIVE BOX
MARIAH [id number]  89y  Male  Heart failure  No pertinent family history in first degree relatives  Handoff  MEWS Score  CLL (chronic lymphocytic leukemia)  Paroxysmal A-fib  Heart failure  Acute on Chronic Systolic Hear  S/P Implantation of Automatic  ALMI (Anterolateral Wall Myoca  Asymptomatic Chronic Venous Hy  Adult Onset Diabetes Mellitus,  Abnormal Metabolic State in Di  Abnormal Metabolic State in Di  Abnormal Metabolic State in Di  Personal History of Myocardial  Personal History of Hypertensi  Acute respiratory failure with hypoxia  Acute on chronic heart failure, unspecified heart failure type  Heel ulcer  Acute respiratory failure with hypoxia  CKD (chronic kidney disease) stage 3, GFR 30-59 ml/min  Need for prophylactic measure  Diabetes mellitus  Paroxysmal A-fib  CLL (chronic lymphocytic leukemia)  Demand ischemia  Acute on chronic heart failure, unspecified heart failure type  Sepsis  Hypoxia  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  S/P Inguinal Hernia Repair  SOB  34  Dysphagia  UTI (urinary tract infection)  Aspiration pneumonia  RSV infection  Acute on chronic heart failure, unspecified heart failure type  Aspiration pneumonia of both lungs, unspecified aspiration pneumonia type, unspecified part of lung  Respiratory distress  Demand ischemia    ALBUTerol/ipratropium for Nebulization 3 milliLiter(s) Nebulizer every 6 hours  apixaban 2.5 milliGRAM(s) Oral every 12 hours  carvedilol 25 milliGRAM(s) Oral every 12 hours  collagenase Ointment 1 Application(s) Topical daily  dextrose 50% Injectable 25 Gram(s) IV Push once  docusate sodium 100 milliGRAM(s) Oral three times a day  finasteride 5 milliGRAM(s) Oral daily  folic acid 1 milliGRAM(s) Oral daily  furosemide   Injectable 40 milliGRAM(s) IV Push two times a day  insulin lispro (HumaLOG) corrective regimen sliding scale   SubCutaneous three times a day before meals  insulin lispro (HumaLOG) corrective regimen sliding scale   SubCutaneous at bedtime  losartan 25 milliGRAM(s) Oral daily  piperacillin/tazobactam IVPB. 3.375 Gram(s) IV Intermittent every 8 hours  senna 2 Tablet(s) Oral at bedtime  Cipro (Other)  fluoroquinolone antibiotics (Unknown)  latex (Unknown)    CBC Full  -  ( 28 Sep 2018 08:29 )  WBC Count : 20.68 K/uL  Hemoglobin : 8.4 g/dL  Hematocrit : 28.5 %  Platelet Count - Automated : 176 K/uL  Mean Cell Volume : 79.4 fl  Mean Cell Hemoglobin : 23.4 pg  Mean Cell Hemoglobin Concentration : 29.5 gm/dL  Auto Neutrophil # : 7.86 K/uL  Auto Lymphocyte # : 10.75 K/uL  Auto Monocyte # : 1.24 K/uL  Auto Eosinophil # : 0.00 K/uL  Auto Basophil # : 0.00 K/uL  Auto Neutrophil % : 38.0 %  Auto Lymphocyte % : 52.0 %  Auto Monocyte % : 6.0 %  Auto Eosinophil % : 0.0 %  Auto Basophil % : 0.0 %    Assessment and Plan:   · Assessment		  Assessment/Plan:     non-infected clinically left heel wound.   Patient currently on iv abx. Continue with z offloading boots and decubitus precautions.   Recommend saul/pvr to evaluate vascular status. Recommend continue with normal saline cleanse with santyl and dsd left heel qd.   Will monitor for signs of infection. podiatry to follow 2-3 times a week  stress importance of keeping heel off weight and pressure and need to wear z-float boots at all times MARIAH [id number]  89y  Male  Heart failure  No pertinent family history in first degree relatives  Handoff  MEWS Score  CLL (chronic lymphocytic leukemia)  Paroxysmal A-fib  Heart failure  Acute on Chronic Systolic Hear  S/P Implantation of Automatic  ALMI (Anterolateral Wall Myoca  Asymptomatic Chronic Venous Hy  Adult Onset Diabetes Mellitus,  Abnormal Metabolic State in Di  Abnormal Metabolic State in Di  Abnormal Metabolic State in Di  Personal History of Myocardial  Personal History of Hypertensi  Acute respiratory failure with hypoxia  Acute on chronic heart failure, unspecified heart failure type  Heel ulcer  Acute respiratory failure with hypoxia  CKD (chronic kidney disease) stage 3, GFR 30-59 ml/min  Need for prophylactic measure  Diabetes mellitus  Paroxysmal A-fib  CLL (chronic lymphocytic leukemia)  Demand ischemia  Acute on chronic heart failure, unspecified heart failure type  Sepsis  Hypoxia  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  S/P Inguinal Hernia Repair  SOB  34  Dysphagia  UTI (urinary tract infection)  Aspiration pneumonia  RSV infection  Acute on chronic heart failure, unspecified heart failure type  Aspiration pneumonia of both lungs, unspecified aspiration pneumonia type, unspecified part of lung  Respiratory distress  Demand ischemia    ALBUTerol/ipratropium for Nebulization 3 milliLiter(s) Nebulizer every 6 hours  apixaban 2.5 milliGRAM(s) Oral every 12 hours  carvedilol 25 milliGRAM(s) Oral every 12 hours  collagenase Ointment 1 Application(s) Topical daily  dextrose 50% Injectable 25 Gram(s) IV Push once  docusate sodium 100 milliGRAM(s) Oral three times a day  finasteride 5 milliGRAM(s) Oral daily  folic acid 1 milliGRAM(s) Oral daily  furosemide   Injectable 40 milliGRAM(s) IV Push two times a day  insulin lispro (HumaLOG) corrective regimen sliding scale   SubCutaneous three times a day before meals  insulin lispro (HumaLOG) corrective regimen sliding scale   SubCutaneous at bedtime  losartan 25 milliGRAM(s) Oral daily  piperacillin/tazobactam IVPB. 3.375 Gram(s) IV Intermittent every 8 hours  senna 2 Tablet(s) Oral at bedtime  Cipro (Other)  fluoroquinolone antibiotics (Unknown)  latex (Unknown)    CBC Full  -  ( 28 Sep 2018 08:29 )  WBC Count : 20.68 K/uL  Hemoglobin : 8.4 g/dL  Hematocrit : 28.5 %  Platelet Count - Automated : 176 K/uL  Mean Cell Volume : 79.4 fl  Mean Cell Hemoglobin : 23.4 pg  Mean Cell Hemoglobin Concentration : 29.5 gm/dL  Auto Neutrophil # : 7.86 K/uL  Auto Lymphocyte # : 10.75 K/uL  Auto Monocyte # : 1.24 K/uL  Auto Eosinophil # : 0.00 K/uL  Auto Basophil # : 0.00 K/uL  Auto Neutrophil % : 38.0 %  Auto Lymphocyte % : 52.0 %  Auto Monocyte % : 6.0 %  Auto Eosinophil % : 0.0 %  Auto Basophil % : 0.0 %    Assessment and Plan:   · Assessment		  Assessment/Plan:     non-infected clinically left heel wound.   Patient currently on iv abx. Continue with z offloading boots and decubitus precautions.   Recommend saul/pvr to evaluate vascular status. Recommend continue with normal saline cleanse with santyl and dsd left heel qd.   Will monitor for signs of infection. podiatry to follow 2-3 times a week  stress importance of keeping heel off weight and pressure and need to wear z-float boots at all times  x-rays reviewed and do not reveal any obvious bone infection.   < from: Xray Foot AP + Lateral + Oblique, Left (09.27.18 @ 12:02) >      < end of copied text >

## 2018-09-28 NOTE — SWALLOW BEDSIDE ASSESSMENT ADULT - SWALLOW EVAL: RECOMMENDED FEEDING/EATING TECHNIQUES
position upright (90 degrees)/allow for swallow between intakes/alternate food with liquid/oral hygiene/small sips/bites/crush medication (when feasible)/maintain upright posture during/after eating for 30 mins/no straws

## 2018-09-28 NOTE — SWALLOW BEDSIDE ASSESSMENT ADULT - ASR SWALLOW ASPIRATION MONITOR
change of breathing pattern/cough/upper respiratory infection/fever/throat clearing/gurgly voice/pneumonia

## 2018-09-28 NOTE — PROGRESS NOTE ADULT - PROBLEM SELECTOR PLAN 9
Previous hospitalization had Cr ~ 1.8-2.0, has progressively downtrended since prior admission. New baseline Cr ~ 1.3. Creatinine now uptrending towards old baseline.    - Trend bmp  - Avoid nephrotoxins  - Renally dose meds

## 2018-09-28 NOTE — SWALLOW BEDSIDE ASSESSMENT ADULT - SWALLOW EVAL: DIAGNOSIS
Pt with known h/o dysphagia, now presenting with suspicion of aspiration PNA. Most recent MBS noted above. Pt currently presents with evidence of 1) oropharyngeal dysphagia notable for prolonged oral management, s/s suggestive of pharyngeal retention and s/s laryngeal penetration/aspiration with nectar-thick liquids. Pt with known h/o dysphagia, now presenting with suspicion of aspiration PNA. Most recent MBS noted above. Pt currently presents with evidence of 1) oropharyngeal dysphagia notable for prolonged oral management, s/s suggestive of pharyngeal retention and s/s laryngeal penetration/aspiration with nectar-thick liquids, 2) hypophonia, 3) dysarthria, 4) cognitive-linguistic deficits.

## 2018-09-28 NOTE — PROGRESS NOTE ADULT - SUBJECTIVE AND OBJECTIVE BOX
***************************************************************  Bhavik Navarro MD  Internal Medicine, PGY-1  Pager: [phone]/ [phone]  ***************************************************************    MARIAH CAMACHO  89y Male  [id number]    HPI:  89 year old man with a history of MI (), HFrEF (EF 20%), paroxysmal A-Fib, AICD, CVA (LLE weakness), HTN, T2DM, BPH w/ chronic indwelling Davis and CLL presenting to the hospital for acute SOB.    Patient endorses 3 days of progressive SOB at rest w/ sudden decompensation requiring supplemental O2 in rehab facility. +lugo, +orthopnea States that 2 weeks ago he developed productive cough (patient unsure of color), chills and night sweats, no subjective fevers. He was recently admitted for Proteus UTI s/p 10 days abx course complicated by aspiration pneumonitis from - subsequently d/c to rehab facility.     Denies chest pain, palpations, nausea/vomiting, change in BM, headaches.       Subjective:     Pt complaining of not being fed. Otherwise reports some pain in the left heel. Denies chest pain, fevers, chills or abdominal pain.    Poor historian due to severe slurring of speech 2/2 past CVA.    Denies fever, HA, CP, SOB, abn pain, dysuria.      REVIEW OF SYSTEMS:  All other review of systems is negative unless indicated above.      MEDICATIONS  (STANDING):  apixaban 2.5 milliGRAM(s) Oral every 12 hours  carvedilol 25 milliGRAM(s) Oral every 12 hours  collagenase Ointment 1 Application(s) Topical daily  dextrose 50% Injectable 25 Gram(s) IV Push once  docusate sodium 100 milliGRAM(s) Oral three times a day  finasteride 5 milliGRAM(s) Oral daily  folic acid 1 milliGRAM(s) Oral daily  furosemide   Injectable 40 milliGRAM(s) IV Push two times a day  insulin lispro (HumaLOG) corrective regimen sliding scale   SubCutaneous three times a day before meals  insulin lispro (HumaLOG) corrective regimen sliding scale   SubCutaneous at bedtime  losartan 25 milliGRAM(s) Oral daily  piperacillin/tazobactam IVPB. 3.375 Gram(s) IV Intermittent every 8 hours  potassium chloride  10 mEq/100 mL IVPB 10 milliEquivalent(s) IV Intermittent every 1 hour  senna 2 Tablet(s) Oral at bedtime    MEDICATIONS  (PRN):  ALBUTerol/ipratropium for Nebulization 3 milliLiter(s) Nebulizer every 6 hours PRN Shortness of Breath and/or Wheezing        Objective:    Vital Signs (24 Hrs):  T(C): 36.4 (18 @ 05:09), Max: 36.9 (18 @ 13:37)  HR: 69 (18 @ 05:09) (69 - 80)  BP: 149/64 (18 @ 05:09) (125/58 - 149/64)  RR: 20 (18 @ 05:09) (18 - 20)  SpO2: 98% (18 @ 05:09) (96% - 98%)  Wt(kg): --  Daily     Daily Weight in k.1 (28 Sep 2018 06:29)    I&O's Summary    27 Sep 2018 07:01  -  28 Sep 2018 07:00  --------------------------------------------------------  IN: 0 mL / OUT: 1275 mL / NET: -1275 mL            GENERAL: No acute distress, well-developed  HEAD:  Atraumatic, Normocephalic  ENT: EOMI, PERRLA, conjunctiva and sclera clear, Neck supple, No JVD, moist mucosa  CHEST/LUNG: Prominent expiratory wheezes b/l; dull/decreased bibasilar breath sounds.   HEART: Regular rate and rhythm; No murmurs, rubs, or gallops  ABDOMEN: Soft, Nontender, Nondistended; Bowel sounds present  BACK: No spinal tenderness  EXTREMITIES:  No clubbing, cyanosis, or edema  PSYCH: Nl behavior, nl affect  NEUROLOGY: AAOx3, non-focal, cranial nerves intact  SKIN: Stage 3 ulcer on heel            LABS:                         8.4    20.44 )-----------( 186      ( 27 Sep 2018 08:38 )             28.6         140  |  101  |  27<H>  ----------------------------<  113<H>  3.3<L>   |  24  |  1.76<H>    Ca    8.5      28 Sep 2018 06:55  Phos  3.5       Mg     1.9         TPro  6.3  /  Alb  2.6<L>  /  TBili  0.5  /  DBili  x   /  AST  9<L>  /  ALT  7<L>  /  AlkPhos  88            RADIOLOGY, EKG & ADDITIONAL TESTS: Reviewed.

## 2018-09-29 LAB
ANION GAP SERPL CALC-SCNC: 11 MMOL/L — SIGNIFICANT CHANGE UP (ref 5–17)
ANION GAP SERPL CALC-SCNC: 13 MMOL/L — SIGNIFICANT CHANGE UP (ref 5–17)
BASOPHILS # BLD AUTO: 0 K/UL — SIGNIFICANT CHANGE UP (ref 0–0.2)
BASOPHILS NFR BLD AUTO: 0 % — SIGNIFICANT CHANGE UP (ref 0–2)
BUN SERPL-MCNC: 33 MG/DL — HIGH (ref 7–23)
BUN SERPL-MCNC: 34 MG/DL — HIGH (ref 7–23)
CALCIUM SERPL-MCNC: 8.2 MG/DL — LOW (ref 8.4–10.5)
CALCIUM SERPL-MCNC: 8.4 MG/DL — SIGNIFICANT CHANGE UP (ref 8.4–10.5)
CHLORIDE SERPL-SCNC: 100 MMOL/L — SIGNIFICANT CHANGE UP (ref 96–108)
CHLORIDE SERPL-SCNC: 101 MMOL/L — SIGNIFICANT CHANGE UP (ref 96–108)
CO2 SERPL-SCNC: 27 MMOL/L — SIGNIFICANT CHANGE UP (ref 22–31)
CO2 SERPL-SCNC: 28 MMOL/L — SIGNIFICANT CHANGE UP (ref 22–31)
CREAT SERPL-MCNC: 2.03 MG/DL — HIGH (ref 0.5–1.3)
CREAT SERPL-MCNC: 2.08 MG/DL — HIGH (ref 0.5–1.3)
CULTURE RESULTS: SIGNIFICANT CHANGE UP
EOSINOPHIL # BLD AUTO: 0.22 K/UL — SIGNIFICANT CHANGE UP (ref 0–0.5)
EOSINOPHIL NFR BLD AUTO: 1 % — SIGNIFICANT CHANGE UP (ref 0–6)
GLUCOSE SERPL-MCNC: 248 MG/DL — HIGH (ref 70–99)
GLUCOSE SERPL-MCNC: 325 MG/DL — HIGH (ref 70–99)
HCT VFR BLD CALC: 26 % — LOW (ref 39–50)
HCT VFR BLD CALC: 28.2 % — LOW (ref 39–50)
HGB BLD-MCNC: 8.3 G/DL — LOW (ref 13–17)
HGB BLD-MCNC: 8.8 G/DL — LOW (ref 13–17)
LYMPHOCYTES # BLD AUTO: 11.32 K/UL — HIGH (ref 1–3.3)
LYMPHOCYTES # BLD AUTO: 52 % — HIGH (ref 13–44)
MAGNESIUM SERPL-MCNC: 1.8 MG/DL — SIGNIFICANT CHANGE UP (ref 1.6–2.6)
MCHC RBC-ENTMCNC: 25.1 PG — LOW (ref 27–34)
MCHC RBC-ENTMCNC: 25.3 PG — LOW (ref 27–34)
MCHC RBC-ENTMCNC: 31 GM/DL — LOW (ref 32–36)
MCHC RBC-ENTMCNC: 31.9 GM/DL — LOW (ref 32–36)
MCV RBC AUTO: 78.5 FL — LOW (ref 80–100)
MCV RBC AUTO: 81.4 FL — SIGNIFICANT CHANGE UP (ref 80–100)
MONOCYTES # BLD AUTO: 1.31 K/UL — HIGH (ref 0–0.9)
MONOCYTES NFR BLD AUTO: 6 % — SIGNIFICANT CHANGE UP (ref 2–14)
NEUTROPHILS # BLD AUTO: 8.49 K/UL — HIGH (ref 1.8–7.4)
NEUTROPHILS NFR BLD AUTO: 39 % — LOW (ref 43–77)
ORGANISM # SPEC MICROSCOPIC CNT: SIGNIFICANT CHANGE UP
ORGANISM # SPEC MICROSCOPIC CNT: SIGNIFICANT CHANGE UP
PHOSPHATE SERPL-MCNC: 2.9 MG/DL — SIGNIFICANT CHANGE UP (ref 2.5–4.5)
PLATELET # BLD AUTO: 159 K/UL — SIGNIFICANT CHANGE UP (ref 150–400)
PLATELET # BLD AUTO: 164 K/UL — SIGNIFICANT CHANGE UP (ref 150–400)
POTASSIUM SERPL-MCNC: 3 MMOL/L — LOW (ref 3.5–5.3)
POTASSIUM SERPL-MCNC: 3.3 MMOL/L — LOW (ref 3.5–5.3)
POTASSIUM SERPL-SCNC: 3 MMOL/L — LOW (ref 3.5–5.3)
POTASSIUM SERPL-SCNC: 3.3 MMOL/L — LOW (ref 3.5–5.3)
RBC # BLD: 3.31 M/UL — LOW (ref 4.2–5.8)
RBC # BLD: 3.47 M/UL — LOW (ref 4.2–5.8)
RBC # FLD: 15.2 % — HIGH (ref 10.3–14.5)
RBC # FLD: 16.2 % — HIGH (ref 10.3–14.5)
SODIUM SERPL-SCNC: 140 MMOL/L — SIGNIFICANT CHANGE UP (ref 135–145)
SODIUM SERPL-SCNC: 140 MMOL/L — SIGNIFICANT CHANGE UP (ref 135–145)
SPECIMEN SOURCE: SIGNIFICANT CHANGE UP
WBC # BLD: 19.9 K/UL — HIGH (ref 3.8–10.5)
WBC # BLD: 21.77 K/UL — HIGH (ref 3.8–10.5)
WBC # FLD AUTO: 19.9 K/UL — HIGH (ref 3.8–10.5)
WBC # FLD AUTO: 21.77 K/UL — HIGH (ref 3.8–10.5)

## 2018-09-29 PROCEDURE — 74230 X-RAY XM SWLNG FUNCJ C+: CPT | Mod: 26

## 2018-09-29 PROCEDURE — 99233 SBSQ HOSP IP/OBS HIGH 50: CPT

## 2018-09-29 RX ORDER — POTASSIUM CHLORIDE 20 MEQ
40 PACKET (EA) ORAL ONCE
Qty: 0 | Refills: 0 | Status: COMPLETED | OUTPATIENT
Start: 2018-09-29 | End: 2018-09-29

## 2018-09-29 RX ORDER — SODIUM CHLORIDE 9 MG/ML
1000 INJECTION INTRAMUSCULAR; INTRAVENOUS; SUBCUTANEOUS
Qty: 0 | Refills: 0 | Status: DISCONTINUED | OUTPATIENT
Start: 2018-09-29 | End: 2018-09-30

## 2018-09-29 RX ADMIN — CARVEDILOL PHOSPHATE 25 MILLIGRAM(S): 80 CAPSULE, EXTENDED RELEASE ORAL at 17:54

## 2018-09-29 RX ADMIN — CARVEDILOL PHOSPHATE 25 MILLIGRAM(S): 80 CAPSULE, EXTENDED RELEASE ORAL at 06:33

## 2018-09-29 RX ADMIN — Medication 3: at 17:54

## 2018-09-29 RX ADMIN — SENNA PLUS 2 TABLET(S): 8.6 TABLET ORAL at 21:38

## 2018-09-29 RX ADMIN — Medication 1 APPLICATION(S): at 15:27

## 2018-09-29 RX ADMIN — PIPERACILLIN AND TAZOBACTAM 25 GRAM(S): 4; .5 INJECTION, POWDER, LYOPHILIZED, FOR SOLUTION INTRAVENOUS at 21:38

## 2018-09-29 RX ADMIN — Medication 40 MILLIGRAM(S): at 06:33

## 2018-09-29 RX ADMIN — SODIUM CHLORIDE 50 MILLILITER(S): 9 INJECTION INTRAMUSCULAR; INTRAVENOUS; SUBCUTANEOUS at 20:32

## 2018-09-29 RX ADMIN — Medication 1 MILLIGRAM(S): at 15:27

## 2018-09-29 RX ADMIN — FINASTERIDE 5 MILLIGRAM(S): 5 TABLET, FILM COATED ORAL at 21:38

## 2018-09-29 RX ADMIN — Medication 4: at 12:23

## 2018-09-29 RX ADMIN — APIXABAN 2.5 MILLIGRAM(S): 2.5 TABLET, FILM COATED ORAL at 06:33

## 2018-09-29 RX ADMIN — Medication 40 MILLIGRAM(S): at 17:52

## 2018-09-29 RX ADMIN — Medication 40 MILLIEQUIVALENT(S): at 15:25

## 2018-09-29 RX ADMIN — Medication 100 MILLIGRAM(S): at 06:33

## 2018-09-29 RX ADMIN — Medication 3 MILLILITER(S): at 13:49

## 2018-09-29 RX ADMIN — Medication 3 MILLILITER(S): at 17:52

## 2018-09-29 RX ADMIN — PIPERACILLIN AND TAZOBACTAM 25 GRAM(S): 4; .5 INJECTION, POWDER, LYOPHILIZED, FOR SOLUTION INTRAVENOUS at 06:33

## 2018-09-29 RX ADMIN — Medication 3 MILLILITER(S): at 06:32

## 2018-09-29 RX ADMIN — Medication 100 MILLIGRAM(S): at 21:38

## 2018-09-29 RX ADMIN — APIXABAN 2.5 MILLIGRAM(S): 2.5 TABLET, FILM COATED ORAL at 17:53

## 2018-09-29 RX ADMIN — Medication 3: at 08:34

## 2018-09-29 RX ADMIN — Medication 3 MILLILITER(S): at 21:38

## 2018-09-29 RX ADMIN — PIPERACILLIN AND TAZOBACTAM 25 GRAM(S): 4; .5 INJECTION, POWDER, LYOPHILIZED, FOR SOLUTION INTRAVENOUS at 13:49

## 2018-09-29 RX ADMIN — LOSARTAN POTASSIUM 25 MILLIGRAM(S): 100 TABLET, FILM COATED ORAL at 06:33

## 2018-09-29 NOTE — PROGRESS NOTE ADULT - PROBLEM SELECTOR PLAN 4
- Stage 3 ulceration of the left heel. Foot XR neg for osteomyelitis  -Appreciate wound care and podiatry recs  -continue with wound care

## 2018-09-29 NOTE — PROGRESS NOTE ADULT - ASSESSMENT
89 year old man with a history of MI (1980), HFrEF (EF 20%), paroxysmal A-Fib, AICD, CVA (LLE weakness), HTN, T2DM, BPH w/ chronic indwelling Davis and CLL presenting to the hospital with acute respiratory failure with hypoxia 2/2 acute on chronic CHF exacerbation, likely exacerbated by sepsis 2' RSV infection & superimposed aspiration PNA with near total atelectasis of left lower lobe.

## 2018-09-29 NOTE — SWALLOW VFSS/MBS ASSESSMENT ADULT - SLP GENERAL OBSERVATIONS
Pt encountered in radiology, secure in NATALIE chair. Pt awake and alert, cooperative, inconsistently following directions for the purposes of the exam.

## 2018-09-29 NOTE — SWALLOW VFSS/MBS ASSESSMENT ADULT - MODE OF PRESENTATION
fed by clinician/spoon spoon/cup/fed by clinician spoon/fed by clinician spoon/cup/self fed/fed by clinician fed by clinician cup

## 2018-09-29 NOTE — SWALLOW VFSS/MBS ASSESSMENT ADULT - ORAL PHASE
max spillover to the valleculae; mild lingual residue/Reduced anterior - posterior transport/Incomplete tongue to palate contact Reduced anterior - posterior transport/Incomplete tongue to palate contact/max spillover to the valleculae, trace to min lingual residue Reduced anterior - posterior transport/Incomplete tongue to palate contact/max spillover to the valleculae, mild linguapalatal residue max spillover to the valleculae, trace to min lingual residue/Reduced anterior - posterior transport/Incomplete tongue to palate contact Delayed oral transit time/max passive spillover to the valleculae, which is WFL for this texture/Reduced anterior - posterior transport/Incomplete tongue to palate contact max spillover to the hypopharynx, trace lingual residue, +sublingual residue/Reduced anterior - posterior transport/Incomplete tongue to palate contact

## 2018-09-29 NOTE — SWALLOW VFSS/MBS ASSESSMENT ADULT - LARYNGEAL PENETRATION DURING THE SWALLOW - SILENT
over laryngeal surfaces of epiglottis and arytenoids, not fully retrieved/Trace over laryngeal surfaces of epiglottis and arytenoids/Trace over laryngeal surfaces of epiglottis and arytenoids, without retrieval/Trace over the laryngeal surface of the arytenoids, without retrieval/Trace

## 2018-09-29 NOTE — SWALLOW VFSS/MBS ASSESSMENT ADULT - ADDITIONAL RECOMMENDATIONS
Maintain good oral hygiene.   Trial of restorative swallow therapy pending Pt ability to participate in exercises. Will continue to follow while patient is in-house, as schedule permits.

## 2018-09-29 NOTE — PROGRESS NOTE ADULT - PROBLEM SELECTOR PLAN 5
- No chest pain. Elevated troponin w/o ischemic changes on EKG, likely in setting of demand ischemia.  - Pt already therapeutically A/C with Eliquis

## 2018-09-29 NOTE — PROGRESS NOTE ADULT - SUBJECTIVE AND OBJECTIVE BOX
Patient with mild cough but denies shortness of breath, significant sputum production, fevers, chills.     GENERAL: No fevers, no chills.  EYES: No blurry vision,  No photophobia  ENT: No sore throat.  No dysphagia  Cardiovascular: No chest pain, palpitations, orthopnea  Pulmonary: + mild cough, no wheezing. No shortness of breath  Gastrointestinal: No abdominal pain, no diarrhea, no constipation.  No melena.  No hematochezia  : No dysuria.  No hematuria  Musculoskeletal: No weakness.  No myalgias.  Dermatology:  No rashes.  Neuro: No Headache.  No vertigo.  No dizziness.  Psych: No anxiety, no depression.  Denies suicidal thoughts.  Hematology: No easy bruising.  No nose bleeds.  Allergy: No environmental allergies.    Vital Signs Last 24 Hrs  T(C): 36.7 (29 Sep 2018 13:48), Max: 37.2 (28 Sep 2018 21:23)  T(F): 98 (29 Sep 2018 13:48), Max: 99 (28 Sep 2018 21:23)  HR: 68 (29 Sep 2018 13:48) (65 - 78)  BP: 137/63 (29 Sep 2018 13:48) (137/63 - 161/75)  BP(mean): --  RR: 17 (29 Sep 2018 13:48) (17 - 20)  SpO2: 100% (29 Sep 2018 13:48) (97% - 100%)    GENERAL: No acute distress, well-developed  HEAD:  Atraumatic, Normocephalic  ENT: EOMI, PERRLA, conjunctiva and sclera clear, Neck supple, No JVD, moist mucosa  CHEST/LUNG: decreased lung sounds b/l, diffuse rhonchi b/l    HEART: Regular rate and rhythm; No murmurs, rubs, or gallops  ABDOMEN: Soft, Nontender, Nondistended; Bowel sounds present  BACK: No spinal tenderness  EXTREMITIES:  No clubbing, cyanosis, or edema  PSYCH: normal affect  NEUROLOGY: AAOx3, non-focal, cranial nerves intact  SKIN: Stage 3 ulcer on left heel

## 2018-09-29 NOTE — PROGRESS NOTE ADULT - PROBLEM SELECTOR PLAN 1
- stable   - Likely 2/2 Acute Decompensated exacerbated by a combination fo RSV+ & Aspiration PNA & Atelectasis  - c/w NC 3L O2 for goal of sat >92%  - CXR reviewed with evidence of bilateral pulmonary infiltrates likely volume overloaded. CTA negative for PE  - c/w Lasix 40 mg IV push twice a day  - NPO per speech and swallow eval

## 2018-09-29 NOTE — PROGRESS NOTE ADULT - PROBLEM SELECTOR PLAN 2
- 2' RSV, aspiration PNA  - CTA on admission: b/l ground glass opacities, b/l pleural effusions, no PE.  - Blood Cx NGTD  - ID consulted--> c/w Zosyn til 10/2  - f/u sensitivities for urine cx (growing pseudomonas)

## 2018-09-29 NOTE — PROGRESS NOTE ADULT - ASSESSMENT
89 year old man with a history of MI (1980), HFrEF (EF 20%), paroxysmal A-Fib, AICD, CVA (LLE weakness), HTN, T2DM, BPH w/ chronic indwelling Davis and CLL presented to the hospital for acute SOB. He is being treated for pneumonia  He was admitted in August 2018 with UTI followed by aspiration pneumonia  Hem follow for CLL And evaluation of IVIG if needed  Patient has CLL or mantle cell lymphoma by flow cytometry without thrombocytopenia and mild anemia likely related to CKD and chronic inflammation. He does not have organomegaly or significant lymphadenopathy. Thus his CLL by itself is not needing treatment at present. The patient had several hospitalizations for infection, including UTI , which likely has increased incidence because of indwelling Davis catheter.  However his IgG level is above 500 mg/dL. Thus we do not have indication to give IVIG.  Management at this time is supportive and treatment of infections as needed.

## 2018-09-29 NOTE — PROGRESS NOTE ADULT - PROBLEM SELECTOR PLAN 6
- chronic leukocytosis 2' CLL   - low cytometry was done revealing a monoclonal CD5, CD20 positive population, suggestive of either CLL or MCL. WBC lower than previous 25-30 WBC  - continue to monitor for now, counts acceptable without evidence of bleeding  - appreciate heme onc recs. Despite recurrent infection, IgG level >500 therefore not a candidate for IVIG

## 2018-09-29 NOTE — SWALLOW VFSS/MBS ASSESSMENT ADULT - DIAGNOSTIC IMPRESSIONS
Pt presents with a severe oropharyngeal dysphagia notable for reduced oral management, uncontrolled A-P spillover, significant pharyngeal residue, laryngeal penetration/ silent aspiration across PO textures. Pt unable to implement swallow strategies due to reduced command following despite verbal and tactile cues.    Disorders: reduced oral competence, reduced lingual strength/ROM/Rate of motion, reduced tongue to palate contact, reduced BOT to posterior pharyngeal wall contact, reduced hyo-laryngeal elevation/excursion, reduced laryngeal closure, reduced pharyngeal contractility, reduced supraglottic sensation, reduced subglottic sensation. Pt presents with a severe oropharyngeal dysphagia notable for reduced oral management, uncontrolled A-P spillover, significant pharyngeal residue, laryngeal penetration/ silent aspiration across PO textures, with laryngeal penetration without retrieval for thin puree, honey-thick liquids and soft solids, and silent aspiration of thick puree, nectar-thick and thin liquids. Pt unable to implement swallow strategies due to reduced command following despite verbal and tactile cues.    Disorders: reduced oral competence, reduced lingual strength/ROM/Rate of motion, reduced tongue to palate contact, reduced BOT to posterior pharyngeal wall contact, reduced hyo-laryngeal elevation/excursion, reduced laryngeal closure, reduced pharyngeal contractility, reduced supraglottic sensation, reduced subglottic sensation. Pt presents with a severe oropharyngeal dysphagia notable for reduced oral management, uncontrolled A-P spillover, significant pharyngeal residue, laryngeal penetration/ silent aspiration across PO textures, with laryngeal penetration without retrieval for thin puree, and soft solids, and silent aspiration of thick puree, honey-thick liquids, nectar-thick and thin liquids. Pt unable to implement swallow strategies due to reduced command following despite verbal and tactile cues.    Disorders: reduced oral competence, reduced lingual strength/ROM/Rate of motion, reduced tongue to palate contact, reduced BOT to posterior pharyngeal wall contact, reduced hyo-laryngeal elevation/excursion, reduced laryngeal closure, reduced pharyngeal contractility, reduced supraglottic sensation, reduced subglottic sensation.

## 2018-09-29 NOTE — SWALLOW VFSS/MBS ASSESSMENT ADULT - ADDITIONAL INFORMATION
+ slight calcifications of laryngeal surface of epiglottis, thyroid and arytenoid cartilages.  + slight calcification of anterior tracheal wall  + multi-level C-spine changes with small anterior cervical osteophytes.  + high shoulder girdle, which limits visualization of subglottic space and proximal esophagus to a degree.

## 2018-09-29 NOTE — PROGRESS NOTE ADULT - PROBLEM SELECTOR PLAN 3
- not ready for transition to PO lasix, still somewhat overloaded  - Troponins elevated 155. BNP elevated 19587  - c/w 40 mg iv lasix BID  - daily weights  - strict I/O's   - c/w carvedilol 25mg q12hrs (patient previously on losartan 40mg however held in setting of NADIA on past hospitalization)

## 2018-09-29 NOTE — PROGRESS NOTE ADULT - PROBLEM SELECTOR PLAN 8
- sugars controlled   - Home meds Lantus 8u qHS, Lispro 2u  - holding pre meals and long acting insulin as pt is NPO. Continue to monitor FS

## 2018-09-29 NOTE — SWALLOW VFSS/MBS ASSESSMENT ADULT - LARYNGEAL PENETRATION AFTER THE SWALLOW - SILENT
deep, to the level of the vocal cords, over laryngeal surfaces of epiglottis and arytenoids, upon swallow of subsequent trial, from pharyngeal and laryngeal residue/Trace over laryngeal surfaces of epiglottis and arytenoids, without retrieval/Trace over laryngeal surfaces of epiglottis and arytenoids from vallecular and pyriform residue, without retrieval/Trace over laryngeal surfaces of epiglottis and arytenoids, from vallecular and pyriform residue, without retrieval/Trace

## 2018-09-29 NOTE — SWALLOW VFSS/MBS ASSESSMENT ADULT - RECOMMENDED CONSISTENCY
NPO, with non-oral nutrition/hydration/medications is recommended based upon the findings of this assessment. However, would suggest Palliative Care consult to determine GOC with re: to provision of nutrition in this patient with advanced age and baseline cognitive deficits.

## 2018-09-29 NOTE — PROGRESS NOTE ADULT - SUBJECTIVE AND OBJECTIVE BOX
89 year old man with a history of MI (1980), HFrEF (EF 20%), paroxysmal A-Fib, AICD, CVA (LLE weakness), HTN, T2DM, BPH w/ chronic indwelling Davis and CLL presenting to the hospital for acute SOB. 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  S/P Inguinal Hernia Repair    Medications:  ALBUTerol/ipratropium for Nebulization 3 milliLiter(s) Nebulizer every 6 hours  apixaban 2.5 milliGRAM(s) Oral every 12 hours  carvedilol 25 milliGRAM(s) Oral every 12 hours  collagenase Ointment 1 Application(s) Topical daily  dextrose 50% Injectable 25 Gram(s) IV Push once  docusate sodium 100 milliGRAM(s) Oral three times a day  finasteride 5 milliGRAM(s) Oral daily  folic acid 1 milliGRAM(s) Oral daily  furosemide   Injectable 40 milliGRAM(s) IV Push two times a day  insulin lispro (HumaLOG) corrective regimen sliding scale   SubCutaneous three times a day before meals  insulin lispro (HumaLOG) corrective regimen sliding scale   SubCutaneous at bedtime  losartan 25 milliGRAM(s) Oral daily  piperacillin/tazobactam IVPB. 3.375 Gram(s) IV Intermittent every 8 hours  senna 2 Tablet(s) Oral at bedtime    Labs:  CBC Full  -  ( 29 Sep 2018 13:50 )  WBC Count : 19.9 K/uL  Hemoglobin : 8.8 g/dL  Hematocrit : 28.2 %  Platelet Count - Automated : 164 K/uL  Mean Cell Volume : 81.4 fl  Mean Cell Hemoglobin : 25.3 pg  Mean Cell Hemoglobin Concentration : 31.0 gm/dL  Auto Neutrophil # : x  Auto Lymphocyte # : x  Auto Monocyte # : x  Auto Eosinophil # : x  Auto Basophil # : x  Auto Neutrophil % : x  Auto Lymphocyte % : x  Auto Monocyte % : x  Auto Eosinophil % : x  Auto Basophil % : x    09-29    140  |  100  |  33<H>  ----------------------------<  325<H>  3.3<L>   |  27  |  2.08<H>    Ca    8.2<L>      29 Sep 2018 13:50  Phos  2.9     09-29  Mg     1.8     09-29        Radiology:             ROS:  Patient comfortable without distress  No distinct complains  Vital Signs Last 24 Hrs  T(C): 36.7 (29 Sep 2018 13:48), Max: 37.2 (28 Sep 2018 21:23)  T(F): 98 (29 Sep 2018 13:48), Max: 99 (28 Sep 2018 21:23)  HR: 81 (29 Sep 2018 15:57) (65 - 81)  BP: 161/67 (29 Sep 2018 15:57) (137/63 - 161/67)  BP(mean): --  RR: 18 (29 Sep 2018 15:57) (17 - 20)  SpO2: 100% (29 Sep 2018 15:57) (97% - 100%)    Physical exam:    No distress  CVS: S1, S2 regular or murmur  Chest: bilateral breath sound, soarse  Abdomen: soft, not tender, no organomegaly or masses  No focal neuro deficit  left heel wound followed by podiatry      Assessment and Plan:

## 2018-09-30 DIAGNOSIS — J69.0 PNEUMONITIS DUE TO INHALATION OF FOOD AND VOMIT: ICD-10-CM

## 2018-09-30 LAB
BASOPHILS # BLD AUTO: 0 K/UL — SIGNIFICANT CHANGE UP (ref 0–0.2)
BASOPHILS NFR BLD AUTO: 0 % — SIGNIFICANT CHANGE UP (ref 0–2)
CULTURE RESULTS: SIGNIFICANT CHANGE UP
CULTURE RESULTS: SIGNIFICANT CHANGE UP
EOSINOPHIL # BLD AUTO: 0.72 K/UL — HIGH (ref 0–0.5)
EOSINOPHIL NFR BLD AUTO: 3 % — SIGNIFICANT CHANGE UP (ref 0–6)
HCT VFR BLD CALC: 28 % — LOW (ref 39–50)
HGB BLD-MCNC: 8.6 G/DL — LOW (ref 13–17)
LYMPHOCYTES # BLD AUTO: 15.62 K/UL — HIGH (ref 1–3.3)
LYMPHOCYTES # BLD AUTO: 65 % — HIGH (ref 13–44)
LYMPHOCYTES # SPEC AUTO: 3 % — HIGH (ref 0–0)
MAGNESIUM SERPL-MCNC: 1.8 MG/DL — SIGNIFICANT CHANGE UP (ref 1.6–2.6)
MANUAL SMEAR VERIFICATION: SIGNIFICANT CHANGE UP
MCHC RBC-ENTMCNC: 24.5 PG — LOW (ref 27–34)
MCHC RBC-ENTMCNC: 30.7 GM/DL — LOW (ref 32–36)
MCV RBC AUTO: 79.8 FL — LOW (ref 80–100)
MONOCYTES # BLD AUTO: 0.96 K/UL — HIGH (ref 0–0.9)
MONOCYTES NFR BLD AUTO: 4 % — SIGNIFICANT CHANGE UP (ref 2–14)
NEUTROPHILS # BLD AUTO: 6.01 K/UL — SIGNIFICANT CHANGE UP (ref 1.8–7.4)
NEUTROPHILS NFR BLD AUTO: 25 % — LOW (ref 43–77)
NRBC # BLD: 0 /100 — SIGNIFICANT CHANGE UP (ref 0–0)
PHOSPHATE SERPL-MCNC: 2.5 MG/DL — SIGNIFICANT CHANGE UP (ref 2.5–4.5)
PLAT MORPH BLD: NORMAL — SIGNIFICANT CHANGE UP
PLATELET # BLD AUTO: 167 K/UL — SIGNIFICANT CHANGE UP (ref 150–400)
RBC # BLD: 3.51 M/UL — LOW (ref 4.2–5.8)
RBC # FLD: 16.4 % — HIGH (ref 10.3–14.5)
RBC BLD AUTO: SIGNIFICANT CHANGE UP
SMUDGE CELLS # BLD: PRESENT — SIGNIFICANT CHANGE UP
SPECIMEN SOURCE: SIGNIFICANT CHANGE UP
SPECIMEN SOURCE: SIGNIFICANT CHANGE UP
WBC # BLD: 24.03 K/UL — HIGH (ref 3.8–10.5)
WBC # FLD AUTO: 24.03 K/UL — HIGH (ref 3.8–10.5)

## 2018-09-30 PROCEDURE — 99233 SBSQ HOSP IP/OBS HIGH 50: CPT

## 2018-09-30 PROCEDURE — 99232 SBSQ HOSP IP/OBS MODERATE 35: CPT

## 2018-09-30 PROCEDURE — 71045 X-RAY EXAM CHEST 1 VIEW: CPT | Mod: 26

## 2018-09-30 RX ORDER — FUROSEMIDE 40 MG
40 TABLET ORAL
Qty: 0 | Refills: 0 | Status: DISCONTINUED | OUTPATIENT
Start: 2018-09-30 | End: 2018-09-30

## 2018-09-30 RX ORDER — POTASSIUM CHLORIDE 20 MEQ
40 PACKET (EA) ORAL EVERY 4 HOURS
Qty: 0 | Refills: 0 | Status: COMPLETED | OUTPATIENT
Start: 2018-09-30 | End: 2018-09-30

## 2018-09-30 RX ORDER — HYDRALAZINE HCL 50 MG
25 TABLET ORAL EVERY 8 HOURS
Qty: 0 | Refills: 0 | Status: DISCONTINUED | OUTPATIENT
Start: 2018-09-30 | End: 2018-10-11

## 2018-09-30 RX ADMIN — APIXABAN 2.5 MILLIGRAM(S): 2.5 TABLET, FILM COATED ORAL at 05:43

## 2018-09-30 RX ADMIN — CARVEDILOL PHOSPHATE 25 MILLIGRAM(S): 80 CAPSULE, EXTENDED RELEASE ORAL at 05:43

## 2018-09-30 RX ADMIN — PIPERACILLIN AND TAZOBACTAM 25 GRAM(S): 4; .5 INJECTION, POWDER, LYOPHILIZED, FOR SOLUTION INTRAVENOUS at 05:42

## 2018-09-30 RX ADMIN — Medication 100 MILLIGRAM(S): at 05:43

## 2018-09-30 RX ADMIN — Medication 40 MILLIGRAM(S): at 05:42

## 2018-09-30 RX ADMIN — LOSARTAN POTASSIUM 25 MILLIGRAM(S): 100 TABLET, FILM COATED ORAL at 05:43

## 2018-09-30 RX ADMIN — Medication 3 MILLILITER(S): at 05:42

## 2018-09-30 RX ADMIN — Medication 3 MILLILITER(S): at 17:49

## 2018-09-30 RX ADMIN — PIPERACILLIN AND TAZOBACTAM 25 GRAM(S): 4; .5 INJECTION, POWDER, LYOPHILIZED, FOR SOLUTION INTRAVENOUS at 13:58

## 2018-09-30 RX ADMIN — Medication 40 MILLIEQUIVALENT(S): at 12:06

## 2018-09-30 RX ADMIN — FINASTERIDE 5 MILLIGRAM(S): 5 TABLET, FILM COATED ORAL at 12:06

## 2018-09-30 RX ADMIN — Medication 100 MILLIGRAM(S): at 13:58

## 2018-09-30 RX ADMIN — Medication 2: at 17:49

## 2018-09-30 RX ADMIN — Medication 1 APPLICATION(S): at 12:06

## 2018-09-30 RX ADMIN — Medication 40 MILLIEQUIVALENT(S): at 14:00

## 2018-09-30 RX ADMIN — Medication 3 MILLILITER(S): at 12:05

## 2018-09-30 RX ADMIN — Medication 1: at 07:59

## 2018-09-30 RX ADMIN — Medication 1 MILLIGRAM(S): at 12:06

## 2018-09-30 RX ADMIN — Medication 1: at 12:06

## 2018-09-30 NOTE — PROGRESS NOTE ADULT - PROBLEM SELECTOR PLAN 2
- 2' RSV, aspiration PNA and pseudomonas cystitis   - CTA on admission: b/l ground glass opacities, b/l pleural effusions, no PE.  - Blood Cx NGTD  - ID consulted--> c/w Zosyn til 10/2  - f/u sensitivities for urine cx (growing pseudomonas)

## 2018-09-30 NOTE — PROGRESS NOTE ADULT - PROBLEM SELECTOR PLAN 1
- stable   - Likely 2/2 Acute Decompensated exacerbated by a combination fo RSV+ & Aspiration PNA & Atelectasis  - c/w NC 3L O2 for goal of sat >92%  - CXR reviewed with evidence of bilateral pulmonary infiltrates likely volume overloaded. CTA negative for PE  - switch lasix to PO bid  - NPO except medications per speech and swallow eval

## 2018-09-30 NOTE — PROGRESS NOTE ADULT - PROBLEM SELECTOR PLAN 6
- chronic leukocytosis 2' CLL- worsening  - low cytometry was done revealing a monoclonal CD5, CD20 positive population, suggestive of either CLL or MCL. WBC lower than previous 25-30 WBC  - continue to monitor for now, counts acceptable without evidence of bleeding  - appreciate heme onc recs. Despite recurrent infection, IgG level >500 therefore not a candidate for IVIG

## 2018-09-30 NOTE — PROGRESS NOTE ADULT - SUBJECTIVE AND OBJECTIVE BOX
Patient with increased sputum secretions. He is requesting to eat but understands why he can not when explained the results of the speech and swallow eval. Denies shortness of breath, chest pain.    GENERAL: No fevers, no chills.  EYES: No blurry vision,  No photophobia  ENT: No sore throat.  No dysphagia  Cardiovascular: No chest pain, palpitations, orthopnea  Pulmonary: No cough, no wheezing. No shortness of breath, ++ sputum production   Gastrointestinal: No abdominal pain, no diarrhea, no constipation.  No melena.  No hematochezia  : No dysuria.  No hematuria  Musculoskeletal: No weakness.  No myalgias.  Dermatology:  No rashes.  Neuro: No Headache.  No vertigo.  No dizziness.      Vital Signs Last 24 Hrs  T(C): 36.6 (30 Sep 2018 13:29), Max: 36.7 (30 Sep 2018 05:38)  T(F): 97.9 (30 Sep 2018 13:29), Max: 98.1 (30 Sep 2018 05:38)  HR: 71 (30 Sep 2018 13:29) (71 - 84)  BP: 160/62 (30 Sep 2018 13:29) (130/60 - 161/67)  BP(mean): --  RR: 18 (30 Sep 2018 13:29) (15 - 18)  SpO2: 98% (30 Sep 2018 13:29) (98% - 100%)    GENERAL: No acute distress, well-developed  HEAD:  Atraumatic, Normocephalic  ENT: EOMI, PERRLA, conjunctiva and sclera clear, Neck supple, No JVD, moist mucosa  CHEST/LUNG: coarse scattered BS/decreased bibasilar breath sounds.   HEART: Regular rate and rhythm; No murmurs, rubs, or gallops  ABDOMEN: Soft, Nontender, Nondistended; Bowel sounds present  BACK: No spinal tenderness  EXTREMITIES:  No clubbing, cyanosis, or edema  PSYCH: normal affect  NEUROLOGY: AAOx3, non-focal, cranial nerves intact  SKIN: Stage 3 ulcer on heel

## 2018-09-30 NOTE — PROGRESS NOTE ADULT - SUBJECTIVE AND OBJECTIVE BOX
Follow Up:  Pseudomonas UTI, Pneumonia    Interval History/ROS: lethargic     Allergies  Cipro (Other)  fluoroquinolone antibiotics (Unknown)  latex (Unknown)        ANTIMICROBIALS:  piperacillin/tazobactam IVPB. 3.375 every 8 hours      OTHER MEDS:  MEDICATIONS  (STANDING):  ALBUTerol/ipratropium for Nebulization 3 every 6 hours  apixaban 2.5 every 12 hours  carvedilol 25 every 12 hours  dextrose 50% Injectable 25 once  docusate sodium 100 three times a day  finasteride 5 daily  furosemide   Injectable 40 two times a day  insulin lispro (HumaLOG) corrective regimen sliding scale  three times a day before meals  insulin lispro (HumaLOG) corrective regimen sliding scale  at bedtime  losartan 25 daily  senna 2 at bedtime      Vital Signs Last 24 Hrs  T(C): 36.7 (30 Sep 2018 05:38), Max: 36.7 (29 Sep 2018 13:48)  T(F): 98.1 (30 Sep 2018 05:38), Max: 98.1 (30 Sep 2018 05:38)  HR: 73 (30 Sep 2018 05:38) (68 - 84)  BP: 148/71 (30 Sep 2018 05:38) (130/60 - 161/67)  BP(mean): --  RR: 15 (30 Sep 2018 05:38) (15 - 18)  SpO2: 98% (30 Sep 2018 05:38) (98% - 100%)    PHYSICAL EXAM:  General: chronically ill appearing, bitemporal wasting   Neurology: lethargic,   Respiratory: no reps distress, scattered crackle  CV: RRR, S1S2, no murmurs, rubs or gallops  Abdominal: Soft, Non-tender, non-distended, normal bowel sounds  Extremities: dependent edema in flanks, LE in Z-boots  Line Sites: Clear  Skin: No rash                        8.8    19.9  )-----------( 164      ( 29 Sep 2018 13:50 )             28.2       09-30    144  |  104  |  32<H>  ----------------------------<  136<H>  3.4<L>   |  29  |  2.04<H>    Ca    8.6      30 Sep 2018 07:19  Phos  2.5     09-30  Mg     1.8     09-30    MICROBIOLOGY:  .Blood Blood-Peripheral  09-25-18   No growth to date.  --  --      .Urine Catheterized  09-25-18   >100,000 CFU/ml Pseudomonas aeruginosa  --  Pseudomonas aeruginosa  Culture - Urine (09.25.18 @ 03:23)    -  Cefepime: S 8    -  Ceftazidime: S 4    -  Aztreonam: S <=4    -  Amikacin: S <=8    -  Ciprofloxacin: S <=0.5    -  Levofloxacin: S <=1    -  Imipenem: S <=1    -  Gentamicin: S 4    -  Meropenem: S <=1    -  Piperacillin/Tazobactam: S <=8    -  Tobramycin: S <=2    Specimen Source: .Urine Catheterized    Culture Results:   >100,000 CFU/ml Pseudomonas aeruginosa    Organism Identification: Pseudomonas aeruginosa    Organism: Pseudomonas aeruginosa    Method Type: LIA        Rapid RVP Result: Detected (09-25 @ 00:41)        RADIOLOGY:    Caleb Schaefer MD; Division of Infectious Disease; Pager: [phone]; nights and weekends: [phone]

## 2018-09-30 NOTE — PROGRESS NOTE ADULT - PROBLEM SELECTOR PLAN 4
- stable  - transition to PO lasix today (bid)  - Troponins elevated 155. BNP elevated 19587  - daily weights  - strict I/O's   - c/w carvedilol 25mg q12hrs  - hold losartan given worsening antonieta on ckd, start hydralazine TID

## 2018-09-30 NOTE — PROGRESS NOTE ADULT - ASSESSMENT
89 year old man with a history of MI (1980), HFrEF (EF 20%), paroxysmal A-Fib, AICD, CVA (LLE weakness), HTN, T2DM, BPH w/ chronic indwelling Davis and CLL  He has considerable debility  Leukocytosis related to CLL  Pneumonia and Pseudomonas bacteruria on Zosyn    Continue Zosyn 9/25 -->  7 day course anticipated

## 2018-09-30 NOTE — PROGRESS NOTE ADULT - PROBLEM SELECTOR PLAN 3
- see above   - patient made NPO except meds by speech and swallow eval  - consult placed to palliative care for establishment of goals of care

## 2018-09-30 NOTE — PROGRESS NOTE ADULT - PROBLEM SELECTOR PLAN 9
- baseline cr 1.3, has been as high as 1.8 in the past  - worsening now, cr if 2.0; if continues to worsen, may need to hold lasix however for now lasix is needed to keep patients respiratory status stable   - suspend losartan for now, start hydralazine for BP support   - Trend bmp  - Avoid nephrotoxins  - Renally dose meds

## 2018-10-01 DIAGNOSIS — M54.9 DORSALGIA, UNSPECIFIED: ICD-10-CM

## 2018-10-01 DIAGNOSIS — Z51.5 ENCOUNTER FOR PALLIATIVE CARE: ICD-10-CM

## 2018-10-01 DIAGNOSIS — I50.23 ACUTE ON CHRONIC SYSTOLIC (CONGESTIVE) HEART FAILURE: ICD-10-CM

## 2018-10-01 DIAGNOSIS — J69.0 PNEUMONITIS DUE TO INHALATION OF FOOD AND VOMIT: ICD-10-CM

## 2018-10-01 DIAGNOSIS — R06.00 DYSPNEA, UNSPECIFIED: ICD-10-CM

## 2018-10-01 DIAGNOSIS — N17.9 ACUTE KIDNEY FAILURE, UNSPECIFIED: ICD-10-CM

## 2018-10-01 LAB
ANION GAP SERPL CALC-SCNC: 11 MMOL/L — SIGNIFICANT CHANGE UP (ref 5–17)
BASOPHILS # BLD AUTO: 0 K/UL — SIGNIFICANT CHANGE UP (ref 0–0.2)
BASOPHILS NFR BLD AUTO: 0 % — SIGNIFICANT CHANGE UP (ref 0–2)
BUN SERPL-MCNC: 31 MG/DL — HIGH (ref 7–23)
CALCIUM SERPL-MCNC: 8.7 MG/DL — SIGNIFICANT CHANGE UP (ref 8.4–10.5)
CHLORIDE SERPL-SCNC: 103 MMOL/L — SIGNIFICANT CHANGE UP (ref 96–108)
CO2 SERPL-SCNC: 29 MMOL/L — SIGNIFICANT CHANGE UP (ref 22–31)
CREAT ?TM UR-MCNC: 40 MG/DL — SIGNIFICANT CHANGE UP
CREAT SERPL-MCNC: 2.14 MG/DL — HIGH (ref 0.5–1.3)
EOSINOPHIL # BLD AUTO: 0 K/UL — SIGNIFICANT CHANGE UP (ref 0–0.5)
EOSINOPHIL NFR BLD AUTO: 0 % — SIGNIFICANT CHANGE UP (ref 0–6)
GLUCOSE BLDC GLUCOMTR-MCNC: 269 MG/DL — HIGH (ref 70–99)
GLUCOSE BLDC GLUCOMTR-MCNC: 282 MG/DL — HIGH (ref 70–99)
GLUCOSE SERPL-MCNC: 158 MG/DL — HIGH (ref 70–99)
HCT VFR BLD CALC: 28.6 % — LOW (ref 39–50)
HGB BLD-MCNC: 8.8 G/DL — LOW (ref 13–17)
LYMPHOCYTES # BLD AUTO: 17.7 K/UL — HIGH (ref 1–3.3)
LYMPHOCYTES # BLD AUTO: 68 % — HIGH (ref 13–44)
MCHC RBC-ENTMCNC: 24.8 PG — LOW (ref 27–34)
MCHC RBC-ENTMCNC: 30.8 GM/DL — LOW (ref 32–36)
MCV RBC AUTO: 80.6 FL — SIGNIFICANT CHANGE UP (ref 80–100)
MONOCYTES # BLD AUTO: 1.04 K/UL — HIGH (ref 0–0.9)
MONOCYTES NFR BLD AUTO: 4 % — SIGNIFICANT CHANGE UP (ref 2–14)
NEUTROPHILS # BLD AUTO: 5.73 K/UL — SIGNIFICANT CHANGE UP (ref 1.8–7.4)
NEUTROPHILS NFR BLD AUTO: 22 % — LOW (ref 43–77)
PLATELET # BLD AUTO: 178 K/UL — SIGNIFICANT CHANGE UP (ref 150–400)
POTASSIUM SERPL-MCNC: 3.6 MMOL/L — SIGNIFICANT CHANGE UP (ref 3.5–5.3)
POTASSIUM SERPL-SCNC: 3.6 MMOL/L — SIGNIFICANT CHANGE UP (ref 3.5–5.3)
RBC # BLD: 3.55 M/UL — LOW (ref 4.2–5.8)
RBC # FLD: 16.2 % — HIGH (ref 10.3–14.5)
SODIUM SERPL-SCNC: 143 MMOL/L — SIGNIFICANT CHANGE UP (ref 135–145)
SODIUM UR-SCNC: 87 MMOL/L — SIGNIFICANT CHANGE UP
WBC # BLD: 26.03 K/UL — HIGH (ref 3.8–10.5)
WBC # FLD AUTO: 26.03 K/UL — HIGH (ref 3.8–10.5)

## 2018-10-01 PROCEDURE — 99232 SBSQ HOSP IP/OBS MODERATE 35: CPT

## 2018-10-01 PROCEDURE — 99231 SBSQ HOSP IP/OBS SF/LOW 25: CPT

## 2018-10-01 PROCEDURE — 99223 1ST HOSP IP/OBS HIGH 75: CPT | Mod: GC

## 2018-10-01 PROCEDURE — 93925 LOWER EXTREMITY STUDY: CPT | Mod: 26

## 2018-10-01 PROCEDURE — 93923 UPR/LXTR ART STDY 3+ LVLS: CPT | Mod: 26

## 2018-10-01 PROCEDURE — 99233 SBSQ HOSP IP/OBS HIGH 50: CPT

## 2018-10-01 RX ORDER — FUROSEMIDE 40 MG
40 TABLET ORAL
Qty: 0 | Refills: 0 | Status: DISCONTINUED | OUTPATIENT
Start: 2018-10-01 | End: 2018-10-01

## 2018-10-01 RX ORDER — SENNA PLUS 8.6 MG/1
10 TABLET ORAL AT BEDTIME
Qty: 0 | Refills: 0 | Status: DISCONTINUED | OUTPATIENT
Start: 2018-10-01 | End: 2018-10-08

## 2018-10-01 RX ORDER — POTASSIUM CHLORIDE 20 MEQ
40 PACKET (EA) ORAL ONCE
Qty: 0 | Refills: 0 | Status: COMPLETED | OUTPATIENT
Start: 2018-10-01 | End: 2018-10-01

## 2018-10-01 RX ORDER — ACETAMINOPHEN 500 MG
650 TABLET ORAL EVERY 6 HOURS
Qty: 0 | Refills: 0 | Status: DISCONTINUED | OUTPATIENT
Start: 2018-10-01 | End: 2018-10-08

## 2018-10-01 RX ORDER — DOCUSATE SODIUM 100 MG
100 CAPSULE ORAL THREE TIMES A DAY
Qty: 0 | Refills: 0 | Status: DISCONTINUED | OUTPATIENT
Start: 2018-10-01 | End: 2018-10-08

## 2018-10-01 RX ORDER — MAGNESIUM SULFATE 500 MG/ML
1 VIAL (ML) INJECTION ONCE
Qty: 0 | Refills: 0 | Status: COMPLETED | OUTPATIENT
Start: 2018-10-01 | End: 2018-10-01

## 2018-10-01 RX ORDER — INSULIN GLARGINE 100 [IU]/ML
5 INJECTION, SOLUTION SUBCUTANEOUS AT BEDTIME
Qty: 0 | Refills: 0 | Status: DISCONTINUED | OUTPATIENT
Start: 2018-10-01 | End: 2018-10-02

## 2018-10-01 RX ADMIN — Medication 3: at 17:46

## 2018-10-01 RX ADMIN — Medication 40 MILLIEQUIVALENT(S): at 12:33

## 2018-10-01 RX ADMIN — Medication 3 MILLILITER(S): at 17:47

## 2018-10-01 RX ADMIN — Medication 25 MILLIGRAM(S): at 23:13

## 2018-10-01 RX ADMIN — PIPERACILLIN AND TAZOBACTAM 25 GRAM(S): 4; .5 INJECTION, POWDER, LYOPHILIZED, FOR SOLUTION INTRAVENOUS at 07:56

## 2018-10-01 RX ADMIN — CARVEDILOL PHOSPHATE 25 MILLIGRAM(S): 80 CAPSULE, EXTENDED RELEASE ORAL at 00:12

## 2018-10-01 RX ADMIN — APIXABAN 2.5 MILLIGRAM(S): 2.5 TABLET, FILM COATED ORAL at 00:11

## 2018-10-01 RX ADMIN — INSULIN GLARGINE 5 UNIT(S): 100 INJECTION, SOLUTION SUBCUTANEOUS at 22:13

## 2018-10-01 RX ADMIN — APIXABAN 2.5 MILLIGRAM(S): 2.5 TABLET, FILM COATED ORAL at 23:13

## 2018-10-01 RX ADMIN — SENNA PLUS 10 MILLILITER(S): 8.6 TABLET ORAL at 23:13

## 2018-10-01 RX ADMIN — CARVEDILOL PHOSPHATE 25 MILLIGRAM(S): 80 CAPSULE, EXTENDED RELEASE ORAL at 12:34

## 2018-10-01 RX ADMIN — Medication 1 MILLIGRAM(S): at 12:34

## 2018-10-01 RX ADMIN — Medication 100 MILLIGRAM(S): at 16:07

## 2018-10-01 RX ADMIN — Medication 1: at 07:56

## 2018-10-01 RX ADMIN — Medication 3 MILLILITER(S): at 12:46

## 2018-10-01 RX ADMIN — Medication 1: at 22:13

## 2018-10-01 RX ADMIN — Medication 25 MILLIGRAM(S): at 16:07

## 2018-10-01 RX ADMIN — Medication 100 MILLIGRAM(S): at 23:13

## 2018-10-01 RX ADMIN — PIPERACILLIN AND TAZOBACTAM 25 GRAM(S): 4; .5 INJECTION, POWDER, LYOPHILIZED, FOR SOLUTION INTRAVENOUS at 16:04

## 2018-10-01 RX ADMIN — Medication 40 MILLIGRAM(S): at 05:35

## 2018-10-01 RX ADMIN — Medication 2: at 12:50

## 2018-10-01 RX ADMIN — Medication 100 GRAM(S): at 12:33

## 2018-10-01 RX ADMIN — APIXABAN 2.5 MILLIGRAM(S): 2.5 TABLET, FILM COATED ORAL at 12:34

## 2018-10-01 RX ADMIN — PIPERACILLIN AND TAZOBACTAM 25 GRAM(S): 4; .5 INJECTION, POWDER, LYOPHILIZED, FOR SOLUTION INTRAVENOUS at 23:14

## 2018-10-01 RX ADMIN — FINASTERIDE 5 MILLIGRAM(S): 5 TABLET, FILM COATED ORAL at 12:34

## 2018-10-01 RX ADMIN — PIPERACILLIN AND TAZOBACTAM 25 GRAM(S): 4; .5 INJECTION, POWDER, LYOPHILIZED, FOR SOLUTION INTRAVENOUS at 00:11

## 2018-10-01 RX ADMIN — Medication 3 MILLILITER(S): at 00:11

## 2018-10-01 RX ADMIN — Medication 3 MILLILITER(S): at 05:36

## 2018-10-01 RX ADMIN — Medication 100 MILLIGRAM(S): at 05:35

## 2018-10-01 RX ADMIN — Medication 3 MILLILITER(S): at 23:14

## 2018-10-01 RX ADMIN — Medication 25 MILLIGRAM(S): at 00:11

## 2018-10-01 RX ADMIN — CARVEDILOL PHOSPHATE 25 MILLIGRAM(S): 80 CAPSULE, EXTENDED RELEASE ORAL at 23:14

## 2018-10-01 RX ADMIN — Medication 1 APPLICATION(S): at 12:34

## 2018-10-01 RX ADMIN — Medication 25 MILLIGRAM(S): at 07:56

## 2018-10-01 NOTE — CONSULT NOTE ADULT - SUBJECTIVE AND OBJECTIVE BOX
HPI:  89 year old man with a history of MI (1980), HFrEF (EF 20%), paroxysmal A-Fib, AICD, CVA (LLE weakness), HTN, T2DM, BPH w/ chronic indwelling Davis and CLL presenting to the hospital for acute SOB.    Patient endorses 3 days of progressive SOB at rest w/ sudden decompensation requiring supplemental O2 in rehab facility. +lugo, +orthopnea States that 2 weeks ago he developed productive cough (patient unsure of color), chills and night sweats, no subjective fevers. He was recently admitted for Proteus UTI s/p 10 days abx course complicated by aspiration pneumonitis from 8/8-8/21 subsequently d/c to rehab facility.     Denies chest pain, palpations, nausea/vomiting, change in BM, headaches.       PERTINENT PMH REVIEWED:  [x ] YES [ ] NO           SOCIAL HISTORY:   Significant other/partner:  yes       Children:   2                Sikhism/Spirituality: Jew  Substance hx:  [ ] YES   [x] NO                   Tobacco hx:  [ ] YES  [ ] NO                       Alcohol hx: [ ] YES  [ ] NO         Home Opioid hx:  [ ] YES  [x ] NO   Living Situation: [x ] Home  [ ] Long term care  [ ] Rehab [ ] Other    FAMILY HISTORY:  No pertinent family history in first degree relatives    [ ] Family history non-contributory     BASELINE (I)ADLs (prior to admission):  Fields: [ ] total  [x ] moderate [ ] dependent    ADVANCE DIRECTIVES:    DNR [ ] YES [x ] NO                            [ ] Completed  MOLST  [ ] YES [ ] NO                      [ ] Completed  Health Care Proxy [ ] YES  [ ] NO   [ ] Completed  Living Will  [ ] YES [ ] NO             [x ] Surrogate  [ ] HCP  [ ] Guardian:   Salima Ellis  Phone#: [phone]    Allergies    Cipro (Other)  fluoroquinolone antibiotics (Unknown)  latex (Unknown)    Intolerances        MEDICATIONS  (STANDING):  ALBUTerol/ipratropium for Nebulization 3 milliLiter(s) Nebulizer every 6 hours  apixaban 2.5 milliGRAM(s) Oral every 12 hours  carvedilol 25 milliGRAM(s) Oral every 12 hours  collagenase Ointment 1 Application(s) Topical daily  dextrose 50% Injectable 25 Gram(s) IV Push once  docusate sodium Liquid 100 milliGRAM(s) Enteral Tube three times a day  finasteride 5 milliGRAM(s) Oral daily  folic acid 1 milliGRAM(s) Oral daily  hydrALAZINE 25 milliGRAM(s) Oral every 8 hours  insulin glargine Injectable (LANTUS) 5 Unit(s) SubCutaneous at bedtime  insulin lispro (HumaLOG) corrective regimen sliding scale   SubCutaneous three times a day before meals  insulin lispro (HumaLOG) corrective regimen sliding scale   SubCutaneous at bedtime  piperacillin/tazobactam IVPB. 3.375 Gram(s) IV Intermittent every 8 hours  senna Syrup 10 milliLiter(s) Oral at bedtime    MEDICATIONS  (PRN):      PRESENT SYMPTOMS:  Source: [ x] Patient   [ ] Family   [ ] Team     Pain:                        [ ] No [x ] Yes             [x ] Mild [ ] Moderate [ ] Severe  chronic back pain, positional   Onset -   Location - back   Duration - intermittend  Character - aching   Alleviating/Aggravating - off loading   Radiation - none   Timing -      Dyspnea:                [ ] No [x ] Yes             [x ] Mild [ ] Moderate [ ] Severe    Anxiety:                  [x ] No [ ] Yes             [ ] Mild [ ] Moderate [ ] Severe    Fatigue:                  [ ] No [ x] Yes             [ ] Mild [x ] Moderate [ ] Severe    Nausea:                  [x ] No [ ] Yes             [ ] Mild [ ] Moderate [ ] Severe    Loss of appetite:   [x ] No [ ] Yes             [ ] Mild [ ] Moderate [ ] Severe    Constipation:        [x ] No [ ] Yes             [ ] Mild [ ] Moderate [ ] Severe    Other Symptoms:  [ ] All other review of systems negative   [x ] Unable to obtain due to poor mentation     Karnofsky Performance Score/Palliative Performance Status Version 2:    50     %    PHYSICAL EXAM:  Vital Signs Last 24 Hrs  T(C): 36.4 (01 Oct 2018 14:23), Max: 37 (01 Oct 2018 04:19)  T(F): 97.6 (01 Oct 2018 14:23), Max: 98.6 (01 Oct 2018 04:19)  HR: 71 (01 Oct 2018 14:23) (71 - 80)  BP: 148/74 (01 Oct 2018 14:23) (130/61 - 148/74)  BP(mean): --  RR: 18 (01 Oct 2018 14:23) (18 - 22)  SpO2: 98% (01 Oct 2018 14:23) (96% - 98%) I&O's Summary    30 Sep 2018 07:01  -  01 Oct 2018 07:00  --------------------------------------------------------  IN: 0 mL / OUT: 1175 mL / NET: -1175 mL    01 Oct 2018 07:01  -  01 Oct 2018 16:18  --------------------------------------------------------  IN: 0 mL / OUT: 400 mL / NET: -400 mL    General:  [x  ] Normal. Alert, Oriented x 3.     HEENT:  [ ] Normal   [x ] Dry mouth / NGT [ ] ET Tube    [ ] Trach  [ ] Oral lesions    Lungs:   [ ] Clear [ ] Tachypnea  [ ] Audible excessive secretions   [ ] Rhonchi        [ ] Right [ ] Left [x ] Bilateral  [ ] Crackles        [ ] Right [ ] Left [ ] Bilateral  [ ] Wheezing     [ ] Right [ ] Left [ ] Bilateral    Cardiovascular:  S1, S2. No S3. No murmurs     Abdomen: [ ] Soft  [ ] Distended   [ x] +BS  [ ] Non tender [ ] Tender  [ ]PEG   [ x]OGT/ NGT   Last BM:   9/30    Genitourinary: [ ] Normal [ ] Incontinent   [ ] Oliguria/Anuria   [x ] Davis    Musculoskeletal:  [ ] Normal   [ ] Weakness  [x ] Bedbound/Wheelchair bound [ ] Edema    Neurological: [x ] No focal deficits  [ ] Cognitive impairment  [ ] Dysphagia [ ] Dysarthria [ ] Paresis [ ] Other     Skin: [ ] Normal   [x ] Pressure ulcer(s) /heel ulcer stage 3[ ] Rash    LABS:                        8.8    26.03 )-----------( 178      ( 01 Oct 2018 09:32 )             28.6     10-01    143  |  103  |  31<H>  ----------------------------<  158<H>  3.6   |  29  |  2.14<H>    Ca    8.7      01 Oct 2018 07:16  Phos  2.5     09-30  Mg     1.9     10-01    Shock: [ ] Septic [ ] Cardiogenic [ ] Neurologic [ ] Hypovolemic  Vasopressors x   Inotrophs x     Protein Calorie Malnutrition: [ ] Mild [x ] Moderate [ ] Severe    Oral Intake: [x ] Unable/mouth care only [ ] Minimal [ ] Moderate [ ] Full Capability  Diet: [ ] NPO [x ] Tube feeds [ ] TPN [ ] Other     RADIOLOGY & ADDITIONAL STUDIES:  EXAM:  ART DUPLEX LOWER EXT BILATERAL                          Impression: There is unimpeded flow through the atherosclerotic distal   right and left left external iliac, femoral and popliteal arteries.  Peroneal artery flow is not imaged bilaterally and these arteries may be   obstructed.  There is a flow-limiting lesion affecting the left anterior tibial artery   in the mid calf.      REFERRALS:   [ ] Chaplaincy  [ ] Hospice  [ ] Child Life  [ ] Social Work  [ ] Case management [ ] Holistic Therapy

## 2018-10-01 NOTE — PROGRESS NOTE ADULT - PROBLEM SELECTOR PLAN 3
- complete course of Zosyn   - patient made NPO except meds, has NGT for feeds   - consult placed to palliative care for establishment of goals of care

## 2018-10-01 NOTE — PROGRESS NOTE ADULT - PROBLEM SELECTOR PLAN 1
- Likely 2/2 aspiration pneumonia, decompensated CHF, and RSV URI   - currently sating well on RA   - CTA chest negative for PE, groundglass opacities suspicious for PNA, b/l pleural effusions   - s/p diuresis w/ Lasix   - completing course of Zosyn

## 2018-10-01 NOTE — PROGRESS NOTE ADULT - ASSESSMENT
89 year old man with a history of MI (1980), HFrEF (EF 20%), paroxysmal A-Fib, AICD, CVA (LLE weakness), HTN, T2DM, BPH w/ chronic indwelling Joshi and CLL presenting to the hospital for acute SOB.  CXR s/o pna  Also with pseudomonas bacteriuria though has joshi.  Leucocytosis -chronic from CLL  Overall is clinically better on day 7 of zosyn  Pseudomonas is sensitive to zosyn  Stable though with NGT per speech swallow recommendations  Continue zosyn for now bbut if stable can be Dced after today  Leucocytosis likely from CLL  Will tailor plan for ID issues  per course,results.Will defer to primary team on management of other issues.  case d/w Med NP  Will Follow.  Beeper [phone]/afterhours/No [phone]

## 2018-10-01 NOTE — PROGRESS NOTE ADULT - PROBLEM SELECTOR PLAN 2
- sepsis present on admission d/t aspiration PNA, pseudomonas UTI, and RSV infection. Now resolved   - CTA on admission: b/l ground glass opacities, b/l pleural effusions, no PE.  - Blood Cx NGTD  - ID consulted--> c/w Zosyn til 10/2

## 2018-10-01 NOTE — CONSULT NOTE ADULT - ASSESSMENT
Patient is a 89 y/o M with multiple comorbidities including s/p MI (1980) w/ systolic dysfunction ( EF 20%) s/p AICD, AFIB, CVA w/ residual left sided weakness, BPH w/ chronic joshi, Stage 3 heal ulcer, CLL whom was admitted for SOB. Patient was found to be RSV+, recurrent aspiration, and heart failure which all have contributed to SOB. Most recently failed speech and swallow. Palliative called for goals of care and symptom management.

## 2018-10-01 NOTE — PROGRESS NOTE ADULT - ASSESSMENT
Assessment and Plan:   · Assessment		  Assessment/Plan:    1. non-infected clinically left heel pressure wound.  2. Patient currently on iv abx and reviewed for UTI and or pneumonia  3. Continue with z offloading boots and decubitus precautions.  4. Recommend saul/pvr to evaluate vascular status.  5. continue with normal saline cleanse with santyl and dsd left heel qd.  6. reviewed xrays and no signs of OM  7. Will monitor for signs of infection and wound care recommendations. podiatry to follow.

## 2018-10-01 NOTE — PROGRESS NOTE ADULT - SUBJECTIVE AND OBJECTIVE BOX
Patient is a 89y old  Male who presents with a chief complaint of dyspnea (01 Oct 2018 09:59)      SUBJECTIVE / OVERNIGHT EVENTS: Pt says he feels fine, denies SOB/cough.     MEDICATIONS  (STANDING):  ALBUTerol/ipratropium for Nebulization 3 milliLiter(s) Nebulizer every 6 hours  apixaban 2.5 milliGRAM(s) Oral every 12 hours  carvedilol 25 milliGRAM(s) Oral every 12 hours  collagenase Ointment 1 Application(s) Topical daily  dextrose 50% Injectable 25 Gram(s) IV Push once  docusate sodium Liquid 100 milliGRAM(s) Enteral Tube three times a day  finasteride 5 milliGRAM(s) Oral daily  folic acid 1 milliGRAM(s) Oral daily  hydrALAZINE 25 milliGRAM(s) Oral every 8 hours  insulin lispro (HumaLOG) corrective regimen sliding scale   SubCutaneous three times a day before meals  insulin lispro (HumaLOG) corrective regimen sliding scale   SubCutaneous at bedtime  piperacillin/tazobactam IVPB. 3.375 Gram(s) IV Intermittent every 8 hours  senna Syrup 10 milliLiter(s) Oral at bedtime    MEDICATIONS  (PRN):      Vital Signs Last 24 Hrs  T(C): 36.4 (01 Oct 2018 14:23), Max: 37 (01 Oct 2018 04:19)  T(F): 97.6 (01 Oct 2018 14:23), Max: 98.6 (01 Oct 2018 04:19)  HR: 71 (01 Oct 2018 14:23) (71 - 80)  BP: 148/74 (01 Oct 2018 14:23) (130/61 - 148/74)  BP(mean): --  RR: 18 (01 Oct 2018 14:23) (18 - 22)  SpO2: 98% (01 Oct 2018 14:23) (96% - 98%)  CAPILLARY BLOOD GLUCOSE      POCT Blood Glucose.: 205 mg/dL (01 Oct 2018 12:49)  POCT Blood Glucose.: 190 mg/dL (01 Oct 2018 07:46)  POCT Blood Glucose.: 179 mg/dL (01 Oct 2018 06:23)  POCT Blood Glucose.: 141 mg/dL (30 Sep 2018 23:53)  POCT Blood Glucose.: 144 mg/dL (30 Sep 2018 21:48)  POCT Blood Glucose.: 211 mg/dL (30 Sep 2018 17:34)    I&O's Summary    30 Sep 2018 07:01  -  01 Oct 2018 07:00  --------------------------------------------------------  IN: 0 mL / OUT: 1175 mL / NET: -1175 mL    01 Oct 2018 07:01  -  01 Oct 2018 14:27  --------------------------------------------------------  IN: 0 mL / OUT: 400 mL / NET: -400 mL      PHYSICAL EXAM:  GENERAL: NAD  HEAD:  Atraumatic, Normocephalic  EYES: EOMI, conjunctiva and sclera clear  NECK: Supple  CHEST/LUNG: Coarse breath sounds   HEART: Regular rate and rhythm; S1S2  ABDOMEN: Soft, Nontender, Nondistended; Bowel sounds present  EXTREMITIES:  2+ Peripheral Pulses, No clubbing, cyanosis, or edema  PSYCH: AAOx3  NEUROLOGY: non-focal  SKIN: Stage 3 ulcer on heel    LABS:                        8.8    26.03 )-----------( 178      ( 01 Oct 2018 09:32 )             28.6     10-01    143  |  103  |  31<H>  ----------------------------<  158<H>  3.6   |  29  |  2.14<H>    Ca    8.7      01 Oct 2018 07:16  Phos  2.5     09-30  Mg     1.9     10-01                RADIOLOGY & ADDITIONAL TESTS:    Imaging Personally Reviewed:  Tele reviewed: /Venus, JAYYT    Consultant(s) Notes Reviewed:      Care Discussed with Consultants/Other Providers:

## 2018-10-01 NOTE — CONSULT NOTE ADULT - PROBLEM SELECTOR RECOMMENDATION 5
Code status and feeding tube was discussed in patients native language by myself  He feels that his conditions are reversible and can not grasp the depth of his existing condition and dysphagia.   Called patients surrogate/daughter Nekrystinain. She will call back for the scheduling of a family meeting.   Will continue to follow.

## 2018-10-01 NOTE — CONSULT NOTE ADULT - PROBLEM SELECTOR RECOMMENDATION 2
Failed speech and swallow.  Discussed the option of feeding tube vs pleasure feeds  Patient is insistent that it is "temporary" and condition will be reversed

## 2018-10-01 NOTE — PROGRESS NOTE ADULT - SUBJECTIVE AND OBJECTIVE BOX
Patient is a 89y old  Male who presents with a chief complaint of dyspnea (30 Sep 2018 14:21)    Being followed by ID for pna,.UTI    Interval history:Feels well  Had NGT   No acute events      ROS:  Occ cough, No SOB,CP  No N/V/D./abd pain  No other complaints      Antimicrobials:    piperacillin/tazobactam IVPB. 3.375 Gram(s) IV Intermittent every 8 hours    Other medications reviewed    Vital Signs Last 24 Hrs  T(C): 37 (10-01-18 @ 04:19), Max: 37 (10-01-18 @ 04:19)  T(F): 98.6 (10-01-18 @ 04:19), Max: 98.6 (10-01-18 @ 04:19)  HR: 77 (10-01-18 @ 04:41) (71 - 80)  BP: 142/62 (10-01-18 @ 04:41) (130/61 - 160/62)  BP(mean): --  RR: 22 (10-01-18 @ 04:19) (18 - 22)  SpO2: 96% (10-01-18 @ 04:19) (96% - 98%)    Physical Exam:        HEENT PERRLA EOMI  NGT     No oral exudate or erythema    Chest Good AE,CTA    CVS RRR S1 S2 WNl No murmur or rub or gallop    Abd soft BS normal No tenderness no masses    IV site no erythema tenderness or discharge    CNS AAO X 3 no focal    Lab Data:                          8.6    24.03 )-----------( 167      ( 30 Sep 2018 08:14 )             28.0     WBC Count: 24.03 (09-30-18 @ 08:14)  WBC Count: 19.9 (09-29-18 @ 13:50)  WBC Count: 21.77 (09-29-18 @ 08:02)  WBC Count: 20.68 (09-28-18 @ 08:29)  WBC Count: 20.44 (09-27-18 @ 08:38)  WBC Count: 17.92 (09-26-18 @ 10:33)  WBC Count: 16.5 (09-25-18 @ 08:40)  WBC Count: 18.0 (09-25-18 @ 00:53)    10-01    143  |  103  |  31<H>  ----------------------------<  158<H>  3.6   |  29  |  2.14<H>    Ca    8.7      01 Oct 2018 07:16  Phos  2.5     09-30  Mg     1.9     10-01        < from: Xray Chest 1 View- PORTABLE-Urgent (09.30.18 @ 17:09) >    INTERPRETATION:  Enteric catheter is present in the stomach.      < end of copied text >      Culture - Urine (09.25.18 @ 03:23)    -  Tobramycin: S <=2    -  Aztreonam: S <=4    -  Ceftazidime: S 4    -  Cefepime: S 8    -  Ciprofloxacin: S <=0.5    -  Gentamicin: S 4    -  Imipenem: S <=1    -  Levofloxacin: S <=1    -  Meropenem: S <=1    -  Piperacillin/Tazobactam: S <=8    -  Amikacin: S <=8    Specimen Source: .Urine Catheterized    Culture Results:   >100,000 CFU/ml Pseudomonas aeruginosa    Organism Identification: Pseudomonas aeruginosa    Organism: Pseudomonas aeruginosa    Method Type: LIA

## 2018-10-01 NOTE — PROGRESS NOTE ADULT - PROBLEM SELECTOR PLAN 9
- DM2 with diabetic nephropathy   - Home meds Lantus 8u qHS, Lispro 2u  - restart Lantus 8 units qhs while on tube feeds   - Continue to monitor FS

## 2018-10-01 NOTE — PROGRESS NOTE ADULT - ASSESSMENT
89 year old man with a history of MI (1980), HFrEF (EF 20%), paroxysmal A-Fib, AICD, CVA (LLE weakness), HTN, T2DM, BPH w/ chronic indwelling Dvais and CLL presenting to the hospital with acute respiratory failure with hypoxia 2/2 acute on chronic CHF exacerbation, likely exacerbated by sepsis 2' RSV infection & superimposed aspiration PNA with near total atelectasis of left lower lobe.

## 2018-10-01 NOTE — PROGRESS NOTE ADULT - PROBLEM SELECTOR PLAN 5
- NADIA on CKD III likely d/t ATN from MONET, baseline cr 1.3  - hold Lasix and ARB   - Check UA, urine lytes, and bladder scan   - Avoid nephrotoxins  - Monitor Cr

## 2018-10-01 NOTE — CONSULT NOTE ADULT - PROBLEM SELECTOR RECOMMENDATION 3
Improved from admission as per patient   Currently not in acute respiratory distress   C/w Oxygen therapy prn

## 2018-10-01 NOTE — PROGRESS NOTE ADULT - SUBJECTIVE AND OBJECTIVE BOX
Podiatry pager #: [phone]/ [phone]    Patient is a 89y old  Male who presents with a chief complaint of dyspnea (01 Oct 2018 16:14) podiatry seen for f/u heel wound/ulcer       INTERVAL HPI/OVERNIGHT EVENTS:  Patient seen and evaluated at bedside.  Pt is resting comfortable in NAD. Denies N/V/F/C.  Pain rated at X/10    Allergies    Cipro (Other)  fluoroquinolone antibiotics (Unknown)  latex (Unknown)    Intolerances        Vital Signs Last 24 Hrs  T(C): 36.4 (01 Oct 2018 21:28), Max: 37 (01 Oct 2018 04:19)  T(F): 97.5 (01 Oct 2018 21:28), Max: 98.6 (01 Oct 2018 04:19)  HR: 78 (01 Oct 2018 21:28) (71 - 80)  BP: 147/68 (01 Oct 2018 21:28) (142/62 - 148/74)  BP(mean): --  RR: 20 (01 Oct 2018 21:28) (18 - 22)  SpO2: 97% (01 Oct 2018 21:28) (96% - 98%)    acetaminophen    Suspension .. 650 milliGRAM(s) Oral every 6 hours PRN  ALBUTerol/ipratropium for Nebulization 3 milliLiter(s) Nebulizer every 6 hours  apixaban 2.5 milliGRAM(s) Oral every 12 hours  carvedilol 25 milliGRAM(s) Oral every 12 hours  collagenase Ointment 1 Application(s) Topical daily  dextrose 50% Injectable 25 Gram(s) IV Push once  docusate sodium Liquid 100 milliGRAM(s) Enteral Tube three times a day  finasteride 5 milliGRAM(s) Oral daily  folic acid 1 milliGRAM(s) Oral daily  hydrALAZINE 25 milliGRAM(s) Oral every 8 hours  insulin glargine Injectable (LANTUS) 5 Unit(s) SubCutaneous at bedtime  insulin lispro (HumaLOG) corrective regimen sliding scale   SubCutaneous three times a day before meals  insulin lispro (HumaLOG) corrective regimen sliding scale   SubCutaneous at bedtime  piperacillin/tazobactam IVPB. 3.375 Gram(s) IV Intermittent every 8 hours  senna Syrup 10 milliLiter(s) Oral at bedtime      LABS:                        8.8    26.03 )-----------( 178      ( 01 Oct 2018 09:32 )             28.6     10-01    143  |  103  |  31<H>  ----------------------------<  158<H>  3.6   |  29  |  2.14<H>    Ca    8.7      01 Oct 2018 07:16  Phos  2.5     09-30  Mg     1.9     10-01          CAPILLARY BLOOD GLUCOSE      POCT Blood Glucose.: 282 mg/dL (01 Oct 2018 21:24)  POCT Blood Glucose.: 269 mg/dL (01 Oct 2018 17:42)  POCT Blood Glucose.: 205 mg/dL (01 Oct 2018 12:49)  POCT Blood Glucose.: 190 mg/dL (01 Oct 2018 07:46)  POCT Blood Glucose.: 179 mg/dL (01 Oct 2018 06:23)  POCT Blood Glucose.: 141 mg/dL (30 Sep 2018 23:53)  POCT Blood Glucose.: 144 mg/dL (30 Sep 2018 21:48)          RADIOLOGY & ADDITIONAL TESTS:    < from: Xray Foot AP + Lateral + Oblique, Left (09.27.18 @ 12:02) >    EXAM:  FOOT COMPLETE LEFT (MIN 3 VIEWS)                            PROCEDURE DATE:  09/27/2018            INTERPRETATION:  CLINICAL INDICATION: Known heart failure. Left heel   pain/wound. Evaluate for osteomyelitis.    TECHNIQUE: 3 views of the left foot.    COMPARISON: No similar prior studies available for comparison.    FINDINGS:  No subcutaneous tracking gas collections, radiopaque foreign body or   periosteal reaction.    Mild to moderate arthrosis of the metatarsophalangeal joints. Calcaneal   bone spur.    Vascular calcifications.    IMPRESSION:   No radiographic evidence for osteomyelitis.    < end of copied text >    LOWER EXTREMITY PHYSICAL EXAM:    Vasular: DP/PT n/p B/L, CFT <4seconds B/L, Temperature gradient warm, B/L.   Neuro: Epicritic sensation diminished to the level of toes B/L.  Musculoskeletal/Ortho: cavus foot type  Skin:  Wound #1: left heel wound with necrotic eschar  Location: plantar posterior aspect  Size: 3cm diameter  Depth: unstageable/fat pad atrophy heel  Wound bed: necrotic eschar  Drainage: none  Odor: none  Periwound: erythema  Etiology: pressure

## 2018-10-01 NOTE — PROGRESS NOTE ADULT - PROBLEM SELECTOR PLAN 4
- likely decompensated by acute infection, now improved   - hold Lasix and ARB d/t NADIA   - c/w carvedilol 25mg q12hrs  - daily weights  - strict I/O's

## 2018-10-01 NOTE — CONSULT NOTE ADULT - ATTENDING COMMENTS
Agree with the above documentation. Discussed with fellow. Patient with multiple medical comorbidities, now with dysphagia.  Will attempt to address goals of care in terms of nutrition and code status; awaiting family availability for meeting as they prefer to meet in person than via phone. Please call with questions

## 2018-10-02 DIAGNOSIS — R13.10 DYSPHAGIA, UNSPECIFIED: ICD-10-CM

## 2018-10-02 LAB
ANION GAP SERPL CALC-SCNC: 11 MMOL/L — SIGNIFICANT CHANGE UP (ref 5–17)
APPEARANCE UR: ABNORMAL
BACTERIA # UR AUTO: NEGATIVE — SIGNIFICANT CHANGE UP
BILIRUB UR-MCNC: NEGATIVE — SIGNIFICANT CHANGE UP
BUN SERPL-MCNC: 33 MG/DL — HIGH (ref 7–23)
CALCIUM SERPL-MCNC: 8.4 MG/DL — SIGNIFICANT CHANGE UP (ref 8.4–10.5)
CHLORIDE SERPL-SCNC: 103 MMOL/L — SIGNIFICANT CHANGE UP (ref 96–108)
CO2 SERPL-SCNC: 28 MMOL/L — SIGNIFICANT CHANGE UP (ref 22–31)
COLOR SPEC: YELLOW — SIGNIFICANT CHANGE UP
CREAT SERPL-MCNC: 2.1 MG/DL — HIGH (ref 0.5–1.3)
DIFF PNL FLD: ABNORMAL
EPI CELLS # UR: 0 /HPF — SIGNIFICANT CHANGE UP (ref 0–5)
GLUCOSE BLDC GLUCOMTR-MCNC: 221 MG/DL — HIGH (ref 70–99)
GLUCOSE BLDC GLUCOMTR-MCNC: 226 MG/DL — HIGH (ref 70–99)
GLUCOSE BLDC GLUCOMTR-MCNC: 231 MG/DL — HIGH (ref 70–99)
GLUCOSE BLDC GLUCOMTR-MCNC: 242 MG/DL — HIGH (ref 70–99)
GLUCOSE BLDC GLUCOMTR-MCNC: 281 MG/DL — HIGH (ref 70–99)
GLUCOSE BLDC GLUCOMTR-MCNC: 300 MG/DL — HIGH (ref 70–99)
GLUCOSE SERPL-MCNC: 269 MG/DL — HIGH (ref 70–99)
GLUCOSE UR QL: NEGATIVE MG/DL — SIGNIFICANT CHANGE UP
HCT VFR BLD CALC: 27.7 % — LOW (ref 39–50)
HGB BLD-MCNC: 8.6 G/DL — LOW (ref 13–17)
HYALINE CASTS # UR AUTO: 2 /LPF — SIGNIFICANT CHANGE UP (ref 0–7)
KETONES UR-MCNC: NEGATIVE — SIGNIFICANT CHANGE UP
LEUKOCYTE ESTERASE UR-ACNC: ABNORMAL
MAGNESIUM SERPL-MCNC: 2.2 MG/DL — SIGNIFICANT CHANGE UP (ref 1.6–2.6)
MCHC RBC-ENTMCNC: 24.8 PG — LOW (ref 27–34)
MCHC RBC-ENTMCNC: 31 GM/DL — LOW (ref 32–36)
MCV RBC AUTO: 79.8 FL — LOW (ref 80–100)
NITRITE UR-MCNC: POSITIVE
PH UR: 6.5 — SIGNIFICANT CHANGE UP (ref 5–8)
PLATELET # BLD AUTO: 163 K/UL — SIGNIFICANT CHANGE UP (ref 150–400)
POTASSIUM SERPL-MCNC: 4 MMOL/L — SIGNIFICANT CHANGE UP (ref 3.5–5.3)
POTASSIUM SERPL-SCNC: 4 MMOL/L — SIGNIFICANT CHANGE UP (ref 3.5–5.3)
PROT UR-MCNC: 30 MG/DL
RBC # BLD: 3.47 M/UL — LOW (ref 4.2–5.8)
RBC # FLD: 16.9 % — HIGH (ref 10.3–14.5)
RBC CASTS # UR COMP ASSIST: 25 /HPF — HIGH (ref 0–4)
SODIUM SERPL-SCNC: 142 MMOL/L — SIGNIFICANT CHANGE UP (ref 135–145)
SP GR SPEC: 1.01 — SIGNIFICANT CHANGE UP (ref 1.01–1.02)
UROBILINOGEN FLD QL: NEGATIVE MG/DL — SIGNIFICANT CHANGE UP
WBC # BLD: 26.6 K/UL — HIGH (ref 3.8–10.5)
WBC # FLD AUTO: 26.6 K/UL — HIGH (ref 3.8–10.5)
WBC UR QL: 227 /HPF — HIGH (ref 0–5)

## 2018-10-02 PROCEDURE — 99232 SBSQ HOSP IP/OBS MODERATE 35: CPT

## 2018-10-02 PROCEDURE — 99231 SBSQ HOSP IP/OBS SF/LOW 25: CPT

## 2018-10-02 PROCEDURE — 99233 SBSQ HOSP IP/OBS HIGH 50: CPT

## 2018-10-02 PROCEDURE — 99223 1ST HOSP IP/OBS HIGH 75: CPT

## 2018-10-02 RX ORDER — FINASTERIDE 5 MG/1
5 TABLET, FILM COATED ORAL DAILY
Qty: 0 | Refills: 0 | Status: DISCONTINUED | OUTPATIENT
Start: 2018-10-02 | End: 2018-10-12

## 2018-10-02 RX ORDER — INSULIN GLARGINE 100 [IU]/ML
8 INJECTION, SOLUTION SUBCUTANEOUS AT BEDTIME
Qty: 0 | Refills: 0 | Status: DISCONTINUED | OUTPATIENT
Start: 2018-10-02 | End: 2018-10-03

## 2018-10-02 RX ORDER — INSULIN LISPRO 100/ML
3 VIAL (ML) SUBCUTANEOUS ONCE
Qty: 0 | Refills: 0 | Status: COMPLETED | OUTPATIENT
Start: 2018-10-02 | End: 2018-10-02

## 2018-10-02 RX ADMIN — Medication 3 MILLILITER(S): at 17:48

## 2018-10-02 RX ADMIN — Medication 3 MILLILITER(S): at 05:38

## 2018-10-02 RX ADMIN — Medication 2: at 12:39

## 2018-10-02 RX ADMIN — SENNA PLUS 10 MILLILITER(S): 8.6 TABLET ORAL at 23:17

## 2018-10-02 RX ADMIN — Medication 25 MILLIGRAM(S): at 23:17

## 2018-10-02 RX ADMIN — Medication 25 MILLIGRAM(S): at 17:48

## 2018-10-02 RX ADMIN — Medication 2: at 07:59

## 2018-10-02 RX ADMIN — INSULIN GLARGINE 8 UNIT(S): 100 INJECTION, SOLUTION SUBCUTANEOUS at 23:38

## 2018-10-02 RX ADMIN — Medication 3 MILLILITER(S): at 23:17

## 2018-10-02 RX ADMIN — CARVEDILOL PHOSPHATE 25 MILLIGRAM(S): 80 CAPSULE, EXTENDED RELEASE ORAL at 11:57

## 2018-10-02 RX ADMIN — Medication 100 MILLIGRAM(S): at 17:49

## 2018-10-02 RX ADMIN — Medication 100 MILLIGRAM(S): at 05:38

## 2018-10-02 RX ADMIN — Medication 25 MILLIGRAM(S): at 07:59

## 2018-10-02 RX ADMIN — Medication 1 MILLIGRAM(S): at 11:57

## 2018-10-02 RX ADMIN — Medication 3 UNIT(S): at 04:52

## 2018-10-02 RX ADMIN — Medication 100 MILLIGRAM(S): at 23:17

## 2018-10-02 RX ADMIN — FINASTERIDE 5 MILLIGRAM(S): 5 TABLET, FILM COATED ORAL at 11:57

## 2018-10-02 RX ADMIN — CARVEDILOL PHOSPHATE 25 MILLIGRAM(S): 80 CAPSULE, EXTENDED RELEASE ORAL at 23:16

## 2018-10-02 RX ADMIN — PIPERACILLIN AND TAZOBACTAM 25 GRAM(S): 4; .5 INJECTION, POWDER, LYOPHILIZED, FOR SOLUTION INTRAVENOUS at 07:59

## 2018-10-02 RX ADMIN — Medication 2: at 17:48

## 2018-10-02 RX ADMIN — APIXABAN 2.5 MILLIGRAM(S): 2.5 TABLET, FILM COATED ORAL at 23:16

## 2018-10-02 RX ADMIN — APIXABAN 2.5 MILLIGRAM(S): 2.5 TABLET, FILM COATED ORAL at 11:57

## 2018-10-02 RX ADMIN — Medication 1 APPLICATION(S): at 13:14

## 2018-10-02 RX ADMIN — Medication 3 MILLILITER(S): at 11:57

## 2018-10-02 NOTE — PROGRESS NOTE ADULT - PROBLEM SELECTOR PLAN 9
- DM2 with diabetic nephropathy   - Home meds Lantus 8u qHS, Lispro 2u  - restart Lantus 8 units qhs  - on continuous tube feeds   - Continue to monitor FS

## 2018-10-02 NOTE — CONSULT NOTE ADULT - ASSESSMENT
88 yo M with critical limb ischemia of left heel    # Critical Limb Ischemia  - Left heel large ischemic ulcer with eschar, no signs of osteomyelitis on Xray. No drainage or sign of infection. 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  Vessels non compressible throughout.    - would benefit from diagnostic and possibly therapeutic angiogram, however there are other components of patients care that need to be evaluated first.  Palliative care is seeing patient and it is unclear at this time what the goals of care are.  He also has acute renal failure (normal BUN/Cr in July 2018).  If plan for angiogram would ideally wait for improvement given contrast load and further risk of damage to kidneys from procedure.    - on low dose eliquis 2.5mg BID for afib, would benefit from aspirin therapy if no significant bleeding risk.  he should also be on a statin (lipitor 40mg) for atherosclerotic disease   - case seen and d/w Dr. Ojeda

## 2018-10-02 NOTE — PROGRESS NOTE ADULT - PROBLEM SELECTOR PLAN 2
- sepsis present on admission d/t aspiration PNA, pseudomonas UTI, and RSV infection. Now resolved   - CTA on admission: b/l ground glass opacities, b/l pleural effusions, no PE.  - urine cultures w/ pseudomonas   - Blood Cx NGTD  - ID consulted--> s/p course of Zosyn

## 2018-10-02 NOTE — PROGRESS NOTE ADULT - PROBLEM SELECTOR PLAN 7
- chronic leukocytosis d/t CLL  - low cytometry was done revealing a monoclonal CD5, CD20 positive population, suggestive of either CLL or MCL. WBC lower than previous 25-30 WBC  - continue to monitor for now, counts acceptable without evidence of bleeding  - appreciate heme onc recs. Despite recurrent infection, IgG level >500 therefore not a candidate for IVIG

## 2018-10-02 NOTE — PROGRESS NOTE ADULT - SUBJECTIVE AND OBJECTIVE BOX
Patient is a 89y old  Male who presents with a chief complaint of dyspnea (02 Oct 2018 10:09)      SUBJECTIVE / OVERNIGHT EVENTS: Pt c/o some lower back pain that is chronic. Denies SOB/cough.     MEDICATIONS  (STANDING):  ALBUTerol/ipratropium for Nebulization 3 milliLiter(s) Nebulizer every 6 hours  apixaban 2.5 milliGRAM(s) Oral every 12 hours  carvedilol 25 milliGRAM(s) Oral every 12 hours  collagenase Ointment 1 Application(s) Topical daily  dextrose 50% Injectable 25 Gram(s) IV Push once  docusate sodium Liquid 100 milliGRAM(s) Enteral Tube three times a day  finasteride 5 milliGRAM(s) Oral daily  folic acid 1 milliGRAM(s) Oral daily  hydrALAZINE 25 milliGRAM(s) Oral every 8 hours  insulin glargine Injectable (LANTUS) 5 Unit(s) SubCutaneous at bedtime  insulin lispro (HumaLOG) corrective regimen sliding scale   SubCutaneous three times a day before meals  insulin lispro (HumaLOG) corrective regimen sliding scale   SubCutaneous at bedtime  senna Syrup 10 milliLiter(s) Oral at bedtime    MEDICATIONS  (PRN):  acetaminophen    Suspension .. 650 milliGRAM(s) Oral every 6 hours PRN Moderate Pain (4 - 6)      Vital Signs Last 24 Hrs  T(C): 36.8 (02 Oct 2018 03:55), Max: 36.8 (02 Oct 2018 03:55)  T(F): 98.3 (02 Oct 2018 03:55), Max: 98.3 (02 Oct 2018 03:55)  HR: 71 (02 Oct 2018 03:55) (71 - 78)  BP: 148/76 (02 Oct 2018 03:55) (120/68 - 148/76)  BP(mean): --  RR: 20 (02 Oct 2018 03:55) (18 - 20)  SpO2: 97% (02 Oct 2018 03:55) (97% - 98%)  CAPILLARY BLOOD GLUCOSE      POCT Blood Glucose.: 226 mg/dL (02 Oct 2018 11:14)  POCT Blood Glucose.: 242 mg/dL (02 Oct 2018 07:51)  POCT Blood Glucose.: 281 mg/dL (02 Oct 2018 03:48)  POCT Blood Glucose.: 300 mg/dL (02 Oct 2018 02:35)  POCT Blood Glucose.: 282 mg/dL (01 Oct 2018 21:24)  POCT Blood Glucose.: 269 mg/dL (01 Oct 2018 17:42)  POCT Blood Glucose.: 205 mg/dL (01 Oct 2018 12:49)    I&O's Summary    01 Oct 2018 07:01  -  02 Oct 2018 07:00  --------------------------------------------------------  IN: 700 mL / OUT: 600 mL / NET: 100 mL        PHYSICAL EXAM:  ENERAL: NAD  HEAD:  Atraumatic, Normocephalic. +NGT   EYES: EOMI, conjunctiva and sclera clear  NECK: Supple  CHEST/LUNG: Coarse breath sounds   HEART: Regular rate and rhythm; S1S2  ABDOMEN: Soft, Nontender, Nondistended; Bowel sounds present  EXTREMITIES:  2+ Peripheral Pulses, No clubbing, cyanosis, or edema  PSYCH: AAOx3  NEUROLOGY: non-focal  SKIN: Stage 3 ulcer on heel    LABS:                        8.6    26.60 )-----------( 163      ( 02 Oct 2018 07:57 )             27.7     10-02    142  |  103  |  33<H>  ----------------------------<  269<H>  4.0   |  28  |  2.10<H>    Ca    8.4      02 Oct 2018 05:29  Mg     2.2     10-02            Urinalysis Basic - ( 01 Oct 2018 22:56 )    Color: Yellow / Appearance: Slightly Turbid / S.013 / pH: x  Gluc: x / Ketone: Negative  / Bili: Negative / Urobili: Negative mg/dL   Blood: x / Protein: 30 mg/dL / Nitrite: Positive   Leuk Esterase: Large / RBC: 25 /HPF /  /HPF   Sq Epi: x / Non Sq Epi: 0 /HPF / Bacteria: Negative        RADIOLOGY & ADDITIONAL TESTS:    Imaging Personally Reviewed:  Tele reviewed: SR/Vpaced, WCT     Arterial duplex of LE's:   There is unimpeded flow through the atherosclerotic distal   right and left left external iliac, femoral and popliteal arteries.  Peroneal artery flow is not imaged bilaterally and these arteries may be   obstructed.  There is a flow-limiting lesion affecting the left anterior tibial artery   in the mid calf.    Consultant(s) Notes Reviewed:      Care Discussed with Consultants/Other Providers:

## 2018-10-02 NOTE — CONSULT NOTE ADULT - SUBJECTIVE AND OBJECTIVE BOX
PAGER:  [phone]               [facility] [id number]              EMAIL [email or web address]   OFFICE [phone]                              ********VASCULAR MEDICINE & CARDIOLOGY PROGRESS NOTE********                            CC:  SOB    HPI:  89 year old man with a history of MI (1980), HFrEF (EF 20%), paroxysmal A-Fib, AICD, CVA (LLE weakness), HTN, T2DM, BPH w/ chronic indwelling Davis and CLL presenting to the hospital for acute decompensated heart failure, aspiration PNA, and UTI.  Noted to have left heel ulcer.  DOLORES/PVR and arterial duplex ordered revealing possible occlusion of bilatera peroneal arteries and flow limiting stenosis in the left anterior tibial artery.  Pt notes ulcer has been developing over the last 2-3 weeks.  He came from rehab where he was walking a little bit but most of the time non ambulatory.  No hx of known PAD.  No hx DVT/PE.        Allergies    Cipro (Other)  fluoroquinolone antibiotics (Unknown)  latex (Unknown)    Intolerances    	    MEDICATIONS:  apixaban 2.5 milliGRAM(s) Oral every 12 hours  carvedilol 25 milliGRAM(s) Oral every 12 hours  hydrALAZINE 25 milliGRAM(s) Oral every 8 hours      ALBUTerol/ipratropium for Nebulization 3 milliLiter(s) Nebulizer every 6 hours    acetaminophen    Suspension .. 650 milliGRAM(s) Oral every 6 hours PRN    docusate sodium Liquid 100 milliGRAM(s) Enteral Tube three times a day  senna Syrup 10 milliLiter(s) Oral at bedtime    dextrose 50% Injectable 25 Gram(s) IV Push once  finasteride 5 milliGRAM(s) Oral daily  insulin glargine Injectable (LANTUS) 8 Unit(s) SubCutaneous at bedtime  insulin lispro (HumaLOG) corrective regimen sliding scale   SubCutaneous three times a day before meals  insulin lispro (HumaLOG) corrective regimen sliding scale   SubCutaneous at bedtime    collagenase Ointment 1 Application(s) Topical daily  folic acid 1 milliGRAM(s) Oral daily      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 No nausea, vomiting, or hematemesis; No diarrhea or constipation. No melena or hematochezia.  GENITOURINARY: No dysuria, frequency, hematuria, or incontinence  NEUROLOGICAL: No headaches, memory loss, loss of strength, numbness, or tremors  SKIN: No itching, burning, rashes, or lesions   LYMPH Nodes: No enlarged glands  ENDOCRINE: No heat or cold intolerance; No hair loss  MUSCULOSKELETAL: No joint pain or swelling; No muscle, back, or extremity pain  PSYCHIATRIC: No depression, anxiety, mood swings, or difficulty sleeping  HEME/LYMPH: No easy bruising, or bleeding gums  ALLERY AND IMMUNOLOGIC: No hives or eczema	    [ ] All others negative	  [ ] Unable to obtain    PHYSICAL EXAM:  T(C): 36.8 (10-02-18 @ 03:55), Max: 36.8 (10-02-18 @ 03:55)  HR: 73 (10-02-18 @ 11:55) (71 - 78)  BP: 135/65 (10-02-18 @ 11:55) (120/68 - 148/76)  RR: 20 (10-02-18 @ 03:55) (18 - 20)  SpO2: 94% (10-02-18 @ 11:55) (94% - 98%)  Wt(kg): --  I&O's Summary    01 Oct 2018 07:01  -  02 Oct 2018 07:00  --------------------------------------------------------  IN: 700 mL / OUT: 600 mL / NET: 100 mL        Appearance: Normal	  HEENT:   Normal oral mucosa, PERRL, EOMI	  Lymphatic: No lymphadenopathy  Cardiovascular: irregular, No JVD, No murmurs, No edema  Respiratory: diffuse rhonchi   Psychiatry: A & O x 3, Mood & affect appropriate  Gastrointestinal:  Soft, Non-tender, + BS	  Skin: No rashes, No ecchymoses, No cyanosis	  Neurologic: Non-focal  Extremities: large deep left lateral heel ulcer with eschar, no drainage, non tender, no signs of cellulitis   Vascular: non palpable distal pulses.       LABS:	 	    CBC Full  -  ( 02 Oct 2018 07:57 )  WBC Count : 26.60 K/uL  Hemoglobin : 8.6 g/dL  Hematocrit : 27.7 %  Platelet Count - Automated : 163 K/uL  Mean Cell Volume : 79.8 fl  Mean Cell Hemoglobin : 24.8 pg  Mean Cell Hemoglobin Concentration : 31.0 gm/dL  Auto Neutrophil # : x  Auto Lymphocyte # : x  Auto Monocyte # : x  Auto Eosinophil # : x  Auto Basophil # : x  Auto Neutrophil % : x  Auto Lymphocyte % : x  Auto Monocyte % : x  Auto Eosinophil % : x  Auto Basophil % : x    10-02    142  |  103  |  33<H>  ----------------------------<  269<H>  4.0   |  28  |  2.10<H>  10-01    143  |  103  |  31<H>  ----------------------------<  158<H>  3.6   |  29  |  2.14<H>    Ca    8.4      02 Oct 2018 05:29  Ca    8.7      01 Oct 2018 07:16  Mg     2.2     10-02  Mg     1.9     10-01 PAGER:  [phone]               [facility] [id number]              EMAIL [email or web address]   OFFICE [phone]                              ********VASCULAR MEDICINE & CARDIOLOGY PROGRESS NOTE********                            CC:  SOB    HPI:  89 year old man with a history of MI (1980), HFrEF (EF 20%), paroxysmal A-Fib, AICD, CVA (LLE weakness), HTN, T2DM, BPH w/ chronic indwelling Davis and CLL presenting to the hospital for acute decompensated heart failure, aspiration PNA, and UTI.  Noted to have left heel ulcer.  DOLORES/PVR and arterial duplex ordered revealing possible occlusion of bilatera peroneal arteries and flow limiting stenosis in the left anterior tibial artery.  Pt notes ulcer has been developing over the last 2-3 weeks.  He came from rehab where he was walking a little bit but most of the time non ambulatory.  No hx of known PAD.  No hx DVT/PE.        Allergies    Cipro (Other)  fluoroquinolone antibiotics (Unknown)  latex (Unknown)    Intolerances    	    MEDICATIONS:  apixaban 2.5 milliGRAM(s) Oral every 12 hours  carvedilol 25 milliGRAM(s) Oral every 12 hours  hydrALAZINE 25 milliGRAM(s) Oral every 8 hours      ALBUTerol/ipratropium for Nebulization 3 milliLiter(s) Nebulizer every 6 hours    acetaminophen    Suspension .. 650 milliGRAM(s) Oral every 6 hours PRN    docusate sodium Liquid 100 milliGRAM(s) Enteral Tube three times a day  senna Syrup 10 milliLiter(s) Oral at bedtime    dextrose 50% Injectable 25 Gram(s) IV Push once  finasteride 5 milliGRAM(s) Oral daily  insulin glargine Injectable (LANTUS) 8 Unit(s) SubCutaneous at bedtime  insulin lispro (HumaLOG) corrective regimen sliding scale   SubCutaneous three times a day before meals  insulin lispro (HumaLOG) corrective regimen sliding scale   SubCutaneous at bedtime    collagenase Ointment 1 Application(s) Topical daily  folic acid 1 milliGRAM(s) Oral daily      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 No nausea, vomiting, or hematemesis; No diarrhea or constipation. No melena or hematochezia.  GENITOURINARY: No dysuria, frequency, hematuria, or incontinence  NEUROLOGICAL: No headaches, memory loss, loss of strength, numbness, or tremors  SKIN: No itching, burning, rashes, or lesions   LYMPH Nodes: No enlarged glands  ENDOCRINE: No heat or cold intolerance; No hair loss  MUSCULOSKELETAL: No joint pain or swelling; No muscle, back, or extremity pain  PSYCHIATRIC: No depression, anxiety, mood swings, or difficulty sleeping  HEME/LYMPH: No easy bruising, or bleeding gums  ALLERY AND IMMUNOLOGIC: No hives or eczema	    [x ] All others negative	  [ ] Unable to obtain    PHYSICAL EXAM:  T(C): 36.8 (10-02-18 @ 03:55), Max: 36.8 (10-02-18 @ 03:55)  HR: 73 (10-02-18 @ 11:55) (71 - 78)  BP: 135/65 (10-02-18 @ 11:55) (120/68 - 148/76)  RR: 20 (10-02-18 @ 03:55) (18 - 20)  SpO2: 94% (10-02-18 @ 11:55) (94% - 98%)  Wt(kg): --  I&O's Summary    01 Oct 2018 07:01  -  02 Oct 2018 07:00  --------------------------------------------------------  IN: 700 mL / OUT: 600 mL / NET: 100 mL        Appearance: Normal	  HEENT:   Normal oral mucosa, PERRL, EOMI	  Lymphatic: No lymphadenopathy  Cardiovascular: irregular, No JVD, No murmurs, No edema  Respiratory: diffuse rhonchi   Psychiatry: A & O x 3, Mood & affect appropriate  Gastrointestinal:  Soft, Non-tender, + BS	  Skin: No rashes, No ecchymoses, No cyanosis	  Neurologic: Non-focal  Extremities: large deep left lateral heel ulcer with eschar, no drainage, non tender, no signs of cellulitis   Vascular: non palpable distal pulses.       LABS:	 	    CBC Full  -  ( 02 Oct 2018 07:57 )  WBC Count : 26.60 K/uL  Hemoglobin : 8.6 g/dL  Hematocrit : 27.7 %  Platelet Count - Automated : 163 K/uL  Mean Cell Volume : 79.8 fl  Mean Cell Hemoglobin : 24.8 pg  Mean Cell Hemoglobin Concentration : 31.0 gm/dL  Auto Neutrophil # : x  Auto Lymphocyte # : x  Auto Monocyte # : x  Auto Eosinophil # : x  Auto Basophil # : x  Auto Neutrophil % : x  Auto Lymphocyte % : x  Auto Monocyte % : x  Auto Eosinophil % : x  Auto Basophil % : x    10-02    142  |  103  |  33<H>  ----------------------------<  269<H>  4.0   |  28  |  2.10<H>  10-01    143  |  103  |  31<H>  ----------------------------<  158<H>  3.6   |  29  |  2.14<H>    Ca    8.4      02 Oct 2018 05:29  Ca    8.7      01 Oct 2018 07:16  Mg     2.2     10-02  Mg     1.9     10-01      `

## 2018-10-02 NOTE — PROGRESS NOTE ADULT - SUBJECTIVE AND OBJECTIVE BOX
Patient is a 89y old  Male who presents with a chief complaint of dyspnea (01 Oct 2018 21:34)    Being followed by ID for pna,UTI     Interval history:does not want PEG  some cough  No other complaints   No acute events      ROS:  NoSOB,CP  No N/V/D./abd pain  No other complaints      Antimicrobials:  zosyn Dced today     Other medications reviewed    Vital Signs Last 24 Hrs  T(C): 36.8 (10-02-18 @ 03:55), Max: 36.8 (10-02-18 @ 03:55)  T(F): 98.3 (10-02-18 @ 03:55), Max: 98.3 (10-02-18 @ 03:55)  HR: 71 (10-02-18 @ 03:55) (71 - 78)  BP: 148/76 (10-02-18 @ 03:55) (120/68 - 148/76)  BP(mean): --  RR: 20 (10-02-18 @ 03:55) (18 - 20)  SpO2: 97% (10-02-18 @ 03:55) (97% - 98%)    Physical Exam:        HEENT PERRLA EOMI  NGT     No oral exudate or erythema    Chest Good AE, basal crackles     CVS RRR S1 S2 WNl No murmur or rub or gallop    Abd soft BS normal No tenderness no masses    IV site no erythema tenderness or discharge    CNS AAO X 3 no focal    Lab Data:                          8.6    26.60 )-----------( 163      ( 02 Oct 2018 07:57 )             27.7       10-02    142  |  103  |  33<H>  ----------------------------<  269<H>  4.0   |  28  |  2.10<H>    Ca    8.4      02 Oct 2018 05:29  Mg     2.2     10-02        < from: Xray Chest 1 View- PORTABLE-Urgent (09.30.18 @ 17:09) >  IMPRESSION: Enteric feeding tube courses below the diaphragm with tip in   the stomach.      < end of copied text >

## 2018-10-02 NOTE — PROGRESS NOTE ADULT - PROBLEM SELECTOR PLAN 5
- NADIA on CKD III likely d/t ATN from MONET, baseline cr 1.3, Cr plateauing   - hold Lasix and ARB   - FeNa 3.2% consistent w/ intrinsic NADIA  - Avoid nephrotoxins  - Monitor Cr

## 2018-10-02 NOTE — PROGRESS NOTE ADULT - PROBLEM SELECTOR PLAN 6
- Stage 3 ulceration of the left heel. Foot XR neg for osteomyelitis  -LE arterial duplex w/ flow-limiting lesion affecting the left anterior tibial artery   in the mid calf  -vascular consult   -continue with wound care

## 2018-10-02 NOTE — PROGRESS NOTE ADULT - PROBLEM SELECTOR PLAN 3
- completed course of Zosyn   - patient made NPO except meds, has NGT for feeds   - aspiration precautions

## 2018-10-02 NOTE — PROGRESS NOTE ADULT - ASSESSMENT
89 year old man with a history of MI (1980), HFrEF (EF 20%), paroxysmal A-Fib, AICD, CVA (LLE weakness), HTN, T2DM, BPH w/ chronic indwelling Davis and CLL presented to the hospital for acute SOB needing treatment of CHF, pneumonia etc  He was admitted in August 2018 with UTI followed by aspiration pneumonia too  Hem follow for CLL   Patient has CLL or mantle cell lymphoma by flow cytometry without thrombocytopenia and mild anemia likely related to CKD and chronic inflammation. He does not have organomegaly or significant lymphadenopathy. Thus his CLL by itself is not needing treatment at present. The patient had several hospitalizations for infection, including UTI , which likely has increased incidence because of indwelling Davis catheter.  However his IgG level is above 500 mg/dL thus did not need IVIG.   Management at this time is supportive and treatment of infections as needed.  At present given his comorbidities and PS, palliative care evaluation is ongoing        Attending Attestation:   I was physically present for the key portions of the evaluation and management (E/M) service provided.  I agree with the above history, physical, and plan which I have reviewed and edited where appropriate.

## 2018-10-02 NOTE — CONSULT NOTE ADULT - ATTENDING COMMENTS
Await goals of care discussions with the patient and family   If goals are in-line with aggressive therapies, then he will need peripheral angio once renal function recovers  Otherwise continue with local therapies to the L heel and offloading.      Await [facility] meeting    Thanks    Royce Ojeda [id number]

## 2018-10-02 NOTE — PROGRESS NOTE ADULT - ASSESSMENT
89 year old man with a history of MI (1980), HFrEF (EF 20%), paroxysmal A-Fib, AICD, CVA (LLE weakness), HTN, T2DM, BPH w/ chronic indwelling Joshi and CLL presenting to the hospital for acute SOB.  CXR s/o pna  Also with pseudomonas bacteriuria though has joshi.  Leucocytosis -chronic from CLL   day 7 of zosyn  Pseudomonas is sensitive to zosyn  Stable though with NGT per speech swallow recommendations  declining PEG  will remain at risk for recurrent aspiration   Aspiration precautions  prognosis guarded  case d/w Med NP  ID will follow as needed,please call [phone] if any questions or issues.

## 2018-10-02 NOTE — PROGRESS NOTE ADULT - SUBJECTIVE AND OBJECTIVE BOX
HPI:  89 year old man with a history of MI (1980), HFrEF (EF 20%), paroxysmal A-Fib, AICD, CVA (LLE weakness), HTN, T2DM, BPH w/ chronic indwelling Davis and CLL presenting to the hospital for acute SOB.    Patient endorses 3 days of progressive SOB at rest w/ sudden decompensation requiring supplemental O2 in rehab facility. +lugo, +orthopnea States that 2 weeks ago he developed productive cough (patient unsure of color), chills and night sweats, no subjective fevers. He was recently admitted for Proteus UTI s/p 10 days abx course complicated by aspiration pneumonitis from 8/8-8/21 subsequently d/c to rehab facility.     Denies chest pain, palpations, nausea/vomiting, change in BM, headaches. 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 650 milliGRAM(s) Oral every 6 hours PRN Moderate Pain (4 - 6)  ALBUTerol/ipratropium for Nebulization 3 milliLiter(s) Nebulizer every 6 hours  apixaban 2.5 milliGRAM(s) Oral every 12 hours  carvedilol 25 milliGRAM(s) Oral every 12 hours  collagenase Ointment 1 Application(s) Topical daily  dextrose 50% Injectable 25 Gram(s) IV Push once  docusate sodium Liquid 100 milliGRAM(s) Enteral Tube three times a day  finasteride 5 milliGRAM(s) Oral daily  folic acid 1 milliGRAM(s) Oral daily  hydrALAZINE 25 milliGRAM(s) Oral every 8 hours  insulin glargine Injectable (LANTUS) 8 Unit(s) SubCutaneous at bedtime  insulin lispro (HumaLOG) corrective regimen sliding scale   SubCutaneous three times a day before meals  insulin lispro (HumaLOG) corrective regimen sliding scale   SubCutaneous at bedtime  senna Syrup 10 milliLiter(s) Oral at bedtime    Labs:  CBC Full  -  ( 02 Oct 2018 07:57 )  WBC Count : 26.60 K/uL  Hemoglobin : 8.6 g/dL  Hematocrit : 27.7 %  Platelet Count - Automated : 163 K/uL  Mean Cell Volume : 79.8 fl  Mean Cell Hemoglobin : 24.8 pg  Mean Cell Hemoglobin Concentration : 31.0 gm/dL  Auto Neutrophil # : x  Auto Lymphocyte # : x  Auto Monocyte # : x  Auto Eosinophil # : x  Auto Basophil # : x  Auto Neutrophil % : x  Auto Lymphocyte % : x  Auto Monocyte % : x  Auto Eosinophil % : x  Auto Basophil % : x    10-02    142  |  103  |  33<H>  ----------------------------<  269<H>  4.0   |  28  |  2.10<H>    Ca    8.4      02 Oct 2018 05:29  Mg     2.2     10-02        Radiology:             ROS:  Patient comfortable without distress  No SOB or chest pain  No palpitation  No abdominal pain, diarrhaea or constipation  No weakness of extremities  No skin changes or swelling of legs    Vital Signs Last 24 Hrs  T(C): 36.8 (02 Oct 2018 03:55), Max: 36.8 (02 Oct 2018 03:55)  T(F): 98.3 (02 Oct 2018 03:55), Max: 98.3 (02 Oct 2018 03:55)  HR: 73 (02 Oct 2018 11:55) (71 - 78)  BP: 135/65 (02 Oct 2018 11:55) (120/68 - 148/76)  BP(mean): --  RR: 20 (02 Oct 2018 03:55) (18 - 20)  SpO2: 94% (02 Oct 2018 11:55) (94% - 98%)    Physical exam:  Patient alert and oriented  No distress  CVS: S1, S2 regular or murmur  Chest: bilateral breath sound without rales  Abdomen: soft, not tender, no organomegaly or masses  No focal neuro deficit  No edema      Assessment and Plan: 89 year old man with a history of MI (1980), HFrEF (EF 20%), paroxysmal A-Fib, AICD, CVA (LLE weakness), HTN, T2DM, BPH w/ chronic indwelling Davis and CLL admitted to the hospital for acute SOB. Treated for CHF, pneumonia, UTI  He has heal ulcer being evaluated by vascular. 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 650 milliGRAM(s) Oral every 6 hours PRN Moderate Pain (4 - 6)  ALBUTerol/ipratropium for Nebulization 3 milliLiter(s) Nebulizer every 6 hours  apixaban 2.5 milliGRAM(s) Oral every 12 hours  carvedilol 25 milliGRAM(s) Oral every 12 hours  collagenase Ointment 1 Application(s) Topical daily  dextrose 50% Injectable 25 Gram(s) IV Push once  docusate sodium Liquid 100 milliGRAM(s) Enteral Tube three times a day  finasteride 5 milliGRAM(s) Oral daily  folic acid 1 milliGRAM(s) Oral daily  hydrALAZINE 25 milliGRAM(s) Oral every 8 hours  insulin glargine Injectable (LANTUS) 8 Unit(s) SubCutaneous at bedtime  insulin lispro (HumaLOG) corrective regimen sliding scale   SubCutaneous three times a day before meals  insulin lispro (HumaLOG) corrective regimen sliding scale   SubCutaneous at bedtime  senna Syrup 10 milliLiter(s) Oral at bedtime    Labs:  CBC Full  -  ( 02 Oct 2018 07:57 )  WBC Count : 26.60 K/uL  Hemoglobin : 8.6 g/dL  Hematocrit : 27.7 %  Platelet Count - Automated : 163 K/uL  Mean Cell Volume : 79.8 fl  Mean Cell Hemoglobin : 24.8 pg  Mean Cell Hemoglobin Concentration : 31.0 gm/dL  Auto Neutrophil # : x  Auto Lymphocyte # : x  Auto Monocyte # : x  Auto Eosinophil # : x  Auto Basophil # : x  Auto Neutrophil % : x  Auto Lymphocyte % : x  Auto Monocyte % : x  Auto Eosinophil % : x  Auto Basophil % : x    10-02    142  |  103  |  33<H>  ----------------------------<  269<H>  4.0   |  28  |  2.10<H>    Ca    8.4      02 Oct 2018 05:29  Mg     2.2     10-02        Radiology:             ROS:  Patient comfortable. No distinct complain  Vital Signs Last 24 Hrs  T(C): 36.8 (02 Oct 2018 03:55), Max: 36.8 (02 Oct 2018 03:55)  T(F): 98.3 (02 Oct 2018 03:55), Max: 98.3 (02 Oct 2018 03:55)  HR: 73 (02 Oct 2018 11:55) (71 - 78)  BP: 135/65 (02 Oct 2018 11:55) (120/68 - 148/76)  BP(mean): --  RR: 20 (02 Oct 2018 03:55) (18 - 20)  SpO2: 94% (02 Oct 2018 11:55) (94% - 98%)    Physical exam:    No , NG tube  CVS: S1, S2 regular or murmur  Chest: bilateral breath sound without rales  Abdomen: soft, not tender, no organomegaly or masses  No focal neuro deficit  Heel ulcers as in chart      Assessment and Plan:

## 2018-10-02 NOTE — PROGRESS NOTE ADULT - PROBLEM SELECTOR PLAN 1
- Likely 2/2 aspiration pneumonia, decompensated CHF, and RSV URI   - resolved, currently sating well on RA   - CTA chest negative for PE, groundglass opacities suspicious for PNA, b/l pleural effusions   - s/p diuresis w/ Lasix   - s/p course of Zosyn

## 2018-10-02 NOTE — PROGRESS NOTE ADULT - PROBLEM SELECTOR PLAN 4
- likely decompensated by acute infection, improved s/p diuresis   - hold Lasix and ARB d/t NADIA   - c/w carvedilol 25mg q12hrs  - daily weights  - strict I/O's

## 2018-10-02 NOTE — PROGRESS NOTE ADULT - PROBLEM SELECTOR PLAN 10
- continue tube feeds   - palliative care to meet with family and discuss GOC, patient does not want PEG   - aspiration precautions

## 2018-10-02 NOTE — CHART NOTE - NSCHARTNOTEFT_GEN_A_CORE
CC: Wide Complex Tachycardia      HPI:  Called by RN to relate pt had an 11 beat WCT as noted on tele.   Patient seen & examined at bedside, he denied feeling  dizzy, CP, SOB, abdominal  pain, N/V or palpitations      ROS:  CONSTITUTIONAL:  No fever, chills, rigors  CARDIOVASCULAR:  No chest pain or palpitations  RESPIRATORY:   No SOB  GASTROINTESTINAL:  No abd pain, N/V, diarrhea/constipation  EXTREMITIES:  No swelling or joint pain  GENITOURINARY:  No burning on urination, increased frequency or urgency.  No flank pain      PAST MEDICAL & SURGICAL HISTORY:  Past Medical History:  Adult Onset Diabetes Mellitus,    ALMI (Anterolateral Wall Myoca    Asymptomatic Chronic Venous Hy    CLL (chronic lymphocytic leukemia)    Heart failure    Paroxysmal A-fib    Personal History of Hypertensi    Personal History of Myocardial    S/P Implantation of Automatic.     Past Surgical History:  After-Cataract    Degeneration of the Retina of    S/P Inguinal Hernia Repair.        Vital Signs Last 24 Hrs  T(C): 36.8 (02 Oct 2018 03:55), Max: 36.8 (02 Oct 2018 03:55)  T(F): 98.3 (02 Oct 2018 03:55), Max: 98.3 (02 Oct 2018 03:55)  HR: 71 (02 Oct 2018 03:55) (71 - 78)  BP: 148/76 (02 Oct 2018 03:55) (120/68 - 148/76)  BP(mean): --  RR: 20 (02 Oct 2018 03:55) (18 - 20)  SpO2: 97% (02 Oct 2018 03:55) (97% - 98%)      Physical Exam:  General: WN/WD NAD, AOx3, nontoxic appearing  Head:  NC/AT  CV: RRR, S1S2   Respiratory: CTA B/L, nonlabored  Abdominal: (+) bowel sounds x4. Soft, NT, ND, no palpable mass, no guarding, or rebound tenderness  Genitourinary: ? Davis   MSK: No BLLE edema, + peripheral pulses, FROM all 4 extremity  Skin: (+) warm, dry   Psych: Appropriate affect       Labs:                        8.6    26.60 )-----------( 163      ( 02 Oct 2018 07:57 )             27.7     10-02    142  |  103  |  33<H>  ----------------------------<  269<H>  4.0   |  28  |  2.10<H>    Ca    8.4      02 Oct 2018 05:29  Mg     2.2     10-02      Urinalysis Basic - ( 10-01 @ 22:56 )    Color: Negative / Appearance: Negative / SG: -- / pH: Negative  Gluc: Small / Ketone: Yellow  / Bili: -- / Urobili: 2   Blood: 0 / Protein: -- / Nitrite: Negative   Leuk Esterase: Large / RBC: 1.013 / WBC --   Sq Epi: 30 / Non Sq Epi: 25 / Bacteria: 6.5        Radiology:      Assessment & Plan:  HPI:  89 year old man with a history of MI (1980), HFrEF (EF 20%), paroxysmal A-Fib, AICD, CVA (LLE weakness), HTN, T2DM, BPH w/ chronic indwelling Davis and CLL presenting to the hospital for acute SOB.  Patient endorses 3 days of progressive SOB at rest w/ sudden decompensation requiring supplemental O2 in rehab facility. +lugo, +orthopnea States that 2 weeks ago he developed productive cough (patient unsure of color), chills and night sweats, no subjective fevers. He was recently admitted for Proteus UTI s/p 10 days abx course complicated by aspiration pneumonitis from 8/8-8/21 subsequently d/c to rehab facility.   Denies chest pain, palpations, nausea/vomiting, change in BM, headaches. (25 Sep 2018 05:51)    Pt seen for noted 11 beats of WCT on tele  Pt remained asymptomatic; VSS    PLAN:  -Continue to monitor  -Continue Carvedilol & present meds. as prescribed  -Keep K > 4 Mg > 2  -Primary Team to follow up in AM, attending to follow       Kim Peterson PA-C  Dept of Medicine CC: Wide Complex Tachycardia      HPI:  Called by RN to relate pt had an 11 beat WCT as noted on tele.   Patient seen & examined at bedside, he denied feeling  dizzy, CP, SOB, abdominal  pain, N/V or palpitations      ROS:  CONSTITUTIONAL:  No fever, chills, rigors  CARDIOVASCULAR:  No chest pain or palpitations  RESPIRATORY:   No SOB  GASTROINTESTINAL:  No abd pain, N/V, diarrhea/constipation  EXTREMITIES:  No swelling or joint pain  GENITOURINARY:  No burning on urination, increased frequency or urgency.  No flank pain      PAST MEDICAL & SURGICAL HISTORY:  Past Medical History:  Adult Onset Diabetes Mellitus,    ALMI (Anterolateral Wall Myoca    Asymptomatic Chronic Venous Hy    CLL (chronic lymphocytic leukemia)    Heart failure    Paroxysmal A-fib    Personal History of Hypertensi    Personal History of Myocardial    S/P Implantation of Automatic.     Past Surgical History:  After-Cataract    Degeneration of the Retina of    S/P Inguinal Hernia Repair.        Vital Signs Last 24 Hrs  T(C): 36.8 (02 Oct 2018 03:55), Max: 36.8 (02 Oct 2018 03:55)  T(F): 98.3 (02 Oct 2018 03:55), Max: 98.3 (02 Oct 2018 03:55)  HR: 71 (02 Oct 2018 03:55) (71 - 78)  BP: 148/76 (02 Oct 2018 03:55) (120/68 - 148/76)  BP(mean): --  RR: 20 (02 Oct 2018 03:55) (18 - 20)  SpO2: 97% (02 Oct 2018 03:55) (97% - 98%)      Physical Exam:  General: Frail elderly male in  NAD, AOx3, nontoxic appearing  Head:  NC/AT  CV: RRR, S1S2   Respiratory: Brady. crackles;  nonlabored  Abdominal: (+) bowel sounds x4. Soft, NT, ND, no palpable mass, no guarding, or rebound tenderness  Genitourinary: + Davis   MSK: No BLLE edema, + peripheral pulses, FROM all 4 extremity  Skin: (+) warm, dry   Psych: Appropriate affect       Labs:                        8.6    26.60 )-----------( 163      ( 02 Oct 2018 07:57 )             27.7     10-02    142  |  103  |  33<H>  ----------------------------<  269<H>  4.0   |  28  |  2.10<H>    Ca    8.4      02 Oct 2018 05:29  Mg     2.2     10-02      Urinalysis Basic - ( 10-01 @ 22:56 )    Color: Negative / Appearance: Negative / SG: -- / pH: Negative  Gluc: Small / Ketone: Yellow  / Bili: -- / Urobili: 2   Blood: 0 / Protein: -- / Nitrite: Negative   Leuk Esterase: Large / RBC: 1.013 / WBC --   Sq Epi: 30 / Non Sq Epi: 25 / Bacteria: 6.5        Radiology:      Assessment & Plan:  HPI:  89 year old man with a history of MI (1980), HFrEF (EF 20%), paroxysmal A-Fib, AICD, CVA (LLE weakness), HTN, T2DM, BPH w/ chronic indwelling Davis and CLL presenting to the hospital for acute SOB.  Patient endorses 3 days of progressive SOB at rest w/ sudden decompensation requiring supplemental O2 in rehab facility. +lugo, +orthopnea States that 2 weeks ago he developed productive cough (patient unsure of color), chills and night sweats, no subjective fevers. He was recently admitted for Proteus UTI s/p 10 days abx course complicated by aspiration pneumonitis from 8/8-8/21 subsequently d/c to rehab facility.   Denies chest pain, palpations, nausea/vomiting, change in BM, headaches. (25 Sep 2018 05:51)    Pt seen for noted 11 beats of WCT on tele  Pt remained asymptomatic; VSS    PLAN:  -Continue to monitor  -Continue Carvedilol & present meds. as prescribed  -Keep K > 4 Mg > 2  -Primary Team to follow up in AM, attending to follow       Kim Peterson PA-C  Dept of Medicine

## 2018-10-03 DIAGNOSIS — Z71.89 OTHER SPECIFIED COUNSELING: ICD-10-CM

## 2018-10-03 LAB
ANION GAP SERPL CALC-SCNC: 9 MMOL/L — SIGNIFICANT CHANGE UP (ref 5–17)
BUN SERPL-MCNC: 34 MG/DL — HIGH (ref 7–23)
CALCIUM SERPL-MCNC: 8.7 MG/DL — SIGNIFICANT CHANGE UP (ref 8.4–10.5)
CHLORIDE SERPL-SCNC: 104 MMOL/L — SIGNIFICANT CHANGE UP (ref 96–108)
CO2 SERPL-SCNC: 31 MMOL/L — SIGNIFICANT CHANGE UP (ref 22–31)
CREAT SERPL-MCNC: 1.98 MG/DL — HIGH (ref 0.5–1.3)
CULTURE RESULTS: SIGNIFICANT CHANGE UP
GLUCOSE BLDC GLUCOMTR-MCNC: 183 MG/DL — HIGH (ref 70–99)
GLUCOSE BLDC GLUCOMTR-MCNC: 199 MG/DL — HIGH (ref 70–99)
GLUCOSE BLDC GLUCOMTR-MCNC: 207 MG/DL — HIGH (ref 70–99)
GLUCOSE BLDC GLUCOMTR-MCNC: 214 MG/DL — HIGH (ref 70–99)
GLUCOSE BLDC GLUCOMTR-MCNC: 247 MG/DL — HIGH (ref 70–99)
GLUCOSE BLDC GLUCOMTR-MCNC: 248 MG/DL — HIGH (ref 70–99)
GLUCOSE BLDC GLUCOMTR-MCNC: 307 MG/DL — HIGH (ref 70–99)
GLUCOSE SERPL-MCNC: 271 MG/DL — HIGH (ref 70–99)
HCT VFR BLD CALC: 28.4 % — LOW (ref 39–50)
HGB BLD-MCNC: 8.7 G/DL — LOW (ref 13–17)
MAGNESIUM SERPL-MCNC: 2.2 MG/DL — SIGNIFICANT CHANGE UP (ref 1.6–2.6)
MCHC RBC-ENTMCNC: 24.6 PG — LOW (ref 27–34)
MCHC RBC-ENTMCNC: 30.6 GM/DL — LOW (ref 32–36)
MCV RBC AUTO: 80.2 FL — SIGNIFICANT CHANGE UP (ref 80–100)
PHOSPHATE SERPL-MCNC: 1.9 MG/DL — LOW (ref 2.5–4.5)
PLATELET # BLD AUTO: 161 K/UL — SIGNIFICANT CHANGE UP (ref 150–400)
POTASSIUM SERPL-MCNC: 3.9 MMOL/L — SIGNIFICANT CHANGE UP (ref 3.5–5.3)
POTASSIUM SERPL-SCNC: 3.9 MMOL/L — SIGNIFICANT CHANGE UP (ref 3.5–5.3)
RBC # BLD: 3.54 M/UL — LOW (ref 4.2–5.8)
RBC # FLD: 17 % — HIGH (ref 10.3–14.5)
SODIUM SERPL-SCNC: 144 MMOL/L — SIGNIFICANT CHANGE UP (ref 135–145)
SPECIMEN SOURCE: SIGNIFICANT CHANGE UP
WBC # BLD: 29.93 K/UL — HIGH (ref 3.8–10.5)
WBC # FLD AUTO: 29.93 K/UL — HIGH (ref 3.8–10.5)

## 2018-10-03 PROCEDURE — 99233 SBSQ HOSP IP/OBS HIGH 50: CPT | Mod: GC

## 2018-10-03 PROCEDURE — 99497 ADVNCD CARE PLAN 30 MIN: CPT | Mod: GC

## 2018-10-03 PROCEDURE — 99233 SBSQ HOSP IP/OBS HIGH 50: CPT

## 2018-10-03 PROCEDURE — 71045 X-RAY EXAM CHEST 1 VIEW: CPT | Mod: 26

## 2018-10-03 RX ORDER — DEXTROSE 50 % IN WATER 50 %
15 SYRINGE (ML) INTRAVENOUS ONCE
Qty: 0 | Refills: 0 | Status: DISCONTINUED | OUTPATIENT
Start: 2018-10-03 | End: 2018-10-08

## 2018-10-03 RX ORDER — SODIUM CHLORIDE 9 MG/ML
1000 INJECTION, SOLUTION INTRAVENOUS
Qty: 0 | Refills: 0 | Status: DISCONTINUED | OUTPATIENT
Start: 2018-10-03 | End: 2018-10-08

## 2018-10-03 RX ORDER — DEXTROSE 50 % IN WATER 50 %
12.5 SYRINGE (ML) INTRAVENOUS ONCE
Qty: 0 | Refills: 0 | Status: DISCONTINUED | OUTPATIENT
Start: 2018-10-03 | End: 2018-10-08

## 2018-10-03 RX ORDER — INSULIN HUMAN 100 [IU]/ML
INJECTION, SOLUTION SUBCUTANEOUS EVERY 6 HOURS
Qty: 0 | Refills: 0 | Status: DISCONTINUED | OUTPATIENT
Start: 2018-10-03 | End: 2018-10-08

## 2018-10-03 RX ORDER — DEXTROSE 50 % IN WATER 50 %
25 SYRINGE (ML) INTRAVENOUS ONCE
Qty: 0 | Refills: 0 | Status: DISCONTINUED | OUTPATIENT
Start: 2018-10-03 | End: 2018-10-08

## 2018-10-03 RX ORDER — GLUCAGON INJECTION, SOLUTION 0.5 MG/.1ML
1 INJECTION, SOLUTION SUBCUTANEOUS ONCE
Qty: 0 | Refills: 0 | Status: DISCONTINUED | OUTPATIENT
Start: 2018-10-03 | End: 2018-10-08

## 2018-10-03 RX ORDER — MEN-PHOR .5; .5 G/G; G/G
1 LOTION TOPICAL THREE TIMES A DAY
Qty: 0 | Refills: 0 | Status: DISCONTINUED | OUTPATIENT
Start: 2018-10-03 | End: 2018-10-12

## 2018-10-03 RX ORDER — HUMAN INSULIN 100 [IU]/ML
12 INJECTION, SUSPENSION SUBCUTANEOUS EVERY 12 HOURS
Qty: 0 | Refills: 0 | Status: DISCONTINUED | OUTPATIENT
Start: 2018-10-03 | End: 2018-10-07

## 2018-10-03 RX ORDER — PETROLATUM,WHITE
1 JELLY (GRAM) TOPICAL
Qty: 0 | Refills: 0 | Status: DISCONTINUED | OUTPATIENT
Start: 2018-10-03 | End: 2018-10-12

## 2018-10-03 RX ORDER — IPRATROPIUM/ALBUTEROL SULFATE 18-103MCG
3 AEROSOL WITH ADAPTER (GRAM) INHALATION EVERY 6 HOURS
Qty: 0 | Refills: 0 | Status: DISCONTINUED | OUTPATIENT
Start: 2018-10-03 | End: 2018-10-03

## 2018-10-03 RX ORDER — HUMAN INSULIN 100 [IU]/ML
12 INJECTION, SUSPENSION SUBCUTANEOUS
Qty: 0 | Refills: 0 | Status: DISCONTINUED | OUTPATIENT
Start: 2018-10-03 | End: 2018-10-03

## 2018-10-03 RX ORDER — POTASSIUM PHOSPHATE, MONOBASIC POTASSIUM PHOSPHATE, DIBASIC 236; 224 MG/ML; MG/ML
15 INJECTION, SOLUTION INTRAVENOUS EVERY 6 HOURS
Qty: 0 | Refills: 0 | Status: COMPLETED | OUTPATIENT
Start: 2018-10-03 | End: 2018-10-03

## 2018-10-03 RX ADMIN — Medication 3 MILLILITER(S): at 17:21

## 2018-10-03 RX ADMIN — Medication 1 APPLICATION(S): at 12:01

## 2018-10-03 RX ADMIN — Medication 1 APPLICATION(S): at 17:20

## 2018-10-03 RX ADMIN — POTASSIUM PHOSPHATE, MONOBASIC POTASSIUM PHOSPHATE, DIBASIC 62.5 MILLIMOLE(S): 236; 224 INJECTION, SOLUTION INTRAVENOUS at 10:03

## 2018-10-03 RX ADMIN — Medication 100 MILLIGRAM(S): at 14:16

## 2018-10-03 RX ADMIN — Medication 25 MILLIGRAM(S): at 08:03

## 2018-10-03 RX ADMIN — Medication 3 MILLILITER(S): at 12:00

## 2018-10-03 RX ADMIN — Medication 100 MILLIGRAM(S): at 05:07

## 2018-10-03 RX ADMIN — INSULIN HUMAN 2: 100 INJECTION, SOLUTION SUBCUTANEOUS at 17:16

## 2018-10-03 RX ADMIN — HUMAN INSULIN 12 UNIT(S): 100 INJECTION, SUSPENSION SUBCUTANEOUS at 17:04

## 2018-10-03 RX ADMIN — CARVEDILOL PHOSPHATE 25 MILLIGRAM(S): 80 CAPSULE, EXTENDED RELEASE ORAL at 12:01

## 2018-10-03 RX ADMIN — Medication 25 MILLIGRAM(S): at 23:38

## 2018-10-03 RX ADMIN — Medication 4: at 08:03

## 2018-10-03 RX ADMIN — Medication 100 MILLIGRAM(S): at 21:54

## 2018-10-03 RX ADMIN — MEN-PHOR 1 APPLICATION(S): .5; .5 LOTION TOPICAL at 21:54

## 2018-10-03 RX ADMIN — SENNA PLUS 10 MILLILITER(S): 8.6 TABLET ORAL at 21:54

## 2018-10-03 RX ADMIN — POTASSIUM PHOSPHATE, MONOBASIC POTASSIUM PHOSPHATE, DIBASIC 62.5 MILLIMOLE(S): 236; 224 INJECTION, SOLUTION INTRAVENOUS at 14:16

## 2018-10-03 RX ADMIN — Medication 2: at 11:59

## 2018-10-03 RX ADMIN — Medication 3 MILLILITER(S): at 23:37

## 2018-10-03 RX ADMIN — Medication 3 MILLILITER(S): at 05:08

## 2018-10-03 RX ADMIN — APIXABAN 2.5 MILLIGRAM(S): 2.5 TABLET, FILM COATED ORAL at 12:01

## 2018-10-03 RX ADMIN — APIXABAN 2.5 MILLIGRAM(S): 2.5 TABLET, FILM COATED ORAL at 23:38

## 2018-10-03 RX ADMIN — CARVEDILOL PHOSPHATE 25 MILLIGRAM(S): 80 CAPSULE, EXTENDED RELEASE ORAL at 23:38

## 2018-10-03 RX ADMIN — INSULIN HUMAN 1: 100 INJECTION, SOLUTION SUBCUTANEOUS at 23:37

## 2018-10-03 RX ADMIN — FINASTERIDE 5 MILLIGRAM(S): 5 TABLET, FILM COATED ORAL at 12:00

## 2018-10-03 RX ADMIN — Medication 1 MILLIGRAM(S): at 12:00

## 2018-10-03 RX ADMIN — Medication 25 MILLIGRAM(S): at 17:21

## 2018-10-03 RX ADMIN — MEN-PHOR 1 APPLICATION(S): .5; .5 LOTION TOPICAL at 17:05

## 2018-10-03 NOTE — PROGRESS NOTE ADULT - PROBLEM SELECTOR PLAN 9
- DM2 with diabetic nephropathy   - Home meds Lantus 8u qHS, Lispro 2u  - will switch Lantus to NPH while on continuous feeds   - Continue to monitor FS

## 2018-10-03 NOTE — DIETITIAN INITIAL EVALUATION ADULT. - NS AS NUTRI INTERV ENTERAL NUTRITION
Pending GOC discussion to help determine course of actions re: PEG vs po. Pt remains at aspiration risk with tubefeeding. For now can continue current TF regimen as ordered as pt is tolerating well. Can increase feeding gradually to better meet estimated nutrition needs if aggressive measures are to be continued following GOC discussion. For now would recommend free H2O order via NGT 50 ml Q3 hours for additional 400 ml Free H2O daily/total of 1366 ml (22 ml/kG) free H2O with TF

## 2018-10-03 NOTE — DIETITIAN INITIAL EVALUATION ADULT. - ENERGY NEEDS
ht: 65 inches. wt: 138.8 pounds (current, bedscale, +2 scrotal edema noted). BMI: 23.1 kG/m2. UBW: 131 pounds per pt. IBW: 130 pounds +/- 10%. %IBW: 107%  Other pertinent objective information: 89 year old male pt with PMH MI (1980), HFrEF, paroxysmal A-Fib, AICD, CVA (LLE weakness), HTN, T2DM, BPH w/ chronic indwelling Davis and CLL presenting with acute hypoxic respiratory failure 2/2 acute on chronic HF, likely exacerbated by RSV infection & superimposed aspiration PNA with near total atelectasis of left lower lobe. Also found to have UTI and dysphagia S/P MBS 9/29/18 with SLP recommendations for non-oral nutrition and hydration. Pt noted with a L heel ischemic ulcer. Pending GOC discussion planned for today and to determine course for further treatment/interventions, including discussion re: PEG vs po.

## 2018-10-03 NOTE — PROGRESS NOTE ADULT - ASSESSMENT
Patient is a 89 y/o M with multiple comorbidities including s/p MI (1980) w/ systolic dysfunction ( EF 20%) s/p AICD, AFIB, CVA w/ residual left sided weakness, BPH w/ chronic joshi, Stage 3 heal ulcer, CLL whom was admitted for SOB. Patient was found to be RSV+, recurrent aspiration, and heart failure which all have contributed to SOB. Most recently failed speech and swallow. Currently undergoing w/u w/ vascular for PVD. Palliative called for goals of care and symptom management.

## 2018-10-03 NOTE — PROGRESS NOTE ADULT - SUBJECTIVE AND OBJECTIVE BOX
pt seen at bedside in NAD. dressing to left foot c/d/i  left heel ulcer noted necrotic stable dry   recommend betadine with DSD   recommend to continue Zfloat boots at all times in bed  will follow 2-3 x a week to monitor for healing if foot worsens please call [phone] ASAP

## 2018-10-03 NOTE — PROGRESS NOTE ADULT - PROBLEM SELECTOR PLAN 5
- NADIA on CKD III likely d/t ATN from [facility], baseline cr 1.3, Cr trending down   - hold Lasix and ARB   - FeNa 3.2% consistent w/ intrinsic NADIA  - Avoid nephrotoxins  - Monitor Cr

## 2018-10-03 NOTE — DIETITIAN INITIAL EVALUATION ADULT. - PROBLEM SELECTOR PLAN 8
-pt with antonieta on last admission and appears to have downtrended to new baseline  -trend bmp  -avoid nephrotoxins  -renally dose meds

## 2018-10-03 NOTE — PROGRESS NOTE ADULT - PROBLEM SELECTOR PLAN 2
- sepsis present on admission d/t aspiration PNA, pseudomonas UTI, and RSV infection. Now resolved   - CTA on admission: b/l ground glass opacities, b/l pleural effusions, no PE.  - urine cultures w/ pseudomonas   - Blood Cx NGTD  - s/p course of Zosyn

## 2018-10-03 NOTE — PROGRESS NOTE ADULT - PROBLEM SELECTOR PLAN 5
critical time spent: more than 80 min   please see GOC note critical time spent: more than 80 min- 1030am-1150am  please see GOC note

## 2018-10-03 NOTE — DIETITIAN INITIAL EVALUATION ADULT. - PROBLEM SELECTOR PLAN 9
IMPROVE score: 3   DVT PPX: patient on Eliquis for afib     DIET: patient seen by S&S s/p Cinesophagram on previous admission recommended Dysphagia 2 with nectar thick liquids    Dr.Benziger Horton   Internal Medicine   PGY-2   [phone] (long range pager)  [phone] (short range)

## 2018-10-03 NOTE — DIETITIAN INITIAL EVALUATION ADULT. - PROBLEM SELECTOR PLAN 3
s/p IV lasix 40mg   daily weights, strict i/o's   -c/w carvedilol 25mg q12hrs, patient previously on losartan 40mg however held in setting of NADIA on past hospitalization  -c/w lasix 40 iv qd (home dose 20 po qod)  -bipap if pt has increasing wob.

## 2018-10-03 NOTE — DIETITIAN INITIAL EVALUATION ADULT. - PROBLEM SELECTOR PLAN 2
elevated WBC + tachycardia + RSV -/+ aspiration PNA  -c/w zosyn, add vancomycin coverage as pt w/ hospitalization in last 30days, check urine legionella if + can add on azithro, currently no diarrhea.   -UA significant for infection how ever pt w/ chronic Davis ?colonization   -f/u urine cultures, obtain x2 set blood cultures (patient already started on abx blood culture not sent in ER)

## 2018-10-03 NOTE — PROGRESS NOTE ADULT - PROBLEM SELECTOR PLAN 6
Patient remains FULL CODE at this time   Family to discuss amongst themselves codes status and extent of medical intervention. Continue management as per primary team  Palliative to follow ongoing GOC discussions

## 2018-10-03 NOTE — DIETITIAN INITIAL EVALUATION ADULT. - PHYSICAL APPEARANCE
underweight/Nutrition-focused physical exam conducted. reveals severe orbital fat pad loss, moderate temporal wasting, severe tricep fat wasting.

## 2018-10-03 NOTE — PROGRESS NOTE ADULT - PROBLEM SELECTOR PLAN 6
- Stage 3 ulceration of the left heel, non-healing d/t PAD  - Foot XR neg for osteomyelitis  - LE arterial duplex w/ flow-limiting lesion affecting the left anterior tibial artery   in the mid calf  -would benefit from diagnostic and therapeutic angiogram once kidney function improves   -continue with wound care

## 2018-10-03 NOTE — PROGRESS NOTE ADULT - SUBJECTIVE AND OBJECTIVE BOX
PAGER:  [phone]               [facility] [address]              EMAIL [email or web address]   OFFICE [phone]                              ********VASCULAR MEDICINE & CARDIOLOGY PROGRESS NOTE********                            CC:  SOB    Interval:  no complaints, no foot pain, breathing ok, very hesitant about doing a procedure on the leg to help heel his ulcer.  family meeting regarding goals of care and feeding tube today.      HPI:  89 year old man with a history of MI (1980), HFrEF (EF 20%), paroxysmal A-Fib, AICD, CVA (LLE weakness), HTN, T2DM, BPH w/ chronic indwelling Davis and CLL presenting to the hospital for acute decompensated heart failure, aspiration PNA, and UTI.  Noted to have left heel ulcer.  DOLORES/PVR and arterial duplex ordered revealing possible occlusion of bilatera peroneal arteries and flow limiting stenosis in the left anterior tibial artery.  Pt notes ulcer has been developing over the last 2-3 weeks.  He came from rehab where he was walking a little bit but most of the time non ambulatory.  No hx of known PAD.  No hx DVT/PE.        Allergies    Cipro (Other)  fluoroquinolone antibiotics (Unknown)  latex (Unknown)    Intolerances    	    MEDICATIONS  (STANDING):  ALBUTerol/ipratropium for Nebulization 3 milliLiter(s) Nebulizer every 6 hours  apixaban 2.5 milliGRAM(s) Oral every 12 hours  carvedilol 25 milliGRAM(s) Oral every 12 hours  collagenase Ointment 1 Application(s) Topical daily  dextrose 50% Injectable 25 Gram(s) IV Push once  docusate sodium Liquid 100 milliGRAM(s) Enteral Tube three times a day  finasteride 5 milliGRAM(s) Oral daily  folic acid 1 milliGRAM(s) Oral daily  hydrALAZINE 25 milliGRAM(s) Oral every 8 hours  insulin glargine Injectable (LANTUS) 8 Unit(s) SubCutaneous at bedtime  insulin lispro (HumaLOG) corrective regimen sliding scale   SubCutaneous three times a day before meals  insulin lispro (HumaLOG) corrective regimen sliding scale   SubCutaneous at bedtime  potassium phosphate IVPB 15 milliMole(s) IV Intermittent every 6 hours  senna Syrup 10 milliLiter(s) Oral at bedtime    MEDICATIONS  (PRN):  acetaminophen    Suspension .. 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 No nausea, vomiting, or hematemesis; No diarrhea or constipation. No melena or hematochezia.  GENITOURINARY: No dysuria, frequency, hematuria, or incontinence  NEUROLOGICAL: No headaches, memory loss, loss of strength, numbness, or tremors  SKIN: No itching, burning, rashes, or lesions   LYMPH Nodes: No enlarged glands  ENDOCRINE: No heat or cold intolerance; No hair loss  MUSCULOSKELETAL: No joint pain or swelling; No muscle, back, or extremity pain  PSYCHIATRIC: No depression, anxiety, mood swings, or difficulty sleeping  HEME/LYMPH: No easy bruising, or bleeding gums  ALLERY AND IMMUNOLOGIC: No hives or eczema	    [x ] All others negative	  [ ] Unable to obtain    ICU Vital Signs Last 24 Hrs  T(C): 36.9 (03 Oct 2018 04:21), Max: 36.9 (03 Oct 2018 04:21)  T(F): 98.4 (03 Oct 2018 04:21), Max: 98.4 (03 Oct 2018 04:21)  HR: 74 (03 Oct 2018 08:06) (70 - 77)  BP: 162/80 (03 Oct 2018 08:06) (135/65 - 162/80)  BP(mean): --  ABP: --  ABP(mean): --  RR: 22 (03 Oct 2018 04:21) (18 - 22)  SpO2: 95% (03 Oct 2018 04:21) (93% - 98%)      Appearance: Normal	  HEENT:   Normal oral mucosa, PERRL, EOMI	  Lymphatic: No lymphadenopathy  Cardiovascular: irregular, No JVD, No murmurs, No edema  Respiratory: diffuse rhonchi   Psychiatry: A & O x 3, Mood & affect appropriate  Gastrointestinal:  Soft, Non-tender, + BS	  Skin: No rashes, No ecchymoses, No cyanosis	  Neurologic: Non-focal  Extremities: large deep left lateral heel ulcer with eschar, no drainage, non tender, no signs of cellulitis   Vascular: non palpable distal pulses.       LABS:	 	                        8.7    29.93 )-----------( 161      ( 03 Oct 2018 07:43 )             28.4   10-03    144  |  104  |  34<H>  ----------------------------<  271<H>  3.9   |  31  |  1.98<H>    Ca    8.7      03 Oct 2018 05:39  Phos  1.9     10-03  Mg     2.2     10-03    ` PAGER:  [phone]               [facility] [id number]              EMAIL [email or web address]   OFFICE [phone]                              ********VASCULAR MEDICINE & CARDIOLOGY PROGRESS NOTE********                            CC:  SOB    Interval:  no complaints, no foot pain, breathing ok, very hesitant about doing a procedure on the leg to help heel his ulcer.  family meeting regarding goals of care and feeding tube today.      HPI:  89 year old man with a history of MI (1980), HFrEF (EF 20%), paroxysmal A-Fib, AICD, CVA (LLE weakness), HTN, T2DM, BPH w/ chronic indwelling Davis and CLL presenting to the hospital for acute decompensated heart failure, aspiration PNA, and UTI.  Noted to have left heel ulcer.  DOLORES/PVR and arterial duplex ordered revealing possible occlusion of bilatera peroneal arteries and flow limiting stenosis in the left anterior tibial artery.  Pt notes ulcer has been developing over the last 2-3 weeks.  He came from rehab where he was walking a little bit but most of the time non ambulatory.  No hx of known PAD.  No hx DVT/PE.           Allergies    Cipro (Other)  fluoroquinolone antibiotics (Unknown)  latex (Unknown)    Intolerances    	    MEDICATIONS  (STANDING):  ALBUTerol/ipratropium for Nebulization 3 milliLiter(s) Nebulizer every 6 hours  apixaban 2.5 milliGRAM(s) Oral every 12 hours  carvedilol 25 milliGRAM(s) Oral every 12 hours  collagenase Ointment 1 Application(s) Topical daily  dextrose 50% Injectable 25 Gram(s) IV Push once  docusate sodium Liquid 100 milliGRAM(s) Enteral Tube three times a day  finasteride 5 milliGRAM(s) Oral daily  folic acid 1 milliGRAM(s) Oral daily  hydrALAZINE 25 milliGRAM(s) Oral every 8 hours  insulin glargine Injectable (LANTUS) 8 Unit(s) SubCutaneous at bedtime  insulin lispro (HumaLOG) corrective regimen sliding scale   SubCutaneous three times a day before meals  insulin lispro (HumaLOG) corrective regimen sliding scale   SubCutaneous at bedtime  potassium phosphate IVPB 15 milliMole(s) IV Intermittent every 6 hours  senna Syrup 10 milliLiter(s) Oral at bedtime    MEDICATIONS  (PRN):  acetaminophen    Suspension .. 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 No nausea, vomiting, or hematemesis; No diarrhea or constipation. No melena or hematochezia.  GENITOURINARY: No dysuria, frequency, hematuria, or incontinence  NEUROLOGICAL: No headaches, memory loss, loss of strength, numbness, or tremors  SKIN: No itching, burning, rashes, or lesions   LYMPH Nodes: No enlarged glands  ENDOCRINE: No heat or cold intolerance; No hair loss  MUSCULOSKELETAL: No joint pain or swelling; No muscle, back, or extremity pain  PSYCHIATRIC: No depression, anxiety, mood swings, or difficulty sleeping  HEME/LYMPH: No easy bruising, or bleeding gums  ALLERY AND IMMUNOLOGIC: No hives or eczema	    [x ] All others negative	  [ ] Unable to obtain    ICU Vital Signs Last 24 Hrs  T(C): 36.9 (03 Oct 2018 04:21), Max: 36.9 (03 Oct 2018 04:21)  T(F): 98.4 (03 Oct 2018 04:21), Max: 98.4 (03 Oct 2018 04:21)  HR: 74 (03 Oct 2018 08:06) (70 - 77)  BP: 162/80 (03 Oct 2018 08:06) (135/65 - 162/80)  BP(mean): --  ABP: --  ABP(mean): --  RR: 22 (03 Oct 2018 04:21) (18 - 22)  SpO2: 95% (03 Oct 2018 04:21) (93% - 98%)      Appearance: Normal	  HEENT:   Normal oral mucosa, PERRL, EOMI	  Lymphatic: No lymphadenopathy  Cardiovascular: irregular, No JVD, No murmurs, No edema  Respiratory: diffuse rhonchi   Psychiatry: A & O x 3, Mood & affect appropriate  Gastrointestinal:  Soft, Non-tender, + BS	  Skin: No rashes, No ecchymoses, No cyanosis	  Neurologic: Non-focal  Extremities: large deep left lateral heel ulcer with eschar, no drainage, non tender, no signs of cellulitis   Vascular: non palpable distal pulses.       LABS:	 	                        8.7    29.93 )-----------( 161      ( 03 Oct 2018 07:43 )             28.4   10-03    144  |  104  |  34<H>  ----------------------------<  271<H>  3.9   |  31  |  1.98<H>    Ca    8.7      03 Oct 2018 05:39  Phos  1.9     10-03  Mg     2.2     10-03    `

## 2018-10-03 NOTE — PROGRESS NOTE ADULT - SUBJECTIVE AND OBJECTIVE BOX
Patient is a 89y old  Male who presents with a chief complaint of dyspnea (03 Oct 2018 14:30)    Being followed by ID for pna s/p Rx    Interval history:noted to have some congestion today  still with some cough  Not much sputum  Denies CP or SOB   No acute events      ROS:    No N/V/D./abd pain  No other complaints      Antimicrobials:  None(zosyn DCed yesterday)    Other medications reviewed    Vital Signs Last 24 Hrs  T(C): 36.7 (10-03-18 @ 13:04), Max: 36.9 (10-03-18 @ 04:21)  T(F): 98.1 (10-03-18 @ 13:04), Max: 98.4 (10-03-18 @ 04:21)  HR: 72 (10-03-18 @ 13:04) (72 - 82)  BP: 144/66 (10-03-18 @ 13:04) (144/66 - 162/80)  BP(mean): --  RR: 17 (10-03-18 @ 13:04) (17 - 22)  SpO2: 94% (10-03-18 @ 13:04) (93% - 95%)    Physical Exam:        HEENT PERRLA EOMI    No oral exudate or erythema  NGT'  No sinus tenderness    Chest Good AE,Bilateral crackles at bases and some rhonchi    CVS RRR S1 S2 WNl No murmur or rub or gallop    Abd soft BS normal No tenderness no masses    IV site no erythema tenderness or discharge    CNS AAO X 3 no focal    Lab Data:                          8.7    29.93 )-----------( 161      ( 03 Oct 2018 07:43 )             28.4   WBC Count: 29.93 (10-03-18 @ 07:43)  WBC Count: 26.60 (10-02-18 @ 07:57)  WBC Count: 26.03 (10-01-18 @ 09:32)  WBC Count: 24.03 (09-30-18 @ 08:14)  WBC Count: 19.9 (09-29-18 @ 13:50)  WBC Count: 21.77 (09-29-18 @ 08:02)  WBC Count: 20.68 (09-28-18 @ 08:29)  WBC Count: 20.44 (09-27-18 @ 08:38)      10-03    144  |  104  |  34<H>  ----------------------------<  271<H>  3.9   |  31  |  1.98<H>    Ca    8.7      03 Oct 2018 05:39  Phos  1.9     10-03  Mg     2.2     10-03          Culture - Urine (collected 02 Oct 2018 08:21)  Source: .Urine Catheterized  Final Report (03 Oct 2018 11:19):    Culture grew 3 or more types of organisms which indicate    collection contamination; consider recollection only if clinically    indicated.      < from: Xray Chest 1 View- PORTABLE-Urgent (10.03.18 @ 13:47) >  FINDINGS/  IMPRESSION:    Moderate bilateral pleural effusions and pulmonary edema are increased.   Bibasilar opacities may represent atelectasis or consolidation.    The enteric tube courses through the stomach, the tip is beyond the lower   margin of the image. Theleft biventricular ICD is unchanged.              < end of copied text >

## 2018-10-03 NOTE — PROGRESS NOTE ADULT - ASSESSMENT
89 year old man with a history of MI (1980), HFrEF (EF 20%), paroxysmal A-Fib, AICD, CVA (LLE weakness), HTN, T2DM, BPH w/ chronic indwelling Joshi and CLL presenting to the hospital for acute SOB.  CXR s/o pna  Also with pseudomonas bacteriuria though has joshi.  Leucocytosis -chronic from CLL  s/p 7 days of zosyn already    Stable though with NGT per speech swallow recommendations  declining PEG  will remain at risk for recurrent aspiration  also some URI like symptoms-? sinus /irriation from NGT  Mild increase in WBC but has CLL  Would recommend continue observation for now    Aspiration precautions  Chest PT  prognosis guarded  Establish GOC  case d/w Med NP  Will tailor plan for ID issues  per course,results.Will defer to primary team on management of other issues.  ID will continue to follow as needed,please call [phone] if any questions or issues.

## 2018-10-03 NOTE — PROGRESS NOTE ADULT - SUBJECTIVE AND OBJECTIVE BOX
Patient is a 89y old  Male who presents with a chief complaint of dyspnea (03 Oct 2018 11:05)      SUBJECTIVE / OVERNIGHT EVENTS: Pt w/ mild dry cough, denies SOB. Mild LBP.     MEDICATIONS  (STANDING):  ALBUTerol/ipratropium for Nebulization 3 milliLiter(s) Nebulizer every 6 hours  apixaban 2.5 milliGRAM(s) Oral every 12 hours  AQUAPHOR (petrolatum Ointment) 1 Application(s) Topical two times a day  camphor 0.5%/menthol 0.5% Topical Lotion 1 Application(s) Topical three times a day  carvedilol 25 milliGRAM(s) Oral every 12 hours  collagenase Ointment 1 Application(s) Topical daily  dextrose 50% Injectable 25 Gram(s) IV Push once  docusate sodium Liquid 100 milliGRAM(s) Enteral Tube three times a day  finasteride 5 milliGRAM(s) Oral daily  folic acid 1 milliGRAM(s) Oral daily  hydrALAZINE 25 milliGRAM(s) Oral every 8 hours  insulin glargine Injectable (LANTUS) 8 Unit(s) SubCutaneous at bedtime  insulin lispro (HumaLOG) corrective regimen sliding scale   SubCutaneous three times a day before meals  insulin lispro (HumaLOG) corrective regimen sliding scale   SubCutaneous at bedtime  potassium phosphate IVPB 15 milliMole(s) IV Intermittent every 6 hours  senna Syrup 10 milliLiter(s) Oral at bedtime    MEDICATIONS  (PRN):  acetaminophen    Suspension .. 650 milliGRAM(s) Oral every 6 hours PRN Moderate Pain (4 - 6)      Vital Signs Last 24 Hrs  T(C): 36.9 (03 Oct 2018 04:21), Max: 36.9 (03 Oct 2018 04:21)  T(F): 98.4 (03 Oct 2018 04:21), Max: 98.4 (03 Oct 2018 04:21)  HR: 74 (03 Oct 2018 08:06) (70 - 77)  BP: 162/80 (03 Oct 2018 08:06) (135/65 - 162/80)  BP(mean): --  RR: 22 (03 Oct 2018 04:21) (18 - 22)  SpO2: 95% (03 Oct 2018 04:21) (93% - 98%)  CAPILLARY BLOOD GLUCOSE      POCT Blood Glucose.: 248 mg/dL (03 Oct 2018 11:40)  POCT Blood Glucose.: 307 mg/dL (03 Oct 2018 07:21)  POCT Blood Glucose.: 214 mg/dL (02 Oct 2018 23:33)  POCT Blood Glucose.: 207 mg/dL (02 Oct 2018 23:14)  POCT Blood Glucose.: 231 mg/dL (02 Oct 2018 17:14)  POCT Blood Glucose.: 221 mg/dL (02 Oct 2018 12:35)    I&O's Summary    02 Oct 2018 07:01  -  03 Oct 2018 07:00  --------------------------------------------------------  IN: 600 mL / OUT: 950 mL / NET: -350 mL    03 Oct 2018 07:01  -  03 Oct 2018 11:50  --------------------------------------------------------  IN: 0 mL / OUT: 0 mL / NET: 0 mL        PHYSICAL EXAM:  ENERAL: NAD  HEAD:  Atraumatic, Normocephalic. +NGT   EYES: EOMI, conjunctiva and sclera clear  NECK: Supple  CHEST/LUNG: Coarse breath sounds    HEART: Regular rate and rhythm; S1S2  ABDOMEN: Soft, Nontender, Nondistended; Bowel sounds present  EXTREMITIES:  2+ Peripheral Pulses, No clubbing, cyanosis, or edema  PSYCH: AAOx3  NEUROLOGY: non-focal  SKIN: Stage 3 ulcer on heel    LABS:                        8.7    29.93 )-----------( 161      ( 03 Oct 2018 07:43 )             28.4     10-03    144  |  104  |  34<H>  ----------------------------<  271<H>  3.9   |  31  |  1.98<H>    Ca    8.7      03 Oct 2018 05:39  Phos  1.9     10-03  Mg     2.2     10-03            Urinalysis Basic - ( 01 Oct 2018 22:56 )    Color: Yellow / Appearance: Slightly Turbid / S.013 / pH: x  Gluc: x / Ketone: Negative  / Bili: Negative / Urobili: Negative mg/dL   Blood: x / Protein: 30 mg/dL / Nitrite: Positive   Leuk Esterase: Large / RBC: 25 /HPF /  /HPF   Sq Epi: x / Non Sq Epi: 0 /HPF / Bacteria: Negative        RADIOLOGY & ADDITIONAL TESTS:    Imaging Personally Reviewed:  Tele reviewed: SR/Erastoced     Consultant(s) Notes Reviewed:      Care Discussed with Consultants/Other Providers:

## 2018-10-03 NOTE — PROGRESS NOTE ADULT - SUBJECTIVE AND OBJECTIVE BOX
INTERVAL HPI/OVERNIGHT EVENTS:  no acute events overnight     Allergies    Cipro (Other)  fluoroquinolone antibiotics (Unknown)  latex (Unknown)    Intolerances      ADVANCE DIRECTIVES:    DNR   MOLST [ ] YES [x ] NO     MEDICATIONS  (STANDING):  ALBUTerol/ipratropium for Nebulization 3 milliLiter(s) Nebulizer every 6 hours  apixaban 2.5 milliGRAM(s) Oral every 12 hours  AQUAPHOR (petrolatum Ointment) 1 Application(s) Topical two times a day  camphor 0.5%/menthol 0.5% Topical Lotion 1 Application(s) Topical three times a day  carvedilol 25 milliGRAM(s) Oral every 12 hours  collagenase Ointment 1 Application(s) Topical daily  dextrose 5%. 1000 milliLiter(s) (50 mL/Hr) IV Continuous <Continuous>  dextrose 50% Injectable 12.5 Gram(s) IV Push once  dextrose 50% Injectable 25 Gram(s) IV Push once  dextrose 50% Injectable 25 Gram(s) IV Push once  docusate sodium Liquid 100 milliGRAM(s) Enteral Tube three times a day  finasteride 5 milliGRAM(s) Oral daily  folic acid 1 milliGRAM(s) Oral daily  hydrALAZINE 25 milliGRAM(s) Oral every 8 hours  insulin NPH human recombinant 12 Unit(s) SubCutaneous every 12 hours  insulin regular  human corrective regimen sliding scale   SubCutaneous every 6 hours  potassium phosphate IVPB 15 milliMole(s) IV Intermittent every 6 hours  senna Syrup 10 milliLiter(s) Oral at bedtime    MEDICATIONS  (PRN):  acetaminophen    Suspension .. 650 milliGRAM(s) Oral every 6 hours PRN Moderate Pain (4 - 6)  dextrose 40% Gel 15 Gram(s) Oral once PRN Blood Glucose LESS THAN 70 milliGRAM(s)/deciliter  glucagon  Injectable 1 milliGRAM(s) IntraMuscular once PRN Glucose LESS THAN 70 milligrams/deciliter      PRESENT SYMPTOMS:  Source: [x ] Patient   [x ] Family   [ ] Team     Pain:                        [x ] No [ ] Yes             [ ] Mild [ ] Moderate [ ] Severe    Onset -  Location -  Duration -  Character -  Alleviating/Aggravating -  Radiation -  Timing -    Dyspnea:                [x ] No [ ] Yes             [ ] Mild [ ] Moderate [ ] Severe    Anxiety:                  [ ] No [x ] Yes             [ x] Mild [ ] Moderate [ ] Severe    Fatigue:                  [ ] No [x ] Yes             [ ] Mild [ ] Moderate [x ] Severe    Nausea:                  [ ] No [ ] Yes             [ ] Mild [ ] Moderate [ ] Severe    Loss of appetite:   [x ] No [ ] Yes             [ ] Mild [ ] Moderate [ ] Severe    Constipation:        [x ] No [ ] Yes             [ ] Mild [ ] Moderate [ ] Severe    Other Symptoms:  [x ] All other review of systems negative   [ ] Unable to obtain due to poor mentation     Karnofsky Performance Score/Palliative Performance Status Version 2:  50   %    PHYSICAL EXAM:  Vital Signs Last 24 Hrs  T(C): 36.7 (03 Oct 2018 13:04), Max: 36.9 (03 Oct 2018 04:21)  T(F): 98.1 (03 Oct 2018 13:04), Max: 98.4 (03 Oct 2018 04:21)  HR: 72 (03 Oct 2018 13:04) (72 - 82)  BP: 144/66 (03 Oct 2018 13:04) (144/66 - 162/80)  BP(mean): --  RR: 17 (03 Oct 2018 13:04) (17 - 22)  SpO2: 94% (03 Oct 2018 13:04) (93% - 95%) I&O's Summary    02 Oct 2018 07:01  -  03 Oct 2018 07:00  --------------------------------------------------------  IN: 600 mL / OUT: 950 mL / NET: -350 mL    03 Oct 2018 07:01  -  03 Oct 2018 14:15  --------------------------------------------------------  IN: 0 mL / OUT: 0 mL / NET: 0 mL    General:  [x  ] Normal. Alert, Oriented x 3.     HEENT:  [ ] Normal   [x ] Dry mouth / NGT [ ] ET Tube    [ ] Trach  [ ] Oral lesions    Lungs:   [ ] Clear [ ] Tachypnea  [ ] Audible excessive secretions   [ ] Rhonchi        [ ] Right [ ] Left [x ] Bilateral  [ ] Crackles        [ ] Right [ ] Left [ ] Bilateral  [ ] Wheezing     [ ] Right [ ] Left [ ] Bilateral    Cardiovascular:  S1, S2. No S3. No murmurs     Abdomen: [ ] Soft  [ ] Distended   [ x] +BS  [ ] Non tender [ ] Tender  [ ]PEG   [ x]OGT/ NGT   Last BM:       Genitourinary: [ ] Normal [ ] Incontinent   [ ] Oliguria/Anuria   [x ] Davis    Musculoskeletal:  [ ] Normal   [ ] Weakness  [x ] Bedbound/Wheelchair bound [ ] Edema    Neurological: [x ] No focal deficits  [ ] Cognitive impairment  [ ] Dysphagia [ ] Dysarthria [ ] Paresis [ ] Other     Skin: [ ] Normal   [x ] Pressure ulcer(s) /heel ulcer stage 3[ ] Rash    LABS:                        8.7    29.93 )-----------( 161      ( 03 Oct 2018 07:43 )             28.4     10-03    144  |  104  |  34<H>  ----------------------------<  271<H>  3.9   |  31  |  1.98<H>    Ca    8.7      03 Oct 2018 05:39  Phos  1.9     10-03  Mg     2.2     10-03    Urinalysis Basic - ( 01 Oct 2018 22:56 )    Color: Yellow / Appearance: Slightly Turbid / S.013 / pH: x  Gluc: x / Ketone: Negative  / Bili: Negative / Urobili: Negative mg/dL   Blood: x / Protein: 30 mg/dL / Nitrite: Positive   Leuk Esterase: Large / RBC: 25 /HPF /  /HPF   Sq Epi: x / Non Sq Epi: 0 /HPF / Bacteria: Negative    Shock: [ ] Septic [ ] Cardiogenic [ ] Neurologic [ ] Hypovolemic  Vasopressors x   Inotrophs x     Oral Intake: [x ] Unable/mouth care only [ ] Minimal [ ] Moderate [ ] Full Capability    Diet: [ ] NPO [x ] Tube feeds [ ] TPN [ ] Other     RADIOLOGY & ADDITIONAL STUDIES:  EXAM:  XR CHEST PORTABLE URGENT 1V                          FINDINGS/  IMPRESSION:    Moderate bilateral pleural effusions and pulmonary edema are increased.   Bibasilar opacities may represent atelectasis or consolidation.    The enteric tube courses through the stomach, the tip is beyond the lower   margin of the image. Theleft biventricular ICD is unchanged.      REFERRALS:   [ ] Chaplaincy  [ ] Hospice  [ ] Child Life  [ ] Social Work  [ ] Case management [ ] Holistic Therapy INTERVAL HPI/OVERNIGHT EVENTS:  no acute events overnight     Allergies    Cipro (Other)  fluoroquinolone antibiotics (Unknown)  latex (Unknown)    Intolerances      ADVANCE DIRECTIVES:    DNR   MOLST [ ] YES [x ] NO     MEDICATIONS  (STANDING):  ALBUTerol/ipratropium for Nebulization 3 milliLiter(s) Nebulizer every 6 hours  apixaban 2.5 milliGRAM(s) Oral every 12 hours  AQUAPHOR (petrolatum Ointment) 1 Application(s) Topical two times a day  camphor 0.5%/menthol 0.5% Topical Lotion 1 Application(s) Topical three times a day  carvedilol 25 milliGRAM(s) Oral every 12 hours  collagenase Ointment 1 Application(s) Topical daily  dextrose 5%. 1000 milliLiter(s) (50 mL/Hr) IV Continuous <Continuous>  dextrose 50% Injectable 12.5 Gram(s) IV Push once  dextrose 50% Injectable 25 Gram(s) IV Push once  dextrose 50% Injectable 25 Gram(s) IV Push once  docusate sodium Liquid 100 milliGRAM(s) Enteral Tube three times a day  finasteride 5 milliGRAM(s) Oral daily  folic acid 1 milliGRAM(s) Oral daily  hydrALAZINE 25 milliGRAM(s) Oral every 8 hours  insulin NPH human recombinant 12 Unit(s) SubCutaneous every 12 hours  insulin regular  human corrective regimen sliding scale   SubCutaneous every 6 hours  potassium phosphate IVPB 15 milliMole(s) IV Intermittent every 6 hours  senna Syrup 10 milliLiter(s) Oral at bedtime    MEDICATIONS  (PRN):  acetaminophen    Suspension .. 650 milliGRAM(s) Oral every 6 hours PRN Moderate Pain (4 - 6)  dextrose 40% Gel 15 Gram(s) Oral once PRN Blood Glucose LESS THAN 70 milliGRAM(s)/deciliter  glucagon  Injectable 1 milliGRAM(s) IntraMuscular once PRN Glucose LESS THAN 70 milligrams/deciliter      PRESENT SYMPTOMS:  Source: [x ] Patient   [x ] Family   [ ] Team     Pain:                        [x ] No [ ] Yes             [ ] Mild [ ] Moderate [ ] Severe    Onset -  Location -  Duration -  Character -  Alleviating/Aggravating -  Radiation -  Timing -    Dyspnea:                [x ] No [ ] Yes             [ ] Mild [ ] Moderate [ ] Severe    Anxiety:                  [ ] No [x ] Yes             [ x] Mild [ ] Moderate [ ] Severe    Fatigue:                  [ ] No [x ] Yes             [ ] Mild [ ] Moderate [x ] Severe    Nausea:                  [ ] No [ ] Yes             [ ] Mild [ ] Moderate [ ] Severe    Loss of appetite:   [x ] No [ ] Yes             [ ] Mild [ ] Moderate [ ] Severe    Constipation:        [x ] No [ ] Yes             [ ] Mild [ ] Moderate [ ] Severe    Other Symptoms:  [x ] All other review of systems negative   [ ] Unable to obtain due to poor mentation     Karnofsky Performance Score/Palliative Performance Status Version 2:  50   %    PHYSICAL EXAM:  Vital Signs Last 24 Hrs  T(C): 36.7 (03 Oct 2018 13:04), Max: 36.9 (03 Oct 2018 04:21)  T(F): 98.1 (03 Oct 2018 13:04), Max: 98.4 (03 Oct 2018 04:21)  HR: 72 (03 Oct 2018 13:04) (72 - 82)  BP: 144/66 (03 Oct 2018 13:04) (144/66 - 162/80)  BP(mean): --  RR: 17 (03 Oct 2018 13:04) (17 - 22)  SpO2: 94% (03 Oct 2018 13:04) (93% - 95%) I&O's Summary    02 Oct 2018 07:01  -  03 Oct 2018 07:00  --------------------------------------------------------  IN: 600 mL / OUT: 950 mL / NET: -350 mL    03 Oct 2018 07:01  -  03 Oct 2018 14:15  --------------------------------------------------------  IN: 0 mL / OUT: 0 mL / NET: 0 mL    General:  [x  ] Normal. Alert, Oriented x 3.     HEENT:  [ ] Normal   [x ] Dry mouth / NGT [ ] ET Tube    [ ] Trach  [ ] Oral lesions    Lungs:   [ ] Clear [ ] Tachypnea  [ ] Audible excessive secretions   [ ] Rhonchi        [ ] Right [ ] Left [x ] Bilateral  [ ] Crackles        [ ] Right [ ] Left [ ] Bilateral  [ ] Wheezing     [ ] Right [ ] Left [ ] Bilateral    Cardiovascular:  S1, S2. No S3. No murmurs     Abdomen: [ ] Soft  [ ] Distended   [ x] +BS  [ ] Non tender [ ] Tender  [ ]PEG   [ x]OGT/ NGT   Last BM:   10/2    Genitourinary: [ ] Normal [ ] Incontinent   [ ] Oliguria/Anuria   [x ] Davis    Musculoskeletal:  [ ] Normal   [ ] Weakness  [x ] Bedbound/Wheelchair bound [ ] Edema    Neurological: [x ] No focal deficits  [ ] Cognitive impairment  [ ] Dysphagia [ ] Dysarthria [ ] Paresis [ ] Other     Skin: [ ] Normal   [x ] Pressure ulcer(s) /heel ulcer stage 3[ ] Rash    LABS:                        8.7    29.93 )-----------( 161      ( 03 Oct 2018 07:43 )             28.4     10-03    144  |  104  |  34<H>  ----------------------------<  271<H>  3.9   |  31  |  1.98<H>    Ca    8.7      03 Oct 2018 05:39  Phos  1.9     10-03  Mg     2.2     10-03    Urinalysis Basic - ( 01 Oct 2018 22:56 )    Color: Yellow / Appearance: Slightly Turbid / S.013 / pH: x  Gluc: x / Ketone: Negative  / Bili: Negative / Urobili: Negative mg/dL   Blood: x / Protein: 30 mg/dL / Nitrite: Positive   Leuk Esterase: Large / RBC: 25 /HPF /  /HPF   Sq Epi: x / Non Sq Epi: 0 /HPF / Bacteria: Negative    Shock: [ ] Septic [ ] Cardiogenic [ ] Neurologic [ ] Hypovolemic  Vasopressors x   Inotrophs x     Oral Intake: [x ] Unable/mouth care only [ ] Minimal [ ] Moderate [ ] Full Capability    Diet: [ ] NPO [x ] Tube feeds [ ] TPN [ ] Other     RADIOLOGY & ADDITIONAL STUDIES:  EXAM:  XR CHEST PORTABLE URGENT 1V                          FINDINGS/  IMPRESSION:    Moderate bilateral pleural effusions and pulmonary edema are increased.   Bibasilar opacities may represent atelectasis or consolidation.    The enteric tube courses through the stomach, the tip is beyond the lower   margin of the image. Theleft biventricular ICD is unchanged.      REFERRALS:   [ ] Chaplaincy  [ ] Hospice  [ ] Child Life  [ ] Social Work  [ ] Case management [ ] Holistic Therapy

## 2018-10-03 NOTE — PROGRESS NOTE ADULT - PROBLEM SELECTOR PLAN 4
- likely decompensated by acute infection, improved s/p diuresis   - hold Lasix and ARB d/t NADIA   - c/w carvedilol 25mg q12hrs  - repeat CXR  - daily weights  - strict I/O's

## 2018-10-03 NOTE — DIETITIAN INITIAL EVALUATION ADULT. - DIET TYPE
NPO with tube feedings/via NGT. Glucerna running at goal of 50 ml/hr x 24 hours. Provides (based off of current body weight 63 kG: Calorie: 1440 kcal (23 kcal/kG); Protein: 72 grams protein (1.1 grams/kG); Free H2O: 966 ml (15 ml/kG). Total volume: 1200 ml.

## 2018-10-03 NOTE — DIETITIAN INITIAL EVALUATION ADULT. - OTHER INFO
Pt seen for LOS admission. Currently observed in bedside chair with NGT feeding running at goal. Pt reports good tolerance to TF, denies N+V, abd. pain, diarrhea, and constipation. RN endorses TF tolerance. Pt denies recent weight change; prior RD notes reveal weight fluctuations pt noted as 132.2 pounds 6/26/18; 118 pounds 8/20/18, now 138.8 pounds per bedscale weight. Pt feels his UBW is around 131 pounds.

## 2018-10-03 NOTE — CHART NOTE - NSCHARTNOTEFT_GEN_A_CORE
Upon Nutritional Assessment by the Registered Dietitian your patient was determined to meet criteria / has evidence of the following diagnosis/diagnoses:          [ ]  Mild Protein Calorie Malnutrition        [ ]  Moderate Protein Calorie Malnutrition        [x] Severe Protein Calorie Malnutrition        [ ] Unspecified Protein Calorie Malnutrition        [ ] Underweight / BMI <19        [ ] Morbid Obesity / BMI > 40      Findings as based on:  [x] Comprehensive nutrition assessment   [x] Nutrition Focused Physical Exam  [ ] Other:       Nutrition Plan/Recommendations:      Please see RD initial assessment for interventions and recommendations    PROVIDER Section:     By signing this assessment you are acknowledging and agree with the diagnosis/diagnoses assigned by the Registered Dietitian    Comments:

## 2018-10-03 NOTE — DIETITIAN INITIAL EVALUATION ADULT. - PROBLEM SELECTOR PLAN 4
elevated troponin w/o ischemic changes on EKG, likely in setting of demand ischemia, patient w/o chest pain  -will trend second set cardiac enzymes to asses for delta  -pt already therapeutically a/c with eliquis hold off on heparin for now.

## 2018-10-04 LAB
ANION GAP SERPL CALC-SCNC: 7 MMOL/L — SIGNIFICANT CHANGE UP (ref 5–17)
BASE EXCESS BLDA CALC-SCNC: 8.1 MMOL/L — HIGH (ref -2–2)
BUN SERPL-MCNC: 36 MG/DL — HIGH (ref 7–23)
CALCIUM SERPL-MCNC: 8.8 MG/DL — SIGNIFICANT CHANGE UP (ref 8.4–10.5)
CHLORIDE SERPL-SCNC: 104 MMOL/L — SIGNIFICANT CHANGE UP (ref 96–108)
CO2 BLDA-SCNC: 33 MMOL/L — HIGH (ref 22–30)
CO2 SERPL-SCNC: 31 MMOL/L — SIGNIFICANT CHANGE UP (ref 22–31)
CREAT SERPL-MCNC: 1.7 MG/DL — HIGH (ref 0.5–1.3)
GAS PNL BLDA: SIGNIFICANT CHANGE UP
GLUCOSE BLDC GLUCOMTR-MCNC: 106 MG/DL — HIGH (ref 70–99)
GLUCOSE BLDC GLUCOMTR-MCNC: 136 MG/DL — HIGH (ref 70–99)
GLUCOSE BLDC GLUCOMTR-MCNC: 181 MG/DL — HIGH (ref 70–99)
GLUCOSE BLDC GLUCOMTR-MCNC: 210 MG/DL — HIGH (ref 70–99)
GLUCOSE BLDC GLUCOMTR-MCNC: 89 MG/DL — SIGNIFICANT CHANGE UP (ref 70–99)
GLUCOSE SERPL-MCNC: 205 MG/DL — HIGH (ref 70–99)
HCO3 BLDA-SCNC: 32 MMOL/L — HIGH (ref 21–29)
HCT VFR BLD CALC: 30.9 % — LOW (ref 39–50)
HGB BLD-MCNC: 9.3 G/DL — LOW (ref 13–17)
MAGNESIUM SERPL-MCNC: 2.2 MG/DL — SIGNIFICANT CHANGE UP (ref 1.6–2.6)
MAGNESIUM SERPL-MCNC: 2.3 MG/DL — SIGNIFICANT CHANGE UP (ref 1.6–2.6)
MCHC RBC-ENTMCNC: 24.5 PG — LOW (ref 27–34)
MCHC RBC-ENTMCNC: 30.1 GM/DL — LOW (ref 32–36)
MCV RBC AUTO: 81.3 FL — SIGNIFICANT CHANGE UP (ref 80–100)
PCO2 BLDA: 40 MMHG — SIGNIFICANT CHANGE UP (ref 32–46)
PH BLDA: 7.51 — HIGH (ref 7.35–7.45)
PHOSPHATE SERPL-MCNC: 3.4 MG/DL — SIGNIFICANT CHANGE UP (ref 2.5–4.5)
PHOSPHATE SERPL-MCNC: 3.5 MG/DL — SIGNIFICANT CHANGE UP (ref 2.5–4.5)
PLATELET # BLD AUTO: 174 K/UL — SIGNIFICANT CHANGE UP (ref 150–400)
PO2 BLDA: 74 MMHG — SIGNIFICANT CHANGE UP (ref 74–108)
POTASSIUM SERPL-MCNC: 4.1 MMOL/L — SIGNIFICANT CHANGE UP (ref 3.5–5.3)
POTASSIUM SERPL-MCNC: 4.5 MMOL/L — SIGNIFICANT CHANGE UP (ref 3.5–5.3)
POTASSIUM SERPL-SCNC: 4.1 MMOL/L — SIGNIFICANT CHANGE UP (ref 3.5–5.3)
POTASSIUM SERPL-SCNC: 4.5 MMOL/L — SIGNIFICANT CHANGE UP (ref 3.5–5.3)
RBC # BLD: 3.8 M/UL — LOW (ref 4.2–5.8)
RBC # FLD: 17.2 % — HIGH (ref 10.3–14.5)
SAO2 % BLDA: 96 % — SIGNIFICANT CHANGE UP (ref 92–96)
SODIUM SERPL-SCNC: 142 MMOL/L — SIGNIFICANT CHANGE UP (ref 135–145)
WBC # BLD: 38.61 K/UL — HIGH (ref 3.8–10.5)
WBC # FLD AUTO: 38.61 K/UL — HIGH (ref 3.8–10.5)

## 2018-10-04 PROCEDURE — 99232 SBSQ HOSP IP/OBS MODERATE 35: CPT

## 2018-10-04 PROCEDURE — 99223 1ST HOSP IP/OBS HIGH 75: CPT | Mod: GC

## 2018-10-04 PROCEDURE — 99233 SBSQ HOSP IP/OBS HIGH 50: CPT

## 2018-10-04 PROCEDURE — 71045 X-RAY EXAM CHEST 1 VIEW: CPT | Mod: 26

## 2018-10-04 PROCEDURE — 93010 ELECTROCARDIOGRAM REPORT: CPT

## 2018-10-04 RX ORDER — IPRATROPIUM/ALBUTEROL SULFATE 18-103MCG
3 AEROSOL WITH ADAPTER (GRAM) INHALATION ONCE
Qty: 0 | Refills: 0 | Status: COMPLETED | OUTPATIENT
Start: 2018-10-04 | End: 2018-10-04

## 2018-10-04 RX ORDER — ASPIRIN/CALCIUM CARB/MAGNESIUM 324 MG
81 TABLET ORAL DAILY
Qty: 0 | Refills: 0 | Status: DISCONTINUED | OUTPATIENT
Start: 2018-10-04 | End: 2018-10-11

## 2018-10-04 RX ORDER — FUROSEMIDE 40 MG
40 TABLET ORAL ONCE
Qty: 0 | Refills: 0 | Status: DISCONTINUED | OUTPATIENT
Start: 2018-10-04 | End: 2018-10-04

## 2018-10-04 RX ORDER — ATORVASTATIN CALCIUM 80 MG/1
40 TABLET, FILM COATED ORAL AT BEDTIME
Qty: 0 | Refills: 0 | Status: DISCONTINUED | OUTPATIENT
Start: 2018-10-04 | End: 2018-10-12

## 2018-10-04 RX ADMIN — Medication 1 APPLICATION(S): at 17:33

## 2018-10-04 RX ADMIN — MEN-PHOR 1 APPLICATION(S): .5; .5 LOTION TOPICAL at 13:07

## 2018-10-04 RX ADMIN — CARVEDILOL PHOSPHATE 25 MILLIGRAM(S): 80 CAPSULE, EXTENDED RELEASE ORAL at 12:31

## 2018-10-04 RX ADMIN — Medication 3 MILLILITER(S): at 17:32

## 2018-10-04 RX ADMIN — HUMAN INSULIN 12 UNIT(S): 100 INJECTION, SUSPENSION SUBCUTANEOUS at 17:32

## 2018-10-04 RX ADMIN — Medication 650 MILLIGRAM(S): at 09:00

## 2018-10-04 RX ADMIN — Medication 100 MILLIGRAM(S): at 13:07

## 2018-10-04 RX ADMIN — Medication 25 MILLIGRAM(S): at 23:36

## 2018-10-04 RX ADMIN — Medication 25 MILLIGRAM(S): at 17:33

## 2018-10-04 RX ADMIN — MEN-PHOR 1 APPLICATION(S): .5; .5 LOTION TOPICAL at 23:36

## 2018-10-04 RX ADMIN — Medication 25 MILLIGRAM(S): at 08:12

## 2018-10-04 RX ADMIN — Medication 1 APPLICATION(S): at 05:53

## 2018-10-04 RX ADMIN — INSULIN HUMAN 2: 100 INJECTION, SOLUTION SUBCUTANEOUS at 05:55

## 2018-10-04 RX ADMIN — ATORVASTATIN CALCIUM 40 MILLIGRAM(S): 80 TABLET, FILM COATED ORAL at 23:36

## 2018-10-04 RX ADMIN — CARVEDILOL PHOSPHATE 25 MILLIGRAM(S): 80 CAPSULE, EXTENDED RELEASE ORAL at 23:36

## 2018-10-04 RX ADMIN — Medication 100 MILLIGRAM(S): at 05:53

## 2018-10-04 RX ADMIN — Medication 1 MILLIGRAM(S): at 12:31

## 2018-10-04 RX ADMIN — Medication 100 MILLIGRAM(S): at 23:36

## 2018-10-04 RX ADMIN — Medication 81 MILLIGRAM(S): at 12:33

## 2018-10-04 RX ADMIN — Medication 3 MILLILITER(S): at 23:36

## 2018-10-04 RX ADMIN — APIXABAN 2.5 MILLIGRAM(S): 2.5 TABLET, FILM COATED ORAL at 23:36

## 2018-10-04 RX ADMIN — HUMAN INSULIN 12 UNIT(S): 100 INJECTION, SUSPENSION SUBCUTANEOUS at 05:53

## 2018-10-04 RX ADMIN — Medication 650 MILLIGRAM(S): at 08:14

## 2018-10-04 RX ADMIN — Medication 3 MILLILITER(S): at 12:30

## 2018-10-04 RX ADMIN — INSULIN HUMAN 0: 100 INJECTION, SOLUTION SUBCUTANEOUS at 12:30

## 2018-10-04 RX ADMIN — MEN-PHOR 1 APPLICATION(S): .5; .5 LOTION TOPICAL at 05:53

## 2018-10-04 RX ADMIN — Medication 3 MILLILITER(S): at 05:53

## 2018-10-04 RX ADMIN — APIXABAN 2.5 MILLIGRAM(S): 2.5 TABLET, FILM COATED ORAL at 12:31

## 2018-10-04 RX ADMIN — SENNA PLUS 10 MILLILITER(S): 8.6 TABLET ORAL at 23:36

## 2018-10-04 NOTE — PROGRESS NOTE ADULT - PROBLEM SELECTOR PLAN 6
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

## 2018-10-04 NOTE — CONSULT NOTE ADULT - ASSESSMENT
88 yo M w/ MI (1980), HFrEF (EF 20%), paroxysmal A-Fib, s/p AICD, CVA (LLE weakness), HTN, T2DM, CKD, BPH w/ chronic indwelling Davis, CLL. Initially admitted with decompensated heart failure and pneumonia, which was treated. Now with worsening hypoxia again. 89 yo M w/ MI (1980), HFrEF (EF 20%), paroxysmal A-Fib, s/p AICD, CVA (LLE weakness), HTN, T2DM, CKD, BPH w/ chronic indwelling Davis, CLL. Initially admitted with decompensated heart failure and pneumonia, which was treated. Now with worsening hypoxia again.   - it would appear that etiology of hypoxia is from aspiration  - he is gurgling and he has scattered rhonchi on exam and he appears dry on exam - no LE edema or JVD   - US shows A lines predominantly and small b/l effusions that are simple appearing; there is focal B lines on the right and dynamic air bronchograms which is consistent with aspiration   - can give antibiotics if indicated- fevers, increased WBC count  - it appears pt will continue to aspirate whether he is being fed by mouth or via NGT  - doubt that antibiotic courses for aspiration will change pt's long term prognosis, which is poor at this point  - continue w/ GOC conversations  - will sign off, please call with any further questions      Carly Bird MD  Pulmonary Critical Care Fellow  [phone] 89 yo M w/ MI (1980), HFrEF (EF 20%), paroxysmal A-Fib, s/p AICD, CVA (LLE weakness), HTN, T2DM, CKD, BPH w/ chronic indwelling Davis, CLL. Initially admitted with decompensated heart failure and pneumonia, which was treated. Now with worsening hypoxic resp failure again.   - it would appear that etiology of hypoxia is from aspiration  - he is gurgling and he has scattered rhonchi on exam and he appears dry on exam - no LE edema or JVD   - US shows A lines predominantly and small b/l effusions that are simple appearing; there is focal B lines on the right and dynamic air bronchograms which is consistent with aspiration   - can give antibiotics if indicated- fevers, increased WBC count  - it appears pt will continue to aspirate whether he is being fed by mouth or via NGT  - doubt that antibiotic courses for aspiration will change pt's long term prognosis, which is poor at this point  - continue w/ GOC conversations  - will sign off, please call with any further questions      Carly Bird MD  Pulmonary Critical Care Fellow  [phone]

## 2018-10-04 NOTE — PROGRESS NOTE ADULT - ASSESSMENT
89 yo M with critical limb ischemia of left heel    # Critical Limb Ischemia  - continue with local wound care per podiatry.    - vascular intervention not in line with GOC.     - continue current medical therapy  - appreciate sub specialty care with this patient      [place]ust[place] [phone]

## 2018-10-04 NOTE — PROGRESS NOTE ADULT - PROBLEM SELECTOR PLAN 4
- likely decompensated by acute infection, improved s/p diuresis   - hold ARB d/t NADIA   - c/w carvedilol 25mg q12hrs  - CXR with moderate b/l pleural effusions and pulmonary edema   - bedside US, will consider resuming diuresis w/ Lasix   - daily weights  - strict I/O's

## 2018-10-04 NOTE — CONSULT NOTE ADULT - SUBJECTIVE AND OBJECTIVE BOX
CHIEF COMPLAINT:    HPI:  89 yo M w/ MI (1980), HFrEF (EF 20%), paroxysmal A-Fib, s/p AICD, CVA (LLE weakness), HTN, T2DM, CKD, BPH w/ chronic indwelling Davis, CLL. Presents to the hospital for acute SOB on 9/25. He started having 3 days of progressive SOB at rest w/ decompensation requiring supplemental O2 in rehab facility. Associated w/ orthopnea. Admitted 8/8-8/21 for Proteus UTI s/p 10 days abx course complicated by aspiration pneumonitis. On admission, he was hypoxic, found to be RSV + and in decompensated heart failure. CTA did not show PE but was consistent with heart failure and possible pneumonia. He was treated for decompensated heart failure and pneumonia/UTI. Initially improved with these treatments with decreasing oxygen requirements. Found to have severe dysphagia and NGT was placed. Renal function began trending upward with diuresis, which was stopped and antibiotic course was completed. Was improving and on RA. This morning pt had episode of hypoxia to 88% on RA, being called for help in management of hypoxia.       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  S/P Inguinal Hernia Repair      FAMILY HISTORY:  No pertinent family history in first degree relatives      SOCIAL HISTORY:  Smoking: denies  Substance Use:  EtOH Use:  Occupation:  Recent Travel:  Country of Birth:      Allergies    Cipro (Other)  fluoroquinolone antibiotics (Unknown)  latex (Unknown)    Intolerances        HOME MEDICATIONS:  · 	furosemide 20 mg oral tablet: 1 tab(s) orally every other day  · 	insulin glargine 100 units/mL subcutaneous solution: 8 unit(s) subcutaneous once a day (at bedtime)  · 	insulin lispro (concentrated) 200 units/mL subcutaneous solution: 4 unit(s) subcutaneous 3 times a day (before meals)  · 	carvedilol 25 mg oral tablet: 1 tab(s) orally every 12 hours  · 	polyethylene glycol 3350 oral powder for reconstitution: 17 gram(s) orally once a day  · 	docusate sodium 100 mg oral capsule: 1 cap(s) orally 3 times a day  · 	senna oral tablet: 2 tab(s) orally once a day (at bedtime)  · 	tamsulosin 0.4 mg oral capsule: 2 cap(s) orally once a day (at bedtime)  · 	acetaminophen 325 mg oral tablet: 2 tab(s) orally every 6 hours, As needed, mild and moderate pain  · 	finasteride 5 mg oral tablet: 1 tab(s) orally once a day  · 	folic acid 1 mg oral tablet: 1 tab(s) orally once a day  · 	Fish Oil 1000 mg oral capsule: 1 cap(s) orally once a day (in the morning)  · 	Eliquis 2.5 mg oral tablet: 1 tab(s) orally 2 times a day    REVIEW OF SYSTEMS:  Constitutional: No fevers or chills. No weight loss. No fatigue or generalised malaise.  Eyes: No itching or discharge from the eyes  ENT: No ear pain. No ear discharge. No nasal congestion. No post nasal drip. No epistaxis. No throat pain. No sore throat. No difficulty swallowing.   CV: No chest pain. No palpitations. No lightheadedness or dizziness.   Resp: No dyspnea at rest. No dyspnea on exertion. No orthopnea. No wheezing. No cough. No stridor. No sputum production. No chest pain with respiration.  GI: No nausea. No vomiting. No diarrhea.  MSK: No joint pain or pain in any extremities  Integumentary: No skin lesions. No pedal edema.  Neurological: No gross motor weakness. No sensory changes.  [ ] All other systems negative  [ ] Unable to assess ROS because ________    OBJECTIVE:  ICU Vital Signs Last 24 Hrs  T(C): 36.8 (04 Oct 2018 05:05), Max: 36.8 (03 Oct 2018 20:50)  T(F): 98.2 (04 Oct 2018 05:05), Max: 98.2 (03 Oct 2018 20:50)  HR: 75 (04 Oct 2018 08:16) (72 - 82)  BP: 156/75 (04 Oct 2018 08:16) (137/62 - 161/68)  BP(mean): --  ABP: --  ABP(mean): --  RR: 16 (04 Oct 2018 05:05) (16 - 19)  SpO2: 88% (04 Oct 2018 05:05) (88% - 96%)        10-03 @ 07:01  -  10-04 @ 07:00  --------------------------------------------------------  IN: 0 mL / OUT: 600 mL / NET: -600 mL    10-04 @ 07:01  -  10-04 @ 10:55  --------------------------------------------------------  IN: 0 mL / OUT: 0 mL / NET: 0 mL      CAPILLARY BLOOD GLUCOSE      POCT Blood Glucose.: 181 mg/dL (04 Oct 2018 07:50)      PHYSICAL EXAM:  General:   HEENT:   Lymph Nodes:  Neck:   Respiratory:   Cardiovascular:   Abdomen:   Extremities:   Skin:   Neurological:  Psychiatry:    HOSPITAL MEDICATIONS:  MEDICATIONS  (STANDING):  ALBUTerol/ipratropium for Nebulization 3 milliLiter(s) Nebulizer every 6 hours  apixaban 2.5 milliGRAM(s) Oral every 12 hours  AQUAPHOR (petrolatum Ointment) 1 Application(s) Topical two times a day  aspirin enteric coated 81 milliGRAM(s) Oral daily  atorvastatin 40 milliGRAM(s) Oral at bedtime  camphor 0.5%/menthol 0.5% Topical Lotion 1 Application(s) Topical three times a day  carvedilol 25 milliGRAM(s) Oral every 12 hours  dextrose 5%. 1000 milliLiter(s) (50 mL/Hr) IV Continuous <Continuous>  dextrose 50% Injectable 12.5 Gram(s) IV Push once  dextrose 50% Injectable 25 Gram(s) IV Push once  dextrose 50% Injectable 25 Gram(s) IV Push once  docusate sodium Liquid 100 milliGRAM(s) Enteral Tube three times a day  finasteride 5 milliGRAM(s) Oral daily  folic acid 1 milliGRAM(s) Oral daily  hydrALAZINE 25 milliGRAM(s) Oral every 8 hours  insulin NPH human recombinant 12 Unit(s) SubCutaneous every 12 hours  insulin regular  human corrective regimen sliding scale   SubCutaneous every 6 hours  senna Syrup 10 milliLiter(s) Oral at bedtime    MEDICATIONS  (PRN):  acetaminophen    Suspension .. 650 milliGRAM(s) Oral every 6 hours PRN Moderate Pain (4 - 6)  dextrose 40% Gel 15 Gram(s) Oral once PRN Blood Glucose LESS THAN 70 milliGRAM(s)/deciliter  glucagon  Injectable 1 milliGRAM(s) IntraMuscular once PRN Glucose LESS THAN 70 milligrams/deciliter      LABS:                        9.3    38.61 )-----------( 174      ( 04 Oct 2018 07:58 )             30.9     Hgb Trend: 9.3<--, 8.7<--, 8.6<--, 8.8<--, 8.6<--  10-04    142  |  104  |  36<H>  ----------------------------<  205<H>  4.1   |  31  |  1.70<H>    Ca    8.8      04 Oct 2018 06:11  Phos  3.4     10-04  Mg     2.3     10-04          Arterial Blood Gas:  10-04 @ 05:47  7.51/40/74/32/96/8.1  ABG lactate: --        MICROBIOLOGY:     RADIOLOGY:  [x ] Reviewed and interpreted by me  < from: CT Angio Chest w/ IV Cont (09.25.18 @ 01:01) >  IMPRESSION:   1.  No pulmonary embolism.  2.  Patchy groundglass opacities in the rightupper, right middle and   right lower lobe suspicious for pneumonia.  3.  Cardiomegaly.  Reflux of contrast in the hepatic veins compatible   with elevated right atrial pressures.  Moderate bilateral pleural   effusions.  Anasarca.  Correlate clinically for congestive heart failure.      < end of copied text >      Bedside US: CHIEF COMPLAINT:    HPI:  89 yo M w/ MI (1980), HFrEF (EF 20%), paroxysmal A-Fib, s/p AICD, CVA (LLE weakness), HTN, T2DM, CKD, BPH w/ chronic indwelling Davis, CLL. Presents to the hospital for acute SOB on 9/25. He started having 3 days of progressive SOB at rest w/ decompensation requiring supplemental O2 in rehab facility. Associated w/ orthopnea. Admitted 8/8-8/21 for Proteus UTI s/p 10 days abx course complicated by aspiration pneumonitis. On admission, he was hypoxic, found to be RSV + and in decompensated heart failure. CTA did not show PE but was consistent with heart failure and possible pneumonia. He was treated for decompensated heart failure and pneumonia/UTI. Initially improved with these treatments with decreasing oxygen requirements. Found to have severe dysphagia and NGT was placed. Renal function began trending upward with diuresis, which was stopped and antibiotic course was completed. Was improving and on RA. This morning pt had episode of hypoxia to 88% on RA, being called for help in management of hypoxia.     Pt has no complaints but is asking when he can eat.       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  S/P Inguinal Hernia Repair      FAMILY HISTORY:  No pertinent family history in first degree relatives      SOCIAL HISTORY:  Smoking: quit 40 years ago, smoked 1 PPD for many years  Substance Use: denies  EtOH Use: denies  Occupation: coffee shop owner  Recent Travel: none  Country of Birth:      Allergies    Cipro (Other)  fluoroquinolone antibiotics (Unknown)  latex (Unknown)    Intolerances        HOME MEDICATIONS:  · 	furosemide 20 mg oral tablet: 1 tab(s) orally every other day  · 	insulin glargine 100 units/mL subcutaneous solution: 8 unit(s) subcutaneous once a day (at bedtime)  · 	insulin lispro (concentrated) 200 units/mL subcutaneous solution: 4 unit(s) subcutaneous 3 times a day (before meals)  · 	carvedilol 25 mg oral tablet: 1 tab(s) orally every 12 hours  · 	polyethylene glycol 3350 oral powder for reconstitution: 17 gram(s) orally once a day  · 	docusate sodium 100 mg oral capsule: 1 cap(s) orally 3 times a day  · 	senna oral tablet: 2 tab(s) orally once a day (at bedtime)  · 	tamsulosin 0.4 mg oral capsule: 2 cap(s) orally once a day (at bedtime)  · 	acetaminophen 325 mg oral tablet: 2 tab(s) orally every 6 hours, As needed, mild and moderate pain  · 	finasteride 5 mg oral tablet: 1 tab(s) orally once a day  · 	folic acid 1 mg oral tablet: 1 tab(s) orally once a day  · 	Fish Oil 1000 mg oral capsule: 1 cap(s) orally once a day (in the morning)  · 	Eliquis 2.5 mg oral tablet: 1 tab(s) orally 2 times a day    REVIEW OF SYSTEMS:  Constitutional: No fevers or chills. No weight loss. No fatigue or generalised malaise.  Eyes: No itching or discharge from the eyes  ENT: No ear pain. No ear discharge. No nasal congestion. No post nasal drip. No epistaxis. No throat pain. No sore throat. No difficulty swallowing.   CV: No chest pain. No palpitations. No lightheadedness or dizziness.   Resp: No dyspnea at rest. No dyspnea on exertion. No orthopnea. No wheezing. No cough. No stridor. No sputum production. No chest pain with respiration.  GI: No nausea. No vomiting. No diarrhea.  MSK: No joint pain or pain in any extremities  Integumentary: No skin lesions. No pedal edema.  Neurological: No gross motor weakness. No sensory changes.  [x ] All other systems negative  [ ] Unable to assess ROS because ________    OBJECTIVE:  ICU Vital Signs Last 24 Hrs  T(C): 36.8 (04 Oct 2018 05:05), Max: 36.8 (03 Oct 2018 20:50)  T(F): 98.2 (04 Oct 2018 05:05), Max: 98.2 (03 Oct 2018 20:50)  HR: 75 (04 Oct 2018 08:16) (72 - 82)  BP: 156/75 (04 Oct 2018 08:16) (137/62 - 161/68)  BP(mean): --  ABP: --  ABP(mean): --  RR: 16 (04 Oct 2018 05:05) (16 - 19)  SpO2: 88% (04 Oct 2018 05:05) (88% - 96%)        10-03 @ 07:01  -  10-04 @ 07:00  --------------------------------------------------------  IN: 0 mL / OUT: 600 mL / NET: -600 mL    10-04 @ 07:01  -  10-04 @ 10:55  --------------------------------------------------------  IN: 0 mL / OUT: 0 mL / NET: 0 mL      CAPILLARY BLOOD GLUCOSE      POCT Blood Glucose.: 181 mg/dL (04 Oct 2018 07:50)      PHYSICAL EXAM:  General: NAD, chronically ill appearing  HEENT:   Lymph Nodes:  Neck:   Respiratory:   Cardiovascular:   Abdomen:   Extremities:   Skin:   Neurological:  Psychiatry:    HOSPITAL MEDICATIONS:  MEDICATIONS  (STANDING):  ALBUTerol/ipratropium for Nebulization 3 milliLiter(s) Nebulizer every 6 hours  apixaban 2.5 milliGRAM(s) Oral every 12 hours  AQUAPHOR (petrolatum Ointment) 1 Application(s) Topical two times a day  aspirin enteric coated 81 milliGRAM(s) Oral daily  atorvastatin 40 milliGRAM(s) Oral at bedtime  camphor 0.5%/menthol 0.5% Topical Lotion 1 Application(s) Topical three times a day  carvedilol 25 milliGRAM(s) Oral every 12 hours  dextrose 5%. 1000 milliLiter(s) (50 mL/Hr) IV Continuous <Continuous>  dextrose 50% Injectable 12.5 Gram(s) IV Push once  dextrose 50% Injectable 25 Gram(s) IV Push once  dextrose 50% Injectable 25 Gram(s) IV Push once  docusate sodium Liquid 100 milliGRAM(s) Enteral Tube three times a day  finasteride 5 milliGRAM(s) Oral daily  folic acid 1 milliGRAM(s) Oral daily  hydrALAZINE 25 milliGRAM(s) Oral every 8 hours  insulin NPH human recombinant 12 Unit(s) SubCutaneous every 12 hours  insulin regular  human corrective regimen sliding scale   SubCutaneous every 6 hours  senna Syrup 10 milliLiter(s) Oral at bedtime    MEDICATIONS  (PRN):  acetaminophen    Suspension .. 650 milliGRAM(s) Oral every 6 hours PRN Moderate Pain (4 - 6)  dextrose 40% Gel 15 Gram(s) Oral once PRN Blood Glucose LESS THAN 70 milliGRAM(s)/deciliter  glucagon  Injectable 1 milliGRAM(s) IntraMuscular once PRN Glucose LESS THAN 70 milligrams/deciliter      LABS:                        9.3    38.61 )-----------( 174      ( 04 Oct 2018 07:58 )             30.9     Hgb Trend: 9.3<--, 8.7<--, 8.6<--, 8.8<--, 8.6<--  10-04    142  |  104  |  36<H>  ----------------------------<  205<H>  4.1   |  31  |  1.70<H>    Ca    8.8      04 Oct 2018 06:11  Phos  3.4     10-04  Mg     2.3     10-04          Arterial Blood Gas:  10-04 @ 05:47  7.51/40/74/32/96/8.1  ABG lactate: --        MICROBIOLOGY:     RADIOLOGY:  [x ] Reviewed and interpreted by me  < from: CT Angio Chest w/ IV Cont (09.25.18 @ 01:01) >  IMPRESSION:   1.  No pulmonary embolism.  2.  Patchy groundglass opacities in the rightupper, right middle and   right lower lobe suspicious for pneumonia.  3.  Cardiomegaly.  Reflux of contrast in the hepatic veins compatible   with elevated right atrial pressures.  Moderate bilateral pleural   effusions.  Anasarca.  Correlate clinically for congestive heart failure.      < end of copied text >      Bedside US: CHIEF COMPLAINT:    HPI:  89 yo M w/ MI (1980), HFrEF (EF 20%), paroxysmal A-Fib, s/p AICD, CVA (LLE weakness), HTN, T2DM, CKD, BPH w/ chronic indwelling Davis, CLL. Presents to the hospital for acute SOB on 9/25. He started having 3 days of progressive SOB at rest w/ decompensation requiring supplemental O2 in rehab facility. Associated w/ orthopnea. Admitted 8/8-8/21 for Proteus UTI s/p 10 days abx course complicated by aspiration pneumonitis. On admission, he was hypoxic, found to be RSV + and in decompensated heart failure. CTA did not show PE but was consistent with heart failure and possible pneumonia. He was treated for decompensated heart failure and pneumonia/UTI. Initially improved with these treatments with decreasing oxygen requirements. Found to have severe dysphagia and NGT was placed. Renal function began trending upward with diuresis, which was stopped and antibiotic course was completed. Was improving and on RA. This morning pt had episode of hypoxia to 88% on RA, being called for help in management of hypoxia.     Pt has no complaints but is asking when he can eat.       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  S/P Inguinal Hernia Repair      FAMILY HISTORY:  No pertinent family history in first degree relatives      SOCIAL HISTORY:  Smoking: quit 40 years ago, smoked 1 PPD for many years  Substance Use: denies  EtOH Use: denies  Occupation: coffee shop owner  Recent Travel: none  Country of Birth:      Allergies    Cipro (Other)  fluoroquinolone antibiotics (Unknown)  latex (Unknown)    Intolerances        HOME MEDICATIONS:  · 	furosemide 20 mg oral tablet: 1 tab(s) orally every other day  · 	insulin glargine 100 units/mL subcutaneous solution: 8 unit(s) subcutaneous once a day (at bedtime)  · 	insulin lispro (concentrated) 200 units/mL subcutaneous solution: 4 unit(s) subcutaneous 3 times a day (before meals)  · 	carvedilol 25 mg oral tablet: 1 tab(s) orally every 12 hours  · 	polyethylene glycol 3350 oral powder for reconstitution: 17 gram(s) orally once a day  · 	docusate sodium 100 mg oral capsule: 1 cap(s) orally 3 times a day  · 	senna oral tablet: 2 tab(s) orally once a day (at bedtime)  · 	tamsulosin 0.4 mg oral capsule: 2 cap(s) orally once a day (at bedtime)  · 	acetaminophen 325 mg oral tablet: 2 tab(s) orally every 6 hours, As needed, mild and moderate pain  · 	finasteride 5 mg oral tablet: 1 tab(s) orally once a day  · 	folic acid 1 mg oral tablet: 1 tab(s) orally once a day  · 	Fish Oil 1000 mg oral capsule: 1 cap(s) orally once a day (in the morning)  · 	Eliquis 2.5 mg oral tablet: 1 tab(s) orally 2 times a day    REVIEW OF SYSTEMS:  Constitutional: No fevers or chills. No weight loss. No fatigue or generalised malaise.  Eyes: No itching or discharge from the eyes  ENT: No ear pain. No ear discharge. No nasal congestion. No post nasal drip. No epistaxis. No throat pain. No sore throat. No difficulty swallowing.   CV: No chest pain. No palpitations. No lightheadedness or dizziness.   Resp: No dyspnea at rest. No dyspnea on exertion. No orthopnea. No wheezing. No cough. No stridor. No sputum production. No chest pain with respiration.  GI: No nausea. No vomiting. No diarrhea.  MSK: No joint pain or pain in any extremities  Integumentary: No skin lesions. No pedal edema.  Neurological: No gross motor weakness. No sensory changes.  [x ] All other systems negative  [ ] Unable to assess ROS because ________    OBJECTIVE:  ICU Vital Signs Last 24 Hrs  T(C): 36.8 (04 Oct 2018 05:05), Max: 36.8 (03 Oct 2018 20:50)  T(F): 98.2 (04 Oct 2018 05:05), Max: 98.2 (03 Oct 2018 20:50)  HR: 75 (04 Oct 2018 08:16) (72 - 82)  BP: 156/75 (04 Oct 2018 08:16) (137/62 - 161/68)  BP(mean): --  ABP: --  ABP(mean): --  RR: 16 (04 Oct 2018 05:05) (16 - 19)  SpO2: 88% (04 Oct 2018 05:05) (88% - 96%)        10-03 @ 07:01  -  10-04 @ 07:00  --------------------------------------------------------  IN: 0 mL / OUT: 600 mL / NET: -600 mL    10-04 @ 07:01  -  10-04 @ 10:55  --------------------------------------------------------  IN: 0 mL / OUT: 0 mL / NET: 0 mL      CAPILLARY BLOOD GLUCOSE      POCT Blood Glucose.: 181 mg/dL (04 Oct 2018 07:50)      PHYSICAL EXAM:  General: NAD, chronically ill appearing, gurgling heard when pt is sleeping  HEENT: dry MM, EOMI, no scleral icterus  Neck: supple, no JVD  Respiratory: course rhonchi diffusely  Cardiovascular: s1s2 RRR   Abdomen: soft, non tender, non distended  Extremities: warm no edema or clubbing  Skin: intact  Neurological: left facial droop, no other focal deficits  Psychiatry: sleeping but awakens, answers some questions, difficult to understand    HOSPITAL MEDICATIONS:  MEDICATIONS  (STANDING):  ALBUTerol/ipratropium for Nebulization 3 milliLiter(s) Nebulizer every 6 hours  apixaban 2.5 milliGRAM(s) Oral every 12 hours  AQUAPHOR (petrolatum Ointment) 1 Application(s) Topical two times a day  aspirin enteric coated 81 milliGRAM(s) Oral daily  atorvastatin 40 milliGRAM(s) Oral at bedtime  camphor 0.5%/menthol 0.5% Topical Lotion 1 Application(s) Topical three times a day  carvedilol 25 milliGRAM(s) Oral every 12 hours  dextrose 5%. 1000 milliLiter(s) (50 mL/Hr) IV Continuous <Continuous>  dextrose 50% Injectable 12.5 Gram(s) IV Push once  dextrose 50% Injectable 25 Gram(s) IV Push once  dextrose 50% Injectable 25 Gram(s) IV Push once  docusate sodium Liquid 100 milliGRAM(s) Enteral Tube three times a day  finasteride 5 milliGRAM(s) Oral daily  folic acid 1 milliGRAM(s) Oral daily  hydrALAZINE 25 milliGRAM(s) Oral every 8 hours  insulin NPH human recombinant 12 Unit(s) SubCutaneous every 12 hours  insulin regular  human corrective regimen sliding scale   SubCutaneous every 6 hours  senna Syrup 10 milliLiter(s) Oral at bedtime    MEDICATIONS  (PRN):  acetaminophen    Suspension .. 650 milliGRAM(s) Oral every 6 hours PRN Moderate Pain (4 - 6)  dextrose 40% Gel 15 Gram(s) Oral once PRN Blood Glucose LESS THAN 70 milliGRAM(s)/deciliter  glucagon  Injectable 1 milliGRAM(s) IntraMuscular once PRN Glucose LESS THAN 70 milligrams/deciliter      LABS:                        9.3    38.61 )-----------( 174      ( 04 Oct 2018 07:58 )             30.9     Hgb Trend: 9.3<--, 8.7<--, 8.6<--, 8.8<--, 8.6<--  10-04    142  |  104  |  36<H>  ----------------------------<  205<H>  4.1   |  31  |  1.70<H>    Ca    8.8      04 Oct 2018 06:11  Phos  3.4     10-04  Mg     2.3     10-04          Arterial Blood Gas:  10-04 @ 05:47  7.51/40/74/32/96/8.1  ABG lactate: --        MICROBIOLOGY:     RADIOLOGY:  [x ] Reviewed and interpreted by me  < from: CT Angio Chest w/ IV Cont (09.25.18 @ 01:01) >  IMPRESSION:   1.  No pulmonary embolism.  2.  Patchy groundglass opacities in the rightupper, right middle and   right lower lobe suspicious for pneumonia.  3.  Cardiomegaly.  Reflux of contrast in the hepatic veins compatible   with elevated right atrial pressures.  Moderate bilateral pleural   effusions.  Anasarca.  Correlate clinically for congestive heart failure.      < end of copied text >      Bedside US:  left anterior A lines   right focal B lines, otherwise A predominant  Small b/l pleural effusion, simple appearing  dynamic air bronchograms noted in right atelectatic lung

## 2018-10-04 NOTE — CHART NOTE - NSCHARTNOTEFT_GEN_A_CORE
MEDICINE NP    MARIAH CAMACHO  89y Male    Patient is a 89y old  Male who presents with a chief complaint of dyspnea (03 Oct 2018 14:55)       > Event Summary:  Notified by RN, Patient with hypoxia, SpO2 88% on R/A and seen at bedside  Patient AAOX3, denies sob, dizziness, lethargy, chest pain.      > Vital Signs Last 24 Hrs  T(C): 36.8 (04 Oct 2018 05:05), Max: 36.8 (03 Oct 2018 20:50)  T(F): 98.2 (04 Oct 2018 05:05), Max: 98.2 (03 Oct 2018 20:50)  HR: 78 (04 Oct 2018 05:05) (72 - 82)  BP: 143/65 (04 Oct 2018 05:05) (137/62 - 162/80)  RR: 16 (04 Oct 2018 05:05) (16 - 19)  SpO2: 88% (04 Oct 2018 05:05) (88% - 96%)    > Review Of System:   General:	No fevers. No chills.    Neurological:  No headache.  No dizziness.   No weakness.      Respiratory :  +productive cough. No SOB. No hemoptysis  Cardiovascular: No chest pain. No palpitations.      >LABS:  ABG - ( 04 Oct 2018 05:47 )  pH, Arterial: 7.51  pH, Blood: x     /  pCO2: 40    /  pO2: 74    / HCO3: 32    / Base Excess: 8.1   /  SaO2: 96          > Physical Assessment:  General: Awake, No acute distress.   Neurology: A&Ox3, nonfocal  CV: +S1S2, RRR.  No peripheral edema  Respiratory: Even, unlabored. RR 22-24.  Lungs w. expiratory wheezing.  +chest congestion with intermittent productive cough.     GI:  + NGT.      Skin: Warm and Dry         > Assessment & Plan:  - HPI:   89 year old man with a history of MI (1980), HFrEF (EF 20%), paroxysmal A-Fib, AICD, CVA (LLE weakness), HTN, T2DM, BPH and CLL.  Presenting to the hospital with acute respiratory failure with hypoxia 2/2 acute on chronic CHF exacerbation, likely exacerbated by sepsis 2' RSV infection & superimposed aspiration PNA with near total atelectasis of left lower lobe.  Now with Hypoxia    1) Hypoxia : Likely multifactorial 2/2 Chest congestion /PNA +/- RSV infection +/- CHF   -Patient noted coughing and expectorating phlegm   -DuoNebs x1 and Chest PT PRN   -Placed on 3L NC for SpO2 now 97%  -Placed on continuous Pulse ox  for SpO2 97%  -ABG with Metabolic Acidosis, will f/u AM BMP/Lytes,   -f/u CXRAY  -can consider Lasix by Primary Team if not improved   -Consider Pulmonary Consult if indicated by Attending   -Will endorse to day team       JOSEF Lou-BC  Medicine Department  #[id number]

## 2018-10-04 NOTE — PROGRESS NOTE ADULT - SUBJECTIVE AND OBJECTIVE BOX
Patient is a 89y old  Male who presents with a chief complaint of dyspnea (04 Oct 2018 10:54)    SUBJECTIVE / OVERNIGHT EVENTS: Pt denies SOB, still coughing.     MEDICATIONS  (STANDING):  ALBUTerol/ipratropium for Nebulization 3 milliLiter(s) Nebulizer every 6 hours  apixaban 2.5 milliGRAM(s) Oral every 12 hours  AQUAPHOR (petrolatum Ointment) 1 Application(s) Topical two times a day  aspirin enteric coated 81 milliGRAM(s) Oral daily  atorvastatin 40 milliGRAM(s) Oral at bedtime  camphor 0.5%/menthol 0.5% Topical Lotion 1 Application(s) Topical three times a day  carvedilol 25 milliGRAM(s) Oral every 12 hours  dextrose 5%. 1000 milliLiter(s) (50 mL/Hr) IV Continuous <Continuous>  dextrose 50% Injectable 12.5 Gram(s) IV Push once  dextrose 50% Injectable 25 Gram(s) IV Push once  dextrose 50% Injectable 25 Gram(s) IV Push once  docusate sodium Liquid 100 milliGRAM(s) Enteral Tube three times a day  finasteride 5 milliGRAM(s) Oral daily  folic acid 1 milliGRAM(s) Oral daily  hydrALAZINE 25 milliGRAM(s) Oral every 8 hours  insulin NPH human recombinant 12 Unit(s) SubCutaneous every 12 hours  insulin regular  human corrective regimen sliding scale   SubCutaneous every 6 hours  senna Syrup 10 milliLiter(s) Oral at bedtime    MEDICATIONS  (PRN):  acetaminophen    Suspension .. 650 milliGRAM(s) Oral every 6 hours PRN Moderate Pain (4 - 6)  dextrose 40% Gel 15 Gram(s) Oral once PRN Blood Glucose LESS THAN 70 milliGRAM(s)/deciliter  glucagon  Injectable 1 milliGRAM(s) IntraMuscular once PRN Glucose LESS THAN 70 milligrams/deciliter      Vital Signs Last 24 Hrs  T(C): 36.8 (04 Oct 2018 05:05), Max: 36.8 (03 Oct 2018 20:50)  T(F): 98.2 (04 Oct 2018 05:05), Max: 98.2 (03 Oct 2018 20:50)  HR: 75 (04 Oct 2018 08:16) (72 - 82)  BP: 156/75 (04 Oct 2018 08:16) (137/62 - 161/68)  BP(mean): --  RR: 16 (04 Oct 2018 05:05) (16 - 19)  SpO2: 88% (04 Oct 2018 05:05) (88% - 96%)  CAPILLARY BLOOD GLUCOSE      POCT Blood Glucose.: 181 mg/dL (04 Oct 2018 07:50)  POCT Blood Glucose.: 210 mg/dL (04 Oct 2018 05:50)  POCT Blood Glucose.: 199 mg/dL (03 Oct 2018 23:32)  POCT Blood Glucose.: 183 mg/dL (03 Oct 2018 21:43)  POCT Blood Glucose.: 247 mg/dL (03 Oct 2018 16:56)  POCT Blood Glucose.: 248 mg/dL (03 Oct 2018 11:40)    I&O's Summary    03 Oct 2018 07:01  -  04 Oct 2018 07:00  --------------------------------------------------------  IN: 0 mL / OUT: 600 mL / NET: -600 mL    04 Oct 2018 07:01  -  04 Oct 2018 11:19  --------------------------------------------------------  IN: 0 mL / OUT: 0 mL / NET: 0 mL        PHYSICAL EXAM:  ENERAL: NAD  HEAD:  Atraumatic, Normocephalic. +NGT   EYES: EOMI, conjunctiva and sclera clear  NECK: Supple  CHEST/LUNG: Coarse breath sounds, b/l ronchi     HEART: Regular rate and rhythm; S1S2  ABDOMEN: Soft, Nontender, Nondistended; Bowel sounds present  EXTREMITIES:  No clubbing, cyanosis, or edema  PSYCH: AAOx3  NEUROLOGY: non-focal  SKIN: Stage 3 ulcer on heel    LABS:                        9.3    38.61 )-----------( 174      ( 04 Oct 2018 07:58 )             30.9     10-04    142  |  104  |  36<H>  ----------------------------<  205<H>  4.1   |  31  |  1.70<H>    Ca    8.8      04 Oct 2018 06:11  Phos  3.4     10-04  Mg     2.3     10-04                RADIOLOGY & ADDITIONAL TESTS:    Imaging Personally Reviewed:  Tele reviewed: SR/Venus, WCT    CXR:   Moderate bilateral pleural effusions and pulmonary edema are increased.   Bibasilar opacities may represent atelectasis or consolidation.    Consultant(s) Notes Reviewed:      Care Discussed with Consultants/Other Providers: spoke w/ Dr Vargas from ID, will not restart Zosyn at this time

## 2018-10-04 NOTE — PROGRESS NOTE ADULT - PROBLEM SELECTOR PLAN 1
- Likely 2/2 aspiration pneumonia, decompensated CHF, and RSV URI   - patient now w/ recurrent hypoxia  likely d/t aspiration vs pulmonary edema   - CTA chest negative for PE, groundglass opacities suspicious for PNA, b/l pleural effusions   - repeat CXR w/ b/l pleural effusions   - pulmonary evaluation, bedside US   - will give Lasix 40mg IV x 1 now  - s/p course of Zosyn - Likely 2/2 aspiration pneumonia, decompensated CHF, and RSV URI   - patient now w/ recurrent hypoxia  likely d/t aspiration vs pulmonary edema   - CTA chest negative for PE, groundglass opacities suspicious for PNA, b/l pleural effusions   - repeat CXR w/ b/l pleural effusions   - pulmonary evaluation, bedside US   - consider resuming Lasix  - s/p course of Zosyn

## 2018-10-04 NOTE — PROGRESS NOTE ADULT - PROBLEM SELECTOR PLAN 9
- DM2 with diabetic nephropathy   - Home meds Lantus 8u qHS, Lispro 2u  - switched Lantus to NPH 12 units q12h while on continuous feeds   - Continue to monitor FS

## 2018-10-04 NOTE — PROGRESS NOTE ADULT - PROBLEM SELECTOR PLAN 10
- high risk of aspiration   - continue tube feeds   - patient/family does not want PEG   - aspiration precautions - high risk of aspiration   - continue tube feeds   - patient/family does not want PEG   - aspiration precautions  - palliative following - high risk of aspiration   - continue tube feeds   - patient does not want PEG, family considering   - aspiration precautions  - palliative following - high risk of aspiration   - continue tube feeds   - patient does not want PEG, but family is still considering it  - aspiration precautions  - palliative following  - spoke w/ family today, want FULL CODE

## 2018-10-04 NOTE — CHART NOTE - NSCHARTNOTEFT_GEN_A_CORE
Notified by RN, patient with 29 beats of WCT on tele monitor.  Patient seen and examined at bedside. Patient alert and oriented, resting comfortably in bed. Denies fever, chills, SOB, CP, palpitations, n/v/abdominal pain.    Vital Signs Last 24 Hrs  T(C): 36.8 (03 Oct 2018 20:50), Max: 36.9 (03 Oct 2018 04:21)  T(F): 98.2 (03 Oct 2018 20:50), Max: 98.4 (03 Oct 2018 04:21)  HR: 77 (03 Oct 2018 23:32) (72 - 82)  BP: 137/62 (03 Oct 2018 23:32) (137/62 - 162/80)  BP(mean): --  RR: 19 (03 Oct 2018 20:50) (17 - 22)  SpO2: 96% (03 Oct 2018 20:50) (94% - 96%)      Physical Exam:  General: Frail elderly male in  NAD, AOx3, nontoxic appearing  Head: non-traumatic, normocephalic, + NGT  CV: RRR, S1S2   Respiratory: coarse breath sounds  Abdominal: (+) bowel sounds x4. Soft, NT, ND,  Ext: no edema    Labs:                          8.7    29.93 )-----------( 161      ( 03 Oct 2018 07:43 )             28.4     10-03    144  |  104  |  34<H>  ----------------------------<  271<H>  3.9   |  31  |  1.98<H>    Ca    8.7      03 Oct 2018 05:39  Phos  1.9     10-03  Mg     2.2     10-03          Assessment & Plan:  89 year old man with a history of MI (1980), HFrEF (EF 20%), paroxysmal A-Fib, AICD, CVA (LLE weakness), HTN, T2DM, BPH and CLL presenting to the hospital with acute respiratory failure with hypoxia 2/2 acute on chronic CHF exacerbation, likely exacerbated by sepsis 2' RSV infection & superimposed aspiration PNA with near total atelectasis of left lower lobe now with recurrent WCT on tele monitor.    1. WCT  - pt remains asymptomatic, has been having multiple episodes of WCT  - currently back to 70s V paced  - will check electrolytes stat, keep K>4, Mag>2  - continue with coreg  - monitor VS closely  - Discussed with [facility], Dr. Colon covering for Dr. Kincaid and in agreement with the plan  - F/u with primary team in       Dorothy Mullen, NP  Medicine  #[id number] Notified by RN, patient with 29 beats of WCT on tele monitor.  Patient seen and examined at bedside. Patient alert and oriented, resting comfortably in bed. Denies fever, chills, SOB, CP, palpitations, n/v/abdominal pain.  Davis discontinued at 1900, TOV. Has not urinated since Davis discontinued    Vital Signs Last 24 Hrs  T(C): 36.8 (03 Oct 2018 20:50), Max: 36.9 (03 Oct 2018 04:21)  T(F): 98.2 (03 Oct 2018 20:50), Max: 98.4 (03 Oct 2018 04:21)  HR: 77 (03 Oct 2018 23:32) (72 - 82)  BP: 137/62 (03 Oct 2018 23:32) (137/62 - 162/80)  BP(mean): --  RR: 19 (03 Oct 2018 20:50) (17 - 22)  SpO2: 96% (03 Oct 2018 20:50) (94% - 96%)      Physical Exam:  General: Frail elderly male in  NAD, AOx3, nontoxic appearing  Head: non-traumatic, normocephalic, + NGT  CV: RRR, S1S2   Respiratory: coarse breath sounds  Abdominal: (+) bowel sounds x4. Soft, NT, ND,  Ext: no edema    Labs:                          8.7    29.93 )-----------( 161      ( 03 Oct 2018 07:43 )             28.4     10-03    144  |  104  |  34<H>  ----------------------------<  271<H>  3.9   |  31  |  1.98<H>    Ca    8.7      03 Oct 2018 05:39  Phos  1.9     10-03  Mg     2.2     10-03          Assessment & Plan:  89 year old man with a history of MI (1980), HFrEF (EF 20%), paroxysmal A-Fib, AICD, CVA (LLE weakness), HTN, T2DM, BPH and CLL presenting to the hospital with acute respiratory failure with hypoxia 2/2 acute on chronic CHF exacerbation, likely exacerbated by sepsis 2' RSV infection & superimposed aspiration PNA with near total atelectasis of left lower lobe now with recurrent WCT on tele monitor.    1. WCT  - pt remains asymptomatic, has been having multiple episodes of WCT  - currently back to 70s V paced on tele monitor  - EKG unchanged from prior study, V paced 73 bpm  - will check electrolytes stat, keep K>4, Mag>2  - continue with coreg  - monitor VS closely    2. Urinary retention  - bladder scan 325 ml, no urge to urinate  - straight catheterization x1  - monitor closely/bladder scan in 6 hrs and notify provider      Discussed with [facility], Dr. Colon covering for Dr. Kincaid and in agreement with the plan  F/u with primary team in jenny Mullen, NP  Medicine  #[id number]

## 2018-10-04 NOTE — PROGRESS NOTE ADULT - SUBJECTIVE AND OBJECTIVE BOX
PAGER:  [phone]               [facility] [address]              EMAIL [email or web address]   OFFICE [phone]                              ********VASCULAR MEDICINE & CARDIOLOGY PROGRESS NOTE********                            CC:  SOB    Interval:  no complaints.  Appears comfortable.  Feet in Z boots       Allergies    Cipro (Other)  fluoroquinolone antibiotics (Unknown)  latex (Unknown)    Intolerances    	   MEDICATIONS  (STANDING):  ALBUTerol/ipratropium for Nebulization 3 milliLiter(s) Nebulizer every 6 hours  apixaban 2.5 milliGRAM(s) Oral every 12 hours  AQUAPHOR (petrolatum Ointment) 1 Application(s) Topical two times a day  aspirin enteric coated 81 milliGRAM(s) Oral daily  atorvastatin 40 milliGRAM(s) Oral at bedtime  camphor 0.5%/menthol 0.5% Topical Lotion 1 Application(s) Topical three times a day  carvedilol 25 milliGRAM(s) Oral every 12 hours  dextrose 5%. 1000 milliLiter(s) (50 mL/Hr) IV Continuous <Continuous>  dextrose 50% Injectable 12.5 Gram(s) IV Push once  dextrose 50% Injectable 25 Gram(s) IV Push once  dextrose 50% Injectable 25 Gram(s) IV Push once  docusate sodium Liquid 100 milliGRAM(s) Enteral Tube three times a day  finasteride 5 milliGRAM(s) Oral daily  folic acid 1 milliGRAM(s) Oral daily  hydrALAZINE 25 milliGRAM(s) Oral every 8 hours  insulin NPH human recombinant 12 Unit(s) SubCutaneous every 12 hours  insulin regular  human corrective regimen sliding scale   SubCutaneous every 6 hours  senna Syrup 10 milliLiter(s) Oral at bedtime    MEDICATIONS  (PRN):  acetaminophen    Suspension .. 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 No nausea, vomiting, or hematemesis; No diarrhea or constipation. No melena or hematochezia.  GENITOURINARY: No dysuria, frequency, hematuria, or incontinence  NEUROLOGICAL: No headaches, memory loss, loss of strength, numbness, or tremors  SKIN: No itching, burning, rashes, or lesions   LYMPH Nodes: No enlarged glands  ENDOCRINE: No heat or cold intolerance; No hair loss  MUSCULOSKELETAL: No joint pain or swelling; No muscle, back, or extremity pain  PSYCHIATRIC: No depression, anxiety, mood swings, or difficulty sleeping  HEME/LYMPH: No easy bruising, or bleeding gums  ALLERY AND IMMUNOLOGIC: No hives or eczema	    [x ] All others negative	  [ ] Unable to obtain     ICU Vital Signs Last 24 Hrs  T(C): 36.8 (04 Oct 2018 05:05), Max: 36.8 (03 Oct 2018 20:50)  T(F): 98.2 (04 Oct 2018 05:05), Max: 98.2 (03 Oct 2018 20:50)  HR: 75 (04 Oct 2018 08:16) (72 - 82)  BP: 156/75 (04 Oct 2018 08:16) (137/62 - 161/68)  BP(mean): --  ABP: --  ABP(mean): --  RR: 16 (04 Oct 2018 05:05) (16 - 19)  SpO2: 88% (04 Oct 2018 05:05) (88% - 96%)        Appearance: Normal	  HEENT:   Normal oral mucosa, PERRL, EOMI	  Lymphatic: No lymphadenopathy  Cardiovascular: irregular, No JVD, No murmurs, No edema  Respiratory: diffuse rhonchi   Psychiatry: A & O x 3, Mood & affect appropriate  Gastrointestinal:  Soft, Non-tender, + BS	  Skin: No rashes, No ecchymoses, No cyanosis	  Neurologic: Non-focal  Extremities: large deep left lateral heel ulcer with eschar, no drainage, non tender, no signs of cellulitis   Vascular: non palpable distal pulses.                          9.3    38.61 )-----------( 174      ( 04 Oct 2018 07:58 )             30.9   10-04    142  |  104  |  36<H>  ----------------------------<  205<H>  4.1   |  31  |  1.70<H>    Ca    8.8      04 Oct 2018 06:11  Phos  3.4     10-04  Mg     2.3     10-04        `

## 2018-10-04 NOTE — PROGRESS NOTE ADULT - PROBLEM SELECTOR PLAN 5
- NADIA on CKD III likely d/t ATN from MONET, baseline cr 1.3, Cr trending down   - hold ARB   - FeNa 3.2% consistent w/ intrinsic NADIA  - Avoid nephrotoxins  - Monitor Cr

## 2018-10-04 NOTE — PROGRESS NOTE ADULT - ASSESSMENT
89 year old man with a history of MI (1980), HFrEF (EF 20%), paroxysmal A-Fib, AICD, CVA (LLE weakness), HTN, T2DM, BPH w/ chronic indwelling Joshi and CLL presenting to the hospital for acute SOB.  CXR s/o pna  Also with pseudomonas bacteriuria though has joshi.  Leucocytosis -chronic from CLL-has increased   s/p 7 days of zosyn already    Stable though with NGT per speech swallow recommendations  declining PEG  will remain at risk for recurrent aspiration  also some URI like symptoms-? sinus /irriation from NGT  Would recommend continue observation for now  but low threshold for restarting abx if any s/s worsening clinically  Aspiration precautions  Chest PT  prognosis guarded  Establish GOC  case d/w Dr Watt  Will tailor plan for ID issues  per course,results.Will defer to primary team on management of other issues.  ID will continue to follow ,please call [phone] if any questions or issues.

## 2018-10-04 NOTE — PROGRESS NOTE ADULT - SUBJECTIVE AND OBJECTIVE BOX
Patient is a 89y old  Male who presents with a chief complaint of dyspnea (04 Oct 2018 11:19)    Being followed by ID for pna-aspiration     Interval history:some cough  Denies CP    No acute events      ROS:  No ,SOB,CP  No N/V/D./abd pain  No other complaints      Antimicrobials:      Other medications reviewed    Vital Signs Last 24 Hrs  T(C): 36.8 (10-04-18 @ 05:05), Max: 36.8 (10-03-18 @ 20:50)  T(F): 98.2 (10-04-18 @ 05:05), Max: 98.2 (10-03-18 @ 20:50)  HR: 75 (10-04-18 @ 08:16) (72 - 78)  BP: 156/75 (10-04-18 @ 08:16) (137/62 - 161/68)  BP(mean): --  RR: 16 (10-04-18 @ 05:05) (16 - 19)  SpO2: 88% (10-04-18 @ 05:05) (88% - 96%)    Physical Exam:        HEENT PERRLA EOMI  NGT    No oral exudate or erythema    Chest Good AE,Occ crackles    CVS RRR S1 S2 WNl No murmur or rub or gallop    Abd soft BS normal No tenderness no masses    IV site no erythema tenderness or discharge    CNS AAO X 3 no focal    Lab Data:                          9.3    38.61 )-----------( 174      ( 04 Oct 2018 07:58 )             30.9   WBC Count: 38.61 (10-04-18 @ 07:58)  WBC Count: 29.93 (10-03-18 @ 07:43)  WBC Count: 26.60 (10-02-18 @ 07:57)  WBC Count: 26.03 (10-01-18 @ 09:32)  WBC Count: 24.03 (09-30-18 @ 08:14)  WBC Count: 19.9 (09-29-18 @ 13:50)  WBC Count: 21.77 (09-29-18 @ 08:02)  WBC Count: 20.68 (09-28-18 @ 08:29)      10-04    142  |  104  |  36<H>  ----------------------------<  205<H>  4.1   |  31  |  1.70<H>    Ca    8.8      04 Oct 2018 06:11  Phos  3.4     10-04  Mg     2.3     10-04          Culture - Urine (collected 02 Oct 2018 08:21)  Source: .Urine Catheterized  Final Report (03 Oct 2018 11:19):    Culture grew 3 or more types of organisms which indicate    collection contamination; consider recollection only if clinically    indicated.          < from: Xray Chest 1 View- PORTABLE-Urgent (10.04.18 @ 06:22) >    IMPRESSION  Small bilateral pleural effusions.      < end of copied text >

## 2018-10-04 NOTE — CONSULT NOTE ADULT - ATTENDING COMMENTS
Pt seen and examined. Acute resp failure with hypoxia 2/2 ongoing aspiration. Bedside US did not show evidence of fluid overload. Would cont aspiration precautions and readdress GOC with family. If spikes can start antibiotics however Pt seen and examined. Acute resp failure with hypoxia 2/2 ongoing aspiration. Bedside US did not show evidence of fluid overload. Would cont aspiration precautions and readdress GOC with family. If spikes can start antibiotics however his overall prognosis is poor and antibiotics are unlikely to alter the disease trajectory.

## 2018-10-05 DIAGNOSIS — R60.9 EDEMA, UNSPECIFIED: ICD-10-CM

## 2018-10-05 LAB
ANION GAP SERPL CALC-SCNC: 8 MMOL/L — SIGNIFICANT CHANGE UP (ref 5–17)
BUN SERPL-MCNC: 38 MG/DL — HIGH (ref 7–23)
CALCIUM SERPL-MCNC: 8.5 MG/DL — SIGNIFICANT CHANGE UP (ref 8.4–10.5)
CHLORIDE SERPL-SCNC: 105 MMOL/L — SIGNIFICANT CHANGE UP (ref 96–108)
CO2 SERPL-SCNC: 29 MMOL/L — SIGNIFICANT CHANGE UP (ref 22–31)
CREAT SERPL-MCNC: 1.51 MG/DL — HIGH (ref 0.5–1.3)
GLUCOSE BLDC GLUCOMTR-MCNC: 114 MG/DL — HIGH (ref 70–99)
GLUCOSE BLDC GLUCOMTR-MCNC: 147 MG/DL — HIGH (ref 70–99)
GLUCOSE BLDC GLUCOMTR-MCNC: 148 MG/DL — HIGH (ref 70–99)
GLUCOSE BLDC GLUCOMTR-MCNC: 181 MG/DL — HIGH (ref 70–99)
GLUCOSE BLDC GLUCOMTR-MCNC: 195 MG/DL — HIGH (ref 70–99)
GLUCOSE BLDC GLUCOMTR-MCNC: 204 MG/DL — HIGH (ref 70–99)
GLUCOSE SERPL-MCNC: 166 MG/DL — HIGH (ref 70–99)
HCT VFR BLD CALC: 28.7 % — LOW (ref 39–50)
HGB BLD-MCNC: 8.4 G/DL — LOW (ref 13–17)
MAGNESIUM SERPL-MCNC: 2.4 MG/DL — SIGNIFICANT CHANGE UP (ref 1.6–2.6)
MCHC RBC-ENTMCNC: 23.7 PG — LOW (ref 27–34)
MCHC RBC-ENTMCNC: 29.3 GM/DL — LOW (ref 32–36)
MCV RBC AUTO: 80.8 FL — SIGNIFICANT CHANGE UP (ref 80–100)
NT-PROBNP SERPL-SCNC: HIGH PG/ML (ref 0–300)
PLATELET # BLD AUTO: 168 K/UL — SIGNIFICANT CHANGE UP (ref 150–400)
POTASSIUM SERPL-MCNC: 3.7 MMOL/L — SIGNIFICANT CHANGE UP (ref 3.5–5.3)
POTASSIUM SERPL-SCNC: 3.7 MMOL/L — SIGNIFICANT CHANGE UP (ref 3.5–5.3)
RBC # BLD: 3.55 M/UL — LOW (ref 4.2–5.8)
RBC # FLD: 17.9 % — HIGH (ref 10.3–14.5)
SODIUM SERPL-SCNC: 142 MMOL/L — SIGNIFICANT CHANGE UP (ref 135–145)
WBC # BLD: 34.36 K/UL — HIGH (ref 3.8–10.5)
WBC # FLD AUTO: 34.36 K/UL — HIGH (ref 3.8–10.5)

## 2018-10-05 PROCEDURE — 93971 EXTREMITY STUDY: CPT | Mod: 26

## 2018-10-05 PROCEDURE — 99233 SBSQ HOSP IP/OBS HIGH 50: CPT

## 2018-10-05 PROCEDURE — 99232 SBSQ HOSP IP/OBS MODERATE 35: CPT

## 2018-10-05 RX ORDER — POTASSIUM CHLORIDE 20 MEQ
40 PACKET (EA) ORAL ONCE
Qty: 0 | Refills: 0 | Status: COMPLETED | OUTPATIENT
Start: 2018-10-05 | End: 2018-10-05

## 2018-10-05 RX ADMIN — Medication 25 MILLIGRAM(S): at 17:40

## 2018-10-05 RX ADMIN — Medication 3 MILLILITER(S): at 23:26

## 2018-10-05 RX ADMIN — APIXABAN 2.5 MILLIGRAM(S): 2.5 TABLET, FILM COATED ORAL at 12:21

## 2018-10-05 RX ADMIN — INSULIN HUMAN 1: 100 INJECTION, SOLUTION SUBCUTANEOUS at 12:21

## 2018-10-05 RX ADMIN — Medication 650 MILLIGRAM(S): at 09:00

## 2018-10-05 RX ADMIN — MEN-PHOR 1 APPLICATION(S): .5; .5 LOTION TOPICAL at 22:44

## 2018-10-05 RX ADMIN — Medication 3 MILLILITER(S): at 05:41

## 2018-10-05 RX ADMIN — CARVEDILOL PHOSPHATE 25 MILLIGRAM(S): 80 CAPSULE, EXTENDED RELEASE ORAL at 23:27

## 2018-10-05 RX ADMIN — APIXABAN 2.5 MILLIGRAM(S): 2.5 TABLET, FILM COATED ORAL at 23:26

## 2018-10-05 RX ADMIN — HUMAN INSULIN 12 UNIT(S): 100 INJECTION, SUSPENSION SUBCUTANEOUS at 17:40

## 2018-10-05 RX ADMIN — Medication 1 APPLICATION(S): at 05:42

## 2018-10-05 RX ADMIN — MEN-PHOR 1 APPLICATION(S): .5; .5 LOTION TOPICAL at 05:42

## 2018-10-05 RX ADMIN — MEN-PHOR 1 APPLICATION(S): .5; .5 LOTION TOPICAL at 13:54

## 2018-10-05 RX ADMIN — Medication 40 MILLIEQUIVALENT(S): at 12:21

## 2018-10-05 RX ADMIN — Medication 25 MILLIGRAM(S): at 23:26

## 2018-10-05 RX ADMIN — Medication 100 MILLIGRAM(S): at 05:41

## 2018-10-05 RX ADMIN — Medication 1 APPLICATION(S): at 17:40

## 2018-10-05 RX ADMIN — HUMAN INSULIN 12 UNIT(S): 100 INJECTION, SUSPENSION SUBCUTANEOUS at 05:41

## 2018-10-05 RX ADMIN — CARVEDILOL PHOSPHATE 25 MILLIGRAM(S): 80 CAPSULE, EXTENDED RELEASE ORAL at 12:21

## 2018-10-05 RX ADMIN — SENNA PLUS 10 MILLILITER(S): 8.6 TABLET ORAL at 22:44

## 2018-10-05 RX ADMIN — Medication 3 MILLILITER(S): at 12:21

## 2018-10-05 RX ADMIN — FINASTERIDE 5 MILLIGRAM(S): 5 TABLET, FILM COATED ORAL at 13:54

## 2018-10-05 RX ADMIN — Medication 1 MILLIGRAM(S): at 12:21

## 2018-10-05 RX ADMIN — Medication 25 MILLIGRAM(S): at 09:01

## 2018-10-05 RX ADMIN — Medication 3 MILLILITER(S): at 17:39

## 2018-10-05 RX ADMIN — ATORVASTATIN CALCIUM 40 MILLIGRAM(S): 80 TABLET, FILM COATED ORAL at 22:45

## 2018-10-05 RX ADMIN — Medication 650 MILLIGRAM(S): at 10:00

## 2018-10-05 RX ADMIN — Medication 100 MILLIGRAM(S): at 22:44

## 2018-10-05 RX ADMIN — INSULIN HUMAN 1: 100 INJECTION, SOLUTION SUBCUTANEOUS at 05:41

## 2018-10-05 NOTE — PROGRESS NOTE ADULT - PROBLEM SELECTOR PLAN 8
- controlled   - c/w eliquis 2.5mG q12  - c/w carvedilol 25mg BID - DM2 with diabetic nephropathy   - Home meds Lantus 8u qHS, Lispro 2u  - switched Lantus to NPH 12 units q12h while on continuous feeds   - Continue to monitor FS

## 2018-10-05 NOTE — PROGRESS NOTE ADULT - PROBLEM SELECTOR PROBLEM 3
Aspiration pneumonia, unspecified aspiration pneumonia type, unspecified laterality, unspecified part of lung Acute on chronic systolic heart failure

## 2018-10-05 NOTE — CHART NOTE - NSCHARTNOTEFT_GEN_A_CORE
Called by RN for 9 beats wct. Pt. seen and examined at bedside. Pt. asymptomatic vitals wnl.  potassium repleted.   c/w all meds as prescribed,   Will check magnesium in am .   No further events on telemetry,   [place] [id number]

## 2018-10-05 NOTE — PROGRESS NOTE ADULT - PROBLEM SELECTOR PROBLEM 2
Sepsis Aspiration pneumonia, unspecified aspiration pneumonia type, unspecified laterality, unspecified part of lung

## 2018-10-05 NOTE — PROGRESS NOTE ADULT - SUBJECTIVE AND OBJECTIVE BOX
MARIAH [id number]  89y  Male  Heart failure  No pertinent family history in first degree relatives  Handoff  MEWS Score  CLL (chronic lymphocytic leukemia)  Paroxysmal A-fib  Heart failure  Acute on Chronic Systolic Hear  S/P Implantation of Automatic  ALMI (Anterolateral Wall Myoca  Asymptomatic Chronic Venous Hy  Adult Onset Diabetes Mellitus,  Abnormal Metabolic State in Di  Abnormal Metabolic State in Di  Abnormal Metabolic State in Di  Personal History of Myocardial  Personal History of Hypertensi  Acute respiratory failure with hypoxia  Acute on chronic heart failure, unspecified heart failure type  Edema, unspecified type  Advance care planning  Dysphagia, unspecified type  Encounter for palliative care  Chronic back pain, unspecified back location, unspecified back pain laterality  Dyspnea  Aspiration pneumonia  NADIA (acute kidney injury)  Acute on chronic systolic heart failure  Aspiration pneumonia, unspecified aspiration pneumonia type, unspecified laterality, unspecified part of lung  Heel ulcer  Acute respiratory failure with hypoxia  CKD (chronic kidney disease) stage 3, GFR 30-59 ml/min  Need for prophylactic measure  Diabetes mellitus  Paroxysmal A-fib  CLL (chronic lymphocytic leukemia)  Demand ischemia  Acute on chronic heart failure, unspecified heart failure type  Sepsis  Hypoxia  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 650 milliGRAM(s) Oral every 6 hours PRN  ALBUTerol/ipratropium for Nebulization 3 milliLiter(s) Nebulizer every 6 hours  apixaban 2.5 milliGRAM(s) Oral every 12 hours  AQUAPHOR (petrolatum Ointment) 1 Application(s) Topical two times a day  aspirin enteric coated 81 milliGRAM(s) Oral daily  atorvastatin 40 milliGRAM(s) Oral at bedtime  camphor 0.5%/menthol 0.5% Topical Lotion 1 Application(s) Topical three times a day  carvedilol 25 milliGRAM(s) Oral every 12 hours  dextrose 40% Gel 15 Gram(s) Oral once PRN  dextrose 5%. 1000 milliLiter(s) IV Continuous <Continuous>  dextrose 50% Injectable 12.5 Gram(s) IV Push once  dextrose 50% Injectable 25 Gram(s) IV Push once  dextrose 50% Injectable 25 Gram(s) IV Push once  docusate sodium Liquid 100 milliGRAM(s) Enteral Tube three times a day  finasteride 5 milliGRAM(s) Oral daily  folic acid 1 milliGRAM(s) Oral daily  glucagon  Injectable 1 milliGRAM(s) IntraMuscular once PRN  hydrALAZINE 25 milliGRAM(s) Oral every 8 hours  insulin NPH human recombinant 12 Unit(s) SubCutaneous every 12 hours  insulin regular  human corrective regimen sliding scale   SubCutaneous every 6 hours  senna Syrup 10 milliLiter(s) Oral at bedtime  Cipro (Other)  fluoroquinolone antibiotics (Unknown)  latex (Unknown)    CBC Full  -  ( 05 Oct 2018 08:13 )  WBC Count : 34.36 K/uL  Hemoglobin : 8.4 g/dL  Hematocrit : 28.7 %  Platelet Count - Automated : 168 K/uL  Mean Cell Volume : 80.8 fl  Mean Cell Hemoglobin : 23.7 pg  Mean Cell Hemoglobin Concentration : 29.3 gm/dL    pt seen at bedside in NAD. dressing to left foot c/d/i pATIENT NOT WEARING Z-FLOAT BOOTS ON LEFT LEG  left heel ulcer noted hyperkeratotic edges with decreased necrotic tissue   stable dry   recommend betadine with DSD   recommend to continue Zfloat boots at all times in bed  No debridement needed  will follow 2-3 x a week to monitor for healing if foot worsens please call [phone] ASAP

## 2018-10-05 NOTE — PROGRESS NOTE ADULT - PROBLEM SELECTOR PLAN 4
- likely decompensated by acute infection, improved s/p diuresis   - hold ARB d/t NADIA   - c/w carvedilol 25mg q12hrs  - CXR with moderate b/l pleural effusions and pulmonary edema   - bedside US 10/4 with A-line predominance and small pleural effusions, air bronchograms consistent w/ aspiration   - holding Lasix as patient appears hypovolemic   - daily weights  - strict I/O's - NADIA on CKD III likely d/t ATN from MONET, baseline cr 1.3, Cr trending down   - holding ARB and Lasix   - FeNa 3.2% consistent w/ intrinsic NADIA  - Avoid nephrotoxins  - Monitor Cr

## 2018-10-05 NOTE — PROGRESS NOTE ADULT - PROBLEM SELECTOR PLAN 6
- Stage 3 ulceration of the left heel, non-healing d/t PAD  - Foot XR neg for osteomyelitis  - LE arterial duplex w/ flow-limiting lesion affecting the left anterior tibial artery   in the mid calf  -ASA/Lipitor for PAD   -family does not want vascular intervention at this time   -continue with wound care - chronic leukocytosis d/t CLL  - IgG level >500 therefore not a candidate for IVIG  - supportive care as per heme   - monitor CBC

## 2018-10-05 NOTE — PROGRESS NOTE ADULT - SUBJECTIVE AND OBJECTIVE BOX
Patient is a 89y old  Male who presents with a chief complaint of dyspnea (05 Oct 2018 10:19)    Being followed by ID for aspiration pneumonia    Interval history:  No acute events      ROS:  No cough,SOB,CP  No N/V/D./abd pain  No other complaints      Antimicrobials:  none    Other medications reviewed    Vital Signs Last 24 Hrs  T(C): 37 (10-05-18 @ 12:22), Max: 37 (10-05-18 @ 12:22)  T(F): 98.6 (10-05-18 @ 12:22), Max: 98.6 (10-05-18 @ 12:22)  HR: 71 (10-05-18 @ 12:19) (67 - 76)  BP: 145/66 (10-05-18 @ 12:19) (115/54 - 146/66)  BP(mean): --  RR: 18 (10-05-18 @ 12:22) (18 - 19)  SpO2: 100% (10-05-18 @ 12:22) (98% - 100%)    Physical Exam:        HEENT PERRLA EOMI  NGT    No oral exudate or erythema    Chest Good AE, Occ rhonchi     CVS RRR S1 S2 WNl No murmur or rub or gallop    Abd soft BS normal No tenderness no masses    IV site no erythema tenderness or discharge    CNS AAO X 3 no focal    Lab Data:                          8.4    34.36 )-----------( 168      ( 05 Oct 2018 08:13 )             28.7   WBC Count: 34.36 (10-05-18 @ 08:13)  WBC Count: 38.61 (10-04-18 @ 07:58)  WBC Count: 29.93 (10-03-18 @ 07:43)  WBC Count: 26.60 (10-02-18 @ 07:57)  WBC Count: 26.03 (10-01-18 @ 09:32)  WBC Count: 24.03 (09-30-18 @ 08:14)  WBC Count: 19.9 (09-29-18 @ 13:50)  WBC Count: 21.77 (09-29-18 @ 08:02)      10-05    142  |  105  |  38<H>  ----------------------------<  166<H>  3.7   |  29  |  1.51<H>    Ca    8.5      05 Oct 2018 06:23  Phos  3.4     10-04  Mg     2.4     10-05          Culture - Urine (collected 02 Oct 2018 08:21)  Source: .Urine Catheterized  Final Report (03 Oct 2018 11:19):    Culture grew 3 or more types of organisms which indicate    collection contamination; consider recollection only if clinically    indicated.

## 2018-10-05 NOTE — PROGRESS NOTE ADULT - PROBLEM SELECTOR PLAN 3
- completed course of Zosyn   - worsening leukocytosis in patient w/ CLL, recurrent hypoxia d/t re-aspiration   - no antibiotics at this time as per ID, but will start IV Vanco/Zosyn if febrile or decompensates clinically   - NPO except meds, has NGT for feeds   - aspiration precautions - likely decompensated by acute infection, improved s/p diuresis   - hold ARB d/t NADIA   - c/w carvedilol 25mg q12hrs  - CXR with moderate b/l pleural effusions and pulmonary edema   - bedside US 10/4 with A-line predominance and small pleural effusions, air bronchograms consistent w/ aspiration   - holding Lasix as patient appears hypovolemic   - daily weights  - strict I/O's

## 2018-10-05 NOTE — PROGRESS NOTE ADULT - PROBLEM SELECTOR PLAN 1
- Likely 2/2 aspiration pneumonia, decompensated CHF, and RSV URI   - patient now w/ recurrent hypoxia d/t aspiration   - CTA chest on admission negative for PE, groundglass opacities suspicious for PNA, b/l pleural effusions   - bedside US 10/4 showed findings consistent w/ aspiration   - s/p diuresis, holding now as patient appears hypovolemic   - s/p course of Zosyn, will restart IV Vanco/Zosyn if decompensates

## 2018-10-05 NOTE — PROGRESS NOTE ADULT - PROBLEM SELECTOR PLAN 9
- DM2 with diabetic nephropathy   - Home meds Lantus 8u qHS, Lispro 2u  - switched Lantus to NPH 12 units q12h while on continuous feeds   - Continue to monitor FS - high risk of aspiration   - NPO, continue tube feeds   - patient does not want PEG, but deferred decision to daughter who is still considering it  - aspiration precautions  - palliative following, patient would benefit from hospice   - family wants FULL CODE

## 2018-10-05 NOTE — CHART NOTE - NSCHARTNOTEFT_GEN_A_CORE
Spoke to daughter Geo Duque via phone yesterday early evening    Reinforced patient's very fragile state with constant aspiration with NPO status & NGT.    Reinforced that lung sounds very rhonchorous.  Pulmonary consult called which reinforced persistent aspiration.  No role for further antibiotics at this time as per ID & pulmonary.  Pulmonary also was able to r/o volume overload so Lasix not indicated at this time especially in setting of metabolic alkalosis.  Reinforced that peg is not the answer since patient cannot manage secretions despite above - reinforced that patient refusing PEG being A&O x 4 which daughter agrees with.  Reinforced that patient unlikely to get into subacute rehab due to very fragile medical condition-  Reinforced that patient cannot stay with NGT.    Reinforced QOL since patient has firmly verbalized desire to eat & drink  Reinforced that Robinul can be used to decrease secretions but acknowledging mucus plugging as complication.    Explained that this decision would be in the setting of understanding that patient wishes, QOL, and happiness would take precedence    Explained that pureed po diet can be done with understanding of aspiration as complication  Wanted exact diet that patient could tolerate and be safe with - explained that there is no "perfect" diet.  Reassured that we would attempt diet this admission.    Discussed hospice with daughter who would not consider it since aide is only 4 hrs/5 days a week in the setting that they had an aide 7 hrs , which she did not want to forego.  Reassured that this may be possible to work out so daughter willing to speak to hospice about it.    Dghtr had bad experience with DNR and her mother whereby care was denied.  Reassured that this is not the case.  Daughter willing to speak to hospice about this concern    Hospice called - spoke to Ina who will speak to palliative care team.  Daughter's contact information (cell & home) given to hospice.    Daughter willing to consider nursing home but wants to know who will pay for it.  Referred this question to care coordinator Joselito Booker since patient has Medicaid managed [place].    Dr. Garrison called and made aware of above.

## 2018-10-05 NOTE — PROGRESS NOTE ADULT - PROBLEM SELECTOR PLAN 2
- sepsis present on admission d/t aspiration PNA, pseudomonas UTI, and RSV infection. Now resolved   - CTA on admission: b/l ground glass opacities, b/l pleural effusions, no PE.  - urine cultures w/ pseudomonas   - Blood Cx NGTD  - s/p course of Zosyn - sepsis present on admission d/t aspiration PNA, pseudomonas UTI, and RSV infection. Now resolved   - completed course of Zosyn   - worsening leukocytosis in patient w/ CLL, recurrent hypoxia likely d/t re-aspiration   - no antibiotics at this time as per ID, but will start IV Vanco/Zosyn if febrile or decompensates clinically   - NPO except meds, has NGT for feeds   - aspiration precautions

## 2018-10-05 NOTE — PROGRESS NOTE ADULT - SUBJECTIVE AND OBJECTIVE BOX
Patient is a 89y old  Male who presents with a chief complaint of dyspnea (04 Oct 2018 12:23)    SUBJECTIVE / OVERNIGHT EVENTS: Pt coughing, denies SOB. Has mild LBP.     MEDICATIONS  (STANDING):  ALBUTerol/ipratropium for Nebulization 3 milliLiter(s) Nebulizer every 6 hours  apixaban 2.5 milliGRAM(s) Oral every 12 hours  AQUAPHOR (petrolatum Ointment) 1 Application(s) Topical two times a day  aspirin enteric coated 81 milliGRAM(s) Oral daily  atorvastatin 40 milliGRAM(s) Oral at bedtime  camphor 0.5%/menthol 0.5% Topical Lotion 1 Application(s) Topical three times a day  carvedilol 25 milliGRAM(s) Oral every 12 hours  dextrose 5%. 1000 milliLiter(s) (50 mL/Hr) IV Continuous <Continuous>  dextrose 50% Injectable 12.5 Gram(s) IV Push once  dextrose 50% Injectable 25 Gram(s) IV Push once  dextrose 50% Injectable 25 Gram(s) IV Push once  docusate sodium Liquid 100 milliGRAM(s) Enteral Tube three times a day  finasteride 5 milliGRAM(s) Oral daily  folic acid 1 milliGRAM(s) Oral daily  hydrALAZINE 25 milliGRAM(s) Oral every 8 hours  insulin NPH human recombinant 12 Unit(s) SubCutaneous every 12 hours  insulin regular  human corrective regimen sliding scale   SubCutaneous every 6 hours  potassium chloride   Solution 40 milliEquivalent(s) Oral once  senna Syrup 10 milliLiter(s) Oral at bedtime    MEDICATIONS  (PRN):  acetaminophen    Suspension .. 650 milliGRAM(s) Oral every 6 hours PRN Moderate Pain (4 - 6)  dextrose 40% Gel 15 Gram(s) Oral once PRN Blood Glucose LESS THAN 70 milliGRAM(s)/deciliter  glucagon  Injectable 1 milliGRAM(s) IntraMuscular once PRN Glucose LESS THAN 70 milligrams/deciliter      Vital Signs Last 24 Hrs  T(C): 36.4 (05 Oct 2018 07:35), Max: 36.7 (04 Oct 2018 13:20)  T(F): 97.5 (05 Oct 2018 07:35), Max: 98 (04 Oct 2018 13:20)  HR: 73 (05 Oct 2018 07:35) (67 - 76)  BP: 142/71 (05 Oct 2018 07:35) (115/54 - 146/66)  BP(mean): --  RR: 19 (05 Oct 2018 07:35) (18 - 19)  SpO2: 100% (05 Oct 2018 07:35) (98% - 100%)  CAPILLARY BLOOD GLUCOSE      POCT Blood Glucose.: 204 mg/dL (05 Oct 2018 07:11)  POCT Blood Glucose.: 195 mg/dL (05 Oct 2018 05:06)  POCT Blood Glucose.: 106 mg/dL (04 Oct 2018 23:23)  POCT Blood Glucose.: 89 mg/dL (04 Oct 2018 17:14)  POCT Blood Glucose.: 136 mg/dL (04 Oct 2018 11:54)    I&O's Summary    04 Oct 2018 07:01  -  05 Oct 2018 07:00  --------------------------------------------------------  IN: 0 mL / OUT: 700 mL / NET: -700 mL    05 Oct 2018 07:01  -  05 Oct 2018 10:20  --------------------------------------------------------  IN: 0 mL / OUT: 0 mL / NET: 0 mL        PHYSICAL EXAM:  ENERAL: NAD  HEAD:  Atraumatic, Normocephalic. +NGT   EYES: EOMI, conjunctiva and sclera clear  NECK: Supple  CHEST/LUNG: Coarse breath sounds, b/l ronchi     HEART: Regular rate and rhythm; S1S2  ABDOMEN: Soft, Nontender, Nondistended; Bowel sounds present  EXTREMITIES:  No clubbing, cyanosis, or edema  PSYCH: AAOx3  NEUROLOGY: non-focal  SKIN: Stage 3 ulcer on left heel    LABS:                        8.4    34.36 )-----------( 168      ( 05 Oct 2018 08:13 )             28.7     10-05    142  |  105  |  38<H>  ----------------------------<  166<H>  3.7   |  29  |  1.51<H>    Ca    8.5      05 Oct 2018 06:23  Phos  3.4     10-04  Mg     2.4     10-05                RADIOLOGY & ADDITIONAL TESTS:    Imaging Personally Reviewed:  Tele reviewed: SR/Venus, WCT    Consultant(s) Notes Reviewed:      Care Discussed with Consultants/Other Providers: Spoke w/ Dr Vargas from ID, no antibiotics at this time but low threshold to restart if clinical status changes Patient is a 89y old  Male who presents with a chief complaint of dyspnea (04 Oct 2018 12:23)    SUBJECTIVE / OVERNIGHT EVENTS: Pt coughing, denies SOB. Has mild LBP.     MEDICATIONS  (STANDING):  ALBUTerol/ipratropium for Nebulization 3 milliLiter(s) Nebulizer every 6 hours  apixaban 2.5 milliGRAM(s) Oral every 12 hours  AQUAPHOR (petrolatum Ointment) 1 Application(s) Topical two times a day  aspirin enteric coated 81 milliGRAM(s) Oral daily  atorvastatin 40 milliGRAM(s) Oral at bedtime  camphor 0.5%/menthol 0.5% Topical Lotion 1 Application(s) Topical three times a day  carvedilol 25 milliGRAM(s) Oral every 12 hours  dextrose 5%. 1000 milliLiter(s) (50 mL/Hr) IV Continuous <Continuous>  dextrose 50% Injectable 12.5 Gram(s) IV Push once  dextrose 50% Injectable 25 Gram(s) IV Push once  dextrose 50% Injectable 25 Gram(s) IV Push once  docusate sodium Liquid 100 milliGRAM(s) Enteral Tube three times a day  finasteride 5 milliGRAM(s) Oral daily  folic acid 1 milliGRAM(s) Oral daily  hydrALAZINE 25 milliGRAM(s) Oral every 8 hours  insulin NPH human recombinant 12 Unit(s) SubCutaneous every 12 hours  insulin regular  human corrective regimen sliding scale   SubCutaneous every 6 hours  potassium chloride   Solution 40 milliEquivalent(s) Oral once  senna Syrup 10 milliLiter(s) Oral at bedtime    MEDICATIONS  (PRN):  acetaminophen    Suspension .. 650 milliGRAM(s) Oral every 6 hours PRN Moderate Pain (4 - 6)  dextrose 40% Gel 15 Gram(s) Oral once PRN Blood Glucose LESS THAN 70 milliGRAM(s)/deciliter  glucagon  Injectable 1 milliGRAM(s) IntraMuscular once PRN Glucose LESS THAN 70 milligrams/deciliter      Vital Signs Last 24 Hrs  T(C): 36.4 (05 Oct 2018 07:35), Max: 36.7 (04 Oct 2018 13:20)  T(F): 97.5 (05 Oct 2018 07:35), Max: 98 (04 Oct 2018 13:20)  HR: 73 (05 Oct 2018 07:35) (67 - 76)  BP: 142/71 (05 Oct 2018 07:35) (115/54 - 146/66)  BP(mean): --  RR: 19 (05 Oct 2018 07:35) (18 - 19)  SpO2: 100% (05 Oct 2018 07:35) (98% - 100%)  CAPILLARY BLOOD GLUCOSE      POCT Blood Glucose.: 204 mg/dL (05 Oct 2018 07:11)  POCT Blood Glucose.: 195 mg/dL (05 Oct 2018 05:06)  POCT Blood Glucose.: 106 mg/dL (04 Oct 2018 23:23)  POCT Blood Glucose.: 89 mg/dL (04 Oct 2018 17:14)  POCT Blood Glucose.: 136 mg/dL (04 Oct 2018 11:54)    I&O's Summary    04 Oct 2018 07:01  -  05 Oct 2018 07:00  --------------------------------------------------------  IN: 0 mL / OUT: 700 mL / NET: -700 mL    05 Oct 2018 07:01  -  05 Oct 2018 10:20  --------------------------------------------------------  IN: 0 mL / OUT: 0 mL / NET: 0 mL        PHYSICAL EXAM:  ENERAL: NAD  HEAD:  Atraumatic, Normocephalic. +NGT   EYES: EOMI, conjunctiva and sclera clear  NECK: Supple  CHEST/LUNG: Coarse breath sounds, b/l ronchi     HEART: Regular rate and rhythm; S1S2  ABDOMEN: Soft, Nontender, Nondistended; Bowel sounds present  EXTREMITIES:  No clubbing, cyanosis, LUE edema   PSYCH: AAOx3  NEUROLOGY: non-focal  SKIN: Stage 3 ulcer on left heel    LABS:                        8.4    34.36 )-----------( 168      ( 05 Oct 2018 08:13 )             28.7     10-05    142  |  105  |  38<H>  ----------------------------<  166<H>  3.7   |  29  |  1.51<H>    Ca    8.5      05 Oct 2018 06:23  Phos  3.4     10-04  Mg     2.4     10-05                RADIOLOGY & ADDITIONAL TESTS:    Imaging Personally Reviewed:  Tele reviewed: SR/Venus, WCT    Consultant(s) Notes Reviewed:      Care Discussed with Consultants/Other Providers: Spoke w/ Dr Vargas from ID, no antibiotics at this time but low threshold to restart if clinical status changes

## 2018-10-05 NOTE — PROGRESS NOTE ADULT - PROBLEM SELECTOR PLAN 7
- chronic leukocytosis d/t CLL  - IgG level >500 therefore not a candidate for IVIG  - supportive care as per heme   - monitor CBC - controlled   - c/w eliquis 2.5mG q12  - c/w carvedilol 25mg BID

## 2018-10-05 NOTE — PROGRESS NOTE ADULT - ASSESSMENT
89 year old man with a history of MI (1980), HFrEF (EF 20%), paroxysmal A-Fib, AICD, CVA (LLE weakness), HTN, T2DM, BPH w/ chronic indwelling Davis and CLL presenting to the hospital for acute SOB.  s/p 7 days of zosyn already for pna,UTI    Stable though with NGT per speech swallow recommendations  declining PEG  will remain at risk for recurrent aspiration  Leucocytosis though has CLL  Clinically stable  Would recommend continue observation for now  but low threshold for restarting abx if any s/s worsening clinically  Aspiration precautions  Chest PT  prognosis guarded  Establish [facility]  case d/w Dr Alysa TOMLIN will be away till 10/9/18.  ID service will cover and follow patient.  Please call [phone] if any issues or questions

## 2018-10-05 NOTE — PROGRESS NOTE ADULT - PROBLEM SELECTOR PLAN 5
- NADIA on CKD III likely d/t ATN from MONET, baseline cr 1.3, Cr trending down   - holding ARB and Lasix   - FeNa 3.2% consistent w/ intrinsic NADIA  - Avoid nephrotoxins  - Monitor Cr - Stage 3 ulceration of the left heel, non-healing d/t PAD  - Foot XR neg for osteomyelitis  - LE arterial duplex w/ flow-limiting lesion affecting the left anterior tibial artery   in the mid calf  -ASA/Lipitor for PAD   -family does not want vascular intervention at this time   -continue with wound care

## 2018-10-05 NOTE — PROGRESS NOTE ADULT - PROBLEM SELECTOR PLAN 10
- high risk of aspiration   - NPO, continue tube feeds   - patient does not want PEG, but deferred decision to daughter who is still considering it  - aspiration precautions  - palliative following  - family wants FULL CODE - high risk of aspiration   - NPO, continue tube feeds   - patient does not want PEG, but deferred decision to daughter who is still considering it  - aspiration precautions  - palliative following, patient would benefit from hospice   - family wants FULL CODE - LUE edema, check LUE doppler

## 2018-10-06 LAB
ANION GAP SERPL CALC-SCNC: 7 MMOL/L — SIGNIFICANT CHANGE UP (ref 5–17)
BUN SERPL-MCNC: 39 MG/DL — HIGH (ref 7–23)
CALCIUM SERPL-MCNC: 8.5 MG/DL — SIGNIFICANT CHANGE UP (ref 8.4–10.5)
CHLORIDE SERPL-SCNC: 109 MMOL/L — HIGH (ref 96–108)
CO2 SERPL-SCNC: 27 MMOL/L — SIGNIFICANT CHANGE UP (ref 22–31)
CREAT SERPL-MCNC: 1.47 MG/DL — HIGH (ref 0.5–1.3)
GLUCOSE BLDC GLUCOMTR-MCNC: 127 MG/DL — HIGH (ref 70–99)
GLUCOSE BLDC GLUCOMTR-MCNC: 85 MG/DL — SIGNIFICANT CHANGE UP (ref 70–99)
GLUCOSE SERPL-MCNC: 127 MG/DL — HIGH (ref 70–99)
HCT VFR BLD CALC: 27.5 % — LOW (ref 39–50)
HGB BLD-MCNC: 8.4 G/DL — LOW (ref 13–17)
MAGNESIUM SERPL-MCNC: 2.4 MG/DL — SIGNIFICANT CHANGE UP (ref 1.6–2.6)
MCHC RBC-ENTMCNC: 24.9 PG — LOW (ref 27–34)
MCHC RBC-ENTMCNC: 30.5 GM/DL — LOW (ref 32–36)
MCV RBC AUTO: 81.6 FL — SIGNIFICANT CHANGE UP (ref 80–100)
PLATELET # BLD AUTO: 157 K/UL — SIGNIFICANT CHANGE UP (ref 150–400)
POTASSIUM SERPL-MCNC: 4.7 MMOL/L — SIGNIFICANT CHANGE UP (ref 3.5–5.3)
POTASSIUM SERPL-SCNC: 4.7 MMOL/L — SIGNIFICANT CHANGE UP (ref 3.5–5.3)
RBC # BLD: 3.37 M/UL — LOW (ref 4.2–5.8)
RBC # FLD: 17.9 % — HIGH (ref 10.3–14.5)
SODIUM SERPL-SCNC: 143 MMOL/L — SIGNIFICANT CHANGE UP (ref 135–145)
WBC # BLD: 30.15 K/UL — HIGH (ref 3.8–10.5)
WBC # FLD AUTO: 30.15 K/UL — HIGH (ref 3.8–10.5)

## 2018-10-06 PROCEDURE — 99233 SBSQ HOSP IP/OBS HIGH 50: CPT | Mod: GC

## 2018-10-06 RX ADMIN — Medication 81 MILLIGRAM(S): at 11:43

## 2018-10-06 RX ADMIN — MEN-PHOR 1 APPLICATION(S): .5; .5 LOTION TOPICAL at 23:15

## 2018-10-06 RX ADMIN — Medication 3 MILLILITER(S): at 11:42

## 2018-10-06 RX ADMIN — Medication 100 MILLIGRAM(S): at 13:56

## 2018-10-06 RX ADMIN — FINASTERIDE 5 MILLIGRAM(S): 5 TABLET, FILM COATED ORAL at 11:42

## 2018-10-06 RX ADMIN — MEN-PHOR 1 APPLICATION(S): .5; .5 LOTION TOPICAL at 13:56

## 2018-10-06 RX ADMIN — APIXABAN 2.5 MILLIGRAM(S): 2.5 TABLET, FILM COATED ORAL at 11:42

## 2018-10-06 RX ADMIN — Medication 1 APPLICATION(S): at 06:14

## 2018-10-06 RX ADMIN — HUMAN INSULIN 12 UNIT(S): 100 INJECTION, SUSPENSION SUBCUTANEOUS at 17:36

## 2018-10-06 RX ADMIN — ATORVASTATIN CALCIUM 40 MILLIGRAM(S): 80 TABLET, FILM COATED ORAL at 23:14

## 2018-10-06 RX ADMIN — Medication 1 APPLICATION(S): at 17:37

## 2018-10-06 RX ADMIN — Medication 100 MILLIGRAM(S): at 06:14

## 2018-10-06 RX ADMIN — Medication 3 MILLILITER(S): at 17:37

## 2018-10-06 RX ADMIN — Medication 3 MILLILITER(S): at 23:12

## 2018-10-06 RX ADMIN — MEN-PHOR 1 APPLICATION(S): .5; .5 LOTION TOPICAL at 06:14

## 2018-10-06 RX ADMIN — HUMAN INSULIN 12 UNIT(S): 100 INJECTION, SUSPENSION SUBCUTANEOUS at 06:33

## 2018-10-06 RX ADMIN — Medication 25 MILLIGRAM(S): at 23:14

## 2018-10-06 RX ADMIN — Medication 1 MILLIGRAM(S): at 11:43

## 2018-10-06 RX ADMIN — APIXABAN 2.5 MILLIGRAM(S): 2.5 TABLET, FILM COATED ORAL at 23:14

## 2018-10-06 RX ADMIN — Medication 3 MILLILITER(S): at 06:14

## 2018-10-06 RX ADMIN — Medication 25 MILLIGRAM(S): at 17:36

## 2018-10-06 RX ADMIN — Medication 650 MILLIGRAM(S): at 11:42

## 2018-10-06 RX ADMIN — Medication 650 MILLIGRAM(S): at 12:15

## 2018-10-06 RX ADMIN — CARVEDILOL PHOSPHATE 25 MILLIGRAM(S): 80 CAPSULE, EXTENDED RELEASE ORAL at 23:14

## 2018-10-06 RX ADMIN — Medication 25 MILLIGRAM(S): at 10:17

## 2018-10-06 NOTE — PROGRESS NOTE ADULT - SUBJECTIVE AND OBJECTIVE BOX
Patient is a 89y old  Male who presents with a chief complaint of dyspnea (04 Oct 2018 12:23)    SUBJECTIVE / OVERNIGHT EVENTS: Pt coughing, denies SOB. no back pain today, asking for food. Yesterday w/ 9 beat WCT, nothing on tele overnight, NSR 60-80.     MEDICATIONS  (STANDING):  ALBUTerol/ipratropium for Nebulization 3 milliLiter(s) Nebulizer every 6 hours  apixaban 2.5 milliGRAM(s) Oral every 12 hours  AQUAPHOR (petrolatum Ointment) 1 Application(s) Topical two times a day  aspirin enteric coated 81 milliGRAM(s) Oral daily  atorvastatin 40 milliGRAM(s) Oral at bedtime  camphor 0.5%/menthol 0.5% Topical Lotion 1 Application(s) Topical three times a day  carvedilol 25 milliGRAM(s) Oral every 12 hours  dextrose 5%. 1000 milliLiter(s) (50 mL/Hr) IV Continuous <Continuous>  dextrose 50% Injectable 12.5 Gram(s) IV Push once  dextrose 50% Injectable 25 Gram(s) IV Push once  dextrose 50% Injectable 25 Gram(s) IV Push once  docusate sodium Liquid 100 milliGRAM(s) Enteral Tube three times a day  finasteride 5 milliGRAM(s) Oral daily  folic acid 1 milliGRAM(s) Oral daily  hydrALAZINE 25 milliGRAM(s) Oral every 8 hours  insulin NPH human recombinant 12 Unit(s) SubCutaneous every 12 hours  insulin regular  human corrective regimen sliding scale   SubCutaneous every 6 hours  potassium chloride   Solution 40 milliEquivalent(s) Oral once  senna Syrup 10 milliLiter(s) Oral at bedtime    MEDICATIONS  (PRN):  acetaminophen    Suspension .. 650 milliGRAM(s) Oral every 6 hours PRN Moderate Pain (4 - 6)  dextrose 40% Gel 15 Gram(s) Oral once PRN Blood Glucose LESS THAN 70 milliGRAM(s)/deciliter  glucagon  Injectable 1 milliGRAM(s) IntraMuscular once PRN Glucose LESS THAN 70 milligrams/deciliter      Vital Signs Last 24 Hrs  T(C): 36.4 (05 Oct 2018 07:35), Max: 36.7 (04 Oct 2018 13:20)  T(F): 97.5 (05 Oct 2018 07:35), Max: 98 (04 Oct 2018 13:20)  HR: 73 (05 Oct 2018 07:35) (67 - 76)  BP: 142/71 (05 Oct 2018 07:35) (115/54 - 146/66)  BP(mean): --  RR: 19 (05 Oct 2018 07:35) (18 - 19)  SpO2: 100% (05 Oct 2018 07:35) (98% - 100%)  CAPILLARY BLOOD GLUCOSE      PHYSICAL EXAM:  GENERAL: NAD, NGT tube in. Copious oral secretions and mucus.   HEAD:  Atraumatic, Normocephalic. +NGT, tube feeds on.    EYES: EOMI, conjunctiva and sclera clear  NECK: Supple  CHEST/LUNG: Coarse breath sounds, b/l ronchi     HEART: Regular rate and rhythm; S1S2  ABDOMEN: Soft, Nontender, Nondistended; Bowel sounds present  EXTREMITIES:  No clubbing, cyanosis, LUE edema   PSYCH: AAOx 2/3.  NEUROLOGY: non-focal  SKIN: Stage 3 ulcer on left heel    LABS:  Gail improving, leukocytosis mild downtrend though persists in low 30s w/ malignancy. No acute lyte abnormalities, anemia stable. K4.7, Mag 2.4                        8.4    34.36 )-----------( 168      ( 05 Oct 2018 08:13 )             28.7     10-05    142  |  105  |  38<H>  ----------------------------<  166<H>  3.7   |  29  |  1.51<H>    Ca    8.5      05 Oct 2018 06:23  Phos  3.4     10-04  Mg     2.4     10-05                RADIOLOGY & ADDITIONAL TESTS:    Imaging Personally Reviewed:  Tele reviewed: SR/Erastoced, WCT x9 beats yesterday 10/5, nothing overnight.     Consultant(s) Notes Reviewed:  yes     Care Discussed with Consultants/Other Providers: yes

## 2018-10-06 NOTE — PROGRESS NOTE ADULT - PROBLEM SELECTOR PLAN 5
- Stage 3 ulceration of the left heel, non-healing d/t PAD  - Foot XR neg for osteomyelitis  - LE arterial duplex w/ flow-limiting lesion affecting the left anterior tibial artery   in the mid calf  -ASA/Lipitor for PAD   -family does not want vascular intervention at this time   -continue with wound care

## 2018-10-06 NOTE — PROGRESS NOTE ADULT - PROBLEM SELECTOR PLAN 2
- sepsis present on admission d/t aspiration PNA, pseudomonas UTI, and RSV infection. Now resolved   - completed course of Zosyn   - worsening leukocytosis in patient w/ CLL, recurrent hypoxia likely d/t re-aspiration   - no antibiotics at this time as per ID, but will start IV Vanco/Zosyn if febrile or decompensates clinically   - NPO except meds, has NGT for feeds   - aspiration precautions

## 2018-10-06 NOTE — PROGRESS NOTE ADULT - PROBLEM SELECTOR PLAN 6
- chronic leukocytosis d/t CLL  - IgG level >500 therefore not a candidate for IVIG  - supportive care as per heme   - monitor CBC

## 2018-10-06 NOTE — PROGRESS NOTE ADULT - PROBLEM SELECTOR PLAN 3
- likely decompensated by acute infection, improved s/p diuresis   - hold ARB d/t NADAI   - c/w carvedilol 25mg q12hrs  - CXR with moderate b/l pleural effusions and pulmonary edema   - bedside US 10/4 with A-line predominance and small pleural effusions, air bronchograms consistent w/ aspiration   - holding Lasix as patient appears hypovolemic   - daily weights  - strict I/O's

## 2018-10-06 NOTE — PROGRESS NOTE ADULT - PROBLEM SELECTOR PLAN 4
- NADIA on CKD III likely d/t ATN from MONET, baseline cr 1.3, Cr trending down   - holding ARB and Lasix   - FeNa 3.2% consistent w/ intrinsic NADIA  - Avoid nephrotoxins  - Monitor Cr  - improving each day.   - no acute electrolyte issues today.

## 2018-10-06 NOTE — PROGRESS NOTE ADULT - PROBLEM SELECTOR PLAN 9
- high risk of aspiration   - NPO, continue tube feeds   - patient asking for oral food this AM, need to clarify GOC w/ family.   - patient does not want PEG, but deferred decision to daughter who is still considering it  - aspiration precautions  - palliative following, patient would benefit from hospice   - family wants FULL CODE

## 2018-10-07 LAB
GLUCOSE BLDC GLUCOMTR-MCNC: 110 MG/DL — HIGH (ref 70–99)
GLUCOSE BLDC GLUCOMTR-MCNC: 111 MG/DL — HIGH (ref 70–99)
GLUCOSE BLDC GLUCOMTR-MCNC: 115 MG/DL — HIGH (ref 70–99)
GLUCOSE BLDC GLUCOMTR-MCNC: 77 MG/DL — SIGNIFICANT CHANGE UP (ref 70–99)
GLUCOSE BLDC GLUCOMTR-MCNC: 86 MG/DL — SIGNIFICANT CHANGE UP (ref 70–99)
GLUCOSE BLDC GLUCOMTR-MCNC: 90 MG/DL — SIGNIFICANT CHANGE UP (ref 70–99)
GLUCOSE BLDC GLUCOMTR-MCNC: 95 MG/DL — SIGNIFICANT CHANGE UP (ref 70–99)
GLUCOSE BLDC GLUCOMTR-MCNC: 99 MG/DL — SIGNIFICANT CHANGE UP (ref 70–99)

## 2018-10-07 PROCEDURE — 99233 SBSQ HOSP IP/OBS HIGH 50: CPT

## 2018-10-07 RX ORDER — DEXTROSE 50 % IN WATER 50 %
12.5 SYRINGE (ML) INTRAVENOUS ONCE
Qty: 0 | Refills: 0 | Status: COMPLETED | OUTPATIENT
Start: 2018-10-07 | End: 2018-10-07

## 2018-10-07 RX ORDER — ACETAMINOPHEN 500 MG
1000 TABLET ORAL EVERY 12 HOURS
Qty: 0 | Refills: 0 | Status: DISCONTINUED | OUTPATIENT
Start: 2018-10-07 | End: 2018-10-08

## 2018-10-07 RX ORDER — HUMAN INSULIN 100 [IU]/ML
10 INJECTION, SUSPENSION SUBCUTANEOUS
Qty: 0 | Refills: 0 | Status: DISCONTINUED | OUTPATIENT
Start: 2018-10-07 | End: 2018-10-08

## 2018-10-07 RX ADMIN — CARVEDILOL PHOSPHATE 25 MILLIGRAM(S): 80 CAPSULE, EXTENDED RELEASE ORAL at 23:52

## 2018-10-07 RX ADMIN — CARVEDILOL PHOSPHATE 25 MILLIGRAM(S): 80 CAPSULE, EXTENDED RELEASE ORAL at 12:40

## 2018-10-07 RX ADMIN — Medication 650 MILLIGRAM(S): at 23:52

## 2018-10-07 RX ADMIN — Medication 1 MILLIGRAM(S): at 12:41

## 2018-10-07 RX ADMIN — Medication 25 MILLIGRAM(S): at 08:15

## 2018-10-07 RX ADMIN — Medication 25 MILLIGRAM(S): at 23:52

## 2018-10-07 RX ADMIN — Medication 100 MILLIGRAM(S): at 06:06

## 2018-10-07 RX ADMIN — ATORVASTATIN CALCIUM 40 MILLIGRAM(S): 80 TABLET, FILM COATED ORAL at 23:52

## 2018-10-07 RX ADMIN — APIXABAN 2.5 MILLIGRAM(S): 2.5 TABLET, FILM COATED ORAL at 12:41

## 2018-10-07 RX ADMIN — SENNA PLUS 10 MILLILITER(S): 8.6 TABLET ORAL at 23:52

## 2018-10-07 RX ADMIN — Medication 100 MILLIGRAM(S): at 23:52

## 2018-10-07 RX ADMIN — MEN-PHOR 1 APPLICATION(S): .5; .5 LOTION TOPICAL at 12:42

## 2018-10-07 RX ADMIN — MEN-PHOR 1 APPLICATION(S): .5; .5 LOTION TOPICAL at 23:51

## 2018-10-07 RX ADMIN — Medication 3 MILLILITER(S): at 06:06

## 2018-10-07 RX ADMIN — FINASTERIDE 5 MILLIGRAM(S): 5 TABLET, FILM COATED ORAL at 12:41

## 2018-10-07 RX ADMIN — Medication 3 MILLILITER(S): at 23:52

## 2018-10-07 RX ADMIN — Medication 12.5 GRAM(S): at 23:38

## 2018-10-07 RX ADMIN — Medication 1 APPLICATION(S): at 06:07

## 2018-10-07 RX ADMIN — Medication 3 MILLILITER(S): at 12:40

## 2018-10-07 RX ADMIN — HUMAN INSULIN 12 UNIT(S): 100 INJECTION, SUSPENSION SUBCUTANEOUS at 06:22

## 2018-10-07 RX ADMIN — HUMAN INSULIN 10 UNIT(S): 100 INJECTION, SUSPENSION SUBCUTANEOUS at 17:19

## 2018-10-07 RX ADMIN — Medication 25 MILLIGRAM(S): at 15:36

## 2018-10-07 RX ADMIN — APIXABAN 2.5 MILLIGRAM(S): 2.5 TABLET, FILM COATED ORAL at 23:52

## 2018-10-07 RX ADMIN — Medication 3 MILLILITER(S): at 17:20

## 2018-10-07 RX ADMIN — Medication 1 APPLICATION(S): at 17:17

## 2018-10-07 RX ADMIN — MEN-PHOR 1 APPLICATION(S): .5; .5 LOTION TOPICAL at 06:07

## 2018-10-07 RX ADMIN — Medication 81 MILLIGRAM(S): at 12:40

## 2018-10-07 NOTE — PROGRESS NOTE ADULT - PROBLEM SELECTOR PLAN 8
- DM2 with diabetic nephropathy   - Home meds Lantus 8u qHS, Lispro 2u  - switched Lantus to NPH 12 to NPH 10 units q12h while on continuous feeds and sugars dipping low this AM.    - Continue to monitor FS  - FS q4 from q6 today to ensure we stay out of hypoglycemic range. - DM2 with diabetic nephropathy   - Home meds Lantus 8u qHS, Lispro 2u  - switched Lantus to NPH 12 to NPH 10 units q12h while on continuous feeds and sugars dipping lower yesterday PM.    - Continue to monitor FS  - FS q4 from q6 today to ensure we stay out of hypoglycemic range.

## 2018-10-07 NOTE — PROGRESS NOTE ADULT - SUBJECTIVE AND OBJECTIVE BOX
Patient is a 89y old  Male who presents with a chief complaint of dyspnea (04 Oct 2018 12:23)    SUBJECTIVE / OVERNIGHT EVENTS: Pt coughing, denies SOB. no back pain today, asking for food. Wants to go home. Vpaced at 60 over night.    MEDICATIONS  (STANDING):  ALBUTerol/ipratropium for Nebulization 3 milliLiter(s) Nebulizer every 6 hours  apixaban 2.5 milliGRAM(s) Oral every 12 hours  AQUAPHOR (petrolatum Ointment) 1 Application(s) Topical two times a day  aspirin enteric coated 81 milliGRAM(s) Oral daily  atorvastatin 40 milliGRAM(s) Oral at bedtime  camphor 0.5%/menthol 0.5% Topical Lotion 1 Application(s) Topical three times a day  carvedilol 25 milliGRAM(s) Oral every 12 hours  dextrose 5%. 1000 milliLiter(s) (50 mL/Hr) IV Continuous <Continuous>  dextrose 50% Injectable 12.5 Gram(s) IV Push once  dextrose 50% Injectable 25 Gram(s) IV Push once  dextrose 50% Injectable 25 Gram(s) IV Push once  docusate sodium Liquid 100 milliGRAM(s) Enteral Tube three times a day  finasteride 5 milliGRAM(s) Oral daily  folic acid 1 milliGRAM(s) Oral daily  hydrALAZINE 25 milliGRAM(s) Oral every 8 hours  insulin NPH human recombinant 12 Unit(s) SubCutaneous every 12 hours  insulin regular  human corrective regimen sliding scale   SubCutaneous every 6 hours  potassium chloride   Solution 40 milliEquivalent(s) Oral once  senna Syrup 10 milliLiter(s) Oral at bedtime    MEDICATIONS  (PRN):  acetaminophen    Suspension .. 650 milliGRAM(s) Oral every 6 hours PRN Moderate Pain (4 - 6)  dextrose 40% Gel 15 Gram(s) Oral once PRN Blood Glucose LESS THAN 70 milliGRAM(s)/deciliter  glucagon  Injectable 1 milliGRAM(s) IntraMuscular once PRN Glucose LESS THAN 70 milligrams/deciliter      Vital Signs Last 24 Hrs  T(C): 36.4 (05 Oct 2018 07:35), Max: 36.7 (04 Oct 2018 13:20)  T(F): 97.5 (05 Oct 2018 07:35), Max: 98 (04 Oct 2018 13:20)  HR: 73 (05 Oct 2018 07:35) (67 - 76)  BP: 142/71 (05 Oct 2018 07:35) (115/54 - 146/66)  BP(mean): --  RR: 19 (05 Oct 2018 07:35) (18 - 19)  SpO2: 100% (05 Oct 2018 07:35) (98% - 100%)  CAPILLARY BLOOD GLUCOSE      PHYSICAL EXAM:  GENERAL: NAD, NGT tube in. Copious oral secretions and mucus.   HEAD:  Atraumatic, Normocephalic. +NGT, tube feeds on.    EYES: EOMI, conjunctiva and sclera clear  NECK: Supple  CHEST/LUNG: Coarse breath sounds, b/l ronchi     HEART: Regular rate and rhythm; S1S2  ABDOMEN: Soft, Nontender, Nondistended; Bowel sounds present  EXTREMITIES:  No clubbing, cyanosis, LUE edema   PSYCH: AAOx 2/3.  NEUROLOGY: non-focal  SKIN: Stage 3 ulcer on left heel    LABS:  Gail improving, leukocytosis mild downtrend though persists in low 30s w/ malignancy. No acute lyte abnormalities, anemia stable. K4.7, Mag 2.4                        8.4    34.36 )-----------( 168      ( 05 Oct 2018 08:13 )             28.7     10-05    142  |  105  |  38<H>  ----------------------------<  166<H>  3.7   |  29  |  1.51<H>    Ca    8.5      05 Oct 2018 06:23  Phos  3.4     10-04  Mg     2.4     10-05                RADIOLOGY & ADDITIONAL TESTS:    Imaging Personally Reviewed:  Tele reviewed: /Venus, WCT x9 beats yesterday 10/5, nothing overnight.     Consultant(s) Notes Reviewed:  yes     Care Discussed with Consultants/Other Providers: yes Patient is a 89y old  Male who presents with a chief complaint of dyspnea (04 Oct 2018 12:23)    SUBJECTIVE / OVERNIGHT EVENTS: Pt coughing, denies SOB. no back pain today, asking for food. Wants to go home. Vpaced at 60 over night.    MEDICATIONS  (STANDING):  ALBUTerol/ipratropium for Nebulization 3 milliLiter(s) Nebulizer every 6 hours  apixaban 2.5 milliGRAM(s) Oral every 12 hours  AQUAPHOR (petrolatum Ointment) 1 Application(s) Topical two times a day  aspirin enteric coated 81 milliGRAM(s) Oral daily  atorvastatin 40 milliGRAM(s) Oral at bedtime  camphor 0.5%/menthol 0.5% Topical Lotion 1 Application(s) Topical three times a day  carvedilol 25 milliGRAM(s) Oral every 12 hours  dextrose 5%. 1000 milliLiter(s) (50 mL/Hr) IV Continuous <Continuous>  dextrose 50% Injectable 12.5 Gram(s) IV Push once  dextrose 50% Injectable 25 Gram(s) IV Push once  dextrose 50% Injectable 25 Gram(s) IV Push once  docusate sodium Liquid 100 milliGRAM(s) Enteral Tube three times a day  finasteride 5 milliGRAM(s) Oral daily  folic acid 1 milliGRAM(s) Oral daily  hydrALAZINE 25 milliGRAM(s) Oral every 8 hours  insulin NPH human recombinant 12 Unit(s) SubCutaneous every 12 hours  insulin regular  human corrective regimen sliding scale   SubCutaneous every 6 hours  potassium chloride   Solution 40 milliEquivalent(s) Oral once  senna Syrup 10 milliLiter(s) Oral at bedtime    MEDICATIONS  (PRN):  acetaminophen    Suspension .. 650 milliGRAM(s) Oral every 6 hours PRN Moderate Pain (4 - 6)  dextrose 40% Gel 15 Gram(s) Oral once PRN Blood Glucose LESS THAN 70 milliGRAM(s)/deciliter  glucagon  Injectable 1 milliGRAM(s) IntraMuscular once PRN Glucose LESS THAN 70 milligrams/deciliter      Vital Signs Last 24 Hrs  T(C): 36.4 (05 Oct 2018 07:35), Max: 36.7 (04 Oct 2018 13:20)  T(F): 97.5 (05 Oct 2018 07:35), Max: 98 (04 Oct 2018 13:20)  HR: 73 (05 Oct 2018 07:35) (67 - 76)  BP: 142/71 (05 Oct 2018 07:35) (115/54 - 146/66)  BP(mean): --  RR: 19 (05 Oct 2018 07:35) (18 - 19)  SpO2: 100% (05 Oct 2018 07:35) (98% - 100%)  CAPILLARY BLOOD GLUCOSE      PHYSICAL EXAM:  GENERAL: NAD, NGT tube in. Copious oral secretions and mucus though improved w/ good oral hygiene   HEAD:  Atraumatic, Normocephalic. +NGT, tube feeds on.    EYES: EOMI, conjunctiva and sclera clear  NECK: Supple  CHEST/LUNG: Coarse breath sounds, b/l ronchi     HEART: Regular rate and rhythm; S1S2  ABDOMEN: Soft, Nontender, Nondistended; Bowel sounds present  EXTREMITIES:  No clubbing, cyanosis, LUE edema   PSYCH: AAOx 2/3.  NEUROLOGY: non-focal  SKIN: Stage 3 ulcer on left heel    LABS:  lab holiday on 10/7 w/ stable cbc and bmp.   Gail improving, leukocytosis mild downtrend though persists in low 30s w/ malignancy. No acute lyte abnormalities, anemia stable. K4.7, Mag 2.4                        8.4    34.36 )-----------( 168      ( 05 Oct 2018 08:13 )             28.7     10-05    142  |  105  |  38<H>  ----------------------------<  166<H>  3.7   |  29  |  1.51<H>    Ca    8.5      05 Oct 2018 06:23  Phos  3.4     10-04  Mg     2.4     10-05                RADIOLOGY & ADDITIONAL TESTS:    Imaging Personally Reviewed:  Tele reviewed: SR/Venus, WCT x9 beats yesterday 10/5, nothing overnight.     Consultant(s) Notes Reviewed:  yes     Care Discussed with Consultants/Other Providers: yes

## 2018-10-07 NOTE — PROGRESS NOTE ADULT - PROBLEM SELECTOR PLAN 3
- likely decompensated by acute infection, improved s/p diuresis   - hold ARB d/t NADIA   - c/w carvedilol 25mg q12hrs  - CXR with moderate b/l pleural effusions and pulmonary edema   - bedside US 10/4 with A-line predominance and small pleural effusions, air bronchograms consistent w/ aspiration   - holding Lasix as patient appears hypovolemic   - daily weights  - strict I/O's

## 2018-10-08 LAB
GLUCOSE BLDC GLUCOMTR-MCNC: 130 MG/DL — HIGH (ref 70–99)
GLUCOSE BLDC GLUCOMTR-MCNC: 134 MG/DL — HIGH (ref 70–99)
GLUCOSE BLDC GLUCOMTR-MCNC: 164 MG/DL — HIGH (ref 70–99)
GLUCOSE BLDC GLUCOMTR-MCNC: 80 MG/DL — SIGNIFICANT CHANGE UP (ref 70–99)

## 2018-10-08 PROCEDURE — 99497 ADVNCD CARE PLAN 30 MIN: CPT

## 2018-10-08 PROCEDURE — 99233 SBSQ HOSP IP/OBS HIGH 50: CPT

## 2018-10-08 RX ORDER — SENNA PLUS 8.6 MG/1
1 TABLET ORAL AT BEDTIME
Qty: 0 | Refills: 0 | Status: DISCONTINUED | OUTPATIENT
Start: 2018-10-08 | End: 2018-10-12

## 2018-10-08 RX ORDER — ACETAMINOPHEN 500 MG
650 TABLET ORAL EVERY 6 HOURS
Qty: 0 | Refills: 0 | Status: DISCONTINUED | OUTPATIENT
Start: 2018-10-08 | End: 2018-10-12

## 2018-10-08 RX ORDER — DOCUSATE SODIUM 100 MG
100 CAPSULE ORAL THREE TIMES A DAY
Qty: 0 | Refills: 0 | Status: DISCONTINUED | OUTPATIENT
Start: 2018-10-08 | End: 2018-10-12

## 2018-10-08 RX ORDER — ACETAMINOPHEN 500 MG
650 TABLET ORAL THREE TIMES A DAY
Qty: 0 | Refills: 0 | Status: DISCONTINUED | OUTPATIENT
Start: 2018-10-08 | End: 2018-10-08

## 2018-10-08 RX ORDER — HUMAN INSULIN 100 [IU]/ML
8 INJECTION, SUSPENSION SUBCUTANEOUS
Qty: 0 | Refills: 0 | Status: DISCONTINUED | OUTPATIENT
Start: 2018-10-08 | End: 2018-10-08

## 2018-10-08 RX ADMIN — Medication 3 MILLILITER(S): at 22:42

## 2018-10-08 RX ADMIN — INSULIN HUMAN 1: 100 INJECTION, SOLUTION SUBCUTANEOUS at 06:12

## 2018-10-08 RX ADMIN — APIXABAN 2.5 MILLIGRAM(S): 2.5 TABLET, FILM COATED ORAL at 11:14

## 2018-10-08 RX ADMIN — APIXABAN 2.5 MILLIGRAM(S): 2.5 TABLET, FILM COATED ORAL at 22:41

## 2018-10-08 RX ADMIN — Medication 100 MILLIGRAM(S): at 05:41

## 2018-10-08 RX ADMIN — Medication 25 MILLIGRAM(S): at 11:24

## 2018-10-08 RX ADMIN — MEN-PHOR 1 APPLICATION(S): .5; .5 LOTION TOPICAL at 22:42

## 2018-10-08 RX ADMIN — Medication 81 MILLIGRAM(S): at 11:14

## 2018-10-08 RX ADMIN — Medication 3 MILLILITER(S): at 05:41

## 2018-10-08 RX ADMIN — Medication 100 MILLIGRAM(S): at 22:41

## 2018-10-08 RX ADMIN — Medication 1 APPLICATION(S): at 18:00

## 2018-10-08 RX ADMIN — Medication 650 MILLIGRAM(S): at 19:00

## 2018-10-08 RX ADMIN — CARVEDILOL PHOSPHATE 25 MILLIGRAM(S): 80 CAPSULE, EXTENDED RELEASE ORAL at 22:41

## 2018-10-08 RX ADMIN — Medication 650 MILLIGRAM(S): at 22:41

## 2018-10-08 RX ADMIN — Medication 1 MILLIGRAM(S): at 11:14

## 2018-10-08 RX ADMIN — Medication 650 MILLIGRAM(S): at 13:09

## 2018-10-08 RX ADMIN — Medication 25 MILLIGRAM(S): at 22:41

## 2018-10-08 RX ADMIN — ATORVASTATIN CALCIUM 40 MILLIGRAM(S): 80 TABLET, FILM COATED ORAL at 22:41

## 2018-10-08 RX ADMIN — Medication 650 MILLIGRAM(S): at 00:22

## 2018-10-08 RX ADMIN — MEN-PHOR 1 APPLICATION(S): .5; .5 LOTION TOPICAL at 05:42

## 2018-10-08 RX ADMIN — Medication 1 APPLICATION(S): at 05:41

## 2018-10-08 RX ADMIN — HUMAN INSULIN 10 UNIT(S): 100 INJECTION, SUSPENSION SUBCUTANEOUS at 06:13

## 2018-10-08 RX ADMIN — Medication 3 MILLILITER(S): at 11:14

## 2018-10-08 RX ADMIN — MEN-PHOR 1 APPLICATION(S): .5; .5 LOTION TOPICAL at 13:10

## 2018-10-08 RX ADMIN — Medication 100 MILLIGRAM(S): at 13:10

## 2018-10-08 RX ADMIN — SENNA PLUS 1 TABLET(S): 8.6 TABLET ORAL at 22:41

## 2018-10-08 RX ADMIN — Medication 3 MILLILITER(S): at 18:00

## 2018-10-08 RX ADMIN — Medication 650 MILLIGRAM(S): at 23:11

## 2018-10-08 RX ADMIN — FINASTERIDE 5 MILLIGRAM(S): 5 TABLET, FILM COATED ORAL at 11:14

## 2018-10-08 RX ADMIN — Medication 25 MILLIGRAM(S): at 18:00

## 2018-10-08 NOTE — PROGRESS NOTE ADULT - PROBLEM SELECTOR PLAN 9
Case d/w palliative (Dr. Li). Palliative team had extensive d/w patient's HCP and alternate HCP last week regarding artificial nutrition. Patient would like to defer all decisions to his family. Reportedly, family does not want to pursue PEG at this time. However, they are reluctant to remove NGT pending further d/w hospice care network and determination of patient's disposition. Agreed with palliative care to revisit [facility] tomorrow w/ family after d/w hospice this afternoon.   - c/w NGT w/ glucernarth feeds for now  - Patient remains FULL CODE

## 2018-10-08 NOTE — CHART NOTE - NSCHARTNOTEFT_GEN_A_CORE
Prior to meeting, LCSW spoke with Marixa Aggarwal, Np, [facility] and Dr. Gale Li, Palliative Attending.  LCSW joined meeting with hospice liaison, Rach and patients daughter and son in law.  Education provided on ongoing aspiration risks with pleasure feeds and PEG tube. Education provided on heart failure as a progressive and terminal diagnosis. Education provided on hospice (home, SNF and inpt when medically indicated) as well as HHA through shanice who can not do tube feeds or give medication.  Dtr presented as overwhelmed and struggling to make decisions for father.  Patient remains a full code with a 13 day LOS.

## 2018-10-08 NOTE — CHART NOTE - NSCHARTNOTEFT_GEN_A_CORE
Weekend events noted, chart reviewed. Plan for meeting today between HCN and family at 3pm.  Palliative care will follow up pending this meeting. Please call with questions

## 2018-10-08 NOTE — PROGRESS NOTE ADULT - PROBLEM SELECTOR PLAN 1
Multifactorial, initially w/ ADHF and aspiration PNA. O2 requirements are slowly decreasing   - CTA chest on admission negative for PE, groundglass opacities suspicious for PNA, b/l pleural effusions   - c/w supplemental O2, titrate off as tolerated  - s/p diuresis, holding now as patient appears hypovolemic   - s/p course of Zosyn, will restart IV Vanco/Zosyn if decompensates

## 2018-10-08 NOTE — PROGRESS NOTE ADULT - PROBLEM SELECTOR PLAN 2
Sepsis present on admission d/t aspiration PNA, pseudomonas UTI, and RSV infection. Now resolved   - completed course of Zosyn   - no antibiotics at this time as per ID, but would start IV Vanco/Zosyn if febrile or decompensates clinically   - NPO except meds, has NGT for feeds   - aspiration precautions

## 2018-10-08 NOTE — PROGRESS NOTE ADULT - SUBJECTIVE AND OBJECTIVE BOX
Patient is a 89y old  Male who presents with a chief complaint of dyspnea (04 Oct 2018 12:23)    SUBJECTIVE / OVERNIGHT EVENTS: Patient seen and examined. He has no acute complaints today. He wants to eat. He feels he can "swallow better now".     REVIEW OF SYSTEMS      General: No fevers, chills  	  Ophthalmologic: No change in vision    Respiratory and Thorax: No SOB or cough  	  Cardiovascular: No chest pain, palpitations, or LE edema    Gastrointestinal: No abdominal pain, nausea, vomiting, or diarrhea    Genitourinary: No dysuria or polyuria    	  MEDICATIONS  (STANDING):  acetaminophen    Suspension .. 650 milliGRAM(s) Oral three times a day  ALBUTerol/ipratropium for Nebulization 3 milliLiter(s) Nebulizer every 6 hours  apixaban 2.5 milliGRAM(s) Oral every 12 hours  AQUAPHOR (petrolatum Ointment) 1 Application(s) Topical two times a day  aspirin enteric coated 81 milliGRAM(s) Oral daily  atorvastatin 40 milliGRAM(s) Oral at bedtime  camphor 0.5%/menthol 0.5% Topical Lotion 1 Application(s) Topical three times a day  carvedilol 25 milliGRAM(s) Oral every 12 hours  dextrose 5%. 1000 milliLiter(s) (50 mL/Hr) IV Continuous <Continuous>  dextrose 50% Injectable 12.5 Gram(s) IV Push once  dextrose 50% Injectable 25 Gram(s) IV Push once  dextrose 50% Injectable 25 Gram(s) IV Push once  docusate sodium Liquid 100 milliGRAM(s) Enteral Tube three times a day  finasteride 5 milliGRAM(s) Oral daily  folic acid 1 milliGRAM(s) Oral daily  hydrALAZINE 25 milliGRAM(s) Oral every 8 hours  insulin NPH human recombinant 8 Unit(s) SubCutaneous two times a day  insulin regular  human corrective regimen sliding scale   SubCutaneous every 6 hours  senna Syrup 10 milliLiter(s) Oral at bedtime    MEDICATIONS  (PRN):  dextrose 40% Gel 15 Gram(s) Oral once PRN Blood Glucose LESS THAN 70 milliGRAM(s)/deciliter  glucagon  Injectable 1 milliGRAM(s) IntraMuscular once PRN Glucose LESS THAN 70 milligrams/deciliter      I&O's Summary    07 Oct 2018 07:01  -  08 Oct 2018 07:00  --------------------------------------------------------  IN: 550 mL / OUT: 750 mL / NET: -200 mL    08 Oct 2018 07:01  -  08 Oct 2018 14:00  --------------------------------------------------------  IN: 0 mL / OUT: 0 mL / NET: 0 mL        CAPILLARY BLOOD GLUCOSE      POCT Blood Glucose.: 80 mg/dL (08 Oct 2018 12:44)  POCT Blood Glucose.: 164 mg/dL (08 Oct 2018 06:01)  POCT Blood Glucose.: 134 mg/dL (08 Oct 2018 04:04)  POCT Blood Glucose.: 130 mg/dL (08 Oct 2018 00:54)  POCT Blood Glucose.: 77 mg/dL (07 Oct 2018 21:25)  POCT Blood Glucose.: 90 mg/dL (07 Oct 2018 17:05)  POCT Blood Glucose.: 95 mg/dL (07 Oct 2018 15:26)      Vital Signs Last 24 Hrs  T(C): 36.3 (08 Oct 2018 04:51), Max: 36.4 (07 Oct 2018 14:04)  T(F): 97.3 (08 Oct 2018 04:51), Max: 97.5 (07 Oct 2018 14:04)  HR: 62 (08 Oct 2018 04:51) (62 - 65)  BP: 102/51 (08 Oct 2018 04:51) (98/60 - 123/63)  BP(mean): --  RR: 18 (08 Oct 2018 04:51) (18 - 18)  SpO2: 100% (08 Oct 2018 04:51) (100% - 100%)    PHYSICAL EXAM:  GENERAL: NAD, NGT tube in.  HEAD: +NGT, tube feeds on.    EYES: EOMI, conjunctiva and sclera clear  NECK: Supple  CHEST/LUNG: Coarse breath sounds, b/l scattered rhonchi     HEART: Regular rate and rhythm; S1S2  ABDOMEN: Soft, Nontender, Nondistended; Bowel sounds present  EXTREMITIES:  No clubbing, cyanosis, LUE edema   PSYCH: AAO x 2/3.  NEUROLOGY: non-focal  SKIN: Stage 3 ulcer on left heel    LABS:  No labs today.                       RADIOLOGY & ADDITIONAL TESTS:    Imaging Personally Reviewed:  Tele reviewed:  V paced, 60-70s    Consultant(s) Notes Reviewed:  palliative    Care Discussed with Consultants/Other Providers: palliative

## 2018-10-08 NOTE — PROGRESS NOTE ADULT - ASSESSMENT
89 y/o man w/ PMH CAD w/ MI (1980), HFrEF (EF 20%) s/p AICD, paroxysmal A-Fib (on pradaxa), s/p CVA (LLE weakness), HTN, T2DM, BPH w/ chronic indwelling Davis and CLL originally p/w acute respiratory failure with hypoxia 2/2 acute on chronic CHF exacerbation, likely exacerbated by sepsis 2' RSV infection & superimposed aspiration PNA with near total atelectasis of left lower lobe, now improving.

## 2018-10-08 NOTE — PROGRESS NOTE ADULT - PROBLEM SELECTOR PLAN 3
- likely decompensated by acute infection, improved s/p diuresis   - Holding further diuresis, patient clinically euvolemic at this time   - holding ARB for now d/t NADIA   - c/w carvedilol 25mg q12hrs  - daily weights  - strict I/O's

## 2018-10-08 NOTE — PROGRESS NOTE ADULT - PROBLEM SELECTOR PLAN 4
- NADIA on CKD III likely d/t ATN from MONET, baseline cr 1.3, Cr trending down   - holding ARB and Lasix   - FeNa 3.2% consistent w/ intrinsic NADIA  - Avoid nephrotoxins  - Monitor Cr

## 2018-10-08 NOTE — PROGRESS NOTE ADULT - PROBLEM SELECTOR PLAN 8
- DM2 with diabetic nephropathy   - Home meds Lantus 8u qHS, Lispro 2u  - Borderline hypoglycemia last night, decreased Humulin dosing   - Continue to monitor FS

## 2018-10-09 LAB
ALBUMIN SERPL ELPH-MCNC: 2.4 G/DL — LOW (ref 3.3–5)
ALP SERPL-CCNC: 122 U/L — HIGH (ref 40–120)
ALT FLD-CCNC: 7 U/L — LOW (ref 10–45)
ANION GAP SERPL CALC-SCNC: 7 MMOL/L — SIGNIFICANT CHANGE UP (ref 5–17)
AST SERPL-CCNC: 10 U/L — SIGNIFICANT CHANGE UP (ref 10–40)
BASOPHILS # BLD AUTO: 0 K/UL — SIGNIFICANT CHANGE UP (ref 0–0.2)
BASOPHILS NFR BLD AUTO: 0 % — SIGNIFICANT CHANGE UP (ref 0–2)
BILIRUB SERPL-MCNC: 0.5 MG/DL — SIGNIFICANT CHANGE UP (ref 0.2–1.2)
BUN SERPL-MCNC: 46 MG/DL — HIGH (ref 7–23)
CALCIUM SERPL-MCNC: 8.1 MG/DL — LOW (ref 8.4–10.5)
CHLORIDE SERPL-SCNC: 104 MMOL/L — SIGNIFICANT CHANGE UP (ref 96–108)
CO2 SERPL-SCNC: 28 MMOL/L — SIGNIFICANT CHANGE UP (ref 22–31)
CREAT SERPL-MCNC: 1.51 MG/DL — HIGH (ref 0.5–1.3)
EOSINOPHIL # BLD AUTO: 1.17 K/UL — HIGH (ref 0–0.5)
EOSINOPHIL NFR BLD AUTO: 5 % — SIGNIFICANT CHANGE UP (ref 0–6)
GLUCOSE SERPL-MCNC: 116 MG/DL — HIGH (ref 70–99)
HCT VFR BLD CALC: 25.3 % — LOW (ref 39–50)
HGB BLD-MCNC: 7.7 G/DL — LOW (ref 13–17)
INR BLD: 1.24 RATIO — HIGH (ref 0.88–1.16)
LYMPHOCYTES # BLD AUTO: 16.59 K/UL — HIGH (ref 1–3.3)
LYMPHOCYTES # BLD AUTO: 71 % — HIGH (ref 13–44)
MAGNESIUM SERPL-MCNC: 2.8 MG/DL — HIGH (ref 1.6–2.6)
MCHC RBC-ENTMCNC: 25.1 PG — LOW (ref 27–34)
MCHC RBC-ENTMCNC: 30.4 GM/DL — LOW (ref 32–36)
MCV RBC AUTO: 82.4 FL — SIGNIFICANT CHANGE UP (ref 80–100)
MONOCYTES # BLD AUTO: 0 K/UL — SIGNIFICANT CHANGE UP (ref 0–0.9)
MONOCYTES NFR BLD AUTO: 0 % — LOW (ref 2–14)
NEUTROPHILS # BLD AUTO: 4.44 K/UL — SIGNIFICANT CHANGE UP (ref 1.8–7.4)
NEUTROPHILS NFR BLD AUTO: 19 % — LOW (ref 43–77)
PHOSPHATE SERPL-MCNC: 3.5 MG/DL — SIGNIFICANT CHANGE UP (ref 2.5–4.5)
PLATELET # BLD AUTO: 173 K/UL — SIGNIFICANT CHANGE UP (ref 150–400)
POTASSIUM SERPL-MCNC: 4.8 MMOL/L — SIGNIFICANT CHANGE UP (ref 3.5–5.3)
POTASSIUM SERPL-SCNC: 4.8 MMOL/L — SIGNIFICANT CHANGE UP (ref 3.5–5.3)
PROT SERPL-MCNC: 6.5 G/DL — SIGNIFICANT CHANGE UP (ref 6–8.3)
PROTHROM AB SERPL-ACNC: 14.1 SEC — HIGH (ref 10–13.1)
RBC # BLD: 3.07 M/UL — LOW (ref 4.2–5.8)
RBC # FLD: 19 % — HIGH (ref 10.3–14.5)
SODIUM SERPL-SCNC: 139 MMOL/L — SIGNIFICANT CHANGE UP (ref 135–145)
WBC # BLD: 23.36 K/UL — HIGH (ref 3.8–10.5)
WBC # FLD AUTO: 23.36 K/UL — HIGH (ref 3.8–10.5)

## 2018-10-09 PROCEDURE — 99233 SBSQ HOSP IP/OBS HIGH 50: CPT

## 2018-10-09 PROCEDURE — 99232 SBSQ HOSP IP/OBS MODERATE 35: CPT

## 2018-10-09 PROCEDURE — 99233 SBSQ HOSP IP/OBS HIGH 50: CPT | Mod: GC

## 2018-10-09 RX ORDER — DEXTROSE 50 % IN WATER 50 %
12.5 SYRINGE (ML) INTRAVENOUS ONCE
Qty: 0 | Refills: 0 | Status: DISCONTINUED | OUTPATIENT
Start: 2018-10-09 | End: 2018-10-12

## 2018-10-09 RX ORDER — DEXTROSE 50 % IN WATER 50 %
12.5 SYRINGE (ML) INTRAVENOUS ONCE
Qty: 0 | Refills: 0 | Status: DISCONTINUED | OUTPATIENT
Start: 2018-10-09 | End: 2018-10-09

## 2018-10-09 RX ORDER — DEXTROSE 50 % IN WATER 50 %
25 SYRINGE (ML) INTRAVENOUS ONCE
Qty: 0 | Refills: 0 | Status: DISCONTINUED | OUTPATIENT
Start: 2018-10-09 | End: 2018-10-09

## 2018-10-09 RX ORDER — INSULIN LISPRO 100/ML
VIAL (ML) SUBCUTANEOUS AT BEDTIME
Qty: 0 | Refills: 0 | Status: DISCONTINUED | OUTPATIENT
Start: 2018-10-09 | End: 2018-10-12

## 2018-10-09 RX ORDER — INSULIN LISPRO 100/ML
VIAL (ML) SUBCUTANEOUS
Qty: 0 | Refills: 0 | Status: DISCONTINUED | OUTPATIENT
Start: 2018-10-09 | End: 2018-10-09

## 2018-10-09 RX ORDER — INSULIN LISPRO 100/ML
VIAL (ML) SUBCUTANEOUS AT BEDTIME
Qty: 0 | Refills: 0 | Status: DISCONTINUED | OUTPATIENT
Start: 2018-10-09 | End: 2018-10-09

## 2018-10-09 RX ORDER — ACETAMINOPHEN 500 MG
650 TABLET ORAL
Qty: 0 | Refills: 0 | Status: DISCONTINUED | OUTPATIENT
Start: 2018-10-09 | End: 2018-10-12

## 2018-10-09 RX ORDER — DEXTROSE 50 % IN WATER 50 %
15 SYRINGE (ML) INTRAVENOUS ONCE
Qty: 0 | Refills: 0 | Status: DISCONTINUED | OUTPATIENT
Start: 2018-10-09 | End: 2018-10-09

## 2018-10-09 RX ORDER — GLUCAGON INJECTION, SOLUTION 0.5 MG/.1ML
1 INJECTION, SOLUTION SUBCUTANEOUS ONCE
Qty: 0 | Refills: 0 | Status: DISCONTINUED | OUTPATIENT
Start: 2018-10-09 | End: 2018-10-09

## 2018-10-09 RX ORDER — SODIUM CHLORIDE 9 MG/ML
1000 INJECTION, SOLUTION INTRAVENOUS
Qty: 0 | Refills: 0 | Status: DISCONTINUED | OUTPATIENT
Start: 2018-10-09 | End: 2018-10-09

## 2018-10-09 RX ORDER — INSULIN LISPRO 100/ML
2 VIAL (ML) SUBCUTANEOUS
Qty: 0 | Refills: 0 | Status: DISCONTINUED | OUTPATIENT
Start: 2018-10-09 | End: 2018-10-12

## 2018-10-09 RX ORDER — DEXTROSE 50 % IN WATER 50 %
25 SYRINGE (ML) INTRAVENOUS ONCE
Qty: 0 | Refills: 0 | Status: DISCONTINUED | OUTPATIENT
Start: 2018-10-09 | End: 2018-10-12

## 2018-10-09 RX ORDER — DEXTROSE 50 % IN WATER 50 %
15 SYRINGE (ML) INTRAVENOUS ONCE
Qty: 0 | Refills: 0 | Status: DISCONTINUED | OUTPATIENT
Start: 2018-10-09 | End: 2018-10-12

## 2018-10-09 RX ORDER — INSULIN GLARGINE 100 [IU]/ML
10 INJECTION, SOLUTION SUBCUTANEOUS AT BEDTIME
Qty: 0 | Refills: 0 | Status: DISCONTINUED | OUTPATIENT
Start: 2018-10-09 | End: 2018-10-12

## 2018-10-09 RX ORDER — SODIUM CHLORIDE 9 MG/ML
1000 INJECTION, SOLUTION INTRAVENOUS
Qty: 0 | Refills: 0 | Status: DISCONTINUED | OUTPATIENT
Start: 2018-10-09 | End: 2018-10-12

## 2018-10-09 RX ORDER — INSULIN LISPRO 100/ML
VIAL (ML) SUBCUTANEOUS
Qty: 0 | Refills: 0 | Status: DISCONTINUED | OUTPATIENT
Start: 2018-10-09 | End: 2018-10-12

## 2018-10-09 RX ORDER — GLUCAGON INJECTION, SOLUTION 0.5 MG/.1ML
1 INJECTION, SOLUTION SUBCUTANEOUS ONCE
Qty: 0 | Refills: 0 | Status: DISCONTINUED | OUTPATIENT
Start: 2018-10-09 | End: 2018-10-12

## 2018-10-09 RX ADMIN — Medication 1 APPLICATION(S): at 05:54

## 2018-10-09 RX ADMIN — Medication 650 MILLIGRAM(S): at 18:51

## 2018-10-09 RX ADMIN — CARVEDILOL PHOSPHATE 25 MILLIGRAM(S): 80 CAPSULE, EXTENDED RELEASE ORAL at 22:29

## 2018-10-09 RX ADMIN — Medication 100 MILLIGRAM(S): at 22:28

## 2018-10-09 RX ADMIN — Medication 2 UNIT(S): at 18:52

## 2018-10-09 RX ADMIN — Medication 4: at 12:35

## 2018-10-09 RX ADMIN — FINASTERIDE 5 MILLIGRAM(S): 5 TABLET, FILM COATED ORAL at 12:36

## 2018-10-09 RX ADMIN — Medication 650 MILLIGRAM(S): at 19:20

## 2018-10-09 RX ADMIN — Medication 25 MILLIGRAM(S): at 23:13

## 2018-10-09 RX ADMIN — ATORVASTATIN CALCIUM 40 MILLIGRAM(S): 80 TABLET, FILM COATED ORAL at 22:29

## 2018-10-09 RX ADMIN — Medication 3 MILLILITER(S): at 12:36

## 2018-10-09 RX ADMIN — MEN-PHOR 1 APPLICATION(S): .5; .5 LOTION TOPICAL at 16:06

## 2018-10-09 RX ADMIN — Medication 100 MILLIGRAM(S): at 16:10

## 2018-10-09 RX ADMIN — Medication 25 MILLIGRAM(S): at 18:48

## 2018-10-09 RX ADMIN — Medication 81 MILLIGRAM(S): at 12:36

## 2018-10-09 RX ADMIN — Medication 3: at 18:52

## 2018-10-09 RX ADMIN — Medication 650 MILLIGRAM(S): at 23:13

## 2018-10-09 RX ADMIN — Medication 650 MILLIGRAM(S): at 12:39

## 2018-10-09 RX ADMIN — APIXABAN 2.5 MILLIGRAM(S): 2.5 TABLET, FILM COATED ORAL at 12:36

## 2018-10-09 RX ADMIN — MEN-PHOR 1 APPLICATION(S): .5; .5 LOTION TOPICAL at 05:54

## 2018-10-09 RX ADMIN — Medication 3 MILLILITER(S): at 05:54

## 2018-10-09 RX ADMIN — Medication 1 MILLIGRAM(S): at 12:37

## 2018-10-09 RX ADMIN — Medication 3 MILLILITER(S): at 23:13

## 2018-10-09 RX ADMIN — SENNA PLUS 1 TABLET(S): 8.6 TABLET ORAL at 22:28

## 2018-10-09 RX ADMIN — Medication 1 APPLICATION(S): at 18:49

## 2018-10-09 RX ADMIN — Medication 3 MILLILITER(S): at 18:48

## 2018-10-09 RX ADMIN — Medication 100 MILLIGRAM(S): at 05:54

## 2018-10-09 RX ADMIN — INSULIN GLARGINE 10 UNIT(S): 100 INJECTION, SOLUTION SUBCUTANEOUS at 22:30

## 2018-10-09 RX ADMIN — MEN-PHOR 1 APPLICATION(S): .5; .5 LOTION TOPICAL at 22:29

## 2018-10-09 RX ADMIN — APIXABAN 2.5 MILLIGRAM(S): 2.5 TABLET, FILM COATED ORAL at 23:13

## 2018-10-09 NOTE — PROGRESS NOTE ADULT - PROBLEM SELECTOR PLAN 2
on antibitoics, currently on dysphagia diet. dobhoff tube d/c  aspiration risk and pneumonia risk explained to family at multiple intervals  They are not ready to change code status at this time.

## 2018-10-09 NOTE — PROGRESS NOTE ADULT - PROBLEM SELECTOR PLAN 8
- DM2 with diabetic nephropathy   - Home meds Lantus 8u qHS, Lispro 2u  - cont this for now from nph 16 total in 24 hrs.

## 2018-10-09 NOTE — PROGRESS NOTE ADULT - SUBJECTIVE AND OBJECTIVE BOX
89 year old man with a history of MI (1980), HFrEF (EF 20%), paroxysmal A-Fib, AICD, CVA (LLE weakness), HTN, T2DM, BPH w/ chronic indwelling Davis and CLL admitted to the hospital for acute SOB. Treated for CHF, pneumonia, UTI  He has heal ulcer followed by vascular.  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 650 milliGRAM(s) Oral every 6 hours PRN Temp greater or equal to 38C (100.4F), Mild Pain (1 - 3)  acetaminophen   Tablet .. 650 milliGRAM(s) Oral four times a day  ALBUTerol/ipratropium for Nebulization 3 milliLiter(s) Nebulizer every 6 hours  apixaban 2.5 milliGRAM(s) Oral every 12 hours  AQUAPHOR (petrolatum Ointment) 1 Application(s) Topical two times a day  aspirin enteric coated 81 milliGRAM(s) Oral daily  atorvastatin 40 milliGRAM(s) Oral at bedtime  camphor 0.5%/menthol 0.5% Topical Lotion 1 Application(s) Topical three times a day  carvedilol 25 milliGRAM(s) Oral every 12 hours  dextrose 40% Gel 15 Gram(s) Oral once PRN Blood Glucose LESS THAN 70 milliGRAM(s)/deciliter  dextrose 5%. 1000 milliLiter(s) IV Continuous <Continuous>  dextrose 50% Injectable 12.5 Gram(s) IV Push once  dextrose 50% Injectable 25 Gram(s) IV Push once  dextrose 50% Injectable 25 Gram(s) IV Push once  docusate sodium 100 milliGRAM(s) Oral three times a day  finasteride 5 milliGRAM(s) Oral daily  folic acid 1 milliGRAM(s) Oral daily  glucagon  Injectable 1 milliGRAM(s) IntraMuscular once PRN Glucose LESS THAN 70 milligrams/deciliter  hydrALAZINE 25 milliGRAM(s) Oral every 8 hours  insulin lispro (HumaLOG) corrective regimen sliding scale   SubCutaneous three times a day before meals  insulin lispro (HumaLOG) corrective regimen sliding scale   SubCutaneous at bedtime  senna 1 Tablet(s) Oral at bedtime    Labs:  CBC Full  -  ( 09 Oct 2018 08:23 )  WBC Count : 23.36 K/uL  Hemoglobin : 7.7 g/dL  Hematocrit : 25.3 %  Platelet Count - Automated : 173 K/uL  Mean Cell Volume : 82.4 fl  Mean Cell Hemoglobin : 25.1 pg  Mean Cell Hemoglobin Concentration : 30.4 gm/dL  Auto Neutrophil # : x  Auto Lymphocyte # : x  Auto Monocyte # : x  Auto Eosinophil # : x  Auto Basophil # : x  Auto Neutrophil % : x  Auto Lymphocyte % : x  Auto Monocyte % : x  Auto Eosinophil % : x  Auto Basophil % : x    10-09    139  |  104  |  46<H>  ----------------------------<  116<H>  4.8   |  28  |  1.51<H>    Ca    8.1<L>      09 Oct 2018 05:53  Phos  3.5     10-09  Mg     2.8     10-09    TPro  6.5  /  Alb  2.4<L>  /  TBili  0.5  /  DBili  x   /  AST  10  /  ALT  7<L>  /  AlkPhos  122<H>  10-09      Radiology:             ROS:  Patient comfortable without distress  No SOB or chest pain  No complains  Vital Signs Last 24 Hrs  T(C): 36.6 (09 Oct 2018 05:33), Max: 36.6 (09 Oct 2018 05:33)  T(F): 97.8 (09 Oct 2018 05:33), Max: 97.8 (09 Oct 2018 05:33)  HR: 63 (09 Oct 2018 05:33) (63 - 72)  BP: 115/58 (09 Oct 2018 05:33) (115/58 - 118/53)  BP(mean): --  RR: 16 (09 Oct 2018 05:33) (16 - 16)  SpO2: 96% (09 Oct 2018 05:33) (96% - 99%)    Physical exam:    No distress  CVS: S1, S2 regular or murmur  Chest: bilateral breath sound without rales  Abdomen: soft, not tender, no organomegaly or masses  No focal neuro deficit  No edema      Assessment and Plan:

## 2018-10-09 NOTE — PROGRESS NOTE ADULT - PROBLEM SELECTOR PLAN 3
currently on dysphagia diet. Attempted to contact daughter x2 today to follow up on conversations held with HCN yesterday. No answer.   Family educated about hospice services, multiple medical comorbidities, ongoing risks of aspiration and poor functional status. will continue to be available as needed for goals of care. please call with questions

## 2018-10-09 NOTE — PROGRESS NOTE ADULT - PROBLEM SELECTOR PLAN 4
- NADIA on CKD III likely d/t ATN from MON[facility], baseline cr 1.3, Cr now stable at 1.5 range, down from 1.9.   - holding ARB and Lasix   - FeNa 3.2% consistent w/ intrinsic NADIA  - Avoid nephrotoxins  - Monitor Cr

## 2018-10-09 NOTE — PROGRESS NOTE ADULT - PROBLEM SELECTOR PLAN 2
Sepsis present on admission d/t aspiration PNA, pseudomonas UTI, and RSV infection. Now resolved   - completed course of Zosyn   - no antibiotics at this time as per ID, but would start IV Vanco/Zosyn if febrile or decompensates clinically   - tolerating oral diet nicely.   - aspiration precautions

## 2018-10-09 NOTE — PROGRESS NOTE ADULT - PROBLEM SELECTOR PLAN 9
Case d/w palliative (Dr. Li). Palliative team had extensive d/w patient's HCP and alternate HCP last week regarding artificial nutrition. Patient would like to defer all decisions to his family. Reportedly, family does not want to pursue PEG at this time. However, they are reluctant to remove NGT pending further d/w hospice care network and determination of patient's disposition. Agreed with palliative care to revisit [facility] tomorrow w/ family after d/w hospice this afternoon.   - c/w oral feeds.   - Patient remains FULL CODE

## 2018-10-09 NOTE — PROGRESS NOTE ADULT - SUBJECTIVE AND OBJECTIVE BOX
Patient is a 89y old  Male who presents with a chief complaint of dyspnea (04 Oct 2018 12:23)    SUBJECTIVE / OVERNIGHT EVENTS: Patient seen and examined. He has no acute complaints today, tolerating oral diet well. Feels cold, wants blanket on him. HD stable overnight, afebrile.    Review of Systems:   CONSTITUTIONAL: No fever.  EYES: No eye pain or discharge.  ENMT:  No sinus or throat pain  NECK: No pain or stiffness  RESPIRATORY: No cough, wheezing, chills or hemoptysis; No shortness of breath  CARDIOVASCULAR: No chest pain, palpitations, dizziness, or leg swelling  GASTROINTESTINAL: No abdominal or epigastric pain. No nausea, vomiting, or hematemesis; No diarrhea or constipation. No melena or hematochezia.  GENITOURINARY: No dysuria or incontinence  NEUROLOGICAL: No headaches, memory loss, loss of strength, numbness, or tremors  SKIN: No rashes.  MUSCULOSKELETAL: +mild residual back pain, improved w/ tylenol.   HEME/LYMPH: No easy bruising, or bleeding gums    	  MEDICATIONS  (STANDING):  acetaminophen    Suspension .. 650 milliGRAM(s) Oral three times a day  ALBUTerol/ipratropium for Nebulization 3 milliLiter(s) Nebulizer every 6 hours  apixaban 2.5 milliGRAM(s) Oral every 12 hours  AQUAPHOR (petrolatum Ointment) 1 Application(s) Topical two times a day  aspirin enteric coated 81 milliGRAM(s) Oral daily  atorvastatin 40 milliGRAM(s) Oral at bedtime  camphor 0.5%/menthol 0.5% Topical Lotion 1 Application(s) Topical three times a day  carvedilol 25 milliGRAM(s) Oral every 12 hours  dextrose 5%. 1000 milliLiter(s) (50 mL/Hr) IV Continuous <Continuous>  dextrose 50% Injectable 12.5 Gram(s) IV Push once  dextrose 50% Injectable 25 Gram(s) IV Push once  dextrose 50% Injectable 25 Gram(s) IV Push once  docusate sodium Liquid 100 milliGRAM(s) Enteral Tube three times a day  finasteride 5 milliGRAM(s) Oral daily  folic acid 1 milliGRAM(s) Oral daily  hydrALAZINE 25 milliGRAM(s) Oral every 8 hours  insulin NPH human recombinant 8 Unit(s) SubCutaneous two times a day  insulin regular  human corrective regimen sliding scale   SubCutaneous every 6 hours  senna Syrup 10 milliLiter(s) Oral at bedtime    MEDICATIONS  (PRN):  dextrose 40% Gel 15 Gram(s) Oral once PRN Blood Glucose LESS THAN 70 milliGRAM(s)/deciliter  glucagon  Injectable 1 milliGRAM(s) IntraMuscular once PRN Glucose LESS THAN 70 milligrams/deciliter      I&O's Summary    07 Oct 2018 07:01  -  08 Oct 2018 07:00  --------------------------------------------------------  IN: 550 mL / OUT: 750 mL / NET: -200 mL    08 Oct 2018 07:01  -  08 Oct 2018 14:00  --------------------------------------------------------  IN: 0 mL / OUT: 0 mL / NET: 0 mL        CAPILLARY BLOOD GLUCOSE      POCT Blood Glucose.: 80 mg/dL (08 Oct 2018 12:44)  POCT Blood Glucose.: 164 mg/dL (08 Oct 2018 06:01)  POCT Blood Glucose.: 134 mg/dL (08 Oct 2018 04:04)  POCT Blood Glucose.: 130 mg/dL (08 Oct 2018 00:54)  POCT Blood Glucose.: 77 mg/dL (07 Oct 2018 21:25)  POCT Blood Glucose.: 90 mg/dL (07 Oct 2018 17:05)  POCT Blood Glucose.: 95 mg/dL (07 Oct 2018 15:26)      Vital Signs Last 24 Hrs  T(C): 36.6 (09 Oct 2018 13:43), Max: 36.6 (09 Oct 2018 05:33)  T(F): 97.9 (09 Oct 2018 13:43), Max: 97.9 (09 Oct 2018 13:43)  HR: 67 (09 Oct 2018 13:43) (63 - 82)  BP: 109/59 (09 Oct 2018 13:43) (106/63 - 118/53)  BP(mean): --  RR: 18 (09 Oct 2018 13:43) (16 - 18)  SpO2: 96% (09 Oct 2018 13:43) (96% - 99%)    PHYSICAL EXAM:  GENERAL: NAD, NGT tube out. Mouth clean.   HEAD: Cachectic.  EYES: EOMI, conjunctiva and sclera clear  NECK: Supple  CHEST/LUNG: Coarse breath sounds, b/l scattered rhonchi     HEART: Regular rate and rhythm; S1S2  ABDOMEN: Soft, Nontender, Nondistended; Bowel sounds present  EXTREMITIES:  No clubbing, cyanosis, LUE edema   PSYCH: AAO x 2/3.  NEUROLOGY: non-focal  SKIN: Stage 3 ulcer on left heel    LABS:  hgb 7.7 from 8.4, stable leukocytosis.   BMP W/ stable cr 1.51, BUN 46.  Hyperglycemia noted.     RADIOLOGY & ADDITIONAL TESTS:  Imaging Personally Reviewed: yes   Consultant(s) Notes Reviewed:  -  Care Discussed with Consultants/Other Providers: -

## 2018-10-09 NOTE — PROGRESS NOTE ADULT - SUBJECTIVE AND OBJECTIVE BOX
Podiatry pager #: [phone]/ [phone]    Patient is a 89y old  Male who presents with a chief complaint of dyspnea (09 Oct 2018 12:30) Podiatry seen today for f/u left heel wound       INTERVAL HPI/OVERNIGHT EVENTS:  Patient seen and evaluated at bedside.  Pt is resting comfortable in NAD. Denies N/V/F/C.  Pain rated at X/10    Allergies    Cipro (Other)  fluoroquinolone antibiotics (Unknown)  latex (Unknown)    Intolerances        Vital Signs Last 24 Hrs  T(C): 36.6 (09 Oct 2018 13:43), Max: 36.6 (09 Oct 2018 05:33)  T(F): 97.9 (09 Oct 2018 13:43), Max: 97.9 (09 Oct 2018 13:43)  HR: 67 (09 Oct 2018 13:43) (63 - 82)  BP: 109/59 (09 Oct 2018 13:43) (106/63 - 118/53)  BP(mean): --  RR: 18 (09 Oct 2018 13:43) (16 - 18)  SpO2: 96% (09 Oct 2018 13:43) (96% - 99%)    acetaminophen   Tablet .. 650 milliGRAM(s) Oral every 6 hours PRN  acetaminophen   Tablet .. 650 milliGRAM(s) Oral four times a day  ALBUTerol/ipratropium for Nebulization 3 milliLiter(s) Nebulizer every 6 hours  apixaban 2.5 milliGRAM(s) Oral every 12 hours  AQUAPHOR (petrolatum Ointment) 1 Application(s) Topical two times a day  aspirin enteric coated 81 milliGRAM(s) Oral daily  atorvastatin 40 milliGRAM(s) Oral at bedtime  camphor 0.5%/menthol 0.5% Topical Lotion 1 Application(s) Topical three times a day  carvedilol 25 milliGRAM(s) Oral every 12 hours  dextrose 40% Gel 15 Gram(s) Oral once PRN  dextrose 5%. 1000 milliLiter(s) IV Continuous <Continuous>  dextrose 50% Injectable 12.5 Gram(s) IV Push once  dextrose 50% Injectable 25 Gram(s) IV Push once  dextrose 50% Injectable 25 Gram(s) IV Push once  docusate sodium 100 milliGRAM(s) Oral three times a day  finasteride 5 milliGRAM(s) Oral daily  folic acid 1 milliGRAM(s) Oral daily  glucagon  Injectable 1 milliGRAM(s) IntraMuscular once PRN  hydrALAZINE 25 milliGRAM(s) Oral every 8 hours  insulin lispro (HumaLOG) corrective regimen sliding scale   SubCutaneous three times a day before meals  insulin lispro (HumaLOG) corrective regimen sliding scale   SubCutaneous at bedtime  senna 1 Tablet(s) Oral at bedtime      LABS:                        7.7    23.36 )-----------( 173      ( 09 Oct 2018 08:23 )             25.3     10-09    139  |  104  |  46<H>  ----------------------------<  116<H>  4.8   |  28  |  1.51<H>    Ca    8.1<L>      09 Oct 2018 05:53  Phos  3.5     10-09  Mg     2.8     10-09    TPro  6.5  /  Alb  2.4<L>  /  TBili  0.5  /  DBili  x   /  AST  10  /  ALT  7<L>  /  AlkPhos  122<H>  10-09    PT/INR - ( 09 Oct 2018 08:27 )   PT: 14.1 sec;   INR: 1.24 ratio             CAPILLARY BLOOD GLUCOSE      POCT Blood Glucose.: 312 mg/dL (09 Oct 2018 12:28)  POCT Blood Glucose.: 200 mg/dL (09 Oct 2018 07:17)  POCT Blood Glucose.: 164 mg/dL (08 Oct 2018 22:04)  POCT Blood Glucose.: 98 mg/dL (08 Oct 2018 18:07)      Lower Extremity Physical Exam:  Vasular: DP/PT n/p B/L, CFT <4seconds B/L, Temperature gradient warm, B/L.   Neuro: Epicritic sensation diminished to the level of toes B/L.  Musculoskeletal/Ortho: cavus foot type  Skin:  Wound #1: left heel wound with necrotic eschar-stable improving  Location: plantar posterior aspect  Size: 3cm diameter  Depth: unstageable/fat pad atrophy heel  Wound bed: necrotic eschar  Drainage: none  Odor: none  Periwound: erythema minimal      RADIOLOGY & ADDITIONAL TESTS:

## 2018-10-09 NOTE — PROGRESS NOTE ADULT - ASSESSMENT
Patient is a 88 y/o M with multiple comorbidities including s/p MI (1980) w/ systolic dysfunction ( EF 20%) s/p AICD, AFIB, CVA w/ residual left sided weakness, BPH w/ chronic joshi, Stage 3 heal ulcer, CLL whom was admitted for SOB. Patient was found to be RSV+, recurrent aspiration, and heart failure which all have contributed to SOB. Most recently failed speech and swallow. Palliative called for goals of care and symptom management.

## 2018-10-09 NOTE — PROGRESS NOTE ADULT - ASSESSMENT
89 year old man with a history of MI (1980), HFrEF (EF 20%), paroxysmal A-Fib, AICD, CVA (LLE weakness), HTN, T2DM, BPH w/ chronic indwelling Davis and CLL presenting to the hospital for acute SOB.  s/p 7 days of zosyn already for pna,UTI  stable off abx  will remain at risk for recurrent aspiration  Leucocytosis though has CLL-trending down   Clinically stable  continue observation   Aspiration precautions  Chest PT  prognosis guarded  Establish GOC  case d/w Med NP    ID will follow as needed,please call [phone] if any questions or issues.

## 2018-10-09 NOTE — PROGRESS NOTE ADULT - SUBJECTIVE AND OBJECTIVE BOX
SUBJECTIVE AND OBJECTIVE: Interval events noted from meeting with HCN.  Patient s/p removal of dobhoff tube and is on dysphagia diet. Requesting juice. Denies pain or shortness of breath.    INTERVAL HPI/OVERNIGHT EVENTS: Meeting yesterday with HCN and palliative SW. Per their discussion, PEG on hold as patient currently on dysphagia diet.  Attempted to call daughter Salima today x2 (12pm and 3:30pm) without success to follow up and offer support.     DNR on chart:   Allergies    Cipro (Other)  fluoroquinolone antibiotics (Unknown)  latex (Unknown)    Intolerances    MEDICATIONS  (STANDING):  acetaminophen   Tablet .. 650 milliGRAM(s) Oral four times a day  ALBUTerol/ipratropium for Nebulization 3 milliLiter(s) Nebulizer every 6 hours  apixaban 2.5 milliGRAM(s) Oral every 12 hours  AQUAPHOR (petrolatum Ointment) 1 Application(s) Topical two times a day  aspirin enteric coated 81 milliGRAM(s) Oral daily  atorvastatin 40 milliGRAM(s) Oral at bedtime  camphor 0.5%/menthol 0.5% Topical Lotion 1 Application(s) Topical three times a day  carvedilol 25 milliGRAM(s) Oral every 12 hours  dextrose 5%. 1000 milliLiter(s) (50 mL/Hr) IV Continuous <Continuous>  dextrose 50% Injectable 12.5 Gram(s) IV Push once  dextrose 50% Injectable 25 Gram(s) IV Push once  dextrose 50% Injectable 25 Gram(s) IV Push once  docusate sodium 100 milliGRAM(s) Oral three times a day  finasteride 5 milliGRAM(s) Oral daily  folic acid 1 milliGRAM(s) Oral daily  hydrALAZINE 25 milliGRAM(s) Oral every 8 hours  insulin lispro (HumaLOG) corrective regimen sliding scale   SubCutaneous three times a day before meals  insulin lispro (HumaLOG) corrective regimen sliding scale   SubCutaneous at bedtime  senna 1 Tablet(s) Oral at bedtime    MEDICATIONS  (PRN):  acetaminophen   Tablet .. 650 milliGRAM(s) Oral every 6 hours PRN Temp greater or equal to 38C (100.4F), Mild Pain (1 - 3)  dextrose 40% Gel 15 Gram(s) Oral once PRN Blood Glucose LESS THAN 70 milliGRAM(s)/deciliter  glucagon  Injectable 1 milliGRAM(s) IntraMuscular once PRN Glucose LESS THAN 70 milligrams/deciliter      ITEMS UNCHECKED ARE NOT PRESENT    PRESENT SYMPTOMS: [ ]Unable to obtain due to poor mentation   Source if other than patient:  [ ]Family   [ ]Team     Pain (Impact on QOL):    Location:  Minimal acceptable level (0-10 scale):                   Aggrevating factors:  Quality:  Radiation:  Severity (0-10 scale):    Timing:    Dyspnea:                           [ ]Mild [ ]Moderate [ ]Severe  Anxiety:                             [ ]Mild [ ]Moderate [ ]Severe  Fatigue:                             [ ]Mild [ ]Moderate [ ]Severe  Nausea:                             [ ]Mild [ ]Moderate [ ]Severe  Loss of appetite:              [ ]Mild [ ]Moderate [ ]Severe  Constipation:                    [ ]Mild [ ]Moderate [ ]Severe    PAIN AD Score:	  [email or web address] (Ctrl + left click to view)    Other Symptoms:  [x ]All other review of systems negative     Karnofsky Performance Score/Palliative Performance Status Version 2:       30  %  PHYSICAL EXAM:  Vital Signs Last 24 Hrs  T(C): 36.6 (09 Oct 2018 13:43), Max: 36.6 (09 Oct 2018 05:33)  T(F): 97.9 (09 Oct 2018 13:43), Max: 97.9 (09 Oct 2018 13:43)  HR: 67 (09 Oct 2018 13:43) (63 - 82)  BP: 109/59 (09 Oct 2018 13:43) (106/63 - 118/53)  BP(mean): --  RR: 18 (09 Oct 2018 13:43) (16 - 18)  SpO2: 96% (09 Oct 2018 13:43) (96% - 99%) I&O's Summary    08 Oct 2018 07:01  -  09 Oct 2018 07:00  --------------------------------------------------------  IN: 240 mL / OUT: 850 mL / NET: -610 mL    09 Oct 2018 07:01  -  09 Oct 2018 15:41  --------------------------------------------------------  IN: 1020 mL / OUT: 300 mL / NET: 720 mL     GENERAL:  [x ]Alert  [x ]Oriented x2   [ ]Lethargic  [ ]Cachexia  [ ]Unarousable  [x ]Verbal  [ ]Non-Verbal  Behavioral:   [ ] Anxiety  [ ] Delirium [ ] Agitation [ ] Other  HEENT:  [ ]Normal   [x ]Dry mouth   [ ]ET Tube/Trach  [ ]Oral lesions  PULMONARY:   [ ]Clear [ ]Tachypnea  [ ]Audible excessive secretions   [ ]Rhonchi        [ ]Right [ ]Left [ ]Bilateral  [x ]Crackles        [x ]Right [ ]Left [ ]Bilateral  [ ]Wheezing     [ ]Right [ ]Left [ ]Bilateral  CARDIOVASCULAR:    [x ]Regular [ ]Irregular [ ]Tachy  [ ]Gerardo [ ]Murmur [ ]Other  GASTROINTESTINAL:  [x ]Soft  [ ]Distended   [ ]+BS  [x ]Non tender [ ]Tender  [ ]PEG [ ]OGT/ NGT   Last BM:    GENITOURINARY:  [ ]Normal [x ] Incontinent   [ ]Oliguria/Anuria   [ ]Davis  MUSCULOSKELETAL:   [ ]Normal   [ ]Weakness  [x ]Bed/Wheelchair bound [ ]Edema  NEUROLOGIC:   [ ]No focal deficits  [x ] Cognitive impairment  [ ] Dysphagia [ ]Dysarthria [ ] Paresis [ ]Other   SKIN: ecchymoses  [ ]Normal   [ ]Pressure ulcer(s)  [ ]Rash    CRITICAL CARE: none  [ ] Shock Present  [ ]Septic [ ]Cardiogenic [ ]Neurologic [ ]Hypovolemic  [ ]  Vasopressors [ ]  Inotropes   [ ] Respiratory failure present  [ ] Acute  [ ] Chronic [ ] Hypoxic  [ ] Hypercarbic [ ] Other  [ ] Other organ failure     LABS:                        7.7    23.36 )-----------( 173      ( 09 Oct 2018 08:23 )             25.3   10-09    139  |  104  |  46<H>  ----------------------------<  116<H>  4.8   |  28  |  1.51<H>    Ca    8.1<L>      09 Oct 2018 05:53  Phos  3.5     10-09  Mg     2.8     10-09    TPro  6.5  /  Alb  2.4<L>  /  TBili  0.5  /  DBili  x   /  AST  10  /  ALT  7<L>  /  AlkPhos  122<H>  10-09  PT/INR - ( 09 Oct 2018 08:27 )   PT: 14.1 sec;   INR: 1.24 ratio      RADIOLOGY & ADDITIONAL STUDIES: no new studies for review    Protein Calorie Malnutrition:  [x ] PPSV2 < or = 30%  [ ] significant weight loss [x ] poor nutritional intake [ ] anasarca [ ] catabolic state Albumin, Serum: 2.4 g/dL (10-09-18 @ 05:53)  Artificial Nutrition [ ]     REFERRALS:   [ ]Chaplaincy  [ x] Hospice  [ ]Child Life  [ ]Social Work  [ ]Case management [ ]Holistic Therapy   Goals of Care Document: Goals of Care Conversation - Personal Advance Directive [DIANA Arteaga] (10-08-18 @ 18:41)

## 2018-10-09 NOTE — PROGRESS NOTE ADULT - ASSESSMENT
Assessment/Plan:    1. non-infected clinically left heel pressure wound.  2. Patient currently on iv abx.   3. Continue with z offloading boots and decubitus precautions.  4. continue with normal saline cleanse with santyl and dsd left heel qd.  5. will follow

## 2018-10-09 NOTE — PROGRESS NOTE ADULT - ASSESSMENT
Mr. Quintanilla is an 89 y/o man w/ PMH CAD w/ MI (1980), HFrEF (EF 20%) s/p AICD, paroxysmal A-Fib (on pradaxa), s/p CVA (LLE weakness), HTN, T2DM, BPH w/ chronic indwelling Davis and CLL originally p/w acute respiratory failure with hypoxia 2/2 acute on chronic CHF exacerbation, likely exacerbated by sepsis 2' RSV infection & superimposed aspiration PNA with near total atelectasis of left lower lobe, now improving. Switched from tube feeds to PO diet, now w/ hyperglycemia as NPH from tube feeds is off. 16U total NPH, will give 1/2 lantus and 1/2 ATC w/ sliding scale. Team discussion w/ daughter today, likely home w/ home hospice. Dispo in the works. In mean time will repeat cbc to ensure hgb not dropping.

## 2018-10-09 NOTE — PROGRESS NOTE ADULT - ASSESSMENT
89 year old man with a history of MI (1980), HFrEF (EF 20%), paroxysmal A-Fib, AICD, CVA (LLE weakness), HTN, T2DM, BPH w/ chronic indwelling Davis and CLL presented to the hospital for acute SOB needing treatment of CHF, pneumonia etc  He was admitted in August 2018 with UTI followed by aspiration pneumonia too  Hem follow for CLL   Patient has CLL or mantle cell lymphoma by flow cytometry without thrombocytopenia and mild anemia likely related to CKD and chronic inflammation. He does not have organomegaly or significant lymphadenopathy. Thus his CLL by itself is not needing treatment at present. The patient had several hospitalizations for infection, including UTI , which likely has increased incidence because of indwelling Davis catheter. He also needed rx for pneumonia and has tendency to aspirate  He did not need IgG as level is above 500 mg/dL Management at this time is supportive and treatment of infections as needed.  At present given his comorbidities and PS, GOC needs addressed.  His prognosis is at present dependant on other co morbidities and not CLL as that does not need rx anyway

## 2018-10-09 NOTE — PROGRESS NOTE ADULT - SUBJECTIVE AND OBJECTIVE BOX
Patient is a 89y old  Male who presents with a chief complaint of dyspnea (08 Oct 2018 13:58)    Being followed by ID for pna    Interval history:feels well  minimal cough  Tolerating dysphagia diet  No acute events      ROS:  No,SOB,CP  No N/V/D./abd pain  No other complaints      Antimicrobials:  None     Other medications reviewed    Vital Signs Last 24 Hrs  T(C): 36.6 (10-09-18 @ 05:33), Max: 36.6 (10-09-18 @ 05:33)  T(F): 97.8 (10-09-18 @ 05:33), Max: 97.8 (10-09-18 @ 05:33)  HR: 63 (10-09-18 @ 05:33) (61 - 72)  BP: 115/58 (10-09-18 @ 05:33) (111/57 - 118/53)  BP(mean): --  RR: 16 (10-09-18 @ 05:33) (16 - 16)  SpO2: 96% (10-09-18 @ 05:33) (96% - 100%)    Physical Exam:        HEENT PERRLA EOMI    No oral exudate or erythema    Chest Good AE,minimal basal crackles     CVS RRR S1 S2 WNl No murmur or rub or gallop    Abd soft BS normal No tenderness no masses    IV site no erythema tenderness or discharge    CNS AAO X 3 no focal    Lab Data:                          7.7    23.36 )-----------( 173      ( 09 Oct 2018 08:23 )             25.3   WBC Count: 23.36 (10-09-18 @ 08:23)  WBC Count: 30.15 (10-06-18 @ 08:48)  WBC Count: 34.36 (10-05-18 @ 08:13)  WBC Count: 38.61 (10-04-18 @ 07:58)  WBC Count: 29.93 (10-03-18 @ 07:43)      10-09    139  |  104  |  46<H>  ----------------------------<  116<H>  4.8   |  28  |  1.51<H>    Ca    8.1<L>      09 Oct 2018 05:53  Phos  3.5     10-09  Mg     2.8     10-09    TPro  6.5  /  Alb  2.4<L>  /  TBili  0.5  /  DBili  x   /  AST  10  /  ALT  7<L>  /  AlkPhos  122<H>  10-09

## 2018-10-09 NOTE — CHART NOTE - NSCHARTNOTEFT_GEN_A_CORE
Pt seen today for malnutrition follow-up. Pt denies new diet preferences other than requesting coffee/tea. Denies issues chewing/swallowing with current diet. Eating fair ~50% of meals, benefits from feeding assist.    89 year old male pt with PMH MI (1980), HFrEF, paroxysmal A-Fib, AICD, CVA (LLE weakness), HTN, T2DM, BPH w/ chronic indwelling Davis and CLL presenting with acute hypoxic respiratory failure 2/2 acute on chronic HF, likely exacerbated by RSV infection & superimposed aspiration PNA with near total atelectasis of left lower lobe. Also found to have UTI and dysphagia S/P MBS 9/29/18 with SLP recommendations for non-oral nutrition and hydration. Meeting held yesterday re: home hospice services. Per note "Family is conflicted re: placement of feeding tube and are unable to make a decision for hospice services at this time." GOC discussions ongoing, in the meantime pt has been upgraded to dysphagia PO diet, NGT removed. Noted with guarded prognosis.    Source: Patient [x]    Family [ ]     other [x] electronic medical records    Diet: Dysphagia 1 Pureed - Honey Consistency Fluid    Patient reports [ ] nausea  [ ] vomiting [ ] diarrhea [ ] constipation  [ ]chewing problems [ ] swallowing issues  [x] other: Denies GI distress, last BM yesterday 10/8 noted with fecal incontinence     PO intake:  < 50% [ ] 50-75% [x]   % [ ]  other :    Source for PO intake [x] Patient [ ] family [x] chart [ ] staff [ ] other    Enteral/Parenteral Nutrition: n/a    Current Weight: 146.6 pounds (current, bedscale, +2 L arm, scrotal edema noted). 138.8 pounds last assessment 10/3.    Pertinent Medications: MEDICATIONS  (STANDING):  acetaminophen   Tablet .. 650 milliGRAM(s) Oral four times a day  ALBUTerol/ipratropium for Nebulization 3 milliLiter(s) Nebulizer every 6 hours  apixaban 2.5 milliGRAM(s) Oral every 12 hours  AQUAPHOR (petrolatum Ointment) 1 Application(s) Topical two times a day  aspirin enteric coated 81 milliGRAM(s) Oral daily  atorvastatin 40 milliGRAM(s) Oral at bedtime  camphor 0.5%/menthol 0.5% Topical Lotion 1 Application(s) Topical three times a day  carvedilol 25 milliGRAM(s) Oral every 12 hours  dextrose 5%. 1000 milliLiter(s) (50 mL/Hr) IV Continuous <Continuous>  dextrose 50% Injectable 12.5 Gram(s) IV Push once  dextrose 50% Injectable 25 Gram(s) IV Push once  dextrose 50% Injectable 25 Gram(s) IV Push once  docusate sodium 100 milliGRAM(s) Oral three times a day  finasteride 5 milliGRAM(s) Oral daily  folic acid 1 milliGRAM(s) Oral daily  hydrALAZINE 25 milliGRAM(s) Oral every 8 hours  insulin lispro (HumaLOG) corrective regimen sliding scale   SubCutaneous three times a day before meals  insulin lispro (HumaLOG) corrective regimen sliding scale   SubCutaneous at bedtime  senna 1 Tablet(s) Oral at bedtime    MEDICATIONS  (PRN):  acetaminophen   Tablet .. 650 milliGRAM(s) Oral every 6 hours PRN Temp greater or equal to 38C (100.4F), Mild Pain (1 - 3)  dextrose 40% Gel 15 Gram(s) Oral once PRN Blood Glucose LESS THAN 70 milliGRAM(s)/deciliter  glucagon  Injectable 1 milliGRAM(s) IntraMuscular once PRN Glucose LESS THAN 70 milligrams/deciliter    Pertinent Labs:  10-09 Na139 mmol/L Glu 116 mg/dL<H> K+ 4.8 mmol/L Cr  1.51 mg/dL<H> BUN 46 mg/dL<H> 10-09 Phos 3.5 mg/dL 10-09 Alb 2.4 g/dL<L> 09-20 PAB 7 mg/dL<L> 09-26 [id number] 6.1 %<H>      Skin: L heel venous ulcer noted      Estimated Needs:   [x] no change since previous assessment  [ ] recalculated:       Previous Nutrition Diagnosis: Severe Malnutrition         Nutrition Diagnosis is [x] ongoing  [ ] resolved [ ] not applicable         New Nutrition Diagnosis: [x] not applicable         Interventions:     1) To continue po diet at this time, conservative consistency puree with honey thickened liquids for ease of chewing/swallowing. Assist pt with meals. Provide thickend tea/coffee per pt request.  2) Aspiration precautions   3) Pending [facility] clarification        Monitoring and Evaluation:     [x] PO intake [x] Tolerance to diet prescription [x] weights [x] follow up per protocol    [x] other: RD remains available: Zahra Katz MS, RDN, CDN, CDE. #[phone]

## 2018-10-10 LAB
ANION GAP SERPL CALC-SCNC: 6 MMOL/L — SIGNIFICANT CHANGE UP (ref 5–17)
ANION GAP SERPL CALC-SCNC: 9 MMOL/L — SIGNIFICANT CHANGE UP (ref 5–17)
BLD GP AB SCN SERPL QL: NEGATIVE — SIGNIFICANT CHANGE UP
BUN SERPL-MCNC: 54 MG/DL — HIGH (ref 7–23)
BUN SERPL-MCNC: 54 MG/DL — HIGH (ref 7–23)
CALCIUM SERPL-MCNC: 8.1 MG/DL — LOW (ref 8.4–10.5)
CALCIUM SERPL-MCNC: 8.1 MG/DL — LOW (ref 8.4–10.5)
CHLORIDE SERPL-SCNC: 102 MMOL/L — SIGNIFICANT CHANGE UP (ref 96–108)
CHLORIDE SERPL-SCNC: 102 MMOL/L — SIGNIFICANT CHANGE UP (ref 96–108)
CO2 SERPL-SCNC: 26 MMOL/L — SIGNIFICANT CHANGE UP (ref 22–31)
CO2 SERPL-SCNC: 29 MMOL/L — SIGNIFICANT CHANGE UP (ref 22–31)
CREAT SERPL-MCNC: 1.6 MG/DL — HIGH (ref 0.5–1.3)
CREAT SERPL-MCNC: 1.61 MG/DL — HIGH (ref 0.5–1.3)
GLUCOSE SERPL-MCNC: 128 MG/DL — HIGH (ref 70–99)
GLUCOSE SERPL-MCNC: 189 MG/DL — HIGH (ref 70–99)
HCT VFR BLD CALC: 24.2 % — LOW (ref 39–50)
HCT VFR BLD CALC: 25.4 % — LOW (ref 39–50)
HGB BLD-MCNC: 7.6 G/DL — LOW (ref 13–17)
HGB BLD-MCNC: 7.7 G/DL — LOW (ref 13–17)
MCHC RBC-ENTMCNC: 25.1 PG — LOW (ref 27–34)
MCHC RBC-ENTMCNC: 25.6 PG — LOW (ref 27–34)
MCHC RBC-ENTMCNC: 30.3 GM/DL — LOW (ref 32–36)
MCHC RBC-ENTMCNC: 31.2 GM/DL — LOW (ref 32–36)
MCV RBC AUTO: 81.9 FL — SIGNIFICANT CHANGE UP (ref 80–100)
MCV RBC AUTO: 82.7 FL — SIGNIFICANT CHANGE UP (ref 80–100)
PLATELET # BLD AUTO: 194 K/UL — SIGNIFICANT CHANGE UP (ref 150–400)
PLATELET # BLD AUTO: 206 K/UL — SIGNIFICANT CHANGE UP (ref 150–400)
POTASSIUM SERPL-MCNC: 4.9 MMOL/L — SIGNIFICANT CHANGE UP (ref 3.5–5.3)
POTASSIUM SERPL-MCNC: 5 MMOL/L — SIGNIFICANT CHANGE UP (ref 3.5–5.3)
POTASSIUM SERPL-SCNC: 4.9 MMOL/L — SIGNIFICANT CHANGE UP (ref 3.5–5.3)
POTASSIUM SERPL-SCNC: 5 MMOL/L — SIGNIFICANT CHANGE UP (ref 3.5–5.3)
RBC # BLD: 2.95 M/UL — LOW (ref 4.2–5.8)
RBC # BLD: 3.07 M/UL — LOW (ref 4.2–5.8)
RBC # FLD: 17.8 % — HIGH (ref 10.3–14.5)
RBC # FLD: 19.2 % — HIGH (ref 10.3–14.5)
RH IG SCN BLD-IMP: POSITIVE — SIGNIFICANT CHANGE UP
SODIUM SERPL-SCNC: 137 MMOL/L — SIGNIFICANT CHANGE UP (ref 135–145)
SODIUM SERPL-SCNC: 137 MMOL/L — SIGNIFICANT CHANGE UP (ref 135–145)
WBC # BLD: 27.86 K/UL — HIGH (ref 3.8–10.5)
WBC # BLD: 28.4 K/UL — HIGH (ref 3.8–10.5)
WBC # FLD AUTO: 27.86 K/UL — HIGH (ref 3.8–10.5)
WBC # FLD AUTO: 28.4 K/UL — HIGH (ref 3.8–10.5)

## 2018-10-10 PROCEDURE — 99233 SBSQ HOSP IP/OBS HIGH 50: CPT

## 2018-10-10 RX ORDER — ACETAMINOPHEN 500 MG
1000 TABLET ORAL ONCE
Qty: 0 | Refills: 0 | Status: COMPLETED | OUTPATIENT
Start: 2018-10-10 | End: 2018-10-10

## 2018-10-10 RX ADMIN — ATORVASTATIN CALCIUM 40 MILLIGRAM(S): 80 TABLET, FILM COATED ORAL at 21:00

## 2018-10-10 RX ADMIN — INSULIN GLARGINE 10 UNIT(S): 100 INJECTION, SOLUTION SUBCUTANEOUS at 22:06

## 2018-10-10 RX ADMIN — Medication 650 MILLIGRAM(S): at 17:58

## 2018-10-10 RX ADMIN — Medication 650 MILLIGRAM(S): at 22:17

## 2018-10-10 RX ADMIN — Medication 81 MILLIGRAM(S): at 12:24

## 2018-10-10 RX ADMIN — Medication 100 MILLIGRAM(S): at 06:05

## 2018-10-10 RX ADMIN — FINASTERIDE 5 MILLIGRAM(S): 5 TABLET, FILM COATED ORAL at 12:24

## 2018-10-10 RX ADMIN — Medication 650 MILLIGRAM(S): at 00:00

## 2018-10-10 RX ADMIN — Medication 3 MILLILITER(S): at 06:04

## 2018-10-10 RX ADMIN — Medication 3 MILLILITER(S): at 17:58

## 2018-10-10 RX ADMIN — Medication 1 MILLIGRAM(S): at 12:23

## 2018-10-10 RX ADMIN — SENNA PLUS 1 TABLET(S): 8.6 TABLET ORAL at 21:01

## 2018-10-10 RX ADMIN — MEN-PHOR 1 APPLICATION(S): .5; .5 LOTION TOPICAL at 06:04

## 2018-10-10 RX ADMIN — Medication 25 MILLIGRAM(S): at 14:41

## 2018-10-10 RX ADMIN — MEN-PHOR 1 APPLICATION(S): .5; .5 LOTION TOPICAL at 14:40

## 2018-10-10 RX ADMIN — Medication 650 MILLIGRAM(S): at 22:07

## 2018-10-10 RX ADMIN — Medication 1: at 17:57

## 2018-10-10 RX ADMIN — Medication 2 UNIT(S): at 07:46

## 2018-10-10 RX ADMIN — CARVEDILOL PHOSPHATE 25 MILLIGRAM(S): 80 CAPSULE, EXTENDED RELEASE ORAL at 21:01

## 2018-10-10 RX ADMIN — Medication 400 MILLIGRAM(S): at 03:16

## 2018-10-10 RX ADMIN — MEN-PHOR 1 APPLICATION(S): .5; .5 LOTION TOPICAL at 21:01

## 2018-10-10 RX ADMIN — Medication 650 MILLIGRAM(S): at 12:22

## 2018-10-10 RX ADMIN — Medication 1 APPLICATION(S): at 17:59

## 2018-10-10 RX ADMIN — Medication 1000 MILLIGRAM(S): at 03:30

## 2018-10-10 RX ADMIN — Medication 650 MILLIGRAM(S): at 13:00

## 2018-10-10 RX ADMIN — APIXABAN 2.5 MILLIGRAM(S): 2.5 TABLET, FILM COATED ORAL at 22:07

## 2018-10-10 RX ADMIN — Medication 2 UNIT(S): at 17:58

## 2018-10-10 RX ADMIN — CARVEDILOL PHOSPHATE 25 MILLIGRAM(S): 80 CAPSULE, EXTENDED RELEASE ORAL at 12:21

## 2018-10-10 RX ADMIN — Medication 1 APPLICATION(S): at 06:04

## 2018-10-10 RX ADMIN — Medication 25 MILLIGRAM(S): at 22:07

## 2018-10-10 RX ADMIN — Medication 1: at 12:21

## 2018-10-10 RX ADMIN — Medication 2 UNIT(S): at 12:21

## 2018-10-10 RX ADMIN — Medication 25 MILLIGRAM(S): at 07:46

## 2018-10-10 RX ADMIN — APIXABAN 2.5 MILLIGRAM(S): 2.5 TABLET, FILM COATED ORAL at 12:24

## 2018-10-10 RX ADMIN — Medication 3 MILLILITER(S): at 12:23

## 2018-10-10 NOTE — PROGRESS NOTE ADULT - PROBLEM SELECTOR PLAN 2
Sepsis present on admission d/t aspiration PNA, pseudomonas UTI, and RSV infection. Now resolved   - completed course of Zosyn   - no antibiotics at this time as per ID  - Tolerating PO intake

## 2018-10-10 NOTE — PROGRESS NOTE ADULT - PROBLEM SELECTOR PLAN 5
- Stage 3 ulceration of the left heel, non-healing d/t PAD  - Foot XR neg for osteomyelitis  - LE arterial duplex w/ flow-limiting lesion affecting the left anterior tibial artery   in the mid calf  -ASA/Lipitor for PAD   -family does not want vascular intervention at this time   -continue with wound care  - Podiatry f/u appreciated

## 2018-10-10 NOTE — PROVIDER CONTACT NOTE (OTHER) - DATE AND TIME:
01-Oct-2018 04:45
01-Oct-2018 08:25
02-Oct-2018 00:30
02-Oct-2018 02:45
02-Oct-2018 13:35
02-Oct-2018 18:30
04-Oct-2018 00:38
10-Oct-2018 03:10
25-Sep-2018 22:00
05-Oct-2018 07:40

## 2018-10-10 NOTE — PROVIDER CONTACT NOTE (OTHER) - ACTION/TREATMENT ORDERED:
Medicine SO Gonsalez notified and aware. RN will continue to monitor.
Will give insulin
no action or treatment at this time. continue on telemtry
no action to be taken at this time. continue to monitor
Continue to monitor pt
Hold lantus 4 units as per MD, take another FS during the night to ensure pt does not go hypoglycemic
IV Tylenol, hold 6 am dose PO Tylenol.
Mg added to AM labs
No intervention at this time continue to monitor pt
Provider ordered EKG. Mag,Phos,and  Potassium labs to be drawn.

## 2018-10-10 NOTE — PROVIDER CONTACT NOTE (OTHER) - NAME OF MD/NP/PA/DO NOTIFIED:
Chandler Bunn
Dr. Mcrae
Dr. Nawaf Pompa Ma
Jeancarlos Peterson PA
Kim Peterson PA
Kim Peterson PA
Semianow NP
emily narvaez
np pk
Medicine NP Sunshine Ames

## 2018-10-10 NOTE — PROVIDER CONTACT NOTE (OTHER) - ASSESSMENT
Patient alert and orientedx3, sleepy. Vital signs stable, /71, HR 73, O2 100% on 2L NC, 19 RR, 97.5F temp. Denies any chest pain or palpitations.
160/77 95
resting comfortably in bed
IX=581/62, HR is now 77, pt is asymptomatic
Pt is asymptomatic, VSS
Pt not in distress, no notice of ectopy, Pt vitals stable.
VSS, no signs of distress
VSS. Pt denies CP and SOB.
pt asymptomatic v/s stable

## 2018-10-10 NOTE — PROVIDER CONTACT NOTE (OTHER) - REASON
6 beats of wct
Pt complaining of 9 out of 10 left arm pain, arm is swollen, US negative DVT
Pt had 11 beats of WCT
Pt had 30 beats WCT
Pt had 6 beats WCT
Pt had episode of PAT for 40sec up to 110
UV=483
ZQ=565, pt is NPO
wct 16
Ectopy on telemetry- 9 beats WCT

## 2018-10-10 NOTE — PROGRESS NOTE ADULT - PROBLEM SELECTOR PLAN 6
- chronic leukocytosis d/t CLL  - IgG level >500 therefore not a candidate for IVIG  - supportive care as per heme   - monitor CBC, H/H stable, no need for transfusion at this time

## 2018-10-10 NOTE — PROGRESS NOTE ADULT - SUBJECTIVE AND OBJECTIVE BOX
Patient is a 89y old  Male who presents with a chief complaint of dyspnea (04 Oct 2018 12:23)    SUBJECTIVE / OVERNIGHT EVENTS: Patient seen and examined. He has no acute complaints today. Started PO feeds yesterday and has thus far tolerated without difficulty.     REVIEW OF SYSTEMS      General: No fevers, chills  	  Ophthalmologic: No change in vision    Respiratory and Thorax: No SOB or cough  	  Cardiovascular: No chest pain, palpitations, or LE edema    Gastrointestinal: No abdominal pain, nausea, vomiting, or diarrhea    Genitourinary: No dysuria or polyuria    MEDICATIONS  (STANDING):  acetaminophen   Tablet .. 650 milliGRAM(s) Oral four times a day  ALBUTerol/ipratropium for Nebulization 3 milliLiter(s) Nebulizer every 6 hours  apixaban 2.5 milliGRAM(s) Oral every 12 hours  AQUAPHOR (petrolatum Ointment) 1 Application(s) Topical two times a day  aspirin enteric coated 81 milliGRAM(s) Oral daily  atorvastatin 40 milliGRAM(s) Oral at bedtime  camphor 0.5%/menthol 0.5% Topical Lotion 1 Application(s) Topical three times a day  carvedilol 25 milliGRAM(s) Oral every 12 hours  dextrose 5%. 1000 milliLiter(s) (50 mL/Hr) IV Continuous <Continuous>  dextrose 50% Injectable 12.5 Gram(s) IV Push once  dextrose 50% Injectable 25 Gram(s) IV Push once  dextrose 50% Injectable 25 Gram(s) IV Push once  docusate sodium 100 milliGRAM(s) Oral three times a day  finasteride 5 milliGRAM(s) Oral daily  folic acid 1 milliGRAM(s) Oral daily  hydrALAZINE 25 milliGRAM(s) Oral every 8 hours  insulin glargine Injectable (LANTUS) 10 Unit(s) SubCutaneous at bedtime  insulin lispro (HumaLOG) corrective regimen sliding scale   SubCutaneous three times a day before meals  insulin lispro (HumaLOG) corrective regimen sliding scale   SubCutaneous at bedtime  insulin lispro Injectable (HumaLOG) 2 Unit(s) SubCutaneous three times a day before meals  senna 1 Tablet(s) Oral at bedtime    MEDICATIONS  (PRN):  acetaminophen   Tablet .. 650 milliGRAM(s) Oral every 6 hours PRN Temp greater or equal to 38C (100.4F), Mild Pain (1 - 3)  dextrose 40% Gel 15 Gram(s) Oral once PRN Blood Glucose LESS THAN 70 milliGRAM(s)/deciliter  glucagon  Injectable 1 milliGRAM(s) IntraMuscular once PRN Glucose LESS THAN 70 milligrams/deciliter      I&O's Summary    09 Oct 2018 07:01  -  10 Oct 2018 07:00  --------------------------------------------------------  IN: 1497 mL / OUT: 750 mL / NET: 747 mL    10 Oct 2018 07:01  -  10 Oct 2018 16:51  --------------------------------------------------------  IN: 540 mL / OUT: 350 mL / NET: 190 mL        CAPILLARY BLOOD GLUCOSE      POCT Blood Glucose.: 184 mg/dL (10 Oct 2018 11:44)  POCT Blood Glucose.: 136 mg/dL (10 Oct 2018 07:23)  POCT Blood Glucose.: 250 mg/dL (09 Oct 2018 21:51)  POCT Blood Glucose.: 295 mg/dL (09 Oct 2018 18:47)  POCT Blood Glucose.: 327 mg/dL (09 Oct 2018 17:31)      Vital Signs Last 24 Hrs  T(C): 36.4 (10 Oct 2018 13:39), Max: 36.8 (09 Oct 2018 21:20)  T(F): 97.5 (10 Oct 2018 13:39), Max: 98.3 (09 Oct 2018 21:20)  HR: 67 (10 Oct 2018 13:39) (64 - 72)  BP: 112/62 (10 Oct 2018 13:39) (108/51 - 112/62)  BP(mean): --  RR: 18 (10 Oct 2018 13:39) (18 - 19)  SpO2: 100% (10 Oct 2018 13:39) (98% - 100%)    PHYSICAL EXAM:  GENERAL: NAD, sitting in chair  EYES: EOMI, conjunctiva and sclera clear  NECK: Supple  CHEST/LUNG: Improved, CTA b/l    HEART: Regular rate and rhythm; S1S2  ABDOMEN: Soft, Nontender, Nondistended; Bowel sounds present  EXTREMITIES:  No clubbing, cyanosis, LUE edema   PSYCH: AAO x 2/3.  NEUROLOGY: non-focal  SKIN: Stage 3 ulcer on left heel    LABS:  (10-10 @ 13:01)                      7.6  28.4 )-----------( 206                 24.2    Neutrophils = -- (--%)  Lymphocytes = -- (--%)  Eosinophils = -- (--%)  Basophils = -- (--%)  Monocytes = -- (--%)  Bands = --%    10-10    137  |  102  |  54<H>  ----------------------------<  189<H>  4.9   |  29  |  1.60<H>    Ca    8.1<L>      10 Oct 2018 13:01  Phos  3.5     10-09  Mg     2.8     10-09    TPro  6.5  /  Alb  2.4<L>  /  TBili  0.5  /  DBili  x   /  AST  10  /  ALT  7<L>  /  AlkPhos  122<H>  10-09    ( 09 Oct 2018 08:27 )   PT: 14.1 sec;   INR: 1.24 ratio;                     RADIOLOGY & ADDITIONAL TESTS:    Imaging Personally Reviewed:  Tele reviewed:  V paced, 60-70s    Consultant(s) Notes Reviewed:  palliative    Care Discussed with Consultants/Other Providers: palliative

## 2018-10-10 NOTE — CHART NOTE - NSCHARTNOTEFT_GEN_A_CORE
Discussed with NP; patient pending re-evaluation by HCN. Palliative care will sign off at this time as patient tolerating diet.  FULL CODE remains after multiple family discussions. Please re-consult if issues arise.

## 2018-10-10 NOTE — PROVIDER CONTACT NOTE (OTHER) - BACKGROUND
Patient admitted with heart failure and respiratory failure with hypoxia r/t aspiration PNA
Dx HF
Pt admitted for HF, last FS @ 17:72=472
Pt admitted for heart failure.
Pt has a hx of 7 beats WCT
Pt has a hx of 7 beats WCT, pt already received coreg PM dose, Mg & K supplemented during day shift
Pt is on tube feeds, this was the q 6 FS but pt is not on a sliding scale
pt admitted on 9/25 for Pneumonia and contact for RSV

## 2018-10-10 NOTE — PROGRESS NOTE ADULT - PROBLEM SELECTOR PLAN 4
- NADIA on CKD III likely d/t ATN from [facility], baseline cr 1.3, Cr trending down to baseline  - holding ARB and Lasix   - Avoid nephrotoxins  - Monitor Cr

## 2018-10-11 PROCEDURE — 99233 SBSQ HOSP IP/OBS HIGH 50: CPT

## 2018-10-11 RX ORDER — ASPIRIN/CALCIUM CARB/MAGNESIUM 324 MG
1 TABLET ORAL
Qty: 0 | Refills: 0 | COMMUNITY
Start: 2018-10-11

## 2018-10-11 RX ORDER — IPRATROPIUM/ALBUTEROL SULFATE 18-103MCG
3 AEROSOL WITH ADAPTER (GRAM) INHALATION
Qty: 0 | Refills: 0 | COMMUNITY
Start: 2018-10-11

## 2018-10-11 RX ORDER — OMEGA-3 ACID ETHYL ESTERS 1 G
1 CAPSULE ORAL
Qty: 0 | Refills: 0 | COMMUNITY

## 2018-10-11 RX ORDER — ASPIRIN/CALCIUM CARB/MAGNESIUM 324 MG
81 TABLET ORAL DAILY
Qty: 0 | Refills: 0 | Status: DISCONTINUED | OUTPATIENT
Start: 2018-10-11 | End: 2018-10-12

## 2018-10-11 RX ORDER — ATORVASTATIN CALCIUM 80 MG/1
1 TABLET, FILM COATED ORAL
Qty: 0 | Refills: 0 | COMMUNITY
Start: 2018-10-11

## 2018-10-11 RX ORDER — ACETAMINOPHEN 500 MG
650 TABLET ORAL ONCE
Qty: 0 | Refills: 0 | Status: COMPLETED | OUTPATIENT
Start: 2018-10-11 | End: 2018-10-11

## 2018-10-11 RX ORDER — LINAGLIPTIN 5 MG/1
1 TABLET, FILM COATED ORAL
Qty: 30 | Refills: 0 | OUTPATIENT
Start: 2018-10-11 | End: 2018-11-09

## 2018-10-11 RX ADMIN — Medication 81 MILLIGRAM(S): at 17:14

## 2018-10-11 RX ADMIN — Medication 650 MILLIGRAM(S): at 09:16

## 2018-10-11 RX ADMIN — Medication 25 MILLIGRAM(S): at 08:04

## 2018-10-11 RX ADMIN — APIXABAN 2.5 MILLIGRAM(S): 2.5 TABLET, FILM COATED ORAL at 11:49

## 2018-10-11 RX ADMIN — Medication 650 MILLIGRAM(S): at 10:45

## 2018-10-11 RX ADMIN — Medication 3 MILLILITER(S): at 17:14

## 2018-10-11 RX ADMIN — FINASTERIDE 5 MILLIGRAM(S): 5 TABLET, FILM COATED ORAL at 11:49

## 2018-10-11 RX ADMIN — Medication 650 MILLIGRAM(S): at 11:54

## 2018-10-11 RX ADMIN — Medication 650 MILLIGRAM(S): at 17:14

## 2018-10-11 RX ADMIN — APIXABAN 2.5 MILLIGRAM(S): 2.5 TABLET, FILM COATED ORAL at 23:43

## 2018-10-11 RX ADMIN — Medication 2 UNIT(S): at 17:14

## 2018-10-11 RX ADMIN — Medication 3 MILLILITER(S): at 06:23

## 2018-10-11 RX ADMIN — SENNA PLUS 1 TABLET(S): 8.6 TABLET ORAL at 23:43

## 2018-10-11 RX ADMIN — Medication 650 MILLIGRAM(S): at 02:23

## 2018-10-11 RX ADMIN — Medication 2 UNIT(S): at 11:50

## 2018-10-11 RX ADMIN — CARVEDILOL PHOSPHATE 25 MILLIGRAM(S): 80 CAPSULE, EXTENDED RELEASE ORAL at 23:43

## 2018-10-11 RX ADMIN — Medication 1 APPLICATION(S): at 06:24

## 2018-10-11 RX ADMIN — Medication 1 APPLICATION(S): at 17:13

## 2018-10-11 RX ADMIN — Medication 650 MILLIGRAM(S): at 03:08

## 2018-10-11 RX ADMIN — Medication 1 MILLIGRAM(S): at 11:49

## 2018-10-11 RX ADMIN — INSULIN GLARGINE 10 UNIT(S): 100 INJECTION, SOLUTION SUBCUTANEOUS at 22:05

## 2018-10-11 RX ADMIN — Medication 3 MILLILITER(S): at 23:43

## 2018-10-11 RX ADMIN — ATORVASTATIN CALCIUM 40 MILLIGRAM(S): 80 TABLET, FILM COATED ORAL at 23:43

## 2018-10-11 RX ADMIN — Medication 2: at 11:49

## 2018-10-11 RX ADMIN — Medication 650 MILLIGRAM(S): at 06:24

## 2018-10-11 RX ADMIN — CARVEDILOL PHOSPHATE 25 MILLIGRAM(S): 80 CAPSULE, EXTENDED RELEASE ORAL at 10:45

## 2018-10-11 RX ADMIN — Medication 650 MILLIGRAM(S): at 23:44

## 2018-10-11 RX ADMIN — Medication 81 MILLIGRAM(S): at 11:49

## 2018-10-11 NOTE — PROGRESS NOTE ADULT - SUBJECTIVE AND OBJECTIVE BOX
Patient is a 89y old  Male who presents with a chief complaint of dyspnea (04 Oct 2018 12:23)    SUBJECTIVE / OVERNIGHT EVENTS: Patient seen and examined. He has no acute complaints today. Tolerating PO intake well with dysphagia diet.     REVIEW OF SYSTEMS      General: No fevers, chills  	  Ophthalmologic: No change in vision    Respiratory and Thorax: No SOB or cough  	  Cardiovascular: No chest pain, palpitations, or LE edema    Gastrointestinal: No abdominal pain, nausea, vomiting, or diarrhea    Genitourinary: No dysuria or polyuria    MEDICATIONS  (STANDING):  acetaminophen   Tablet .. 650 milliGRAM(s) Oral four times a day  ALBUTerol/ipratropium for Nebulization 3 milliLiter(s) Nebulizer every 6 hours  apixaban 2.5 milliGRAM(s) Oral every 12 hours  AQUAPHOR (petrolatum Ointment) 1 Application(s) Topical two times a day  aspirin  chewable 81 milliGRAM(s) Oral daily  atorvastatin 40 milliGRAM(s) Oral at bedtime  camphor 0.5%/menthol 0.5% Topical Lotion 1 Application(s) Topical three times a day  carvedilol 25 milliGRAM(s) Oral every 12 hours  dextrose 5%. 1000 milliLiter(s) (50 mL/Hr) IV Continuous <Continuous>  dextrose 50% Injectable 12.5 Gram(s) IV Push once  dextrose 50% Injectable 25 Gram(s) IV Push once  dextrose 50% Injectable 25 Gram(s) IV Push once  docusate sodium 100 milliGRAM(s) Oral three times a day  finasteride 5 milliGRAM(s) Oral daily  folic acid 1 milliGRAM(s) Oral daily  insulin glargine Injectable (LANTUS) 10 Unit(s) SubCutaneous at bedtime  insulin lispro (HumaLOG) corrective regimen sliding scale   SubCutaneous three times a day before meals  insulin lispro (HumaLOG) corrective regimen sliding scale   SubCutaneous at bedtime  insulin lispro Injectable (HumaLOG) 2 Unit(s) SubCutaneous three times a day before meals  senna 1 Tablet(s) Oral at bedtime    MEDICATIONS  (PRN):  acetaminophen   Tablet .. 650 milliGRAM(s) Oral every 6 hours PRN Temp greater or equal to 38C (100.4F), Mild Pain (1 - 3)  dextrose 40% Gel 15 Gram(s) Oral once PRN Blood Glucose LESS THAN 70 milliGRAM(s)/deciliter  glucagon  Injectable 1 milliGRAM(s) IntraMuscular once PRN Glucose LESS THAN 70 milligrams/deciliter      I&O's Summary    10 Oct 2018 07:01  -  11 Oct 2018 07:00  --------------------------------------------------------  IN: 540 mL / OUT: 850 mL / NET: -310 mL        CAPILLARY BLOOD GLUCOSE      POCT Blood Glucose.: 224 mg/dL (11 Oct 2018 11:44)  POCT Blood Glucose.: 86 mg/dL (11 Oct 2018 07:38)  POCT Blood Glucose.: 139 mg/dL (10 Oct 2018 21:23)  POCT Blood Glucose.: 161 mg/dL (10 Oct 2018 17:06)      Vital Signs Last 24 Hrs  T(C): 37.1 (11 Oct 2018 04:57), Max: 37.1 (11 Oct 2018 04:57)  T(F): 98.8 (11 Oct 2018 04:57), Max: 98.8 (11 Oct 2018 04:57)  HR: 66 (11 Oct 2018 10:36) (62 - 71)  BP: 121/44 (11 Oct 2018 10:36) (120/57 - 127/66)  BP(mean): --  RR: 16 (11 Oct 2018 04:57) (16 - 18)  SpO2: 96% (11 Oct 2018 04:57) (96% - 98%)    PHYSICAL EXAM:  GENERAL: NAD, sitting in chair  EYES: EOMI, conjunctiva and sclera clear  NECK: Supple  CHEST/LUNG: Improved, CTA b/l    HEART: Regular rate and rhythm; S1S2  ABDOMEN: Soft, Nontender, Nondistended; Bowel sounds present  EXTREMITIES:  No clubbing, cyanosis, LUE edema   PSYCH: AAO x 2/3.  NEUROLOGY: non-focal  SKIN: Stage 3 ulcer on left heel    LABS:    (10-10 @ 13:01)                      7.6  28.4 )-----------( 206                 24.2    Neutrophils = -- (--%)  Lymphocytes = -- (--%)  Eosinophils = -- (--%)  Basophils = -- (--%)  Monocytes = -- (--%)  Bands = --%    10-10    137  |  102  |  54<H>  ----------------------------<  189<H>  4.9   |  29  |  1.60<H>    Ca    8.1<L>      10 Oct 2018 13:01                     RADIOLOGY & ADDITIONAL TESTS:    Imaging Personally Reviewed:  Tele reviewed:  IRIS manriquez, 60-70s    Consultant(s) Notes Reviewed:  palliative    Care Discussed with Consultants/Other Providers: palliative

## 2018-10-11 NOTE — PROGRESS NOTE ADULT - PROBLEM SELECTOR PLAN 8
- DM2 with diabetic nephropathy   - c/w current insulin dosing  - Will transition to tradjenta on D/C, which can be crushed and will give appropriate control (HbA1c of only 6.1 on admission)  - Continue to monitor FS

## 2018-10-11 NOTE — PROGRESS NOTE ADULT - PROBLEM SELECTOR PLAN 5
- Stage 3 ulceration of the left heel, non-healing d/t PAD  - Foot XR neg for osteomyelitis  - LE arterial duplex w/ flow-limiting lesion affecting the left anterior tibial artery   in the mid calf  -ASA/Lipitor for PAD   -family does not want vascular intervention   -continue with wound care. Daughter and granddaughter at bedside were taught by RN regarding dressing changes

## 2018-10-11 NOTE — PROGRESS NOTE ADULT - PROBLEM SELECTOR PLAN 1
Multifactorial, now resolved. Patient is currently breathing comfortably on room air.  - Patient signed hospice consents, will have home O2

## 2018-10-11 NOTE — CHART NOTE - NSCHARTNOTEFT_GEN_A_CORE
Pt seen today for malnutrition follow-up/consult. Pt resting at this time. Per staff, he is eating fair-good ~50% of meals with feeding assist. Pt denies chewing/swallowing difficulty with current diet.    89 year old male pt with PMH MI (1980), HFrEF, paroxysmal A-Fib, AICD, CVA (LLE weakness), HTN, T2DM, BPH w/ chronic indwelling Davis and CLL presenting with acute hypoxic respiratory failure 2/2 acute on chronic HF, likely exacerbated by RSV infection & superimposed aspiration PNA with near total atelectasis of left lower lobe. Also found to have dysphagia S/P MBS 9/29/18 with SLP recommendations for non-oral nutrition and hydration. Per GOC pt planned for home hospice, and has been upgraded to dysphagia PO diet, NGT removed.    Source: Patient [x]    Family [ ]     other [x] electronic medical records    Diet: Dysphagia 1 Pureed - Honey Consistency Fluid, Low Sodium     Patient reports [ ] nausea  [ ] vomiting [ ] diarrhea [ ] constipation  [ ]chewing problems [ ] swallowing issues  [x] other: last BM today 10/11, noted with fecal incontinence     PO intake:  < 50% [ ] 50-75% [x]   % [x]  other : with feeding assist    Source for PO intake [ ] Patient [ ] family [x] chart [x] staff [ ] other    Enteral/Parenteral Nutrition: n/a    Current Weight: 154.2 pounds (current, bedscale, +2 L arm edema noted).  155.4 pounds admit wt.    Pertinent Medications: MEDICATIONS  (STANDING):  acetaminophen   Tablet .. 650 milliGRAM(s) Oral four times a day  ALBUTerol/ipratropium for Nebulization 3 milliLiter(s) Nebulizer every 6 hours  apixaban 2.5 milliGRAM(s) Oral every 12 hours  AQUAPHOR (petrolatum Ointment) 1 Application(s) Topical two times a day  aspirin enteric coated 81 milliGRAM(s) Oral daily  atorvastatin 40 milliGRAM(s) Oral at bedtime  camphor 0.5%/menthol 0.5% Topical Lotion 1 Application(s) Topical three times a day  carvedilol 25 milliGRAM(s) Oral every 12 hours  dextrose 5%. 1000 milliLiter(s) (50 mL/Hr) IV Continuous <Continuous>  dextrose 50% Injectable 12.5 Gram(s) IV Push once  dextrose 50% Injectable 25 Gram(s) IV Push once  dextrose 50% Injectable 25 Gram(s) IV Push once  docusate sodium 100 milliGRAM(s) Oral three times a day  finasteride 5 milliGRAM(s) Oral daily  folic acid 1 milliGRAM(s) Oral daily  insulin glargine Injectable (LANTUS) 10 Unit(s) SubCutaneous at bedtime  insulin lispro (HumaLOG) corrective regimen sliding scale   SubCutaneous three times a day before meals  insulin lispro (HumaLOG) corrective regimen sliding scale   SubCutaneous at bedtime  insulin lispro Injectable (HumaLOG) 2 Unit(s) SubCutaneous three times a day before meals  senna 1 Tablet(s) Oral at bedtime    MEDICATIONS  (PRN):  acetaminophen   Tablet .. 650 milliGRAM(s) Oral every 6 hours PRN Temp greater or equal to 38C (100.4F), Mild Pain (1 - 3)  dextrose 40% Gel 15 Gram(s) Oral once PRN Blood Glucose LESS THAN 70 milliGRAM(s)/deciliter  glucagon  Injectable 1 milliGRAM(s) IntraMuscular once PRN Glucose LESS THAN 70 milligrams/deciliter    Pertinent Labs:  10-10 Na137 mmol/L Glu 189 mg/dL<H> K+ 4.9 mmol/L Cr  1.60 mg/dL<H> BUN 54 mg/dL<H> 10-09 Phos 3.5 mg/dL 10-09 Alb 2.4 g/dL<L> 09-20 PAB 7 mg/dL<L> 09-26 [id number] 6.1 %<H>      Skin: L heel venous ulcer noted      Estimated Needs:   [x] no change since previous assessment  [ ] recalculated:       Previous Nutrition Diagnosis: Severe Malnutrition         Nutrition Diagnosis is [x] ongoing  [ ] resolved [ ] not applicable         New Nutrition Diagnosis: [x] not applicable         Interventions:     1) To continue po diet at this time, conservative consistency puree with honey thickened liquids for ease of chewing/swallowing.   2) Diet at home hospice as per pt wishes/comfort feeds  3) Assist pt with all meals, emotional support       Monitoring and Evaluation:     [x] PO intake [x] Tolerance to diet prescription [x] weights [x] follow up per protocol    [x] other: RD remains available: Zahra Katz MS, RDN, CDN, CDE. #[phone].

## 2018-10-11 NOTE — PHARMACOTHERAPY INTERVENTION NOTE - COMMENTS
Reviewed discharge medication names, doses, and indications with patient's spouse and daughter.
Patient on continuous tube feeds, recommended change insulin sliding scale from TID/HS to Q6H.

## 2018-10-11 NOTE — PROGRESS NOTE ADULT - PROBLEM SELECTOR PLAN 3
- likely decompensated by acute infection, improved s/p diuresis   - Holding further diuresis, patient clinically euvolemic at this time. Will hold lasix at time of D/C. Family counseled that a visiting nurse will be available for assessments. If patient develops dyspnea at home, may be reasonable to restart lasix for comfort measures at that time.  - No ARB or hydralazine. BP is borderline and risks of pill burden outweigh benefits in this hospice patient  - c/w carvedilol 25mg q12hrs  - daily weights  - strict I/O's

## 2018-10-11 NOTE — PROGRESS NOTE ADULT - PROBLEM SELECTOR PLAN 4
- NADIA on CKD III likely d/t ATN from [facility], baseline cr 1.3, Cr trending down to 1.6  - holding ARB and Lasix   - Avoid nephrotoxins  - Monitor Cr

## 2018-10-12 VITALS
HEART RATE: 66 BPM | TEMPERATURE: 97 F | RESPIRATION RATE: 16 BRPM | DIASTOLIC BLOOD PRESSURE: 66 MMHG | SYSTOLIC BLOOD PRESSURE: 118 MMHG | OXYGEN SATURATION: 96 %

## 2018-10-12 PROCEDURE — 99239 HOSP IP/OBS DSCHRG MGMT >30: CPT | Mod: GV

## 2018-10-12 RX ORDER — IPRATROPIUM/ALBUTEROL SULFATE 18-103MCG
3 AEROSOL WITH ADAPTER (GRAM) INHALATION
Qty: 3 | Refills: 0 | OUTPATIENT
Start: 2018-10-12 | End: 2018-11-10

## 2018-10-12 RX ORDER — DOCUSATE SODIUM 100 MG
1 CAPSULE ORAL
Qty: 90 | Refills: 0 | OUTPATIENT
Start: 2018-10-12 | End: 2018-11-10

## 2018-10-12 RX ORDER — MEN-PHOR .5; .5 G/G; G/G
1 LOTION TOPICAL
Qty: 1 | Refills: 0 | OUTPATIENT
Start: 2018-10-12 | End: 2018-11-10

## 2018-10-12 RX ORDER — LINAGLIPTIN 5 MG/1
1 TABLET, FILM COATED ORAL
Qty: 30 | Refills: 0 | OUTPATIENT
Start: 2018-10-12 | End: 2018-11-10

## 2018-10-12 RX ORDER — ASPIRIN/CALCIUM CARB/MAGNESIUM 324 MG
1 TABLET ORAL
Qty: 30 | Refills: 0 | OUTPATIENT
Start: 2018-10-12 | End: 2018-11-10

## 2018-10-12 RX ORDER — SENNA PLUS 8.6 MG/1
2 TABLET ORAL
Qty: 60 | Refills: 0 | OUTPATIENT
Start: 2018-10-12 | End: 2018-11-10

## 2018-10-12 RX ORDER — APIXABAN 2.5 MG/1
1 TABLET, FILM COATED ORAL
Qty: 0 | Refills: 0 | COMMUNITY

## 2018-10-12 RX ORDER — ACETAMINOPHEN 500 MG
2 TABLET ORAL
Qty: 240 | Refills: 0 | OUTPATIENT
Start: 2018-10-12 | End: 2018-11-10

## 2018-10-12 RX ORDER — PETROLATUM,WHITE
1 JELLY (GRAM) TOPICAL
Qty: 1 | Refills: 0 | OUTPATIENT
Start: 2018-10-12 | End: 2018-11-10

## 2018-10-12 RX ORDER — APIXABAN 2.5 MG/1
1 TABLET, FILM COATED ORAL
Qty: 60 | Refills: 0 | OUTPATIENT
Start: 2018-10-12 | End: 2018-11-10

## 2018-10-12 RX ORDER — CARVEDILOL PHOSPHATE 80 MG/1
1 CAPSULE, EXTENDED RELEASE ORAL
Qty: 60 | Refills: 0 | OUTPATIENT
Start: 2018-10-12 | End: 2018-11-10

## 2018-10-12 RX ADMIN — Medication 2 UNIT(S): at 07:59

## 2018-10-12 RX ADMIN — Medication 650 MILLIGRAM(S): at 12:15

## 2018-10-12 RX ADMIN — Medication 650 MILLIGRAM(S): at 06:06

## 2018-10-12 RX ADMIN — Medication 1 MILLIGRAM(S): at 12:14

## 2018-10-12 RX ADMIN — Medication 2 UNIT(S): at 12:13

## 2018-10-12 RX ADMIN — APIXABAN 2.5 MILLIGRAM(S): 2.5 TABLET, FILM COATED ORAL at 12:14

## 2018-10-12 RX ADMIN — Medication 3 MILLILITER(S): at 12:14

## 2018-10-12 RX ADMIN — CARVEDILOL PHOSPHATE 25 MILLIGRAM(S): 80 CAPSULE, EXTENDED RELEASE ORAL at 12:14

## 2018-10-12 RX ADMIN — Medication 3 MILLILITER(S): at 06:06

## 2018-10-12 RX ADMIN — Medication 81 MILLIGRAM(S): at 12:14

## 2018-10-12 RX ADMIN — FINASTERIDE 5 MILLIGRAM(S): 5 TABLET, FILM COATED ORAL at 12:14

## 2018-10-12 RX ADMIN — Medication 650 MILLIGRAM(S): at 00:22

## 2018-10-12 RX ADMIN — MEN-PHOR 1 APPLICATION(S): .5; .5 LOTION TOPICAL at 06:06

## 2018-10-12 RX ADMIN — Medication 1 APPLICATION(S): at 06:06

## 2018-10-12 NOTE — GOALS OF CARE CONVERSATION - PERSONAL ADVANCE DIRECTIVE - CONVERSATION/DISCUSSION
Hospice Referral
Palliative Care Referral
Prognosis/Diagnosis/Treatment Options/Hospice Referral
Treatment Options/Diagnosis/Prognosis

## 2018-10-12 NOTE — PROGRESS NOTE ADULT - PROVIDER SPECIALTY LIST ADULT
Heme/Onc
Hospitalist
Infectious Disease
Internal Medicine
Palliative Care
Palliative Care
Podiatry
Vascular Cardiology
Vascular Cardiology
Wound Care
Infectious Disease
Internal Medicine
Hospitalist
Internal Medicine

## 2018-10-12 NOTE — GOALS OF CARE CONVERSATION - PERSONAL ADVANCE DIRECTIVE - CONVERSATION DETAILS
10/11 Met with daughter reviewed Hospice POC/benefits, consents signed.  DME ordered, expected delivery 10/12, discharge to follow.  Collaborated care with FAROOQ Richardson and Dr. Edouard.
As per [facility] CM, the pt's dtr Salima will meet with hospice on 10/11/18 at 10-10:30am to discuss services. Caprice Jiménez RN
Called pt's daughter Salima (cell ) and arranged to meet on Monday, 10/8 at 3 p.m. to discuss hospice care.
Met with pt's dtr and son-in-law to discuss home hospice services. Family is conflicted re: placement of feeding tube and are unable to make a decision for hospice services at this time. FAROOQ Downs and NP Clive made aware of results of meeting. HCN contact info given to them to call when ready.
Family meeting was held with patients daughter and son in law. Palliative philosophy was explained and affirmed we were consulted to be an added layer of support to the family.     Family has a background of already having a bad experience when the daughter had made her mother DNR/DNI. They noticed the medical team at that time "immediately stopped caring for mom" and felt that in declaring the patient (their father) DNR/DNI now would mean that the medical treatment would cease. The daughter also has financial barriers and great care giver burden where she is concerned of what the future of her fathers problems would look like.     The acute issues of CLL, aspiration, feeding tube, peripheral vascular disease, wounds, heart failure, renal failure were all addressed. Attempts to see the patients bigger clinical picture were outlined. Options of aggressive management vs conservative management were given. Hospice philosophy was also introduced. Daughter had a hard time seeing the bigger picture and would be held up on the details of medical issues at hand. Both the daughter and son in law explained he would never want aggressive surgeries or intervention. However, when this was presented to the patient, he could not understand the depth of these decisions.     Overall, a consensus was made that the NGT will remain until patients acute aspiration pna treatment is completed. NO PEG tube. They will need to go home and talk about a DNR/DNI and hospice to the patients other daughter in [place]. They would also like to have a trusted family friend (also in some sort of medical field) to come and talk the patient to better understand his wishes.    Palliative will following ongoing [facility] discussions. C/w medical management as per primary team.
Patient has a health care proxy in chart.   Odalys Borrego is named in the HCP w/ Dr. Goodrich  ([phone]) named as the alternative agent.     Odalys Borrego called today and expressed her wishes to review and discuss our recommendations w/ Dr. Goodrich whom is an oncologist from [facility]. Our goals of care meeting from Wednesday was clarified. From that meeting the only thing that was decided upon as per patient wishes was no feeding tube.     Dr. Goodrich agreed if patient does not want a peg tube, the route of management should be conservative with pleasure feeding, DNR/DNI, and home with hospice. If the patient wants the feeding tube than medical management moving forward needs to be further addressed. Being in between these paths does not make sense and alters the patients quality of life. Dr. Goodrich agrees Salima may be having difficulties deciding due to past experience and multiple burdens involved in the decision making process. He agreed to clarify and discuss the above with Salima to make a final decision in the patients best interest. Will inform if aware of any final decision.    -Lorie Mendoza MD  Palliative fellow

## 2018-10-12 NOTE — PROGRESS NOTE ADULT - ATTENDING COMMENTS
It seems that patient is choosing a less aggressive approach  Continue with local therapy to the wound  If status changes, can always perform peripheral angiogram as outpatient    Thanks  Royce Ojeda [id number]
Agree with the above documentation. Discussed with fellow. Patient with multiple medical comorbidities, now with dysphagia.  Will attempt to address goals of care in terms of nutrition and code status; awaiting family availability for meeting as they prefer to meet in person than via phone. Please call with questions
Patient seen and examined and agree with the above documentation with the following additions:   Met with family today, please see goals of care conversation document.   Patient remains high risk for aspiration, continues on dobhoff feeds. Discussion with family; explained multiple medical conditions.   They are not interested in certain aggressive interventions, but despite knowing about renal dysfunction, heart dysfunction, skin dysfunction and risk of infections with dysphagia, family would like to continue current management. They are no interested in pursuing PEG tube or surgical intervention from vascular perspective at this point, but are still undecided about CODE STATUS and transitioning focus of care. Will follow up. Rest of plan as documented above.
back pain give liquid tylenol 1g bid, + heating pads.   more frequent finger stick monitoring. nph 10 from 12.   GOC conversations w/ daughter.
Pt planned for  [facility] duplex, and speech swallow evaluation. NPO until then.  Patient was positive for RSV, legionella ag negative.  Azithromycin discontinued.  Blood cultures negative.  Vanc discontinued.  Urine culture growing pseudomonas.  Davis was exchanged on 9/25.  Continuing zosyn for pseudomonas coverage.  Follow up sensitivities.  Continue apixaban for afib.  Try to wean off oxygen.  Pt is diuresing well, with -1350 since yesterday. Continue daily weights.  Appreciate heme recs regarding role for IVIG in pt with CLL and now recurrent pneumonia/infections.
Plan d/w patient and his daughter and granddaughter at bedside yesterday. Also reviewed all medications and how to administer them with pharmacist present. d/w Florence (hospice RN), meds to be sent to hospice pharmacy today. Reviewed disposition planning in detail with family. d/w NP (Charis). Patient is stable for D/C home today with hospice services in place. Family is in full agreement with plan of care.     Discharge planning time 45 minutes.     Adrian Edouard M.D.  Hospitalist  Pager: [phone]
Plan d/w patient and his daughter and granddaughter at bedside. Also reviewed all medications and how to administer them with pharmacist present. d/w hospice RN and CM, reviewed disposition planning in detail with family. d/w NP (Charis). Patient is stable for D/C home tomorrow morning with hospice services in place. Family is in full agreement with plan of care.     Adrian Edouard M.D.  Hospitalist  Pager: [phone]
Plan d/w patient, NP (Marixa), and palliative (Dr. Li).    Adrian Edouard M.D.  Hospitalist  Pager: [phone]
Plan d/w patient, NP (Marixa), and palliative (Dr. Li).    Adrian Edouard M.D.  Hospitalist  Pager: [phone]
Plan d/w patient, NP (Marixa).  tolerating orals well, likely home w/ home hospice.   comfortable today.  dispo pending.
needs aggressive oral care.   will continue to closely monitor.
Hypophosphatemia, supplement phos
Pt is doing well.  Speech saw patient. New diet placed.  Still on oxygen, with wheezing on exam.  Made duonebs Q6H.  No role for IVIG per hematology.  Pt is AV paced.  K is low 3.3, will replete.  H/H stable, wbc elevated to 22.  Cr is uptrending.  Will monitor, avoid nephrotoxic agents.  Likely prerenal as patient has has no PO intake in last four days.  Will see how he does today. Hesistant to give fluids with his current resp status.  Appreciate ID recs
Pt came in with hypoxia, currently saturating well on nasal canula. No complaints.  SOB not present at rest. No chest pain. Denies cough and productive sputum. Requesting diet and container to clean his dentures.    Patient found to have patchy opacity bilateraly, granuloma, positive U/A, positive RSV on RVP, and anasarca.  Patient had bandemia, elevated troponin and BNP.  Patient with acute on chronic systolic heart failure.  Acute hypoxic respiratory failure.  Pneumonia, likely HCAP vs. Aspiration.  Urinary tract infection 2/2 chronic indwelling joshi 2/2 bph.  Continue broad spectrum antibiotics as abovve.  Continue lasix for diuersis. Monitor I/O, daily weights.  Monitor BP and electrolytes.  Speech swallow consulted.  Pt has pressure ulcer on his right heal
Pt evaluated by podiatry for ulcer on heel.  Will get duplex, saul.  Needs speech evaluation.  Pt has been NPO since Tuesday.  UTI with pseudomonas.  Davis was exchanged on Sept 25.  Appreciate Carlitos recs.

## 2018-10-12 NOTE — PROGRESS NOTE ADULT - SUBJECTIVE AND OBJECTIVE BOX
Patient is a 89y old  Male who presents with a chief complaint of dyspnea (04 Oct 2018 12:23)    SUBJECTIVE / OVERNIGHT EVENTS: Patient seen and examined. He has no acute complaints today. Tolerating PO intake well with dysphagia diet.     REVIEW OF SYSTEMS      General: No fevers, chills  	  Ophthalmologic: No change in vision    Respiratory and Thorax: No SOB or cough  	  Cardiovascular: No chest pain, palpitations, or LE edema    Gastrointestinal: No abdominal pain, nausea, vomiting, or diarrhea    Genitourinary: No dysuria or polyuria    MEDICATIONS  (STANDING):  acetaminophen   Tablet .. 650 milliGRAM(s) Oral four times a day  ALBUTerol/ipratropium for Nebulization 3 milliLiter(s) Nebulizer every 6 hours  apixaban 2.5 milliGRAM(s) Oral every 12 hours  AQUAPHOR (petrolatum Ointment) 1 Application(s) Topical two times a day  aspirin  chewable 81 milliGRAM(s) Oral daily  atorvastatin 40 milliGRAM(s) Oral at bedtime  camphor 0.5%/menthol 0.5% Topical Lotion 1 Application(s) Topical three times a day  carvedilol 25 milliGRAM(s) Oral every 12 hours  dextrose 5%. 1000 milliLiter(s) (50 mL/Hr) IV Continuous <Continuous>  dextrose 50% Injectable 12.5 Gram(s) IV Push once  dextrose 50% Injectable 25 Gram(s) IV Push once  dextrose 50% Injectable 25 Gram(s) IV Push once  docusate sodium 100 milliGRAM(s) Oral three times a day  finasteride 5 milliGRAM(s) Oral daily  folic acid 1 milliGRAM(s) Oral daily  insulin glargine Injectable (LANTUS) 10 Unit(s) SubCutaneous at bedtime  insulin lispro (HumaLOG) corrective regimen sliding scale   SubCutaneous three times a day before meals  insulin lispro (HumaLOG) corrective regimen sliding scale   SubCutaneous at bedtime  insulin lispro Injectable (HumaLOG) 2 Unit(s) SubCutaneous three times a day before meals  senna 1 Tablet(s) Oral at bedtime    MEDICATIONS  (PRN):  acetaminophen   Tablet .. 650 milliGRAM(s) Oral every 6 hours PRN Temp greater or equal to 38C (100.4F), Mild Pain (1 - 3)  dextrose 40% Gel 15 Gram(s) Oral once PRN Blood Glucose LESS THAN 70 milliGRAM(s)/deciliter  glucagon  Injectable 1 milliGRAM(s) IntraMuscular once PRN Glucose LESS THAN 70 milligrams/deciliter      I&O's Summary    11 Oct 2018 07:01  -  12 Oct 2018 07:00  --------------------------------------------------------  IN: 240 mL / OUT: 1000 mL / NET: -760 mL    12 Oct 2018 07:01  -  12 Oct 2018 12:30  --------------------------------------------------------  IN: 120 mL / OUT: 0 mL / NET: 120 mL        CAPILLARY BLOOD GLUCOSE      POCT Blood Glucose.: 82 mg/dL (12 Oct 2018 11:27)  POCT Blood Glucose.: 80 mg/dL (12 Oct 2018 07:27)  POCT Blood Glucose.: 130 mg/dL (11 Oct 2018 21:07)  POCT Blood Glucose.: 121 mg/dL (11 Oct 2018 17:06)      Vital Signs Last 24 Hrs  T(C): 36.4 (12 Oct 2018 05:12), Max: 36.8 (11 Oct 2018 20:10)  T(F): 97.6 (12 Oct 2018 05:12), Max: 98.3 (11 Oct 2018 20:10)  HR: 66 (12 Oct 2018 12:22) (64 - 69)  BP: 124/69 (12 Oct 2018 12:22) (111/36 - 143/68)  BP(mean): --  RR: 16 (12 Oct 2018 05:12) (16 - 17)  SpO2: 98% (12 Oct 2018 05:12) (95% - 98%)    PHYSICAL EXAM:  GENERAL: NAD, sitting in chair  EYES: EOMI, conjunctiva and sclera clear  NECK: Supple  CHEST/LUNG: Improved, CTA b/l    HEART: Regular rate and rhythm; S1S2  ABDOMEN: Soft, Nontender, Nondistended; Bowel sounds present  EXTREMITIES:  No clubbing, cyanosis, LUE edema   PSYCH: AAO x 2/3.  NEUROLOGY: non-focal  SKIN: Stage 3 ulcer on left heel    LABS:    (10-10 @ 13:01)                      7.6  28.4 )-----------( 206                 24.2    Neutrophils = -- (--%)  Lymphocytes = -- (--%)  Eosinophils = -- (--%)  Basophils = -- (--%)  Monocytes = -- (--%)  Bands = --%    10-10    137  |  102  |  54<H>  ----------------------------<  189<H>  4.9   |  29  |  1.60<H>    Ca    8.1<L>      10 Oct 2018 13:01                     RADIOLOGY & ADDITIONAL TESTS:    Imaging Personally Reviewed:  Tele reviewed:  IRIS manriquez, 60-70s    Consultant(s) Notes Reviewed:  palliative    Care Discussed with Consultants/Other Providers: palliative, hospice

## 2018-10-12 NOTE — PROGRESS NOTE ADULT - PROBLEM SELECTOR PLAN 4
- NADIA on CKD III likely d/t ATN from [facility], baseline cr 1.3, Cr trending down to 1.6  - holding ARB and Lasix   - Avoid nephrotoxins

## 2018-10-12 NOTE — PROGRESS NOTE ADULT - REASON FOR ADMISSION
dyspnea

## 2018-11-11 PROCEDURE — 87040 BLOOD CULTURE FOR BACTERIA: CPT

## 2018-11-11 PROCEDURE — 87581 M.PNEUMON DNA AMP PROBE: CPT

## 2018-11-11 PROCEDURE — 96374 THER/PROPH/DIAG INJ IV PUSH: CPT | Mod: XU

## 2018-11-11 PROCEDURE — 96375 TX/PRO/DX INJ NEW DRUG ADDON: CPT | Mod: XU

## 2018-11-11 PROCEDURE — 74230 X-RAY XM SWLNG FUNCJ C+: CPT

## 2018-11-11 PROCEDURE — 87449 NOS EACH ORGANISM AG IA: CPT

## 2018-11-11 PROCEDURE — 87798 DETECT AGENT NOS DNA AMP: CPT

## 2018-11-11 PROCEDURE — 84300 ASSAY OF URINE SODIUM: CPT

## 2018-11-11 PROCEDURE — 36600 WITHDRAWAL OF ARTERIAL BLOOD: CPT

## 2018-11-11 PROCEDURE — 82570 ASSAY OF URINE CREATININE: CPT

## 2018-11-11 PROCEDURE — 87486 CHLMYD PNEUM DNA AMP PROBE: CPT

## 2018-11-11 PROCEDURE — 87086 URINE CULTURE/COLONY COUNT: CPT

## 2018-11-11 PROCEDURE — 85027 COMPLETE CBC AUTOMATED: CPT

## 2018-11-11 PROCEDURE — 82435 ASSAY OF BLOOD CHLORIDE: CPT

## 2018-11-11 PROCEDURE — 86901 BLOOD TYPING SEROLOGIC RH(D): CPT

## 2018-11-11 PROCEDURE — 83605 ASSAY OF LACTIC ACID: CPT

## 2018-11-11 PROCEDURE — 71275 CT ANGIOGRAPHY CHEST: CPT

## 2018-11-11 PROCEDURE — 82947 ASSAY GLUCOSE BLOOD QUANT: CPT

## 2018-11-11 PROCEDURE — 83880 ASSAY OF NATRIURETIC PEPTIDE: CPT

## 2018-11-11 PROCEDURE — 83036 HEMOGLOBIN GLYCOSYLATED A1C: CPT

## 2018-11-11 PROCEDURE — 94640 AIRWAY INHALATION TREATMENT: CPT

## 2018-11-11 PROCEDURE — 80048 BASIC METABOLIC PNL TOTAL CA: CPT

## 2018-11-11 PROCEDURE — 80053 COMPREHEN METABOLIC PANEL: CPT

## 2018-11-11 PROCEDURE — 85014 HEMATOCRIT: CPT

## 2018-11-11 PROCEDURE — 86900 BLOOD TYPING SEROLOGIC ABO: CPT

## 2018-11-11 PROCEDURE — 97530 THERAPEUTIC ACTIVITIES: CPT

## 2018-11-11 PROCEDURE — 84484 ASSAY OF TROPONIN QUANT: CPT

## 2018-11-11 PROCEDURE — 92611 MOTION FLUOROSCOPY/SWALLOW: CPT

## 2018-11-11 PROCEDURE — 93925 LOWER EXTREMITY STUDY: CPT

## 2018-11-11 PROCEDURE — 82330 ASSAY OF CALCIUM: CPT

## 2018-11-11 PROCEDURE — 86850 RBC ANTIBODY SCREEN: CPT

## 2018-11-11 PROCEDURE — 92610 EVALUATE SWALLOWING FUNCTION: CPT

## 2018-11-11 PROCEDURE — 82803 BLOOD GASES ANY COMBINATION: CPT

## 2018-11-11 PROCEDURE — 84295 ASSAY OF SERUM SODIUM: CPT

## 2018-11-11 PROCEDURE — 85730 THROMBOPLASTIN TIME PARTIAL: CPT

## 2018-11-11 PROCEDURE — 97110 THERAPEUTIC EXERCISES: CPT

## 2018-11-11 PROCEDURE — 93923 UPR/LXTR ART STDY 3+ LVLS: CPT

## 2018-11-11 PROCEDURE — 97161 PT EVAL LOW COMPLEX 20 MIN: CPT

## 2018-11-11 PROCEDURE — 83735 ASSAY OF MAGNESIUM: CPT

## 2018-11-11 PROCEDURE — 71045 X-RAY EXAM CHEST 1 VIEW: CPT

## 2018-11-11 PROCEDURE — 87186 SC STD MICRODIL/AGAR DIL: CPT

## 2018-11-11 PROCEDURE — 93005 ELECTROCARDIOGRAM TRACING: CPT

## 2018-11-11 PROCEDURE — 81001 URINALYSIS AUTO W/SCOPE: CPT

## 2018-11-11 PROCEDURE — 99285 EMERGENCY DEPT VISIT HI MDM: CPT | Mod: 25

## 2018-11-11 PROCEDURE — 84100 ASSAY OF PHOSPHORUS: CPT

## 2018-11-11 PROCEDURE — 73630 X-RAY EXAM OF FOOT: CPT

## 2018-11-11 PROCEDURE — 36000 PLACE NEEDLE IN VEIN: CPT | Mod: RT,XU

## 2018-11-11 PROCEDURE — 93971 EXTREMITY STUDY: CPT

## 2018-11-11 PROCEDURE — 87633 RESP VIRUS 12-25 TARGETS: CPT

## 2018-11-11 PROCEDURE — 85610 PROTHROMBIN TIME: CPT

## 2018-11-11 PROCEDURE — 84132 ASSAY OF SERUM POTASSIUM: CPT

## 2018-11-11 PROCEDURE — 82962 GLUCOSE BLOOD TEST: CPT

## 2018-12-21 NOTE — PHYSICAL THERAPY INITIAL EVALUATION ADULT - GAIT DEVIATIONS NOTED, PT EVAL
Telephone Encounter by Coniakilah MoMark [facility] at 07/26/17 10:15 AM     Author:  RAVINDRA Schmitt Service:  (none) Author Type:  Certified Medical Assistant     Filed:  07/26/17 10:19 AM Encounter Date:  7/26/2017 Status:  Signed     :  Coniakilah MoMark [address] (Certified Medical Assistant)            Packet has been signed, faxed to [facility] [place] admitting and pre op and filed. [NL1.1M]      Revision History        User Key Date/Time User Provider Type Action    > NL1.1 07/26/17 10:19 AM Carol MoMark [facility] Certified Medical Assistant Sign    M - Manual
Telephone Encounter by Dimple Urena Mellissa Pack at 07/26/17 08:46 AM     Author:  Dimple Urena Mellissa Pack Service:  (none) Author Type:  Certified Medical Assistant     Filed:  07/26/17 08:48 AM Encounter Date:  7/26/2017 Status:  Signed     :  Dimple Urena Mellissa Ramos Sirena (Certified Medical Assistant)            Patient scheduled for Left Heart Catheterization / Angiogram at[AS1.1T] [facility][AS1.1M] with [AS1.1T] Jovana[AS1.1M] on[AS1.1T] 7/27/17[AS1.1M] at[AS1.1T] 730am[AS1.1M],[AS1.1T]  600am[AS1.1M] arrival.[AS1.1T]            Revision History        User Key Date/Time User Provider Type Action    > AS1.1 07/26/17 08:48 AM Dimple Urena Mellissa Pack Certified Medical Assistant Sign    M - Manual, T - Template
Telephone Encounter by Godfrey Goel at 07/26/17 08:09 AM     Author:  Godfrey Goel Service:  (none) Author Type:  Certified Medical Assistant     Filed:  07/26/17 08:39 AM Encounter Date:  7/26/2017 Status:  Signed     :  Cliff Vidales (Certified Medical Assistant)            Order received per[MR1.1T] Dr Lucas[MR1.1M] for[MR1.1T] left[MR1.1M] heart cath[MR1.1T] with Dr Saenz Monday to perform[MR1.1M] on[MR1.1T] July28/2017[MR1.1M] at[MR1.1T] [facility][MR1.1M]. [MR1.1T] 7am[MR1.1M]arrival/[MR1.1T]9am[MR1.1M] start   Scheduled with[MR1.1T] Paige[MR1.1M]. [facility] [MR1.1T] Date/time given to patient in office. [MR1.2M] Pre op work[MR1.1T] E[MR1.2M]KG[MR1.1T] done on[MR1.2M] 07/24/2014[MR1.3M],[MR1.1T] patient sent to get[MR1.3M] chest x-ray[MR1.1T] &[MR1.3M]labs[MR1.1T] today[MR1.3M]. [MR1.1T] Told to come back to office to review instructions and med review. Packet to Dr Marcus Burdick to complete packet. [MR1.3M]      Revision History        User Key Date/Time User Provider Type Action    > MR1.2 07/26/17 08:39 AM Cliff Vidales Certified Medical Assistant Sign     MR1.3 07/26/17 08:36 AM Cliff Vidales Certified Medical Assistant      MR1.1 07/26/17 08:09 AM Cliff Vidales Certified Medical Assistant     M - Manual, T - Template
Telephone Encounter by Moira Muñoz RN at 07/26/17 09:45 AM     Author:  Moira Muñoz RN Service:  (none) Author Type:  Registered Nurse     Filed:  07/26/17 09:49 AM Encounter Date:  7/26/2017 Status:  Signed     :  Moira Muñoz RN (Registered Nurse)            All general and medication instructions reviewed with patient in office. RN and CMA letters competed.   Packet to Dr. Stone Fitzgerald for signature.[AB1.1M]      Revision History        User Key Date/Time User Provider Type Action    > AB1.1 07/26/17 09:49 AM Moira Muñoz RN Registered Nurse Sign    M - Manual
decreased stride length/decreased weight-shifting ability/decreased johan/decreased step length

## 2019-07-29 NOTE — PATIENT PROFILE ADULT. - TEACHING/LEARNING RELIGIOUS CONSIDERATIONS
Abdomen soft, non-tender and non-distended, no rebound, no guarding and no masses. no hepatosplenomegaly. none

## 2019-10-17 NOTE — PATIENT PROFILE ADULT. - PRO SERVICES AT [facility]
"Anesthesia Transfer of Care Note    Patient: Kemar Perez    Procedure(s) Performed: Procedure(s) (LRB):  EGD (ESOPHAGOGASTRODUODENOSCOPY) (N/A)    Patient location: GI    Anesthesia Type: general    Transport from OR: Transported from OR on room air with adequate spontaneous ventilation    Post pain: adequate analgesia    Post assessment: no apparent anesthetic complications and tolerated procedure well    Post vital signs: stable    Level of consciousness: sedated and responds to stimulation    Nausea/Vomiting: no nausea/vomiting    Complications: none    Transfer of care protocol was followed      Last vitals:   Visit Vitals  BP (!) 147/67 (BP Location: Left arm, Patient Position: Lying)   Pulse 76   Temp 36.7 °C (98.1 °F) (Oral)   Resp 17   Ht 5' 11" (1.803 m)   Wt 80.2 kg (176 lb 12.9 oz)   SpO2 100%   BMI 24.66 kg/m²     " none

## 2020-05-13 NOTE — H&P ADULT - LYMPHATIC
no
anterior cervical R/supraclavicular R/posterior cervical L/anterior cervical L/supraclavicular L/posterior cervical R

## 2020-05-14 NOTE — CHART NOTE - NSCHARTNOTEFT_GEN_A_CORE
CC: WCT      HPI:  Called by RN to relate pt having a 6 beat WCT noted on Tele  Patient seen & examined at bedside, he denied dizziness, CP, SOB, abdominal  pain, N/V, or palpitations        ROS:  CONSTITUTIONAL:  No fever, chills, rigors  CARDIOVASCULAR:  No chest pain or palpitations  RESPIRATORY:   No SOB  GASTROINTESTINAL:  No abd pain, N/V, diarrhea/constipation  EXTREMITIES:  No swelling or joint pain  GENITOURINARY:  No burning on urination, increased frequency or urgency.  No flank pain  SKIN: No rashes        PAST MEDICAL & SURGICAL HISTORY:  Past Medical History:  Adult Onset Diabetes Mellitus,    ALMI (Anterolateral Wall Myoca    Asymptomatic Chronic Venous Hy    CLL (chronic lymphocytic leukemia)    Heart failure    Paroxysmal A-fib    Personal History of Hypertensi    Personal History of Myocardial    S/P Implantation of Automatic.     Past Surgical History:  After-Cataract    Degeneration of the Retina of    S/P Inguinal Hernia Repair.        Vital Signs Last 24 Hrs  T(C): 37 (01 Oct 2018 04:19), Max: 37 (01 Oct 2018 04:19)  T(F): 98.6 (01 Oct 2018 04:19), Max: 98.6 (01 Oct 2018 04:19)  HR: 77 (01 Oct 2018 04:41) (71 - 80)  BP: 142/62 (01 Oct 2018 04:41) (130/61 - 160/62)  BP(mean): --  RR: 22 (01 Oct 2018 04:19) (18 - 22)  SpO2: 96% (01 Oct 2018 04:19) (96% - 98%)      Physical Exam:  General: Frail, in  NAD, AOx3, nontoxic appearing  Head:  NC/AT  CV: RRR, S1S2   Respiratory: Brady. coarse rales R > L; respirations non labored  Abdominal: (+) bowel sounds x4. Soft, non tender, non distended; + NGT in place  Genitourinary: + Davis   MSK: No BLLE edema, + peripheral pulses  Skin: (+) warm, dry   Psych: Appropriate affect       Labs:                        8.6    24.03 )-----------( 167      ( 30 Sep 2018 08:14 )             28.0     09-30    144  |  104  |  32<H>  ----------------------------<  136<H>  3.4<L>   |  29  |  2.04<H>    Ca    8.6      30 Sep 2018 07:19  Phos  2.5     09-30  Mg     1.8     09-30      Radiology:      Assessment & Plan:  HPI:  89 year old man with a history of MI (1980), HFrEF (EF 20%), paroxysmal A-Fib, AICD, CVA (LLE weakness), HTN, T2DM, BPH w/ chronic indwelling Davis and CLL presenting to the hospital for acute SOB.  Patient endorses 3 days of progressive SOB at rest w/ sudden decompensation requiring supplemental O2 in rehab facility. +lugo, +orthopnea States that 2 weeks ago he developed productive cough (patient unsure of color), chills and night sweats, no subjective fevers. He was recently admitted for Proteus UTI s/p 10 days abx course complicated by aspiration pneumonitis from 8/8-8/21 subsequently d/c to rehab facility.   Denies chest pain, palpations, nausea/vomiting, change in BM, headaches. (25 Sep 2018 05:51)    Pt seen this AM for a 6 beats of WCT noted on tele; pt remained asymptomatic; VSS  PLAN:  -Continue to monitor  -Continue Metoprolol dose as ordered  -Keep K > 4 Mg > 2  -Primary Team to follow up in AM, attending to follow       Kim Peterson PA-C  Dept of Medicine    20 min. spent during this pt. encounter 22.3

## 2021-11-23 NOTE — ED ADULT NURSE NOTE - IS THE PATIENT ABLE TO BE SCREENED?
Anesthesia Focused Assessment    Has patient ever tested positive for COVID? No    STOP-BANG Sleep Apnea Questionnaire    SNORE loudly (heard through closed doors)? No  TIRED, fatigued, sleepy during daytime? No  OBSERVED stopping breathing during sleep? No  High blood PRESSURE being treated? Yes    BMI over 35? No  AGE over 48? No  NECK circumference over 16\"? No  GENDER (male)? Yes             Total 2  High risk 5-8  Intermediate risk 3-4  Low risk 0-2    Obstructive Sleep Apnea: denies  If YES, machine used: no     Type 1 DM:   no  T2DM:  no    Coronary Artery Disease:  no  Hypertension:  yes    Active smoker:  1 ppd for years  Drinks Alcohol:  No  Marijuana usage    Dentition: benign    Defib / AICD / Pacemaker: no      Renal Failure/dialysis:  no    Patient was evaluated in PAT & anesthesia guidelines were applied. NPO guidelines, medication instructions and scheduled arrival time were reviewed with patient.     Hx of anesthesia complications:  no  Family hx of anesthesia complications:  no                                                                                                                     Anesthesia contacted:   no  Medical or cardiac clearance ordered: no    Mily Alcantar PA-C  11/23/21  10:36 AM Yes

## 2022-08-09 NOTE — ED ADULT NURSE NOTE - CCCP TRG CHIEF CMPLNT
Bed: Exam 07  Expected date: 8/9/22  Expected time:   Means of arrival:   Comments:  EMS  
sob wheezing

## 2023-03-22 NOTE — PROVIDER CONTACT NOTE (OTHER) - RECOMMENDATIONS
His INR today was 3.2 and today is 1 day after his higher dose of warfarin. I recommended that he change the dates of the higher dose warfarin to Tuesday and Saturday so that they can be spaced more evenly through the week rather than Tuesday and Thursday. I reminded him that it is important to be careful when working around tools that could cause bleeding.
Tele PA made aware
gave 1/2 am dextrose  rechecked at 170  symptoms gone

## 2023-09-28 NOTE — PROGRESS NOTE ADULT - PROBLEM SELECTOR PLAN 8
- DVT prophylaxis: patient on eliquis 2.5 BID.  - PT recommends discharge to a rehab facility and patient is now agreeable. - currently on sliding scale and lantus 8, humalog 2  - monitor FS. well-groomed/no distress

## 2023-10-19 NOTE — PROVIDER CONTACT NOTE (OTHER) - NAME OF MD/NP/PA/DO NOTIFIED:
hu Consent: The patient's consent was obtained including but not limited to risks of crusting, scabbing, blistering, scarring, darker or lighter pigmentary change, recurrence, incomplete removal and infection. Add 52 Modifier (Optional): no Number Of Freeze-Thaw Cycles: 2 freeze-thaw cycles Anesthesia Volume In Cc: 0.5 Medical Necessity Clause: This procedure was medically necessary because the lesions that were treated were: Detail Level: Detailed Medical Necessity Information: It is in your best interest to select a reason for this procedure from the list below. All of these items fulfill various CMS LCD requirements except the new and changing color options. Treatment Number (Will Not Render If 0): 0 Post-Care Instructions: I reviewed with the patient in detail post-care instructions. Patient is to wear sunprotection, and avoid picking at any of the treated lesions. Pt may apply Vaseline to crusted or scabbing areas.

## 2023-10-25 NOTE — ED ADULT NURSE NOTE - OBJECTIVE STATEMENT
88y/o male with history of CLL, HTN, Afib and AICD, CHF, MI, DM, BIBEMS from [facility] for hypoxia. Patient oriented to person and place, disoriented to time. As per EMS, patient had episode of hypoxia where SpO2 went as low as 70% on room air. EMS administered 3 duoneb treatments. Patient arrived in ED with face mask on and 3rd duoneb running, O2sat 97%. As per EMS, patient has had cough for about 2 weeks. Patient currently c/o SOB. Denies CP, abd pain, dizziness, N/V/D, vision changes, HA back pain. Crackles heard b/l to lungs. Abd soft, nontender, nondistended. Davis catheter in place upon arrival, unknown date of insertion. Catheter draining clear yellow urine to gravity, stat lock in place. Patient with stage 3 ulceration to left heal, dressing changed in ED. EKG done, patient on CM. MD Parekh at bedside.
Resident

## 2024-03-08 NOTE — H&P ADULT - PROBLEM SELECTOR PROBLEM 9
Today's Plan:   Lab on your way out today for the blood count, kidney fn and magnesium level.   I will speak with Dr. Parisi about the pacemaker implantation and let you know.     If you have questions or concerns please call my nurse team at [phone].   Scheduling phone number: [phone]  Reminder: Please bring in all current medications, over the counter supplements and vitamin bottles to your next appointment.    It was a pleasure seeing you today!     Nathaly Looney PA-C     Need for prophylactic measure

## 2024-09-03 NOTE — PROGRESS NOTE ADULT - ASSESSMENT
(M6) obeys commands 89 yo M with critical limb ischemia of left heel    # Critical Limb Ischemia  - Left heel large ischemic ulcer with eschar, no signs of osteomyelitis on Xray. No drainage or sign of infection. 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  Vessels non compressible throughout.    - would benefit from diagnostic and possibly therapeutic angiogram, however there are other components of patients care that need to be evaluated first.  Palliative care is seeing patient and it is unclear at this time what the goals of care are. FAMILY MEETING TODAY. He also has acute renal failure (normal BUN/Cr in July 2018).  If plan for angiogram would ideally wait for improvement given contrast load and further risk of damage to kidneys from procedure.  Cr trending slowly down today   - on low dose eliquis 2.5mg BID for afib, would benefit from aspirin therapy if no significant bleeding risk.  he should also be on a statin (lipitor 40mg) for atherosclerotic disease  Cont carvedilol and hydralazine for systolic heart failure (severely reduced LVEF globally on June 2018 TTE) and BP control.  Hold ACE-I/ARB in setting of renal failure.    - case seen and d/w Dr. Ojeda 89 yo M with critical limb ischemia of left heel    # Critical Limb Ischemia  - Left heel large ischemic ulcer with eschar, no signs of osteomyelitis on Xray. No drainage or sign of infection. 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  Vessels non compressible throughout.    - would benefit from diagnostic and possibly therapeutic angiogram, however there are other components of patients care that need to be evaluated first.  Palliative care is seeing patient and it is unclear at this time what the goals of care are. FAMILY MEETING TODAY. He also has acute renal failure (normal BUN/Cr in July 2018).  Would wait for improvement in renal function given contrast load and further risk of damage to kidneys from procedure. Workup can be done as outpatient as no emergent need for revascularization     - Cr trending slowly down today   - on low dose eliquis 2.5mg BID for afib, would benefit from aspirin therapy if no significant bleeding risk.  he should also be on a statin (lipitor 40mg) for atherosclerotic disease  Cont carvedilol and hydralazine for systolic heart failure (severely reduced LVEF globally on June 2018 TTE) and BP control.  Hold ACE-I/ARB in setting of renal failure.    - case seen and d/w Dr. Ojeda

## 2025-01-06 NOTE — SWALLOW VFSS/MBS ASSESSMENT ADULT - ASPIRATION DURING THE SWALLOW - SILENT
Affect and characteristics of appearance, verbalizations, behaviors are appropriate combination of new material and laryngeal residue/Trace Trace/descends along anterior tracheal wall

## 2025-01-17 NOTE — ED ADULT NURSE NOTE - FALL HARM RISK TYPE OF ASSESSMENT
A user error has taken place: encounter opened in error, closed for administrative reasons.    Daily Assessment